# Patient Record
Sex: MALE | Race: BLACK OR AFRICAN AMERICAN | Employment: UNEMPLOYED | URBAN - METROPOLITAN AREA
[De-identification: names, ages, dates, MRNs, and addresses within clinical notes are randomized per-mention and may not be internally consistent; named-entity substitution may affect disease eponyms.]

---

## 2017-06-07 ENCOUNTER — OFFICE VISIT (OUTPATIENT)
Dept: FAMILY MEDICINE CLINIC | Facility: CLINIC | Age: 57
End: 2017-06-07

## 2017-06-07 VITALS
TEMPERATURE: 98.3 F | DIASTOLIC BLOOD PRESSURE: 76 MMHG | RESPIRATION RATE: 20 BRPM | WEIGHT: 267 LBS | SYSTOLIC BLOOD PRESSURE: 126 MMHG | HEIGHT: 62 IN | OXYGEN SATURATION: 96 % | HEART RATE: 63 BPM | BODY MASS INDEX: 49.13 KG/M2

## 2017-06-07 DIAGNOSIS — E11.9 CONTROLLED TYPE 2 DIABETES MELLITUS WITHOUT COMPLICATION, WITHOUT LONG-TERM CURRENT USE OF INSULIN (HCC): ICD-10-CM

## 2017-06-07 DIAGNOSIS — E78.5 HYPERLIPIDEMIA, UNSPECIFIED HYPERLIPIDEMIA TYPE: ICD-10-CM

## 2017-06-07 DIAGNOSIS — J20.9 ACUTE BRONCHITIS, UNSPECIFIED ORGANISM: Primary | ICD-10-CM

## 2017-06-07 DIAGNOSIS — E66.01 MORBID OBESITY, UNSPECIFIED OBESITY TYPE (HCC): ICD-10-CM

## 2017-06-07 DIAGNOSIS — R05.9 COUGH: ICD-10-CM

## 2017-06-07 RX ORDER — AZITHROMYCIN 250 MG/1
TABLET, FILM COATED ORAL
Qty: 6 TAB | Refills: 0 | Status: SHIPPED | OUTPATIENT
Start: 2017-06-07 | End: 2017-06-12

## 2017-06-07 RX ORDER — HYDROCODONE POLISTIREX AND CHLORPHENIRAMINE POLISTIREX 10; 8 MG/5ML; MG/5ML
5 SUSPENSION, EXTENDED RELEASE ORAL
Qty: 60 ML | Refills: 0 | Status: SHIPPED | OUTPATIENT
Start: 2017-06-07 | End: 2017-06-21

## 2017-06-07 RX ORDER — FLUTICASONE PROPIONATE 50 MCG
2 SPRAY, SUSPENSION (ML) NASAL DAILY
Qty: 1 BOTTLE | Refills: 6 | Status: SHIPPED | OUTPATIENT
Start: 2017-06-07 | End: 2017-06-21

## 2017-06-07 NOTE — PROGRESS NOTES
Chief Complaint   Patient presents with    Cough     congestion since Thursday. Patient complains of chest discomfort when coughing. Subjective:   Keivn Fortune is a 55 y.o. male who complains of coryza, congestion, sneezing and post nasal drip for 7 days, unchanged since that time. He denies a history of shortness of breath and wheezing. Evaluation to date: none. Treatment to date: OTC products. Patient does smoke cigarettes. Relevant PMH: DM.    Patient Active Problem List   Diagnosis Code    Pneumonia J18.9    Controlled type 2 diabetes mellitus without complication, without long-term current use of insulin ([facility] E11.9    Hyperlipidemia E78.5    Morbid obesity ([facility] E66.01     Patient Active Problem List    Diagnosis Date Noted    Controlled type 2 diabetes mellitus without complication, without long-term current use of insulin ([facility] 06/07/2017    Hyperlipidemia 06/07/2017    Morbid obesity ([facility] 06/07/2017    Pneumonia 03/30/2014     Current Outpatient Prescriptions   Medication Sig Dispense Refill    fluticasone (FLONASE) 50 mcg/actuation nasal spray 2 Sprays by Both Nostrils route daily. Administer to right and left nostrils. 1 Bottle 6    azithromycin (ZITHROMAX Z-BETO) 250 mg tablet Take as directed 6 Tab 0    chlorpheniramine-HYDROcodone (TUSSIONEX) 10-8 mg/5 mL suspension Take 5 mL by mouth every twelve (12) hours as needed for Cough. Max Daily Amount: 10 mL. Indications: COUGH 60 mL 0    albuterol sulfate (PROAIR RESPICLICK) 90 mcg/actuation aepb Take 2 Puffs by inhalation every four (4) hours as needed. 1 Inhaler 0    glipiZIDE (GLUCOTROL) 10 mg tablet Take 10 mg by mouth daily.  metFORMIN (GLUCOPHAGE) 1,000 mg tablet Take 1,000 mg by mouth two (2) times daily (with meals).  naproxen (NAPROSYN) 500 mg tablet Take 500 mg by mouth two (2) times daily (with meals).       pseudoephedrine CR (SUDAFED 12 HOUR) 120 mg CR tablet Take 1 Tab by mouth two (2) times daily as needed for Congestion. 14 Tab 0    allopurinol (ZYLOPRIM) 300 mg tablet        No Known Allergies  Past Medical History:   Diagnosis Date    Diabetes ([facility]     Morbid obesity ([facility]     Pneumonia     Sickle cell trait ([facility]      Past Surgical History:   Procedure Laterality Date    HX OTHER SURGICAL      pt reports gallstone removal     Family History   Problem Relation Age of Onset    Sickle Cell Trait Other     Diabetes Mother     Diabetes Sister     Diabetes Maternal Grandmother         Review of Systems  Pertinent items are noted in HPI. Objective:     Visit Vitals    /76 (BP 1 Location: Right arm, BP Patient Position: Sitting)    Pulse 63    Temp 98.3 °F (36.8 °C) (Oral)    Resp 20    Ht 5' 2\" (1.575 m)    Wt 267 lb (121.1 kg)    SpO2 96%    BMI 48.83 kg/m2     General:  alert, cooperative, no distress   Eyes: negative   Ears: normal TM's and external ear canals AU   Sinuses: Normal paranasal sinuses without tenderness   Mouth:  Lips, mucosa, and tongue normal. Teeth and gums normal   Neck: supple, symmetrical, trachea midline and no adenopathy. Heart: normal rate and regular rhythm, S1 and S2 normal, no murmurs noted. Lungs: clear to auscultation bilaterally, wheezes R base, L base   Abdomen: soft, non-tender. Bowel sounds normal. No masses,  no organomegaly        Assessment/Plan:   bronchitis  RTC prn. Daquan was seen today for cough. Diagnoses and all orders for this visit:    Acute bronchitis, unspecified organism  -     azithromycin (ZITHROMAX Z-BETO) 250 mg tablet; Take as directed  -     albuterol sulfate (PROAIR RESPICLICK) 90 mcg/actuation aepb; Take 2 Puffs by inhalation every four (4) hours as needed. Cough  -     fluticasone (FLONASE) 50 mcg/actuation nasal spray; 2 Sprays by Both Nostrils route daily. Administer to right and left nostrils. -     chlorpheniramine-HYDROcodone (TUSSIONEX) 10-8 mg/5 mL suspension;  Take 5 mL by mouth every twelve (12) hours as needed for Cough. Max Daily Amount: 10 mL. Indications: COUGH    Controlled type 2 diabetes mellitus without complication, without long-term current use of insulin (HCC)  -     HEMOGLOBIN A1C WITH EAG; Future  -     METABOLIC PANEL, COMPREHENSIVE; Future    Hyperlipidemia, unspecified hyperlipidemia type  -     LIPID PANEL; Future    Morbid obesity, unspecified obesity type  -     METABOLIC PANEL, COMPREHENSIVE; Future    Patient planning to return and establish at next visit. Will order labs for prior. I have discussed the diagnosis with the patient and the intended plan as seen in the above orders. The patient has received an after-visit summary and questions were answered concerning future plans. I have discussed medication side effects and warnings with the patient as well. I have reviewed the plan of care with the patient, accepted their input and they are in agreement with the treatment goals. Patient verbalizes understanding. Follow-up Disposition:  Return in 2 weeks (on 6/21/2017), or if symptoms worsen or fail to improve, for DM, Obesity. Rock Cardoza

## 2017-06-07 NOTE — MR AVS SNAPSHOT
Visit Information Date & Time Provider Department Dept. Phone Encounter #  
 6/7/2017 11:00 AM Tamiko Almonte MD [employer] [phone] [id number] Follow-up Instructions Return in 2 weeks (on 6/21/2017), or if symptoms worsen or fail to improve, for DM, Obesity. Upcoming Health Maintenance Date Due Hepatitis C Screening 1960 Pneumococcal 19-64 Medium Risk (1 of 1 - PPSV23) 9/1/1980 DTaP/Tdap/Td series (1 - Tdap) 9/1/1982 INFLUENZA AGE 9 TO ADULT 8/1/2017 COLONOSCOPY 9/26/2022 Allergies as of 6/7/2017  Review Complete On: 6/7/2017 By: Nely Lundberg LPN No Known Allergies Current Immunizations  Never Reviewed No immunizations on file. Not reviewed this visit You Were Diagnosed With   
  
 Codes Comments Acute bronchitis, unspecified organism    -  Primary ICD-10-CM: J20.9 ICD-9-CM: 466.0 Cough     ICD-10-CM: R05 ICD-9-CM: 786.2 Controlled type 2 diabetes mellitus without complication, without long-term current use of insulin [facility]     ICD-10-CM: E11.9 ICD-9-CM: 250.00 Hyperlipidemia, unspecified hyperlipidemia type     ICD-10-CM: E78.5 ICD-9-CM: 272.4 Morbid obesity, unspecified obesity type     ICD-10-CM: E66.01 
ICD-9-CM: 278.01 Vitals BP Pulse Temp Resp Height(growth percentile) Weight(growth percentile) 126/76 (BP 1 Location: Right arm, BP Patient Position: Sitting) 63 98.3 °F (36.8 °C) (Oral) 20 5' 2\" (1.575 m) 267 lb (121.1 kg) SpO2 BMI Smoking Status 96% 48.83 kg/m2 Current Every Day Smoker BMI and BSA Data Body Mass Index Body Surface Area  
 48.83 kg/m 2 2.3 m 2 Preferred Pharmacy Pharmacy Name Phone [id number] [address] [phone] Your Updated Medication List  
  
   
This list is accurate as of: 6/7/17 12:01 PM.  Always use your most recent med list.  
  
  
  
  
 albuterol sulfate 90 mcg/actuation Aepb Commonly known as:  Lorraine Hummer Take 2 Puffs by inhalation every four (4) hours as needed. allopurinol 300 mg tablet Commonly known as:  ZYLOPRIM  
  
 azithromycin 250 mg tablet Commonly known as:  Sherrel Gain Take as directed  
  
 chlorpheniramine-HYDROcodone 10-8 mg/5 mL suspension Commonly known as:  Alberta Arts Take 5 mL by mouth every twelve (12) hours as needed for Cough. Max Daily Amount: 10 mL. Indications: COUGH  
  
 fluticasone 50 mcg/actuation nasal spray Commonly known as:  Martha Aid 2 Sprays by Both Nostrils route daily. Administer to right and left nostrils. glipiZIDE 10 mg tablet Commonly known as:  Daphene Wills Take 10 mg by mouth daily. metFORMIN 1,000 mg tablet Commonly known as:  GLUCOPHAGE Take 1,000 mg by mouth two (2) times daily (with meals). naproxen 500 mg tablet Commonly known as:  NAPROSYN Take 500 mg by mouth two (2) times daily (with meals). pseudoephedrine  mg CR tablet Commonly known as:  SUDAFED 12 HOUR Take 1 Tab by mouth two (2) times daily as needed for Congestion. Prescriptions Printed Refills  
 chlorpheniramine-HYDROcodone (TUSSIONEX) 10-8 mg/5 mL suspension 0 Sig: Take 5 mL by mouth every twelve (12) hours as needed for Cough. Max Daily Amount: 10 mL. Indications: COUGH Class: Print Route: Oral  
  
Prescriptions Sent to Pharmacy Refills  
 fluticasone (FLONASE) 50 mcg/actuation nasal spray 6 Si Sprays by Both Nostrils route daily. Administer to right and left nostrils. Class: Normal  
 Pharmacy: [address] [place] [address] Ph #: [phone] Route: Both Nostrils  
 azithromycin (ZITHROMAX Z-EBTO) 250 mg tablet 0 Sig: Take as directed Class: Normal  
 Pharmacy: [address] [place] [address] Ph #: [phone] albuterol sulfate (PROAIR RESPICLICK) 90 mcg/actuation aepb 0 Sig: Take 2 Puffs by inhalation every four (4) hours as needed. Class: Normal  
 Pharmacy: [address] [place] [address] [facility] #: [phone] Route: Inhalation Follow-up Instructions Return in 2 weeks (on 6/21/2017), or if symptoms worsen or fail to improve, for DM, Obesity. To-Do List   
 06/07/2017 Lab:  HEMOGLOBIN A1C WITH EAG   
  
 06/07/2017 Lab:  LIPID PANEL   
  
 06/07/2017 Lab:  METABOLIC PANEL, COMPREHENSIVE Patient Instructions Upper Respiratory Infection (Cold): Care Instructions Your Care Instructions An upper respiratory infection, or URI, is an infection of the nose, sinuses, or throat. URIs are spread by coughs, sneezes, and direct contact. The common cold is the most frequent kind of URI. The flu and sinus infections are other kinds of URIs. Almost all URIs are caused by viruses. Antibiotics won't cure them. But you can treat most infections with home care. This may include drinking lots of fluids and taking over-the-counter pain medicine. You will probably feel better in 4 to 10 days. The doctor has checked you carefully, but problems can develop later. If you notice any problems or new symptoms, get medical treatment right away. Follow-up care is a key part of your treatment and safety. Be sure to make and go to all appointments, and call your doctor if you are having problems. It's also a good idea to know your test results and keep a list of the medicines you take. How can you care for yourself at home? · To prevent dehydration, drink plenty of fluids, enough so that your urine is light yellow or clear like water. Choose water and other caffeine-free clear liquids until you feel better. If you have kidney, heart, or liver disease and have to limit fluids, talk with your doctor before you increase the amount of fluids you drink. · Take an over-the-counter pain medicine, such as acetaminophen (Tylenol), ibuprofen (Advil, Motrin), or naproxen (Aleve). Read and follow all instructions on the label. · Before you use cough and cold medicines, check the label. These medicines may not be safe for young children or for people with certain health problems. · Be careful when taking over-the-counter cold or flu medicines and Tylenol at the same time. Many of these medicines have acetaminophen, which is Tylenol. Read the labels to make sure that you are not taking more than the recommended dose. Too much acetaminophen (Tylenol) can be harmful. · Get plenty of rest. 
· Do not smoke or allow others to smoke around you. If you need help quitting, talk to your doctor about stop-smoking programs and medicines. These can increase your chances of quitting for good. When should you call for help? Call 911 anytime you think you may need emergency care. For example, call if: 
· You have severe trouble breathing. Call your doctor now or seek immediate medical care if: 
· You seem to be getting much sicker. · You have new or worse trouble breathing. · You have a new or higher fever. · You have a new rash. Watch closely for changes in your health, and be sure to contact your doctor if: 
· You have a new symptom, such as a sore throat, an earache, or sinus pain. · You cough more deeply or more often, especially if you notice more mucus or a change in the color of your mucus. · You do not get better as expected. Where can you learn more? Go to http://shekhar-pk.info/. Enter Z960 in the search box to learn more about \"Upper Respiratory Infection (Cold): Care Instructions. \" Current as of: June 30, 2016 Content Version: 11.2 © 2517-6635 [employer].  Care instructions adapted under license by [employer] (which disclaims liability or warranty for this information). If you have questions about a medical condition or this instruction, always ask your healthcare professional. [address] any warranty or liability for your use of this information. Introducing [facility]! [facility] introduces Songdrop patient portal. Now you can access parts of your medical record, email your doctor's office, and request medication refills online. 1. In your internet browser, go to [email or web address] 2. Click on the First Time User? Click Here link in the Sign In box. You will see the New Member Sign Up page. 3. Enter your Songdrop Access Code exactly as it appears below. You will not need to use this code after youve completed the sign-up process. If you do not sign up before the expiration date, you must request a new code. · Songdrop Access Code: [email or web address] Expires: 9/5/2017 12:01 PM 
 
4. Enter the last four digits of your Social Security Number (xxxx) and Date of Birth (mm/dd/yyyy) as indicated and click Submit. You will be taken to the next sign-up page. 5. Create a Songdrop ID. This will be your Songdrop login ID and cannot be changed, so think of one that is secure and easy to remember. 6. Create a Songdrop password. You can change your password at any time. 7. Enter your Password Reset Question and Answer. This can be used at a later time if you forget your password. 8. Enter your e-mail address. You will receive e-mail notification when new information is available in [address] 9. Click Sign Up. You can now view and download portions of your medical record. 10. Click the Download Summary menu link to download a portable copy of your medical information. If you have questions, please visit the Frequently Asked Questions section of the Songdrop website. Remember, Songdrop is NOT to be used for urgent needs. For medical emergencies, dial 911. Now available from your iPhone and Android! Please provide this summary of care documentation to your next provider. Your primary care clinician is listed as Salina Hester. If you have any questions after today's visit, please call [phone].

## 2017-06-07 NOTE — PROGRESS NOTES
Chief Complaint   Patient presents with    Cough     congestion since Thursday. Patient complains of chest discomfort when coughing. 1. Have you been to the ER, urgent care clinic since your last visit? Hospitalized since your last visit? No    2. Have you seen or consulted any other health care providers outside of the [address] [place] [address] since your last visit? Include any pap smears or colon screening.  No

## 2017-06-07 NOTE — PATIENT INSTRUCTIONS

## 2017-06-16 LAB
ALBUMIN SERPL-MCNC: 4.5 G/DL (ref 3.5–5.5)
ALBUMIN/GLOB SERPL: 1.7 {RATIO} (ref 1.2–2.2)
ALP SERPL-CCNC: 89 IU/L (ref 39–117)
ALT SERPL-CCNC: 19 IU/L (ref 0–44)
AST SERPL-CCNC: 21 IU/L (ref 0–40)
BILIRUB SERPL-MCNC: 1 MG/DL (ref 0–1.2)
BUN SERPL-MCNC: 10 MG/DL (ref 6–24)
BUN/CREAT SERPL: 13 (ref 9–20)
CALCIUM SERPL-MCNC: 9 MG/DL (ref 8.7–10.2)
CHLORIDE SERPL-SCNC: 100 MMOL/L (ref 96–106)
CHOLEST SERPL-MCNC: 192 MG/DL (ref 100–199)
CO2 SERPL-SCNC: 23 MMOL/L (ref 18–29)
CREAT SERPL-MCNC: 0.79 MG/DL (ref 0.76–1.27)
EST. AVERAGE GLUCOSE BLD GHB EST-MCNC: 298 MG/DL
GLOBULIN SER CALC-MCNC: 2.6 G/DL (ref 1.5–4.5)
GLUCOSE SERPL-MCNC: 274 MG/DL (ref 65–99)
HBA1C MFR BLD: 12 % (ref 4.8–5.6)
HDLC SERPL-MCNC: 50 MG/DL
LDLC SERPL CALC-MCNC: 98 MG/DL (ref 0–99)
POTASSIUM SERPL-SCNC: 3.9 MMOL/L (ref 3.5–5.2)
PROT SERPL-MCNC: 7.1 G/DL (ref 6–8.5)
SODIUM SERPL-SCNC: 138 MMOL/L (ref 134–144)
TRIGL SERPL-MCNC: 221 MG/DL (ref 0–149)
VLDLC SERPL CALC-MCNC: 44 MG/DL (ref 5–40)

## 2017-06-20 DIAGNOSIS — J20.9 ACUTE BRONCHITIS, UNSPECIFIED ORGANISM: ICD-10-CM

## 2017-06-20 RX ORDER — ALBUTEROL SULFATE 90 UG/1
2 AEROSOL, METERED RESPIRATORY (INHALATION)
Qty: 1 INHALER | Refills: 0 | Status: SHIPPED | OUTPATIENT
Start: 2017-06-20 | End: 2017-10-18

## 2017-06-21 ENCOUNTER — OFFICE VISIT (OUTPATIENT)
Dept: FAMILY MEDICINE CLINIC | Facility: CLINIC | Age: 57
End: 2017-06-21

## 2017-06-21 VITALS
BODY MASS INDEX: 49.5 KG/M2 | DIASTOLIC BLOOD PRESSURE: 80 MMHG | TEMPERATURE: 98 F | OXYGEN SATURATION: 96 % | WEIGHT: 269 LBS | HEART RATE: 65 BPM | RESPIRATION RATE: 20 BRPM | SYSTOLIC BLOOD PRESSURE: 144 MMHG | HEIGHT: 62 IN

## 2017-06-21 DIAGNOSIS — M10.9 GOUT, UNSPECIFIED CAUSE, UNSPECIFIED CHRONICITY, UNSPECIFIED SITE: ICD-10-CM

## 2017-06-21 DIAGNOSIS — E66.01 MORBID OBESITY, UNSPECIFIED OBESITY TYPE (HCC): ICD-10-CM

## 2017-06-21 DIAGNOSIS — R03.0 ELEVATED BP WITHOUT DIAGNOSIS OF HYPERTENSION: ICD-10-CM

## 2017-06-21 DIAGNOSIS — E78.5 HYPERLIPIDEMIA, UNSPECIFIED HYPERLIPIDEMIA TYPE: ICD-10-CM

## 2017-06-21 DIAGNOSIS — Z71.6 TOBACCO ABUSE COUNSELING: ICD-10-CM

## 2017-06-21 DIAGNOSIS — Z11.3 SCREENING FOR STD (SEXUALLY TRANSMITTED DISEASE): ICD-10-CM

## 2017-06-21 DIAGNOSIS — Z72.0 TOBACCO ABUSE: ICD-10-CM

## 2017-06-21 DIAGNOSIS — E11.9 CONTROLLED TYPE 2 DIABETES MELLITUS WITHOUT COMPLICATION, WITHOUT LONG-TERM CURRENT USE OF INSULIN (HCC): Primary | ICD-10-CM

## 2017-06-21 LAB
MICROALBUMIN UR TEST STR-MCNC: 80 MG/L
MICROALBUMIN/CREAT RATIO POC: NORMAL MG/G

## 2017-06-21 RX ORDER — TIMOLOL MALEATE 5 MG/ML
SOLUTION OPHTHALMIC
Refills: 0 | COMMUNITY
Start: 2017-04-24

## 2017-06-21 RX ORDER — BRINZOLAMIDE/BRIMONIDINE TARTRATE 10; 2 MG/ML; MG/ML
1 SUSPENSION/ DROPS OPHTHALMIC
Refills: 0 | COMMUNITY
Start: 2017-04-25 | End: 2020-11-16 | Stop reason: ALTCHOICE

## 2017-06-21 RX ORDER — METFORMIN HYDROCHLORIDE 1000 MG/1
1000 TABLET ORAL 2 TIMES DAILY WITH MEALS
Qty: 60 TAB | Refills: 3 | Status: SHIPPED | OUTPATIENT
Start: 2017-06-21 | End: 2018-01-22 | Stop reason: SDUPTHER

## 2017-06-21 RX ORDER — GLIPIZIDE 5 MG/1
5 TABLET ORAL 2 TIMES DAILY
Qty: 60 TAB | Refills: 3 | Status: SHIPPED | OUTPATIENT
Start: 2017-06-21 | End: 2018-01-22 | Stop reason: ALTCHOICE

## 2017-06-21 RX ORDER — ALLOPURINOL 300 MG/1
300 TABLET ORAL DAILY
Qty: 30 TAB | Refills: 3 | Status: SHIPPED | OUTPATIENT
Start: 2017-06-21 | End: 2018-06-04 | Stop reason: SDUPTHER

## 2017-06-21 NOTE — MR AVS SNAPSHOT
Visit Information Date & Time Provider Department Dept. Phone Encounter #  
 6/21/2017  3:45 PM Roseline Pennington MD [facility] [id number] Follow-up Instructions Return in about 4 weeks (around 7/19/2017), or if symptoms worsen or fail to improve, for DM, Obesity, HLD. Upcoming Health Maintenance Date Due Hepatitis C Screening 1960 FOOT EXAM Q1 9/1/1971 MICROALBUMIN Q1 9/1/1971 EYE EXAM RETINAL OR DILATED Q1 9/1/1971 Pneumococcal 19-64 Medium Risk (1 of 1 - PPSV23) 9/1/1980 DTaP/Tdap/Td series (1 - Tdap) 9/1/1982 INFLUENZA AGE 9 TO ADULT 8/1/2017 HEMOGLOBIN A1C Q6M 12/15/2017 LIPID PANEL Q1 6/15/2018 COLONOSCOPY 9/26/2022 Allergies as of 6/21/2017  Review Complete On: 6/21/2017 By: Roseline Pennington MD  
 No Known Allergies Current Immunizations  Never Reviewed No immunizations on file. Not reviewed this visit You Were Diagnosed With   
  
 Codes Comments Controlled type 2 diabetes mellitus without complication, without long-term current use of insulin ([facility]    -  Primary ICD-10-CM: E11.9 ICD-9-CM: 250.00 Morbid obesity, unspecified obesity type     ICD-10-CM: E66.01 
ICD-9-CM: 278.01 Hyperlipidemia, unspecified hyperlipidemia type     ICD-10-CM: E78.5 ICD-9-CM: 272.4 Elevated BP without diagnosis of hypertension     ICD-10-CM: R03.0 ICD-9-CM: 796.2 Gout, unspecified cause, unspecified chronicity, unspecified site     ICD-10-CM: M10.9 ICD-9-CM: 274.9 Screening for STD (sexually transmitted disease)     ICD-10-CM: Z11.3 ICD-9-CM: V74.5 Vitals BP Pulse Temp Resp Height(growth percentile) Weight(growth percentile) 144/80 65 98 °F (36.7 °C) 20 5' 2\" (1.575 m) 269 lb (122 kg) SpO2 BMI Smoking Status 96% 49.2 kg/m2 Current Every Day Smoker Vitals History BMI and BSA Data Body Mass Index Body Surface Area 49.2 kg/m 2 2.31 m 2 Preferred Pharmacy Pharmacy Name Phone [id number] [address] [phone] Your Updated Medication List  
  
   
This list is accurate as of: 6/21/17  4:09 PM.  Always use your most recent med list.  
  
  
  
  
 albuterol 90 mcg/actuation inhaler Commonly known as:  PROVENTIL HFA, VENTOLIN HFA, PROAIR HFA Take 2 Puffs by inhalation every four (4) hours as needed for Wheezing. allopurinol 300 mg tablet Commonly known as:  [place] Calender Take 1 Tab by mouth daily. glipiZIDE 5 mg tablet Commonly known as:  [place] Fujita Take 1 Tab by mouth two (2) times a day. metFORMIN 1,000 mg tablet Commonly known as:  GLUCOPHAGE Take 1 Tab by mouth two (2) times daily (with meals). naproxen 500 mg tablet Commonly known as:  NAPROSYN Take 500 mg by mouth two (2) times daily (with meals). SIMBRINZA 1-0.2 % Drps Generic drug:  brinzolamide-brimonidine  
  
 timolol 0.5 % ophthalmic gel-forming Commonly known as:  TIMOPTIC-XE  
instill 1 drop into both eyes once daily Prescriptions Sent to Pharmacy Refills  
 allopurinol (ZYLOPRIM) 300 mg tablet 3 Sig: Take 1 Tab by mouth daily. Class: Normal  
 Pharmacy: [address] [place] [address] Ph #: [phone] Route: Oral  
 metFORMIN (GLUCOPHAGE) 1,000 mg tablet 3 Sig: Take 1 Tab by mouth two (2) times daily (with meals). Class: Normal  
 Pharmacy: [address] [place] [address] Ph #: [phone] Route: Oral  
 glipiZIDE (GLUCOTROL) 5 mg tablet 3 Sig: Take 1 Tab by mouth two (2) times a day. Class: Normal  
 Pharmacy: [address] [place] [address] Ph #: [phone] Route: Oral  
  
We Performed the Following AMB POC URINE, MICROALBUMIN, SEMIQUANTITATIVE [88363 CPT(R)]  DIABETES FOOT EXAM [7 Custom] Follow-up Instructions Return in about 4 weeks (around 7/19/2017), or if symptoms worsen or fail to improve, for DM, Obesity, HLD. To-Do List   
 06/21/2017 Lab:  HEP B SURFACE AG   
  
 06/21/2017 Lab:  HEPATITIS C AB   
  
 06/21/2017 Lab:  HIV 1/2 AG/AB, 4TH GENERATION,W RFLX CONFIRM   
  
 06/21/2017 Lab:  [facility] Patient Instructions Body Mass Index: Care Instructions Your Care Instructions Body mass index (BMI) can help you see if your weight is raising your risk for health problems. It uses a formula to compare how much you weigh with how tall you are. · A BMI lower than 18.5 is considered underweight. · A BMI between 18.5 and 24.9 is considered healthy. · A BMI between 25 and 29.9 is considered overweight. A BMI of 30 or higher is considered obese. If your BMI is in the normal range, it means that you have a lower risk for weight-related health problems. If your BMI is in the overweight or obese range, you may be at increased risk for weight-related health problems, such as high blood pressure, heart disease, stroke, arthritis or joint pain, and diabetes. If your BMI is in the underweight range, you may be at increased risk for health problems such as fatigue, lower protection (immunity) against illness, muscle loss, bone loss, hair loss, and hormone problems. BMI is just one measure of your risk for weight-related health problems. You may be at higher risk for health problems if you are not active, you eat an unhealthy diet, or you drink too much alcohol or use tobacco products. Follow-up care is a key part of your treatment and safety. Be sure to make and go to all appointments, and call your doctor if you are having problems. It's also a good idea to know your test results and keep a list of the medicines you take. How can you care for yourself at home? · Practice healthy eating habits.  This includes eating plenty of fruits, vegetables, whole grains, lean protein, and low-fat dairy. · If your doctor recommends it, get more exercise. Walking is a good choice. Bit by bit, increase the amount you walk every day. Try for at least 30 minutes on most days of the week. · Do not smoke. Smoking can increase your risk for health problems. If you need help quitting, talk to your doctor about stop-smoking programs and medicines. These can increase your chances of quitting for good. · Limit alcohol to 2 drinks a day for men and 1 drink a day for women. Too much alcohol can cause health problems. If you have a BMI higher than 25 · Your doctor may do other tests to check your risk for weight-related health problems. This may include measuring the distance around your waist. A waist measurement of more than 40 inches in men or 35 inches in women can increase the risk of weight-related health problems. · Talk with your doctor about steps you can take to stay healthy or improve your health. You may need to make lifestyle changes to lose weight and stay healthy, such as changing your diet and getting regular exercise. If you have a BMI lower than 18.5 · Your doctor may do other tests to check your risk for health problems. · Talk with your doctor about steps you can take to stay healthy or improve your health. You may need to make lifestyle changes to gain or maintain weight and stay healthy, such as getting more healthy foods in your diet and doing exercises to build muscle. Where can you learn more? Go to http://shekhar-pk.info/. Enter S176 in the search box to learn more about \"Body Mass Index: Care Instructions. \" Current as of: January 23, 2017 Content Version: 11.3 © 6136-2853 Healthwise, Incorporated. Care instructions adapted under license by [employer] (which disclaims liability or warranty for this information).  If you have questions about a medical condition or this instruction, always ask your healthcare professional. [place] [address] any warranty or liability for your use of this information. Learning About Diabetes Food Guidelines Your Care Instructions Meal planning is important to manage diabetes. It helps keep your blood sugar at a target level (which you set with your doctor). You don't have to eat special foods. You can eat what your family eats, including sweets once in a while. But you do have to pay attention to how often you eat and how much you eat of certain foods. You may want to work with a dietitian or a certified diabetes educator (CDE) to help you plan meals and snacks. A dietitian or CDE can also help you lose weight if that is one of your goals. What should you know about eating carbs? Managing the amount of carbohydrate (carbs) you eat is an important part of healthy meals when you have diabetes. Carbohydrate is found in many foods. · Learn which foods have carbs. And learn the amounts of carbs in different foods. ¨ Bread, cereal, pasta, and rice have about 15 grams of carbs in a serving. A serving is 1 slice of bread (1 ounce), ½ cup of cooked cereal, or 1/3 cup of cooked pasta or rice. ¨ Fruits have 15 grams of carbs in a serving. A serving is 1 small fresh fruit, such as an apple or orange; ½ of a banana; ½ cup of cooked or canned fruit; ½ cup of fruit juice; 1 cup of melon or raspberries; or 2 tablespoons of dried fruit. ¨ Milk and no-sugar-added yogurt have 15 grams of carbs in a serving. A serving is 1 cup of milk or 2/3 cup of no-sugar-added yogurt. ¨ Starchy vegetables have 15 grams of carbs in a serving. A serving is ½ cup of mashed potatoes or sweet potato; 1 cup winter squash; ½ of a small baked potato; ½ cup of cooked beans; or ½ cup cooked corn or green peas. · Learn how much carbs to eat each day and at each meal. A dietitian or CDE can teach you how to keep track of the amount of carbs you eat.  This is called carbohydrate counting. · If you are not sure how to count carbohydrate grams, use the Plate Method to plan meals. It is a good, quick way to make sure that you have a balanced meal. It also helps you spread carbs throughout the day. ¨ Divide your plate by types of foods. Put non-starchy vegetables on half the plate, meat or other protein food on one-quarter of the plate, and a grain or starchy vegetable in the final quarter of the plate. To this you can add a small piece of fruit and 1 cup of milk or yogurt, depending on how many carbs you are supposed to eat at a meal. 
· Try to eat about the same amount of carbs at each meal. Do not \"save up\" your daily allowance of carbs to eat at one meal. 
· Proteins have very little or no carbs per serving. Examples of proteins are beef, chicken, turkey, fish, eggs, tofu, cheese, cottage cheese, and peanut butter. A serving size of meat is 3 ounces, which is about the size of a deck of cards. Examples of meat substitute serving sizes (equal to 1 ounce of meat) are 1/4 cup of cottage cheese, 1 egg, 1 tablespoon of peanut butter, and ½ cup of tofu. How can you eat out and still eat healthy? · Learn to estimate the serving sizes of foods that have carbohydrate. If you measure food at home, it will be easier to estimate the amount in a serving of restaurant food. · If the meal you order has too much carbohydrate (such as potatoes, corn, or baked beans), ask to have a low-carbohydrate food instead. Ask for a salad or green vegetables. · If you use insulin, check your blood sugar before and after eating out to help you plan how much to eat in the future. · If you eat more carbohydrate at a meal than you had planned, take a walk or do other exercise. This will help lower your blood sugar. What else should you know? · Limit saturated fat, such as the fat from meat and dairy products.  This is a healthy choice because people who have diabetes are at higher risk of heart disease. So choose lean cuts of meat and nonfat or low-fat dairy products. Use olive or canola oil instead of butter or shortening when cooking. · Don't skip meals. Your blood sugar may drop too low if you skip meals and take insulin or certain medicines for diabetes. · Check with your doctor before you drink alcohol. Alcohol can cause your blood sugar to drop too low. Alcohol can also cause a bad reaction if you take certain diabetes medicines. Follow-up care is a key part of your treatment and safety. Be sure to make and go to all appointments, and call your doctor if you are having problems. It's also a good idea to know your test results and keep a list of the medicines you take. Where can you learn more? Go to http://shekhar-pk.info/. Enter Y060 in the search box to learn more about \"Learning About Diabetes Food Guidelines. \" Current as of: March 13, 2017 Content Version: 11.3 © 5100-5609 [employer]. Care instructions adapted under license by [employer] (which disclaims liability or warranty for this information). If you have questions about a medical condition or this instruction, always ask your healthcare professional. [address] any warranty or liability for your use of this information. Learning About Meal Planning for Diabetes Why plan your meals? Meal planning can be a key part of managing diabetes. Planning meals and snacks with the right balance of carbohydrate, protein, and fat can help you keep your blood sugar at the target level you set with your doctor. You don't have to eat special foods. You can eat what your family eats, including sweets once in a while. But you do have to pay attention to how often you eat and how much you eat of certain foods. You may want to work with a dietitian or a certified diabetes educator.  He or she can give you tips and meal ideas and can answer your questions about meal planning. This health professional can also help you reach a healthy weight if that is one of your goals. What plan is right for you? Your dietitian or diabetes educator may suggest that you start with the plate format or carbohydrate counting. The plate format The plate format is a simple way to help you manage how you eat. You plan meals by learning how much space each food should take on a plate. Using the plate format helps you spread carbohydrate throughout the day. It can make it easier to keep your blood sugar level within your target range. It also helps you see if you're eating healthy portion sizes. To use the plate format, you put non-starchy vegetables on half your plate. Add meat or meat substitutes on one-quarter of the plate. Put a grain or starchy vegetable (such as brown rice or a potato) on the final quarter of the plate. You can add a small piece of fruit and some low-fat or fat-free milk or yogurt, depending on your carbohydrate goal for each meal. 
Here are some tips for using the plate format: · Make sure that you are not using an oversized plate. A 9-inch plate is best. Many restaurants use larger plates. · Get used to using the plate format at home. Then you can use it when you eat out. · Write down your questions about using the plate format. Talk to your doctor, a dietitian, or a diabetes educator about your concerns. Carbohydrate counting With carbohydrate counting, you plan meals based on the amount of carbohydrate in each food. Carbohydrate raises blood sugar higher and more quickly than any other nutrient. It is found in desserts, breads and cereals, and fruit. It's also found in starchy vegetables such as potatoes and corn, grains such as rice and pasta, and milk and yogurt. Spreading carbohydrate throughout the day helps keep your blood sugar levels within your target range.  
Your daily amount depends on several things, including your weight, how active you are, which diabetes medicines you take, and what your goals are for your blood sugar levels. A registered dietitian or diabetes educator can help you plan how much carbohydrate to include in each meal and snack. A guideline for your daily amount of carbohydrate is: · 45 to 60 grams at each meal. That's about the same as 3 to 4 carbohydrate servings. · 15 to 20 grams at each snack. That's about the same as 1 carbohydrate serving. The Nutrition Facts label on packaged foods tells you how much carbohydrate is in a serving of the food. First, look at the serving size on the food label. Is that the amount you eat in a serving? All of the nutrition information on a food label is based on that serving size. So if you eat more or less than that, you'll need to adjust the other numbers. Total carbohydrate is the next thing you need to look for on the label. If you count carbohydrate servings, one serving of carbohydrate is 15 grams. For foods that don't come with labels, such as fresh fruits and vegetables, you'll need a guide that lists carbohydrate in these foods. Ask your doctor, dietitian, or diabetes educator about books or other nutrition guides you can use. If you take insulin, you need to know how many grams of carbohydrate are in a meal. This lets you know how much rapid-acting insulin to take before you eat. If you use an insulin pump, you get a constant rate of insulin during the day. So the pump must be programmed at meals to give you extra insulin to cover the rise in blood sugar after meals. When you know how much carbohydrate you will eat, you can take the right amount of insulin. Or, if you always use the same amount of insulin, you need to make sure that you eat the same amount of carbohydrate at meals. If you need more help to understand carbohydrate counting and food labels, ask your doctor, dietitian, or diabetes educator. How do you get started with meal planning? Here are some tips to get started: 
· Plan your meals a week at a time. Don't forget to include snacks too. · Use cookbooks or online recipes to plan several main meals. Plan some quick meals for busy nights. You also can double some recipes that freeze well. Then you can save half for other busy nights when you don't have time to cook. · Make sure you have the ingredients you need for your recipes. If you're running low on basic items, put these items on your shopping list too. · List foods that you use to make breakfasts, lunches, and snacks. List plenty of fruits and vegetables. · Post this list on the refrigerator. Add to it as you think of more things you need. · Take the list to the store to do your weekly shopping. Follow-up care is a key part of your treatment and safety. Be sure to make and go to all appointments, and call your doctor if you are having problems. It's also a good idea to know your test results and keep a list of the medicines you take. Where can you learn more? Go to http://shekharKylin Networkpk.info/. Abena Patel in the search box to learn more about \"Learning About Meal Planning for Diabetes. \" Current as of: March 13, 2017 Content Version: 11.3 © 7373-0755 [employer]. Care instructions adapted under license by [employer] (which disclaims liability or warranty for this information). If you have questions about a medical condition or this instruction, always ask your healthcare professional. [address] any warranty or liability for your use of this information. Nutrition Tips for Diabetes: After Your Visit Your Care Instructions A healthy diet is important to manage diabetes. It helps you lose weight (if you need to) and keep it off. It gives you the nutrition and energy your body needs and helps prevent heart disease. But a diet for diabetes does not mean that you have to eat special foods.  You can eat what your family eats, including occasional sweets and other favorites. But you do have to pay attention to how often you eat and how much you eat of certain foods. The right plan for you will give you meals that help you keep your blood sugar at healthy levels. Try to eat a variety of foods and to spread carbohydrate throughout the day. Carbohydrate raises blood sugar higher and more quickly than any other nutrient does. Carbohydrate is found in sugar, breads and cereals, fruit, starchy vegetables such as potatoes and corn, and milk and yogurt. You may want to work with a dietitian or diabetes educator to help you plan meals and snacks. A dietitian or diabetes educator also can help you lose weight if that is one of your goals. The following tips can help you enjoy your meals and stay healthy. Follow-up care is a key part of your treatment and safety. Be sure to make and go to all appointments, and call your doctor if you are having problems. Its also a good idea to know your test results and keep a list of the medicines you take. How can you care for yourself at home? · Learn which foods have carbohydrate and how much carbohydrate to eat. A dietitian or diabetes educator can help you learn to keep track of how much carbohydrate you eat. · Spread carbohydrate throughout the day. Eat some carbohydrate at all meals, but do not eat too much at any one time. · Plan meals to include food from all the food groups. These are the food groups and some example portion sizes: ¨ Grains: 1 slice of bread (1 ounce), ½ cup of cooked cereal, and 1/3 cup of cooked pasta or rice. These have about 15 grams of carbohydrate in a serving. Choose whole grains such as whole wheat bread or crackers, oatmeal, and brown rice more often than refined grains.  
¨ Fruit: 1 small fresh fruit, such as an apple or orange; ½ of a banana; ½ cup of chopped, cooked, or canned fruit; ½ cup of fruit juice; 1 cup of melon or raspberries; and 2 tablespoons of dried fruit. These have about 15 grams of carbohydrate in a serving. ¨ Dairy: 1 cup of nonfat or low-fat milk and 2/3 cup of plain yogurt. These have about 15 grams of carbohydrate in a serving. ¨ Protein foods: Beef, chicken, turkey, fish, eggs, tofu, cheese, cottage cheese, and peanut butter. A serving size of meat is 3 ounces, which is about the size of a deck of cards. Examples of meat substitute serving sizes (equal to 1 ounce of meat) are 1/4 cup of cottage cheese, 1 egg, 1 tablespoon of peanut butter, and ½ cup of tofu. These have very little or no carbohydrate per serving. ¨ Vegetables: Starchy vegetables such as ½ cup of cooked dried beans, peas, potatoes, or corn have about 15 grams of carbohydrate. Nonstarchy vegetables have very little carbohydrate, such as 1 cup of raw leafy vegetables (such as spinach), ½ cup of other vegetables (cooked or chopped), and 3/4 cup of vegetable juice. · Use the plate format to plan meals. It is a good, quick way to make sure that you have a balanced meal. It also helps you spread carbohydrate throughout the day. You divide your plate by types of foods. Put vegetables on half the plate, meat or meat substitutes on one-quarter of the plate, and a grain or starchy vegetable (such as brown rice or a potato) in the final quarter of the plate. To this you can add a small piece of fruit and 1 cup of milk or yogurt, depending on how much carbohydrate you are supposed to eat at a meal. 
· Talk to your dietitian or diabetes educator about ways to add limited amounts of sweets into your meal plan. You can eat these foods now and then, as long as you include the amount of carbohydrate they have in your daily carbohydrate allowance. · If you drink alcohol, limit it to no more than 1 drink a day for women and 2 drinks a day for men. If you are pregnant, no amount of alcohol is known to be safe. · Protein, fat, and fiber do not raise blood sugar as much as carbohydrate does. If you eat a lot of these nutrients in a meal, your blood sugar will rise more slowly than it would otherwise. · Limit saturated fats, such as those from meat and dairy products. Try to replace it with monounsaturated fat, such as olive oil. This is a healthier choice because people who have diabetes are at higher-than-average risk of heart disease. But use a modest amount of olive oil. A tablespoon of olive oil has 14 grams of fat and 120 calories. · Exercise lowers blood sugar. If you take insulin by shots or pump, you can use less than you would if you were not exercising. Keep in mind that timing matters. If you exercise within 1 hour after a meal, your body may need less insulin for that meal than it would if you exercised 3 hours after the meal. Test your blood sugar to find out how exercise affects your need for insulin. · Exercise on most days of the week. Aim for at least 30 minutes. Exercise helps you stay at a healthy weight and helps your body use insulin. Walking is an easy way to get exercise. Gradually increase the amount you walk every day. You also may want to swim, bike, or do other activities. When you eat out · Learn to estimate the serving sizes of foods that have carbohydrate. If you measure food at home, it will be easier to estimate the amount in a serving of restaurant food. · If the meal you order has too much carbohydrate (such as potatoes, corn, or baked beans), ask to have a low-carbohydrate food instead. Ask for a salad or green vegetables. · If you use insulin, check your blood sugar before and after eating out to help you plan how much to eat in the future. · If you eat more carbohydrate at a meal than you had planned, take a walk or do other exercise. This will help lower your blood sugar. Where can you learn more? Go to DealExplorer.be Enter O671 in the search box to learn more about \"Nutrition Tips for Diabetes: After Your Visit. \"  
© 2212-1643 Healthwise, [employer]orporated. Care instructions adapted under license by [employer] (which disclaims liability or warranty for this information). This care instruction is for use with your licensed healthcare professional. If you have questions about a medical condition or this instruction, always ask your healthcare professional. [place] [address] any warranty or liability for your use of this information. Content Version: 56.8.840359; Current as of: June 4, 2014 High Cholesterol: Care Instructions Your Care Instructions Cholesterol is a type of fat in your blood. It is needed for many body functions, such as making new cells. Cholesterol is made by your body. It also comes from food you eat. High cholesterol means that you have too much of the fat in your blood. This raises your risk of a heart attack and stroke. LDL and HDL are part of your total cholesterol. LDL is the \"bad\" cholesterol. High LDL can raise your risk for heart disease, heart attack, and stroke. HDL is the \"good\" cholesterol. It helps clear bad cholesterol from the body. High HDL is linked with a lower risk of heart disease, heart attack, and stroke. Your cholesterol levels help your doctor find out your risk for having a heart attack or stroke. You and your doctor can talk about whether you need to lower your risk and what treatment is best for you. A heart-healthy lifestyle along with medicines can help lower your cholesterol and your risk. The way you choose to lower your risk will depend on how high your risk is for heart attack and stroke. It will also depend on how you feel about taking medicines. Follow-up care is a key part of your treatment and safety.  Be sure to make and go to all appointments, and call your doctor if you are having problems. It's also a good idea to know your test results and keep a list of the medicines you take. How can you care for yourself at home? · Eat a variety of foods every day. Good choices include fruits, vegetables, whole grains (like oatmeal), dried beans and peas, nuts and seeds, soy products (like tofu), and fat-free or low-fat dairy products. · Replace butter, margarine, and hydrogenated or partially hydrogenated oils with olive and canola oils. (Canola oil margarine without trans fat is fine.) · Replace red meat with fish, poultry, and soy protein (like tofu). · Limit processed and packaged foods like chips, crackers, and cookies. · Bake, broil, or steam foods. Don't up them. · Be physically active. Get at least 30 minutes of exercise on most days of the week. Walking is a good choice. You also may want to do other activities, such as running, swimming, cycling, or playing tennis or team sports. · Stay at a healthy weight or lose weight by making the changes in eating and physical activity listed above. Losing just a small amount of weight, even 5 to 10 pounds, can reduce your risk for having a heart attack or stroke. · Do not smoke. When should you call for help? Watch closely for changes in your health, and be sure to contact your doctor if: 
· You need help making lifestyle changes. · You have questions about your medicine. Where can you learn more? Go to http://shekhar-pk.info/. Enter J103 in the search box to learn more about \"High Cholesterol: Care Instructions. \" Current as of: April 3, 2017 Content Version: 11.3 © 1369-5578 [email or web address]. Care instructions adapted under license by [employer] (which disclaims liability or warranty for this information).  If you have questions about a medical condition or this instruction, always ask your healthcare professional. [address] any warranty or liability for your use of this information. Introducing [facility]! Christopher Ruelas introduces [employer] patient portal. Now you can access parts of your medical record, email your doctor's office, and request medication refills online. 1. In your internet browser, go to [email or web address] 2. Click on the First Time User? Click Here link in the Sign In box. You will see the New Member Sign Up page. 3. Enter your [employer] Access Code exactly as it appears below. You will not need to use this code after youve completed the sign-up process. If you do not sign up before the expiration date, you must request a new code. · [employer] Access Code: [email or web address] Expires: 9/5/2017 12:01 PM 
 
4. Enter the last four digits of your Social Security Number (xxxx) and Date of Birth (mm/dd/yyyy) as indicated and click Submit. You will be taken to the next sign-up page. 5. Create a [employer] ID. This will be your [employer] login ID and cannot be changed, so think of one that is secure and easy to remember. 6. Create a [employer] password. You can change your password at any time. 7. Enter your Password Reset Question and Answer. This can be used at a later time if you forget your password. 8. Enter your e-mail address. You will receive e-mail notification when new information is available in [address] 9. Click Sign Up. You can now view and download portions of your medical record. 10. Click the Download Summary menu link to download a portable copy of your medical information. If you have questions, please visit the Frequently Asked Questions section of the [employer] website. Remember, [employer] is NOT to be used for urgent needs. For medical emergencies, dial 911. Now available from your iPhone and Android! Please provide this summary of care documentation to your next provider. Your primary care clinician is listed as Maryjane Yeung.  If you have any questions after today's visit, please call [phone].

## 2017-06-21 NOTE — PROGRESS NOTES
Chief Complaint   Patient presents with    Diabetes    Obesity     HPI:  Subjective:       Chief Complaint  The patient presents for follow up of diabetes, hypertension and high cholesterol. HPI  Daquan Lamas is a 55 y.o. male seen for follow up of diabetes. Healso has hypertension and hyperlipidemia. Diabetes poorly controlled, needs improvement, hypertension needs further observation, hyperlipidemia poorly controlled. Diet and Lifestyle: not attempting to follow a low fat, low cholesterol diet, not attempting to follow a low sodium diet, does not rigorously follow a diabetic diet, sedentary, smoker 0.25ppd    Home BP Monitoring: is not measured at home. Diabetic Review of Systems - medication compliance: noncompliant much of the time, diabetic diet compliance: noncompliant much of the time, home glucose monitoring: is not performed. Discussed the patient's BMI with him. The BMI follow up plan is as follows: I have counseled this patient on diet and exercise regimens. Tobacco abuse smoking 0.25ppd, planning to quit on own  Hx of gout needs refill on allopurinol    Review of Systems   Constitutional: Negative for chills, fever and weight loss. Eyes: Negative for blurred vision, double vision and photophobia. Respiratory: Negative for cough, shortness of breath and wheezing. Cardiovascular: Negative for chest pain, palpitations and orthopnea. Gastrointestinal: Negative for abdominal pain, constipation, diarrhea, nausea and vomiting. Genitourinary: Positive for frequency. Negative for dysuria, flank pain and hematuria. Skin: Negative for itching and rash. Neurological: Positive for headaches. Negative for tingling, sensory change, speech change and focal weakness. Psychiatric/Behavioral: Negative for suicidal ideas.          No Known Allergies  Past Medical History:   Diagnosis Date    Diabetes ([facility]     Morbid obesity ([facility]     Pneumonia     Sickle cell trait ([facility] Past Surgical History:   Procedure Laterality Date    HX OTHER SURGICAL      pt reports gallstone removal     Family History   Problem Relation Age of Onset    Sickle Cell Trait Other     Diabetes Mother     Diabetes Sister     Diabetes Maternal Grandmother      History   Smoking Status    Current Every Day Smoker    Packs/day: 0.25    Years: 30.00   Smokeless Tobacco    Never Used     Social History     Social History    Marital status: SINGLE     Spouse name: N/A    Number of children: N/A    Years of education: N/A     Occupational History    Not on file. Social History Main Topics    Smoking status: Current Every Day Smoker     Packs/day: 0.25     Years: 30.00    Smokeless tobacco: Never Used    Alcohol use No    Drug use: No    Sexual activity: Yes     Partners: Female     Birth control/ protection: Condom     Other Topics Concern    Not on file     Social History Narrative         OBJECTIVE:  Visit Vitals    /80    Pulse 65    Temp 98 °F (36.7 °C)    Resp 20    Ht 5' 2\" (1.575 m)    Wt 269 lb (122 kg)    SpO2 96%    BMI 49.2 kg/m2     PHYSICAL EXAM:  Physical Exam   Constitutional: He is oriented to person, place, and time. He appears well-developed and well-nourished. HENT:   Head: Normocephalic and atraumatic. Eyes: Conjunctivae are normal. Pupils are equal, round, and reactive to light. Cardiovascular: Normal rate, regular rhythm and intact distal pulses. Pulmonary/Chest: Effort normal and breath sounds normal. No respiratory distress. He has no wheezes. He has no rales. Abdominal: Soft. Bowel sounds are normal. He exhibits no distension. There is no tenderness. Musculoskeletal: He exhibits no edema. Lymphadenopathy:     He has no cervical adenopathy. Neurological: He is alert and oriented to person, place, and time. Skin: Skin is warm. Psychiatric: He has a normal mood and affect.  His behavior is normal.           Diabetic foot exam performed by Celestina Durand MD     Measurement  Response Nurse Comment Physician Comment   Monofilament  R - normal sensation with micro filament  L - normal sensation with micro filament     Pulse DP R - present  L - present     Pulse TP R - present  L - present     Structural deformity R - None  L - None     Skin Integrity / Deformity R - None  L - None        Reviewed by:             ASSESSMENT/PLAN:  Daquan was seen today for diabetes and obesity. Diagnoses and all orders for this visit:    Controlled type 2 diabetes mellitus without complication, without long-term current use of insulin (HCC)  -     AMB POC URINE, MICROALBUMIN, SEMIQUANTITATIVE  -     metFORMIN (GLUCOPHAGE) 1,000 mg tablet; Take 1 Tab by mouth two (2) times daily (with meals). -     glipiZIDE (GLUCOTROL) 5 mg tablet; Take 1 Tab by mouth two (2) times a day. -      DIABETES FOOT EXAM    Morbid obesity, unspecified obesity type    Hyperlipidemia, unspecified hyperlipidemia type    Elevated BP without diagnosis of hypertension    Gout, unspecified cause, unspecified chronicity, unspecified site  -     allopurinol (ZYLOPRIM) 300 mg tablet; Take 1 Tab by mouth daily. Screening for STD (sexually transmitted disease)  -     HEPATITIS C AB; Future  -     HEP B SURFACE AG; Future  -     RPR; Future  -     HIV 1/2 AG/AB, 4TH GENERATION,W RFLX CONFIRM; Future    Tobacco abuse    Tobacco abuse counseling     Discussed labs with patient. Patient will call with test strips information as he needs refill. I have discussed the diagnosis with the patient and the intended plan as seen in the above orders. The patient has received an after-visit summary and questions were answered concerning future plans. I have discussed medication side effects and warnings with the patient as well. I have reviewed the plan of care with the patient, accepted their input and they are in agreement with the treatment goals.  Patient verbalizes understanding. Follow-up Disposition:  Return in about 4 weeks (around 7/19/2017), or if symptoms worsen or fail to improve, for DM, Obesity, HLD.

## 2017-07-15 LAB
HBV SURFACE AG SERPL QL IA: NEGATIVE
HCV AB S/CO SERPL IA: <0.1 S/CO RATIO (ref 0–0.9)
HIV 1+2 AB+HIV1 P24 AG SERPL QL IA: NON REACTIVE
RPR SER QL: NON REACTIVE

## 2017-07-19 ENCOUNTER — OFFICE VISIT (OUTPATIENT)
Dept: FAMILY MEDICINE CLINIC | Facility: CLINIC | Age: 57
End: 2017-07-19

## 2017-07-19 VITALS
HEIGHT: 62 IN | HEART RATE: 62 BPM | BODY MASS INDEX: 49.98 KG/M2 | TEMPERATURE: 98.1 F | OXYGEN SATURATION: 97 % | DIASTOLIC BLOOD PRESSURE: 64 MMHG | SYSTOLIC BLOOD PRESSURE: 127 MMHG | WEIGHT: 271.6 LBS | RESPIRATION RATE: 20 BRPM

## 2017-07-19 DIAGNOSIS — E11.9 CONTROLLED TYPE 2 DIABETES MELLITUS WITHOUT COMPLICATION, WITHOUT LONG-TERM CURRENT USE OF INSULIN (HCC): Primary | ICD-10-CM

## 2017-07-19 LAB — GLUCOSE DOSE-GTT, POCT, GLDSPOCT: 126

## 2017-07-19 RX ORDER — LANCETS
EACH MISCELLANEOUS
Qty: 90 EACH | Refills: 11 | Status: SHIPPED | OUTPATIENT
Start: 2017-07-19

## 2017-07-19 RX ORDER — INSULIN PUMP SYRINGE, 3 ML
EACH MISCELLANEOUS 3 TIMES DAILY
COMMUNITY
End: 2017-07-19 | Stop reason: SDUPTHER

## 2017-07-19 RX ORDER — INSULIN PUMP SYRINGE, 3 ML
EACH MISCELLANEOUS DAILY
Qty: 1 KIT | Refills: 0 | Status: SHIPPED | OUTPATIENT
Start: 2017-07-19

## 2017-07-19 NOTE — MR AVS SNAPSHOT
Visit Information Date & Time Provider Department Dept. Phone Encounter #  
 7/19/2017 10:45 AM Ghazal Guillory MD [employer]  Follow-up Instructions Return in about 3 months (around 10/19/2017), or if symptoms worsen or fail to improve, for DM, Obesity, ED. Your Appointments 8/2/2017 10:15 AM  
Nurse Visit with Ghazal Guillory MD  
[employer] ([facility]) Appt Note: BP check 14 [facility] [address] [facility] [address]  
[phone]  
  
   
 14 [address] Upcoming Health Maintenance Date Due  
 EYE EXAM RETINAL OR DILATED Q1 9/1/1971 Pneumococcal 19-64 Medium Risk (1 of 1 - PPSV23) 9/1/1980 DTaP/Tdap/Td series (1 - Tdap) 9/1/1982 INFLUENZA AGE 9 TO ADULT 8/1/2017 HEMOGLOBIN A1C Q6M 12/15/2017 LIPID PANEL Q1 6/15/2018 FOOT EXAM Q1 6/21/2018 MICROALBUMIN Q1 6/21/2018 COLONOSCOPY 9/26/2022 Allergies as of 7/19/2017  Review Complete On: 7/19/2017 By: Ghazal Guillory MD  
 No Known Allergies Current Immunizations  Never Reviewed No immunizations on file. Not reviewed this visit You Were Diagnosed With   
  
 Codes Comments Controlled type 2 diabetes mellitus without complication, without long-term current use of insulin ([facility].)    -  Primary ICD-10-CM: E11.9 ICD-9-CM: 250.00 Vitals BP Pulse Temp Resp Height(growth percentile) Weight(growth percentile) 127/64 (BP 1 Location: Left arm, BP Patient Position: Sitting) 62 98.1 °F (36.7 °C) (Oral) 20 5' 2\" (1.575 m) 271 lb 9.6 oz (123.2 kg) SpO2 BMI Smoking Status 97% 49.68 kg/m2 Current Every Day Smoker Vitals History BMI and BSA Data Body Mass Index Body Surface Area  
 49.68 kg/m 2 2.32 m 2 Preferred Pharmacy Pharmacy Name Phone [facility]/PHARMACY #[id number]- [address] [address] [phone] Your Updated Medication List  
  
   
This list is accurate as of: 7/19/17 11:29 AM.  Always use your most recent med list.  
  
  
  
  
 albuterol 90 mcg/actuation inhaler Commonly known as:  PROVENTIL HFA, VENTOLIN HFA, PROAIR HFA Take 2 Puffs by inhalation every four (4) hours as needed for Wheezing. allopurinol 300 mg tablet Commonly known as:  Geradine Colder Take 1 Tab by mouth daily. Blood-Glucose Meter monitoring kit  
by Does Not Apply route daily. glipiZIDE 5 mg tablet Commonly known as:  Pietro Fragmin Take 1 Tab by mouth two (2) times a day. glucose blood VI test strips strip Commonly known as:  TRUETEST TEST STRIPS  
by Does Not Apply route three (3) times daily. Lancets Misc Use as directed 3 times a day  
  
 metFORMIN 1,000 mg tablet Commonly known as:  GLUCOPHAGE Take 1 Tab by mouth two (2) times daily (with meals). naproxen 500 mg tablet Commonly known as:  NAPROSYN Take 500 mg by mouth two (2) times daily (with meals). SIMBRINZA 1-0.2 % Drps Generic drug:  brinzolamide-brimonidine  
  
 timolol 0.5 % ophthalmic gel-forming Commonly known as:  TIMOPTIC-XE  
instill 1 drop into both eyes once daily Prescriptions Sent to Pharmacy Refills Blood-Glucose Meter monitoring kit 0 Sig: by Does Not Apply route daily. Class: Normal  
 Pharmacy: [address] [place] [address] Ph #: [phone] Route: Does Not Apply Lancets misc 11 Sig: Use as directed 3 times a day Class: Normal  
 Pharmacy: [facility]/pharmacy #[id number]- [address] Ph #: [phone]  
 glucose blood VI test strips (TRUETEST TEST STRIPS) strip 11 Sig: by Does Not Apply route three (3) times daily. Class: Normal  
 Pharmacy: [address] [place] [address] Ph #: [phone] Route: Does Not Apply Follow-up Instructions Return in about 3 months (around 10/19/2017), or if symptoms worsen or fail to improve, for DM, Obesity, ED. Patient Instructions Learning About Diabetes Food Guidelines Your Care Instructions Meal planning is important to manage diabetes. It helps keep your blood sugar at a target level (which you set with your doctor). You don't have to eat special foods. You can eat what your family eats, including sweets once in a while. But you do have to pay attention to how often you eat and how much you eat of certain foods. You may want to work with a dietitian or a certified diabetes educator (CDE) to help you plan meals and snacks. A dietitian or CDE can also help you lose weight if that is one of your goals. What should you know about eating carbs? Managing the amount of carbohydrate (carbs) you eat is an important part of healthy meals when you have diabetes. Carbohydrate is found in many foods. · Learn which foods have carbs. And learn the amounts of carbs in different foods. ¨ Bread, cereal, pasta, and rice have about 15 grams of carbs in a serving. A serving is 1 slice of bread (1 ounce), ½ cup of cooked cereal, or 1/3 cup of cooked pasta or rice. ¨ Fruits have 15 grams of carbs in a serving. A serving is 1 small fresh fruit, such as an apple or orange; ½ of a banana; ½ cup of cooked or canned fruit; ½ cup of fruit juice; 1 cup of melon or raspberries; or 2 tablespoons of dried fruit. ¨ Milk and no-sugar-added yogurt have 15 grams of carbs in a serving. A serving is 1 cup of milk or 2/3 cup of no-sugar-added yogurt. ¨ Starchy vegetables have 15 grams of carbs in a serving. A serving is ½ cup of mashed potatoes or sweet potato; 1 cup winter squash; ½ of a small baked potato; ½ cup of cooked beans; or ½ cup cooked corn or green peas.  
· Learn how much carbs to eat each day and at each meal. A dietitian or CDE can teach you how to keep track of the amount of carbs you eat. This is called carbohydrate counting. · If you are not sure how to count carbohydrate grams, use the Plate Method to plan meals. It is a good, quick way to make sure that you have a balanced meal. It also helps you spread carbs throughout the day. ¨ Divide your plate by types of foods. Put non-starchy vegetables on half the plate, meat or other protein food on one-quarter of the plate, and a grain or starchy vegetable in the final quarter of the plate. To this you can add a small piece of fruit and 1 cup of milk or yogurt, depending on how many carbs you are supposed to eat at a meal. 
· Try to eat about the same amount of carbs at each meal. Do not \"save up\" your daily allowance of carbs to eat at one meal. 
· Proteins have very little or no carbs per serving. Examples of proteins are beef, chicken, turkey, fish, eggs, tofu, cheese, cottage cheese, and peanut butter. A serving size of meat is 3 ounces, which is about the size of a deck of cards. Examples of meat substitute serving sizes (equal to 1 ounce of meat) are 1/4 cup of cottage cheese, 1 egg, 1 tablespoon of peanut butter, and ½ cup of tofu. How can you eat out and still eat healthy? · Learn to estimate the serving sizes of foods that have carbohydrate. If you measure food at home, it will be easier to estimate the amount in a serving of restaurant food. · If the meal you order has too much carbohydrate (such as potatoes, corn, or baked beans), ask to have a low-carbohydrate food instead. Ask for a salad or green vegetables. · If you use insulin, check your blood sugar before and after eating out to help you plan how much to eat in the future. · If you eat more carbohydrate at a meal than you had planned, take a walk or do other exercise. This will help lower your blood sugar. What else should you know? · Limit saturated fat, such as the fat from meat and dairy products.  This is a healthy choice because people who have diabetes are at higher risk of heart disease. So choose lean cuts of meat and nonfat or low-fat dairy products. Use olive or canola oil instead of butter or shortening when cooking. · Don't skip meals. Your blood sugar may drop too low if you skip meals and take insulin or certain medicines for diabetes. · Check with your doctor before you drink alcohol. Alcohol can cause your blood sugar to drop too low. Alcohol can also cause a bad reaction if you take certain diabetes medicines. Follow-up care is a key part of your treatment and safety. Be sure to make and go to all appointments, and call your doctor if you are having problems. It's also a good idea to know your test results and keep a list of the medicines you take. Where can you learn more? Go to http://shekhar-pk.info/. Enter N086 in the search box to learn more about \"Learning About Diabetes Food Guidelines. \" Current as of: March 13, 2017 Content Version: 11.3 © 6268-5774 [employer]. Care instructions adapted under license by [employer] (which disclaims liability or warranty for this information). If you have questions about a medical condition or this instruction, always ask your healthcare professional. [address] any warranty or liability for your use of this information. Learning About Meal Planning for Diabetes Why plan your meals? Meal planning can be a key part of managing diabetes. Planning meals and snacks with the right balance of carbohydrate, protein, and fat can help you keep your blood sugar at the target level you set with your doctor. You don't have to eat special foods. You can eat what your family eats, including sweets once in a while. But you do have to pay attention to how often you eat and how much you eat of certain foods. You may want to work with a dietitian or a certified diabetes educator.  He or she can give you tips and meal ideas and can answer your questions about meal planning. This health professional can also help you reach a healthy weight if that is one of your goals. What plan is right for you? Your dietitian or diabetes educator may suggest that you start with the plate format or carbohydrate counting. The plate format The plate format is a simple way to help you manage how you eat. You plan meals by learning how much space each food should take on a plate. Using the plate format helps you spread carbohydrate throughout the day. It can make it easier to keep your blood sugar level within your target range. It also helps you see if you're eating healthy portion sizes. To use the plate format, you put non-starchy vegetables on half your plate. Add meat or meat substitutes on one-quarter of the plate. Put a grain or starchy vegetable (such as brown rice or a potato) on the final quarter of the plate. You can add a small piece of fruit and some low-fat or fat-free milk or yogurt, depending on your carbohydrate goal for each meal. 
Here are some tips for using the plate format: · Make sure that you are not using an oversized plate. A 9-inch plate is best. Many restaurants use larger plates. · Get used to using the plate format at home. Then you can use it when you eat out. · Write down your questions about using the plate format. Talk to your doctor, a dietitian, or a diabetes educator about your concerns. Carbohydrate counting With carbohydrate counting, you plan meals based on the amount of carbohydrate in each food. Carbohydrate raises blood sugar higher and more quickly than any other nutrient. It is found in desserts, breads and cereals, and fruit. It's also found in starchy vegetables such as potatoes and corn, grains such as rice and pasta, and milk and yogurt. Spreading carbohydrate throughout the day helps keep your blood sugar levels within your target range. Your daily amount depends on several things, including your weight, how active you are, which diabetes medicines you take, and what your goals are for your blood sugar levels. A registered dietitian or diabetes educator can help you plan how much carbohydrate to include in each meal and snack. A guideline for your daily amount of carbohydrate is: · 45 to 60 grams at each meal. That's about the same as 3 to 4 carbohydrate servings. · 15 to 20 grams at each snack. That's about the same as 1 carbohydrate serving. The Nutrition Facts label on packaged foods tells you how much carbohydrate is in a serving of the food. First, look at the serving size on the food label. Is that the amount you eat in a serving? All of the nutrition information on a food label is based on that serving size. So if you eat more or less than that, you'll need to adjust the other numbers. Total carbohydrate is the next thing you need to look for on the label. If you count carbohydrate servings, one serving of carbohydrate is 15 grams. For foods that don't come with labels, such as fresh fruits and vegetables, you'll need a guide that lists carbohydrate in these foods. Ask your doctor, dietitian, or diabetes educator about books or other nutrition guides you can use. If you take insulin, you need to know how many grams of carbohydrate are in a meal. This lets you know how much rapid-acting insulin to take before you eat. If you use an insulin pump, you get a constant rate of insulin during the day. So the pump must be programmed at meals to give you extra insulin to cover the rise in blood sugar after meals. When you know how much carbohydrate you will eat, you can take the right amount of insulin. Or, if you always use the same amount of insulin, you need to make sure that you eat the same amount of carbohydrate at meals.  
If you need more help to understand carbohydrate counting and food labels, ask your doctor, dietitian, or diabetes educator. How do you get started with meal planning? Here are some tips to get started: 
· Plan your meals a week at a time. Don't forget to include snacks too. · Use cookbooks or online recipes to plan several main meals. Plan some quick meals for busy nights. You also can double some recipes that freeze well. Then you can save half for other busy nights when you don't have time to cook. · Make sure you have the ingredients you need for your recipes. If you're running low on basic items, put these items on your shopping list too. · List foods that you use to make breakfasts, lunches, and snacks. List plenty of fruits and vegetables. · Post this list on the refrigerator. Add to it as you think of more things you need. · Take the list to the store to do your weekly shopping. Follow-up care is a key part of your treatment and safety. Be sure to make and go to all appointments, and call your doctor if you are having problems. It's also a good idea to know your test results and keep a list of the medicines you take. Where can you learn more? Go to http://shekhar-pk.info/. Spivey Free in the search box to learn more about \"Learning About Meal Planning for Diabetes. \" Current as of: March 13, 2017 Content Version: 11.3 © 6035-1879 [employer]ulet. Care instructions adapted under license by [employer] (which disclaims liability or warranty for this information). If you have questions about a medical condition or this instruction, always ask your healthcare professional. [address] any warranty or liability for your use of this information. Nutrition Tips for Diabetes: After Your Visit Your Care Instructions A healthy diet is important to manage diabetes. It helps you lose weight (if you need to) and keep it off.  It gives you the nutrition and energy your body needs and helps prevent heart disease. But a diet for diabetes does not mean that you have to eat special foods. You can eat what your family eats, including occasional sweets and other favorites. But you do have to pay attention to how often you eat and how much you eat of certain foods. The right plan for you will give you meals that help you keep your blood sugar at healthy levels. Try to eat a variety of foods and to spread carbohydrate throughout the day. Carbohydrate raises blood sugar higher and more quickly than any other nutrient does. Carbohydrate is found in sugar, breads and cereals, fruit, starchy vegetables such as potatoes and corn, and milk and yogurt. You may want to work with a dietitian or diabetes educator to help you plan meals and snacks. A dietitian or diabetes educator also can help you lose weight if that is one of your goals. The following tips can help you enjoy your meals and stay healthy. Follow-up care is a key part of your treatment and safety. Be sure to make and go to all appointments, and call your doctor if you are having problems. Its also a good idea to know your test results and keep a list of the medicines you take. How can you care for yourself at home? · Learn which foods have carbohydrate and how much carbohydrate to eat. A dietitian or diabetes educator can help you learn to keep track of how much carbohydrate you eat. · Spread carbohydrate throughout the day. Eat some carbohydrate at all meals, but do not eat too much at any one time. · Plan meals to include food from all the food groups. These are the food groups and some example portion sizes: ¨ Grains: 1 slice of bread (1 ounce), ½ cup of cooked cereal, and 1/3 cup of cooked pasta or rice. These have about 15 grams of carbohydrate in a serving. Choose whole grains such as whole wheat bread or crackers, oatmeal, and brown rice more often than refined grains. ¨ Fruit: 1 small fresh fruit, such as an apple or orange; ½ of a banana; ½ cup of chopped, cooked, or canned fruit; ½ cup of fruit juice; 1 cup of melon or raspberries; and 2 tablespoons of dried fruit. These have about 15 grams of carbohydrate in a serving. ¨ Dairy: 1 cup of nonfat or low-fat milk and 2/3 cup of plain yogurt. These have about 15 grams of carbohydrate in a serving. ¨ Protein foods: Beef, chicken, turkey, fish, eggs, tofu, cheese, cottage cheese, and peanut butter. A serving size of meat is 3 ounces, which is about the size of a deck of cards. Examples of meat substitute serving sizes (equal to 1 ounce of meat) are 1/4 cup of cottage cheese, 1 egg, 1 tablespoon of peanut butter, and ½ cup of tofu. These have very little or no carbohydrate per serving. ¨ Vegetables: Starchy vegetables such as ½ cup of cooked dried beans, peas, potatoes, or corn have about 15 grams of carbohydrate. Nonstarchy vegetables have very little carbohydrate, such as 1 cup of raw leafy vegetables (such as spinach), ½ cup of other vegetables (cooked or chopped), and 3/4 cup of vegetable juice. · Use the plate format to plan meals. It is a good, quick way to make sure that you have a balanced meal. It also helps you spread carbohydrate throughout the day. You divide your plate by types of foods. Put vegetables on half the plate, meat or meat substitutes on one-quarter of the plate, and a grain or starchy vegetable (such as brown rice or a potato) in the final quarter of the plate. To this you can add a small piece of fruit and 1 cup of milk or yogurt, depending on how much carbohydrate you are supposed to eat at a meal. 
· Talk to your dietitian or diabetes educator about ways to add limited amounts of sweets into your meal plan. You can eat these foods now and then, as long as you include the amount of carbohydrate they have in your daily carbohydrate allowance. · If you drink alcohol, limit it to no more than 1 drink a day for women and 2 drinks a day for men. If you are pregnant, no amount of alcohol is known to be safe. · Protein, fat, and fiber do not raise blood sugar as much as carbohydrate does. If you eat a lot of these nutrients in a meal, your blood sugar will rise more slowly than it would otherwise. · Limit saturated fats, such as those from meat and dairy products. Try to replace it with monounsaturated fat, such as olive oil. This is a healthier choice because people who have diabetes are at higher-than-average risk of heart disease. But use a modest amount of olive oil. A tablespoon of olive oil has 14 grams of fat and 120 calories. · Exercise lowers blood sugar. If you take insulin by shots or pump, you can use less than you would if you were not exercising. Keep in mind that timing matters. If you exercise within 1 hour after a meal, your body may need less insulin for that meal than it would if you exercised 3 hours after the meal. Test your blood sugar to find out how exercise affects your need for insulin. · Exercise on most days of the week. Aim for at least 30 minutes. Exercise helps you stay at a healthy weight and helps your body use insulin. Walking is an easy way to get exercise. Gradually increase the amount you walk every day. You also may want to swim, bike, or do other activities. When you eat out · Learn to estimate the serving sizes of foods that have carbohydrate. If you measure food at home, it will be easier to estimate the amount in a serving of restaurant food. · If the meal you order has too much carbohydrate (such as potatoes, corn, or baked beans), ask to have a low-carbohydrate food instead. Ask for a salad or green vegetables. · If you use insulin, check your blood sugar before and after eating out to help you plan how much to eat in the future.  
· If you eat more carbohydrate at a meal than you had planned, take a walk or do other exercise. This will help lower your blood sugar. Where can you learn more? Go to Dragonfly.be Enter D678 in the search box to learn more about \"Nutrition Tips for Diabetes: After Your Visit. \"  
© 3499-0387 Healthwise, Incorporated. Care instructions adapted under license by Ivan Bush (which disclaims liability or warranty for this information). This care instruction is for use with your licensed healthcare professional. If you have questions about a medical condition or this instruction, always ask your healthcare professional. [address] any warranty or liability for your use of this information. Content Version: 82.9.528542; Current as of: June 4, 2014 Introducing [facility]! [employer] Bush introduces fl3ur patient portal. Now you can access parts of your medical record, email your doctor's office, and request medication refills online. 1. In your internet browser, go to [email or web address] 2. Click on the First Time User? Click Here link in the Sign In box. You will see the New Member Sign Up page. 3. Enter your fl3ur Access Code exactly as it appears below. You will not need to use this code after youve completed the sign-up process. If you do not sign up before the expiration date, you must request a new code. · fl3ur Access Code: [email or web address] Expires: 9/5/2017 12:01 PM 
 
4. Enter the last four digits of your Social Security Number (xxxx) and Date of Birth (mm/dd/yyyy) as indicated and click Submit. You will be taken to the next sign-up page. 5. Create a fl3ur ID. This will be your fl3ur login ID and cannot be changed, so think of one that is secure and easy to remember. 6. Create a fl3ur password. You can change your password at any time. 7. Enter your Password Reset Question and Answer. This can be used at a later time if you forget your password. 8. Enter your e-mail address. You will receive e-mail notification when new information is available in [address] 9. Click Sign Up. You can now view and download portions of your medical record. 10. Click the Download Summary menu link to download a portable copy of your medical information. If you have questions, please visit the Frequently Asked Questions section of the [employer] website. Remember, [employer] is NOT to be used for urgent needs. For medical emergencies, dial 911. Now available from your iPhone and Android! Please provide this summary of care documentation to your next provider. Your primary care clinician is listed as Dean Frazier. If you have any questions after today's visit, please call [phone].

## 2017-07-19 NOTE — PROGRESS NOTES
Chief Complaint   Patient presents with    Follow-up     4 week cholesterol. C/O ED. Need new glucometer    Diabetes    Obesity     Diabetes control uncertain  Diet and Lifestyle: does not rigorously follow a diabetic diet, sedentary    Diabetic Review of Systems - medication compliance: compliant most of the time, diabetic diet compliance: noncompliant much of the time, home glucose monitoring: is not performed. He tells me his meter is not working and needs a new one. Patient Active Problem List   Diagnosis Code    Pneumonia J18.9    Controlled type 2 diabetes mellitus without complication, without long-term current use of insulin (HCC) E11.9    Hyperlipidemia E78.5    Morbid obesity ([facility] [address] E66.01       Review of Systems   Complete ROS negative except where noted in HPI    Objective:     Visit Vitals    /64 (BP 1 Location: Left arm, BP Patient Position: Sitting)  Comment: XL cuff automated    Pulse 62    Temp 98.1 °F (36.7 °C) (Oral)    Resp 20    Ht 5' 2\" (1.575 m)    Wt 271 lb 9.6 oz (123.2 kg)    SpO2 97%    BMI 49.68 kg/m2     No exam data present    Constitutional: NAD, A & O x 3  Lungs: CTAB  Cardiovascular: RRFRANDY Butt was seen today for follow-up, diabetes and obesity. Diagnoses and all orders for this visit:    Controlled type 2 diabetes mellitus without complication, without long-term current use of insulin ([facility])  -     Blood-Glucose Meter monitoring kit; by Does Not Apply route daily.  -     Lancets misc; Use as directed 3 times a day  -     glucose blood VI test strips (TRUETEST TEST STRIPS) strip; by Does Not Apply route three (3) times daily.  -     AMB POC GLUCOSE TEST      I have discussed the diagnosis with the patient and the intended plan as seen in the above orders. The patient has received an after-visit summary and questions were answered concerning future plans. I have discussed medication side effects and warnings with the patient as well.  I have reviewed the plan of care with the patient, accepted their input and they are in agreement with the treatment goals. Patient verbalizes understanding.     Follow-up Disposition:  Return in about 3 months (around 10/19/2017), or if symptoms worsen or fail to improve, for DM, Obesity, ED.

## 2017-07-19 NOTE — PATIENT INSTRUCTIONS
Learning About Diabetes Food Guidelines  Your Care Instructions  Meal planning is important to manage diabetes. It helps keep your blood sugar at a target level (which you set with your doctor). You don't have to eat special foods. You can eat what your family eats, including sweets once in a while. But you do have to pay attention to how often you eat and how much you eat of certain foods. You may want to work with a dietitian or a certified diabetes educator (CDE) to help you plan meals and snacks. A dietitian or CDE can also help you lose weight if that is one of your goals. What should you know about eating carbs? Managing the amount of carbohydrate (carbs) you eat is an important part of healthy meals when you have diabetes. Carbohydrate is found in many foods. · Learn which foods have carbs. And learn the amounts of carbs in different foods. ¨ Bread, cereal, pasta, and rice have about 15 grams of carbs in a serving. A serving is 1 slice of bread (1 ounce), ½ cup of cooked cereal, or 1/3 cup of cooked pasta or rice. ¨ Fruits have 15 grams of carbs in a serving. A serving is 1 small fresh fruit, such as an apple or orange; ½ of a banana; ½ cup of cooked or canned fruit; ½ cup of fruit juice; 1 cup of melon or raspberries; or 2 tablespoons of dried fruit. ¨ Milk and no-sugar-added yogurt have 15 grams of carbs in a serving. A serving is 1 cup of milk or 2/3 cup of no-sugar-added yogurt. ¨ Starchy vegetables have 15 grams of carbs in a serving. A serving is ½ cup of mashed potatoes or sweet potato; 1 cup winter squash; ½ of a small baked potato; ½ cup of cooked beans; or ½ cup cooked corn or green peas. · Learn how much carbs to eat each day and at each meal. A dietitian or CDE can teach you how to keep track of the amount of carbs you eat. This is called carbohydrate counting. · If you are not sure how to count carbohydrate grams, use the Plate Method to plan meals.  It is a good, quick way to make sure that you have a balanced meal. It also helps you spread carbs throughout the day. ¨ Divide your plate by types of foods. Put non-starchy vegetables on half the plate, meat or other protein food on one-quarter of the plate, and a grain or starchy vegetable in the final quarter of the plate. To this you can add a small piece of fruit and 1 cup of milk or yogurt, depending on how many carbs you are supposed to eat at a meal.  · Try to eat about the same amount of carbs at each meal. Do not \"save up\" your daily allowance of carbs to eat at one meal.  · Proteins have very little or no carbs per serving. Examples of proteins are beef, chicken, turkey, fish, eggs, tofu, cheese, cottage cheese, and peanut butter. A serving size of meat is 3 ounces, which is about the size of a deck of cards. Examples of meat substitute serving sizes (equal to 1 ounce of meat) are 1/4 cup of cottage cheese, 1 egg, 1 tablespoon of peanut butter, and ½ cup of tofu. How can you eat out and still eat healthy? · Learn to estimate the serving sizes of foods that have carbohydrate. If you measure food at home, it will be easier to estimate the amount in a serving of restaurant food. · If the meal you order has too much carbohydrate (such as potatoes, corn, or baked beans), ask to have a low-carbohydrate food instead. Ask for a salad or green vegetables. · If you use insulin, check your blood sugar before and after eating out to help you plan how much to eat in the future. · If you eat more carbohydrate at a meal than you had planned, take a walk or do other exercise. This will help lower your blood sugar. What else should you know? · Limit saturated fat, such as the fat from meat and dairy products. This is a healthy choice because people who have diabetes are at higher risk of heart disease. So choose lean cuts of meat and nonfat or low-fat dairy products. Use olive or canola oil instead of butter or shortening when cooking.   · Don't skip meals. Your blood sugar may drop too low if you skip meals and take insulin or certain medicines for diabetes. · Check with your doctor before you drink alcohol. Alcohol can cause your blood sugar to drop too low. Alcohol can also cause a bad reaction if you take certain diabetes medicines. Follow-up care is a key part of your treatment and safety. Be sure to make and go to all appointments, and call your doctor if you are having problems. It's also a good idea to know your test results and keep a list of the medicines you take. Where can you learn more? Go to http://shekharAthleteTraxpk.info/. Enter H898 in the search box to learn more about \"Learning About Diabetes Food Guidelines. \"  Current as of: March 13, 2017  Content Version: 11.3  © 8930-3192 [employer]. Care instructions adapted under license by [employer] (which disclaims liability or warranty for this information). If you have questions about a medical condition or this instruction, always ask your healthcare professional. [address] any warranty or liability for your use of this information. Learning About Meal Planning for Diabetes  Why plan your meals? Meal planning can be a key part of managing diabetes. Planning meals and snacks with the right balance of carbohydrate, protein, and fat can help you keep your blood sugar at the target level you set with your doctor. You don't have to eat special foods. You can eat what your family eats, including sweets once in a while. But you do have to pay attention to how often you eat and how much you eat of certain foods. You may want to work with a dietitian or a certified diabetes educator. He or she can give you tips and meal ideas and can answer your questions about meal planning. This health professional can also help you reach a healthy weight if that is one of your goals. What plan is right for you?   Your dietitian or diabetes educator may suggest that you start with the plate format or carbohydrate counting. The plate format  The plate format is a simple way to help you manage how you eat. You plan meals by learning how much space each food should take on a plate. Using the plate format helps you spread carbohydrate throughout the day. It can make it easier to keep your blood sugar level within your target range. It also helps you see if you're eating healthy portion sizes. To use the plate format, you put non-starchy vegetables on half your plate. Add meat or meat substitutes on one-quarter of the plate. Put a grain or starchy vegetable (such as brown rice or a potato) on the final quarter of the plate. You can add a small piece of fruit and some low-fat or fat-free milk or yogurt, depending on your carbohydrate goal for each meal.  Here are some tips for using the plate format:  · Make sure that you are not using an oversized plate. A 9-inch plate is best. Many restaurants use larger plates. · Get used to using the plate format at home. Then you can use it when you eat out. · Write down your questions about using the plate format. Talk to your doctor, a dietitian, or a diabetes educator about your concerns. Carbohydrate counting  With carbohydrate counting, you plan meals based on the amount of carbohydrate in each food. Carbohydrate raises blood sugar higher and more quickly than any other nutrient. It is found in desserts, breads and cereals, and fruit. It's also found in starchy vegetables such as potatoes and corn, grains such as rice and pasta, and milk and yogurt. Spreading carbohydrate throughout the day helps keep your blood sugar levels within your target range. Your daily amount depends on several things, including your weight, how active you are, which diabetes medicines you take, and what your goals are for your blood sugar levels.  A registered dietitian or diabetes educator can help you plan how much carbohydrate to include in each meal and snack. A guideline for your daily amount of carbohydrate is:  · 45 to 60 grams at each meal. That's about the same as 3 to 4 carbohydrate servings. · 15 to 20 grams at each snack. That's about the same as 1 carbohydrate serving. The Nutrition Facts label on packaged foods tells you how much carbohydrate is in a serving of the food. First, look at the serving size on the food label. Is that the amount you eat in a serving? All of the nutrition information on a food label is based on that serving size. So if you eat more or less than that, you'll need to adjust the other numbers. Total carbohydrate is the next thing you need to look for on the label. If you count carbohydrate servings, one serving of carbohydrate is 15 grams. For foods that don't come with labels, such as fresh fruits and vegetables, you'll need a guide that lists carbohydrate in these foods. Ask your doctor, dietitian, or diabetes educator about books or other nutrition guides you can use. If you take insulin, you need to know how many grams of carbohydrate are in a meal. This lets you know how much rapid-acting insulin to take before you eat. If you use an insulin pump, you get a constant rate of insulin during the day. So the pump must be programmed at meals to give you extra insulin to cover the rise in blood sugar after meals. When you know how much carbohydrate you will eat, you can take the right amount of insulin. Or, if you always use the same amount of insulin, you need to make sure that you eat the same amount of carbohydrate at meals. If you need more help to understand carbohydrate counting and food labels, ask your doctor, dietitian, or diabetes educator. How do you get started with meal planning? Here are some tips to get started:  · Plan your meals a week at a time. Don't forget to include snacks too. · Use cookbooks or online recipes to plan several main meals. Plan some quick meals for busy nights. You also can double some recipes that freeze well. Then you can save half for other busy nights when you don't have time to cook. · Make sure you have the ingredients you need for your recipes. If you're running low on basic items, put these items on your shopping list too. · List foods that you use to make breakfasts, lunches, and snacks. List plenty of fruits and vegetables. · Post this list on the refrigerator. Add to it as you think of more things you need. · Take the list to the store to do your weekly shopping. Follow-up care is a key part of your treatment and safety. Be sure to make and go to all appointments, and call your doctor if you are having problems. It's also a good idea to know your test results and keep a list of the medicines you take. Where can you learn more? Go to http://shekharQivivopk.info/. Krystal Tolentino in the search box to learn more about \"Learning About Meal Planning for Diabetes. \"  Current as of: March 13, 2017  Content Version: 11.3  © 7700-8434 [employer]. Care instructions adapted under license by [employer] (which disclaims liability or warranty for this information). If you have questions about a medical condition or this instruction, always ask your healthcare professional. [address] any warranty or liability for your use of this information. Nutrition Tips for Diabetes: After Your Visit  Your Care Instructions  A healthy diet is important to manage diabetes. It helps you lose weight (if you need to) and keep it off. It gives you the nutrition and energy your body needs and helps prevent heart disease. But a diet for diabetes does not mean that you have to eat special foods. You can eat what your family eats, including occasional sweets and other favorites. But you do have to pay attention to how often you eat and how much you eat of certain foods.  The right plan for you will give you meals that help you keep your blood sugar at healthy levels. Try to eat a variety of foods and to spread carbohydrate throughout the day. Carbohydrate raises blood sugar higher and more quickly than any other nutrient does. Carbohydrate is found in sugar, breads and cereals, fruit, starchy vegetables such as potatoes and corn, and milk and yogurt. You may want to work with a dietitian or diabetes educator to help you plan meals and snacks. A dietitian or diabetes educator also can help you lose weight if that is one of your goals. The following tips can help you enjoy your meals and stay healthy. Follow-up care is a key part of your treatment and safety. Be sure to make and go to all appointments, and call your doctor if you are having problems. Its also a good idea to know your test results and keep a list of the medicines you take. How can you care for yourself at home? · Learn which foods have carbohydrate and how much carbohydrate to eat. A dietitian or diabetes educator can help you learn to keep track of how much carbohydrate you eat. · Spread carbohydrate throughout the day. Eat some carbohydrate at all meals, but do not eat too much at any one time. · Plan meals to include food from all the food groups. These are the food groups and some example portion sizes:  ¨ Grains: 1 slice of bread (1 ounce), ½ cup of cooked cereal, and 1/3 cup of cooked pasta or rice. These have about 15 grams of carbohydrate in a serving. Choose whole grains such as whole wheat bread or crackers, oatmeal, and brown rice more often than refined grains. ¨ Fruit: 1 small fresh fruit, such as an apple or orange; ½ of a banana; ½ cup of chopped, cooked, or canned fruit; ½ cup of fruit juice; 1 cup of melon or raspberries; and 2 tablespoons of dried fruit. These have about 15 grams of carbohydrate in a serving. ¨ Dairy: 1 cup of nonfat or low-fat milk and 2/3 cup of plain yogurt. These have about 15 grams of carbohydrate in a serving.   ¨ Protein foods: Beef, chicken, turkey, fish, eggs, tofu, cheese, cottage cheese, and peanut butter. A serving size of meat is 3 ounces, which is about the size of a deck of cards. Examples of meat substitute serving sizes (equal to 1 ounce of meat) are 1/4 cup of cottage cheese, 1 egg, 1 tablespoon of peanut butter, and ½ cup of tofu. These have very little or no carbohydrate per serving. ¨ Vegetables: Starchy vegetables such as ½ cup of cooked dried beans, peas, potatoes, or corn have about 15 grams of carbohydrate. Nonstarchy vegetables have very little carbohydrate, such as 1 cup of raw leafy vegetables (such as spinach), ½ cup of other vegetables (cooked or chopped), and 3/4 cup of vegetable juice. · Use the plate format to plan meals. It is a good, quick way to make sure that you have a balanced meal. It also helps you spread carbohydrate throughout the day. You divide your plate by types of foods. Put vegetables on half the plate, meat or meat substitutes on one-quarter of the plate, and a grain or starchy vegetable (such as brown rice or a potato) in the final quarter of the plate. To this you can add a small piece of fruit and 1 cup of milk or yogurt, depending on how much carbohydrate you are supposed to eat at a meal.  · Talk to your dietitian or diabetes educator about ways to add limited amounts of sweets into your meal plan. You can eat these foods now and then, as long as you include the amount of carbohydrate they have in your daily carbohydrate allowance. · If you drink alcohol, limit it to no more than 1 drink a day for women and 2 drinks a day for men. If you are pregnant, no amount of alcohol is known to be safe. · Protein, fat, and fiber do not raise blood sugar as much as carbohydrate does. If you eat a lot of these nutrients in a meal, your blood sugar will rise more slowly than it would otherwise. · Limit saturated fats, such as those from meat and dairy products.  Try to replace it with monounsaturated fat, such as olive oil. This is a healthier choice because people who have diabetes are at higher-than-average risk of heart disease. But use a modest amount of olive oil. A tablespoon of olive oil has 14 grams of fat and 120 calories. · Exercise lowers blood sugar. If you take insulin by shots or pump, you can use less than you would if you were not exercising. Keep in mind that timing matters. If you exercise within 1 hour after a meal, your body may need less insulin for that meal than it would if you exercised 3 hours after the meal. Test your blood sugar to find out how exercise affects your need for insulin. · Exercise on most days of the week. Aim for at least 30 minutes. Exercise helps you stay at a healthy weight and helps your body use insulin. Walking is an easy way to get exercise. Gradually increase the amount you walk every day. You also may want to swim, bike, or do other activities. When you eat out  · Learn to estimate the serving sizes of foods that have carbohydrate. If you measure food at home, it will be easier to estimate the amount in a serving of restaurant food. · If the meal you order has too much carbohydrate (such as potatoes, corn, or baked beans), ask to have a low-carbohydrate food instead. Ask for a salad or green vegetables. · If you use insulin, check your blood sugar before and after eating out to help you plan how much to eat in the future. · If you eat more carbohydrate at a meal than you had planned, take a walk or do other exercise. This will help lower your blood sugar. Where can you learn more? Go to Ens[employer].be  Enter P016 in the search box to learn more about \"Nutrition Tips for Diabetes: After Your Visit. \"   © 9057-9381 [employer]. Care instructions adapted under license by Gregor Alcazar (which disclaims liability or warranty for this information).  This care instruction is for use with your licensed healthcare professional. If you have questions about a medical condition or this instruction, always ask your healthcare professional. [place] [address] any warranty or liability for your use of this information.   Content Version: 59.8.261790; Current as of: June 4, 2014

## 2017-07-26 DIAGNOSIS — N52.9 ERECTILE DYSFUNCTION, UNSPECIFIED ERECTILE DYSFUNCTION TYPE: Primary | ICD-10-CM

## 2017-07-26 DIAGNOSIS — E11.9 CONTROLLED TYPE 2 DIABETES MELLITUS WITHOUT COMPLICATION, WITHOUT LONG-TERM CURRENT USE OF INSULIN (HCC): ICD-10-CM

## 2017-07-26 RX ORDER — SILDENAFIL 100 MG/1
100 TABLET, FILM COATED ORAL
Qty: 30 TAB | Refills: 0 | Status: SHIPPED | OUTPATIENT
Start: 2017-07-26 | End: 2021-09-06

## 2017-07-26 NOTE — PROGRESS NOTES
Chief Complaint   Patient presents with    Follow-up     4 week cholesterol. C/O ED. Need new glucometer    Diabetes    Obesity     1. Have you been to the ER, urgent care clinic since your last visit? Hospitalized since your last visit? No    2. Have you seen or consulted any other health care providers outside of the [address] [place] [address] since your last visit? Include any pap smears or colon screening.  No none

## 2017-07-28 NOTE — PROGRESS NOTES
Patient aware that his patient assistance form has been faxed to [employer] for the viagra  along with his income information that he submitted.  Patient aware that it can take up to two weeks to get an approval.

## 2017-08-02 ENCOUNTER — CLINICAL SUPPORT (OUTPATIENT)
Dept: FAMILY MEDICINE CLINIC | Facility: CLINIC | Age: 57
End: 2017-08-02

## 2017-08-02 VITALS — HEART RATE: 61 BPM | SYSTOLIC BLOOD PRESSURE: 126 MMHG | DIASTOLIC BLOOD PRESSURE: 71 MMHG

## 2017-08-02 DIAGNOSIS — Z01.30 BP CHECK: Primary | ICD-10-CM

## 2017-08-02 NOTE — PROGRESS NOTES
Nurse Visit - Hypertenstion  Daquan KWAN Eric Butler is a 55 y.o. male who is here for a follow up Blood Pressure    Vision changes: None. Headache: None. Has patients missed any doses of medicine?   no      No Known Allergies    Current Outpatient Prescriptions   Medication Sig Dispense Refill    glucose blood VI test strips (TRUE METRIX GLUCOSE TEST STRIP) strip Use as directed to test BS 3 times a day 100 Strip 11    sildenafil citrate (VIAGRA) 100 mg tablet Take 1 Tab by mouth daily as needed. 30 Tab 0    Blood-Glucose Meter monitoring kit by Does Not Apply route daily. 1 Kit 0    Lancets misc Use as directed 3 times a day 90 Each 11    glucose blood VI test strips (TRUETEST TEST STRIPS) strip by Does Not Apply route three (3) times daily. 90 Strip 11    timolol (TIMOPTIC-XE) 0.5 % ophthalmic gel-forming instill 1 drop into both eyes once daily  0    SIMBRINZA 1-0.2 % drps   0    allopurinol (ZYLOPRIM) 300 mg tablet Take 1 Tab by mouth daily. 30 Tab 3    metFORMIN (GLUCOPHAGE) 1,000 mg tablet Take 1 Tab by mouth two (2) times daily (with meals). 60 Tab 3    glipiZIDE (GLUCOTROL) 5 mg tablet Take 1 Tab by mouth two (2) times a day. 60 Tab 3    albuterol (PROVENTIL HFA, VENTOLIN HFA, PROAIR HFA) 90 mcg/actuation inhaler Take 2 Puffs by inhalation every four (4) hours as needed for Wheezing. 1 Inhaler 0    naproxen (NAPROSYN) 500 mg tablet Take 500 mg by mouth two (2) times daily (with meals). BP Readings from Last 4 Encounters:   08/02/17 126/71   07/19/17 127/64   06/21/17 144/80   06/07/17 126/76       Visit Vitals    /71 (BP 1 Location: Left arm, BP Patient Position: Sitting)    Pulse 61         Recommendations:   -DASH diet   -150 min exercise / week   -Keep BMI at 20 - 25       Patient informed that if any changes are made by PCP, then he/she will receive a call. Patient verbalized an understanding and did not voice any concerns at this time.

## 2017-09-09 LAB — PSA, EXTERNAL: 0.38

## 2017-10-18 ENCOUNTER — HOSPITAL ENCOUNTER (EMERGENCY)
Age: 57
Discharge: HOME | End: 2017-10-18
Attending: EMERGENCY MEDICINE
Payer: MEDICAID

## 2017-10-18 VITALS
BODY MASS INDEX: 42.33 KG/M2 | TEMPERATURE: 99.4 F | RESPIRATION RATE: 18 BRPM | WEIGHT: 230 LBS | OXYGEN SATURATION: 97 % | HEIGHT: 62 IN | DIASTOLIC BLOOD PRESSURE: 90 MMHG | SYSTOLIC BLOOD PRESSURE: 152 MMHG | HEART RATE: 75 BPM

## 2017-10-18 DIAGNOSIS — J06.9 ACUTE UPPER RESPIRATORY INFECTION: Primary | ICD-10-CM

## 2017-10-18 DIAGNOSIS — R03.0 ELEVATED BLOOD PRESSURE READING: ICD-10-CM

## 2017-10-18 PROCEDURE — 99281 EMR DPT VST MAYX REQ PHY/QHP: CPT

## 2017-10-18 RX ORDER — ALBUTEROL SULFATE 90 UG/1
2 AEROSOL, METERED RESPIRATORY (INHALATION)
Qty: 1 INHALER | Refills: 0 | Status: SHIPPED | OUTPATIENT
Start: 2017-10-18 | End: 2018-09-14

## 2017-10-18 RX ORDER — HYDROCODONE POLISTIREX AND CHLORPHENIRAMINE POLISTIREX 10; 8 MG/5ML; MG/5ML
5 SUSPENSION, EXTENDED RELEASE ORAL
Qty: 60 ML | Refills: 0 | Status: SHIPPED | OUTPATIENT
Start: 2017-10-18 | End: 2018-01-22

## 2017-10-18 NOTE — ED PROVIDER NOTES
HPI Comments: 9:35 AM Daquan Camacho is a 65 y.o. male with a history of DM, pneumonia, and morbid obesity who presents to ED for the evaluation of soar throat onset since yesterday. He explains feeling like his \"face feels swollen\". Rates pain 10/10. Associated Sx include generalized body aches, nausea, wheezing, cough, dizziness, congestion, and soarness in his neck. Pt is a current everyday cigarette user. No other complaints, associated symptoms or modifying factors at this time. PCP: Socorro Abdalla MD            The history is provided by the patient. Past Medical History:   Diagnosis Date    Diabetes ([address]     Morbid obesity ([address]     Pneumonia     Sickle cell trait ([address]        Past Surgical History:   Procedure Laterality Date    HX OTHER SURGICAL      pt reports gallstone removal         Family History:   Problem Relation Age of Onset    Diabetes Mother     Diabetes Maternal Grandmother     Sickle Cell Trait Other     Diabetes Sister        Social History     Social History    Marital status: SINGLE     Spouse name: N/A    Number of children: N/A    Years of education: N/A     Occupational History    Not on file. Social History Main Topics    Smoking status: Current Every Day Smoker     Packs/day: 0.25     Years: 30.00    Smokeless tobacco: Never Used    Alcohol use No    Drug use: No    Sexual activity: Yes     Partners: Female     Birth control/ protection: Condom     Other Topics Concern    Not on file     Social History Narrative         ALLERGIES: Review of patient's allergies indicates no known allergies. Review of Systems   Constitutional: Negative for chills and fever. HENT: Positive for congestion and sore throat. Respiratory: Positive for cough and wheezing. Negative for shortness of breath. Cardiovascular: Negative for chest pain. Gastrointestinal: Negative for diarrhea and vomiting.    Musculoskeletal:        Generalized body aches  Neck soarness   Skin: Negative for rash. Neurological: Positive for dizziness. Negative for headaches. All other systems reviewed and are negative. Vitals:    10/18/17 0909   BP: 152/90   Pulse: 75   Resp: 18   Temp: 99.4 °F (37.4 °C)   SpO2: 97%   Weight: 104.3 kg (230 lb)   Height: 5' 2\" (1.575 m)            Physical Exam   Constitutional: He is oriented to person, place, and time. He appears well-developed and well-nourished. No distress. HENT:   Head: Normocephalic and atraumatic. TMs normal, L canal w/ cerumen, OP normal, nose congested   Eyes: Right eye exhibits no discharge. Left eye exhibits no discharge. Neck: Neck supple. No JVD present. Cardiovascular: Normal rate, regular rhythm and normal heart sounds. Pulmonary/Chest: Effort normal and breath sounds normal. No respiratory distress. He has no wheezes. He has no rales. Abdominal: Soft. Musculoskeletal: He exhibits no edema or tenderness. Neurological: He is alert and oriented to person, place, and time. He exhibits normal muscle tone. Coordination normal.   Skin: Skin is warm and dry. No rash noted. He is not diaphoretic. Psychiatric: He has a normal mood and affect. Nursing note and vitals reviewed. MDM  Number of Diagnoses or Management Options  Acute upper respiratory infection:   Elevated blood pressure reading:   Diagnosis management comments: Imp: Constitutional sxs c/w viral URI. Will withhold antibiotics per CDC guidelines: \"Routine treatment of uncomplicated acute bronchitis with antibiotics is not recommended, regardless of cough duration. \"      ED Course       Procedures    Vitals:  Patient Vitals for the past 12 hrs:   Temp Pulse Resp BP SpO2   10/18/17 0909 99.4 °F (37.4 °C) 75 18 152/90 97 %         Medications ordered:   Medications - No data to display      Lab findings:  No results found for this or any previous visit (from the past 12 hour(s)).     EKG interpretation by ED Physician:      X-Ray, CT or other radiology findings or impressions:  No results found. Progress notes, Consult notes or additional Procedure notes:       Disposition:  Diagnosis:   1. Acute upper respiratory infection    2. Elevated blood pressure reading      1) Plenty of fluids  2) Tussionex for cough. Albuterol for wheezing  3) Cool mist vaporizer in bedroom at night  4) Honey 1 tsp as needed for cough  5) Vapo Rub to chest wall at night  6) Mentholated cough drops as needed  7) Recheck with PCP 1 week if not improved. 8) Blood pressure elevated to 152/90 (normal less than 140/90) and requires follow up with your PCP. 9) Coughs and colds are caused by viruses and antibiotics are not needed to treat them. Disposition: home    Follow-up Information     Follow up With Details Comments [address], [place]   [address] [place] [address]  [phone]             Patient's Medications   Start Taking    CHLORPHENIRAMINE-HYDROCODONE [facility]) 10-8 MG/5 ML SUSPENSION    Take 5 mL by mouth every twelve (12) hours as needed for Cough. Max Daily Amount: 10 mL. Continue Taking    ALLOPURINOL (ZYLOPRIM) 300 MG TABLET    Take 1 Tab by mouth daily. BLOOD-GLUCOSE METER MONITORING KIT    by Does Not Apply route daily. GLIPIZIDE (GLUCOTROL) 5 MG TABLET    Take 1 Tab by mouth two (2) times a day. GLUCOSE BLOOD VI TEST STRIPS (TRUE METRIX GLUCOSE TEST STRIP) STRIP    Use as directed to test BS 3 times a day    GLUCOSE BLOOD VI TEST STRIPS (TRUETEST TEST STRIPS) STRIP    by Does Not Apply route three (3) times daily. LANCETS MISC    Use as directed 3 times a day    METFORMIN (GLUCOPHAGE) 1,000 MG TABLET    Take 1 Tab by mouth two (2) times daily (with meals). NAPROXEN (NAPROSYN) 500 MG TABLET    Take 500 mg by mouth two (2) times daily (with meals). SILDENAFIL CITRATE (VIAGRA) 100 MG TABLET    Take 1 Tab by mouth daily as needed. SIMBRINZA 1-0.2 % DRPS        TIMOLOL (TIMOPTIC-XE) 0.5 % OPHTHALMIC GEL-FORMING    instill 1 drop into both eyes once daily   These Medications have changed    Modified Medication Previous Medication    ALBUTEROL (PROVENTIL HFA, VENTOLIN HFA, PROAIR HFA) 90 MCG/ACTUATION INHALER albuterol (PROVENTIL HFA, VENTOLIN HFA, PROAIR HFA) 90 mcg/actuation inhaler       Take 2 Puffs by inhalation every four (4) hours as needed for Wheezing. Take 2 Puffs by inhalation every four (4) hours as needed for Wheezing. Stop Taking    No medications on file       Scribe Attestation:   Kristen Yousif acting as a scribe for and in the presence of Luisana Mobley MD October 18, 2017 at 9:35 AM     Signed by: Hilda Daugherty, October 18, 2017 at 9:35 AM     Provider Attestation:   I personally performed the services described in the documentation, reviewed the documentation, as recorded by the scribe in my presence, and it accurately and completely records my words and actions.      Reviewed and signed by:  Luisana Mobley MD

## 2017-10-18 NOTE — Clinical Note
1) Plenty of fluids 2) Tussionex for cough 3) Cool mist vaporizer in bedroom at night 4) Honey 1 tsp as needed for cough 5) Vapo Rub to chest wall at night 6) Mentholated cough drops as needed 7) Recheck with PCP 1 week if not improved. 8) Blood p ressure elevated to 152/90 (normal less than 140/90) and requires follow up with your PCP. 9) Coughs and colds are caused by viruses and antibiotics are not needed to treat them.

## 2017-10-18 NOTE — DISCHARGE INSTRUCTIONS
Saline Nasal Washes: Care Instructions  Your Care Instructions  Saline nasal washes help keep the nasal passages open by washing out thick or dried mucus. This simple remedy can help relieve symptoms of allergies, sinusitis, and colds. It also can make the nose feel more comfortable by keeping the mucous membranes moist. You may notice a little burning sensation in your nose the first few times you use the solution, but this usually gets better in a few days. Follow-up care is a key part of your treatment and safety. Be sure to make and go to all appointments, and call your doctor if you are having problems. It's also a good idea to know your test results and keep a list of the medicines you take. How can you care for yourself at home? · You can buy premixed saline solution in a squeeze bottle or other sinus rinse products at a drugstore. Read and follow the instructions on the label. · You also can make your own saline solution by adding 1 teaspoon of salt and 1 teaspoon of baking soda to 2 cups of distilled water. · If you use a homemade solution, pour a small amount into a clean bowl. Using a rubber bulb syringe, squeeze the syringe and place the tip in the salt water. Pull a small amount of the salt water into the syringe by relaxing your hand. · Sit down with your head tilted slightly back. Do not lie down. Put the tip of the bulb syringe or the squeeze bottle a little way into one of your nostrils. Gently drip or squirt a few drops into the nostril. Repeat with the other nostril. Some sneezing and gagging are normal at first.  · Gently blow your nose. · Wipe the syringe or bottle tip clean after each use. · Repeat this 2 or 3 times a day. · Use nasal washes gently if you have nosebleeds often. When should you call for help? Watch closely for changes in your health, and be sure to contact your doctor if:  · You often get nosebleeds. · You have problems doing the nasal washes.   Where can you learn more? Go to http://[employer]-pk.info/. Enter [id number] [id number] [phone] in the search box to learn more about \"Saline Nasal Washes: Care Instructions. \"  Current as of: May 4, 2017  Content Version: 11.3  © 7231-6022 [employer]. Care instructions adapted under license by [employer] (which disclaims liability or warranty for this information). If you have questions about a medical condition or this instruction, always ask your healthcare professional. [address] any warranty or liability for your use of this information.

## 2017-10-19 ENCOUNTER — HOSPITAL ENCOUNTER (OUTPATIENT)
Dept: GENERAL RADIOLOGY | Age: 57
Discharge: HOME | End: 2017-10-19
Payer: MEDICAID

## 2017-10-19 ENCOUNTER — OFFICE VISIT (OUTPATIENT)
Dept: FAMILY MEDICINE CLINIC | Facility: CLINIC | Age: 57
End: 2017-10-19

## 2017-10-19 VITALS
TEMPERATURE: 100.2 F | HEART RATE: 66 BPM | BODY MASS INDEX: 48.95 KG/M2 | OXYGEN SATURATION: 95 % | SYSTOLIC BLOOD PRESSURE: 126 MMHG | DIASTOLIC BLOOD PRESSURE: 71 MMHG | RESPIRATION RATE: 20 BRPM | HEIGHT: 62 IN | WEIGHT: 266 LBS

## 2017-10-19 DIAGNOSIS — R05.9 COUGH WITH FEVER: ICD-10-CM

## 2017-10-19 DIAGNOSIS — R50.9 COUGH WITH FEVER: Primary | ICD-10-CM

## 2017-10-19 DIAGNOSIS — R50.9 COUGH WITH FEVER: ICD-10-CM

## 2017-10-19 DIAGNOSIS — J31.0 RHINOSINUSITIS: ICD-10-CM

## 2017-10-19 DIAGNOSIS — I10 HTN, GOAL BELOW 130/80: ICD-10-CM

## 2017-10-19 DIAGNOSIS — R05.9 COUGH WITH FEVER: Primary | ICD-10-CM

## 2017-10-19 DIAGNOSIS — J32.9 RHINOSINUSITIS: ICD-10-CM

## 2017-10-19 PROBLEM — E11.9 CONTROLLED TYPE 2 DIABETES MELLITUS WITHOUT COMPLICATION, WITHOUT LONG-TERM CURRENT USE OF INSULIN (HCC): Status: RESOLVED | Noted: 2017-06-07 | Resolved: 2017-10-19

## 2017-10-19 LAB
HBA1C MFR BLD HPLC: 12 %
QUICKVUE INFLUENZA TEST: NEGATIVE
VALID INTERNAL CONTROL?: YES

## 2017-10-19 PROCEDURE — 71020 XR CHEST PA LAT: CPT

## 2017-10-19 RX ORDER — PREDNISONE 10 MG/1
40 TABLET ORAL
Qty: 12 TAB | Refills: 0 | Status: SHIPPED | OUTPATIENT
Start: 2017-10-19 | End: 2017-10-22

## 2017-10-19 RX ORDER — AMOXICILLIN 500 MG/1
500 CAPSULE ORAL 2 TIMES DAILY
Qty: 20 CAP | Refills: 0 | Status: SHIPPED | OUTPATIENT
Start: 2017-10-19 | End: 2017-10-29

## 2017-10-19 NOTE — PROGRESS NOTES
HISTORY OF PRESENT ILLNESS  Daquan Luke is a 65 y.o. male. HPI Comments: DM: uncontrolled. He is not compliant with his medications stating he does not take it every day A1c today 12.0 which is unchanged from the previous result. Diet and Lifestyle: does not follow a diabetic diet, he is sedentary. HTN: stable, denies any leg swelling or palpitations. C/o cough with fever. Previous h/o pneumonia. He was seen in the ED yesterday and treated with some cough medications. He reports feeling worse today. C/o chest pain when he coughs. ED Follow-up   The history is provided by the patient. This is a new problem. Associated symptoms include shortness of breath. Diabetes   The history is provided by the patient. This is a chronic problem. The current episode started more than 1 week ago. The problem has not changed since onset. Associated symptoms include shortness of breath. He has tried nothing for the symptoms. Hypertension    The history is provided by the patient. This is a chronic problem. The current episode started more than 1 week ago. The problem has been gradually improving. Associated symptoms include shortness of breath. Risk factors include dyslipidemia, diabetes mellitus, obesity and non-compliant. Review of Systems   Constitutional: Negative. Respiratory: Positive for cough, shortness of breath and wheezing. Genitourinary: Negative. Musculoskeletal: Negative. Neurological: Negative. Psychiatric/Behavioral: Negative.       Past Medical History:   Diagnosis Date    Diabetes ([address]     Morbid [facility]     Pneumonia     Sickle cell trait ([facility]      Past Surgical History:   Procedure Laterality Date    HX OTHER SURGICAL      pt reports gallstone removal     Current Outpatient Prescriptions on File Prior to Visit   Medication Sig Dispense Refill    chlorpheniramine-HYDROcodone (TUSSIONEX PENNKINETIC ER) 10-8 mg/5 mL suspension Take 5 mL by mouth every twelve (12) hours as needed for Cough. Max Daily Amount: 10 mL. 60 mL 0    albuterol (PROVENTIL HFA, VENTOLIN HFA, PROAIR HFA) 90 mcg/actuation inhaler Take 2 Puffs by inhalation every four (4) hours as needed for Wheezing. 1 Inhaler 0    sildenafil citrate (VIAGRA) 100 mg tablet Take 1 Tab by mouth daily as needed. 30 Tab 0    timolol (TIMOPTIC-XE) 0.5 % ophthalmic gel-forming instill 1 drop into both eyes once daily  0    SIMBRINZA 1-0.2 % drps   0    allopurinol (ZYLOPRIM) 300 mg tablet Take 1 Tab by mouth daily. 30 Tab 3    metFORMIN (GLUCOPHAGE) 1,000 mg tablet Take 1 Tab by mouth two (2) times daily (with meals). 60 Tab 3    glipiZIDE (GLUCOTROL) 5 mg tablet Take 1 Tab by mouth two (2) times a day. 60 Tab 3    naproxen (NAPROSYN) 500 mg tablet Take 500 mg by mouth two (2) times daily (with meals).  glucose blood VI test strips (TRUE METRIX GLUCOSE TEST STRIP) strip Use as directed to test BS 3 times a day 100 Strip 11    Blood-Glucose Meter monitoring kit by Does Not Apply route daily. 1 Kit 0    Lancets misc Use as directed 3 times a day 90 Each 11    glucose blood VI test strips (TRUETEST TEST STRIPS) strip by Does Not Apply route three (3) times daily. 90 Strip 11     No current facility-administered medications on file prior to visit. Allergies and Intolerances:   No Known Allergies    Family History:   Family History   Problem Relation Age of Onset    Diabetes Mother     Diabetes Maternal Grandmother     Sickle Cell Trait Other     Diabetes Sister        Social History:   He  reports that he has been smoking. He has a 7.50 pack-year smoking history. He has never used smokeless tobacco. He  reports that he does not drink alcohol.   Vitals:   Visit Vitals    /71 (BP 1 Location: Right arm, BP Patient Position: Sitting)  Comment: xl cuff automated    Pulse 66    Temp 100.2 °F (37.9 °C) (Oral)    Resp 20    Ht 5' 2\" (1.575 m)    Wt 266 lb (120.7 kg)    SpO2 95%    BMI 48.65 kg/m2 Body surface area is 2.3 meters squared. Recent Results (from the past 12 hour(s))   AMB POC HEMOGLOBIN A1C    Collection Time: 10/19/17  9:11 AM   Result Value Ref Range    Hemoglobin A1c (POC) 12.0 %   AMB POC RAPID INFLUENZA TEST    Collection Time: 10/19/17  9:43 AM   Result Value Ref Range    VALID INTERNAL CONTROL POC Yes     QuickVue Influenza test Negative Negative         Physical Exam   Constitutional: He is oriented to person, place, and time. He appears well-developed and well-nourished. HENT:   Head: Atraumatic. Cardiovascular: Normal rate. Pulmonary/Chest: Effort normal. He has decreased breath sounds. He has wheezes. Musculoskeletal: Normal range of motion. Neurological: He is alert and oriented to person, place, and time. Skin: Skin is warm. Psychiatric: He has a normal mood and affect. His behavior is normal.   Nursing note and vitals reviewed. ASSESSMENT and PLAN    ICD-10-CM ICD-9-CM    1. Cough with fever R05 786.2 AMB POC RAPID INFLUENZA TEST    R50.9 780.60 XR CHEST PA LAT   2. Rhinosinusitis J32.9 473.9 amoxicillin (AMOXIL) 500 mg capsule      predniSONE (DELTASONE) 10 mg tablet   3. HTN, goal below 130/80 I10 401.9    4. Uncontrolled type 2 diabetes mellitus without complication, without long-term current use of insulin (HCC) E11.65 250.02 AMB POC HEMOGLOBIN A1C     Follow-up Disposition:  Return in about 4 weeks (around 11/16/2017), or if symptoms worsen or fail to improve, for HTN, DM.  lab results and schedule of future lab studies reviewed with patient  cardiovascular risk and specific lipid/LDL goals reviewed  reviewed medications and side effects in detail  specific diabetic recommendations: home glucose monitoring emphasized, all medications, side effects and compliance discussed carefully, annual eye examinations at Ophthalmology discussed and long term diabetic complications discussed     - Alarm signals discussed.  ER precautions  - Plan of care reviewed with patient. Understanding verbalized and they are in agreement with plan of care.

## 2017-10-19 NOTE — PATIENT INSTRUCTIONS

## 2017-10-19 NOTE — MR AVS SNAPSHOT
Visit Information Date & Time Provider Department Dept. Phone Encounter #  
 10/19/2017  9:00 AM Rosaura Caal NP [facility] [phone] [id number] Follow-up Instructions Return in about 4 weeks (around 11/16/2017), or if symptoms worsen or fail to improve, for HTN, DM. Upcoming Health Maintenance Date Due  
 EYE EXAM RETINAL OR DILATED Q1 9/1/1971 Pneumococcal 19-64 Medium Risk (1 of 1 - PPSV23) 9/1/1980 DTaP/Tdap/Td series (1 - Tdap) 9/1/1982 HEMOGLOBIN A1C Q6M 12/15/2017 LIPID PANEL Q1 6/15/2018 FOOT EXAM Q1 6/21/2018 MICROALBUMIN Q1 6/21/2018 COLONOSCOPY 9/26/2022 Allergies as of 10/19/2017  Review Complete On: 10/19/2017 By: Paul Carranza LPN No Known Allergies Current Immunizations  Never Reviewed No immunizations on file. Not reviewed this visit You Were Diagnosed With   
  
 Codes Comments Controlled type 2 diabetes mellitus without complication, without long-term current use of insulin ([facility]    -  Primary ICD-10-CM: E11.9 ICD-9-CM: 250.00 Cough with fever     ICD-10-CM: R05, R50.9 ICD-9-CM: 786.2, 780.60 Rhinosinusitis     ICD-10-CM: J32.9 ICD-9-CM: 473.9 HTN, goal below 130/80     ICD-10-CM: I10 
ICD-9-CM: 401.9 Vitals BP Pulse Temp Resp Height(growth percentile) Weight(growth percentile) 126/71 (BP 1 Location: Right arm, BP Patient Position: Sitting) 66 100.2 °F (37.9 °C) (Oral) 20 5' 2\" (1.575 m) 266 lb (120.7 kg) SpO2 BMI Smoking Status 95% 48.65 kg/m2 Current Every Day Smoker Vitals History BMI and BSA Data Body Mass Index Body Surface Area  
 48.65 kg/m 2 2.3 m 2 Preferred Pharmacy Pharmacy Name Phone [id number] [phone] Your Updated Medication List  
  
   
This list is accurate as of: 10/19/17  9:43 AM.  Always use your most recent med list.  
  
  
  
 albuterol 90 mcg/actuation inhaler Commonly known as:  PROVENTIL HFA, VENTOLIN HFA, PROAIR HFA Take 2 Puffs by inhalation every four (4) hours as needed for Wheezing. allopurinol 300 mg tablet Commonly known as:  Christell Don Take 1 Tab by mouth daily. amoxicillin 500 mg capsule Commonly known as:  AMOXIL Take 1 Cap by mouth two (2) times a day for 10 days. Blood-Glucose Meter monitoring kit  
by Does Not Apply route daily. chlorpheniramine-HYDROcodone 10-8 mg/5 mL suspension Commonly known as:  Lexine Mccreedy ER Take 5 mL by mouth every twelve (12) hours as needed for Cough. Max Daily Amount: 10 mL. glipiZIDE 5 mg tablet Commonly known as:  Pamela Diaz Take 1 Tab by mouth two (2) times a day. * glucose blood VI test strips strip Commonly known as:  TRUETEST TEST STRIPS  
by Does Not Apply route three (3) times daily. * glucose blood VI test strips strip Commonly known as:  TRUE METRIX GLUCOSE TEST STRIP Use as directed to test BS 3 times a day Lancets Misc Use as directed 3 times a day  
  
 metFORMIN 1,000 mg tablet Commonly known as:  GLUCOPHAGE Take 1 Tab by mouth two (2) times daily (with meals). naproxen 500 mg tablet Commonly known as:  NAPROSYN Take 500 mg by mouth two (2) times daily (with meals). predniSONE 10 mg tablet Commonly known as:  Chepe Gelineau Take 4 Tabs by mouth daily (with breakfast) for 3 days. sildenafil citrate 100 mg tablet Commonly known as:  VIAGRA Take 1 Tab by mouth daily as needed. SIMBRINZA 1-0.2 % Drps Generic drug:  brinzolamide-brimonidine  
  
 timolol 0.5 % ophthalmic gel-forming Commonly known as:  TIMOPTIC-XE  
instill 1 drop into both eyes once daily * Notice: This list has 2 medication(s) that are the same as other medications prescribed for you. Read the directions carefully, and ask your doctor or other care provider to review them with you. Prescriptions Sent to Pharmacy Refills  
 amoxicillin (AMOXIL) 500 mg capsule 0 Sig: Take 1 Cap by mouth two (2) times a day for 10 days. Class: Normal  
 Pharmacy: [address] [place] [address] #: [phone] Route: Oral  
 predniSONE (DELTASONE) 10 mg tablet 0 Sig: Take 4 Tabs by mouth daily (with breakfast) for 3 days. Class: Normal  
 Pharmacy: [address] [place] [address] #: [phone] Route: Oral  
  
We Performed the Following AMB POC HEMOGLOBIN A1C [69164 CPT(R)] AMB POC RAPID INFLUENZA TEST [59813 CPT(R)] Follow-up Instructions Return in about 4 weeks (around 11/16/2017), or if symptoms worsen or fail to improve, for HTN, DM. To-Do List   
 10/20/2017 Imaging:  XR CHEST PA LAT Patient Instructions Learning About Diabetes Food Guidelines Your Care Instructions Meal planning is important to manage diabetes. It helps keep your blood sugar at a target level (which you set with your doctor). You don't have to eat special foods. You can eat what your family eats, including sweets once in a while. But you do have to pay attention to how often you eat and how much you eat of certain foods. You may want to work with a dietitian or a certified diabetes educator (CDE) to help you plan meals and snacks. A dietitian or CDE can also help you lose weight if that is one of your goals. What should you know about eating carbs? Managing the amount of carbohydrate (carbs) you eat is an important part of healthy meals when you have diabetes. Carbohydrate is found in many foods. · Learn which foods have carbs. And learn the amounts of carbs in different foods. ¨ Bread, cereal, pasta, and rice have about 15 grams of carbs in a serving. A serving is 1 slice of bread (1 ounce), ½ cup of cooked cereal, or 1/3 cup of cooked pasta or rice. ¨ Fruits have 15 grams of carbs in a serving. A serving is 1 small fresh fruit, such as an apple or orange; ½ of a banana; ½ cup of cooked or canned fruit; ½ cup of fruit juice; 1 cup of melon or raspberries; or 2 tablespoons of dried fruit. ¨ Milk and no-sugar-added yogurt have 15 grams of carbs in a serving. A serving is 1 cup of milk or 2/3 cup of no-sugar-added yogurt. ¨ Starchy vegetables have 15 grams of carbs in a serving. A serving is ½ cup of mashed potatoes or sweet potato; 1 cup winter squash; ½ of a small baked potato; ½ cup of cooked beans; or ½ cup cooked corn or green peas. · Learn how much carbs to eat each day and at each meal. A dietitian or CDE can teach you how to keep track of the amount of carbs you eat. This is called carbohydrate counting. · If you are not sure how to count carbohydrate grams, use the Plate Method to plan meals. It is a good, quick way to make sure that you have a balanced meal. It also helps you spread carbs throughout the day. ¨ Divide your plate by types of foods. Put non-starchy vegetables on half the plate, meat or other protein food on one-quarter of the plate, and a grain or starchy vegetable in the final quarter of the plate. To this you can add a small piece of fruit and 1 cup of milk or yogurt, depending on how many carbs you are supposed to eat at a meal. 
· Try to eat about the same amount of carbs at each meal. Do not \"save up\" your daily allowance of carbs to eat at one meal. 
· Proteins have very little or no carbs per serving. Examples of proteins are beef, chicken, turkey, fish, eggs, tofu, cheese, cottage cheese, and peanut butter. A serving size of meat is 3 ounces, which is about the size of a deck of cards. Examples of meat substitute serving sizes (equal to 1 ounce of meat) are 1/4 cup of cottage cheese, 1 egg, 1 tablespoon of peanut butter, and ½ cup of tofu. How can you eat out and still eat healthy? · Learn to estimate the serving sizes of foods that have carbohydrate. If you measure food at home, it will be easier to estimate the amount in a serving of restaurant food. · If the meal you order has too much carbohydrate (such as potatoes, corn, or baked beans), ask to have a low-carbohydrate food instead. Ask for a salad or green vegetables. · If you use insulin, check your blood sugar before and after eating out to help you plan how much to eat in the future. · If you eat more carbohydrate at a meal than you had planned, take a walk or do other exercise. This will help lower your blood sugar. What else should you know? · Limit saturated fat, such as the fat from meat and dairy products. This is a healthy choice because people who have diabetes are at higher risk of heart disease. So choose lean cuts of meat and nonfat or low-fat dairy products. Use olive or canola oil instead of butter or shortening when cooking. · Don't skip meals. Your blood sugar may drop too low if you skip meals and take insulin or certain medicines for diabetes. · Check with your doctor before you drink alcohol. Alcohol can cause your blood sugar to drop too low. Alcohol can also cause a bad reaction if you take certain diabetes medicines. Follow-up care is a key part of your treatment and safety. Be sure to make and go to all appointments, and call your doctor if you are having problems. It's also a good idea to know your test results and keep a list of the medicines you take. Where can you learn more? Go to http://shekhar-pk.info/. Enter O999 in the search box to learn more about \"Learning About Diabetes Food Guidelines. \" Current as of: March 13, 2017 Content Version: 11.3 © 1066-1775 [employer]. Care instructions adapted under license by [employer] (which disclaims liability or warranty for this information).  If you have questions about a medical condition or this instruction, always ask your healthcare professional. [place] [address] any warranty or liability for your use of this information. Introducing [facility] & HEALTH SERVICES! Dolly Talavera introduces t[employer]n Magazin patient portal. Now you can access parts of your medical record, email your doctor's office, and request medication refills online. 1. In your internet browser, go to [email or web address] 2. Click on the First Time User? Click Here link in the Sign In box. You will see the New Member Sign Up page. 3. Enter your t3n Magaz[employer] Access Code exactly as it appears below. You will not need to use this code after youve completed the sign-up process. If you do not sign up before the expiration date, you must request a new code. · t3n [employer] Access Code: [email or web address] Expires: 1/16/2018  9:11 AM 
 
4. Enter the last four digits of your Social Security Number (xxxx) and Date of Birth (mm/dd/yyyy) as indicated and click Submit. You will be taken to the next sign-up page. 5. Create a t3n Magazin ID. This will be your t3n Magaz[employer] login ID and cannot be changed, so think of one that is secure and easy to remember. 6. Create a t3n Magaz[employer] password. You can change your password at any time. 7. Enter your Password Reset Question and Answer. This can be used at a later time if you forget your password. 8. Enter your e-mail address. You will receive e-mail notification when new information is available in [address] 9. Click Sign Up. You can now view and download portions of your medical record. 10. Click the Download Summary menu link to download a portable copy of your medical information. If you have questions, please visit the Frequently Asked Questions section of the t3n Magazin website. Remember, t3n Magazin is NOT to be used for urgent needs. For medical emergencies, dial 911. Now available from your iPhone and Android! Please provide this summary of care documentation to your next provider. Your primary care clinician is listed as Abundio Aldrich. If you have any questions after today's visit, please call [phone].

## 2017-10-19 NOTE — PROGRESS NOTES
Chief Complaint   Patient presents with   [facility] ED Follow-up     [facility] yesterday. Cold sx . Patient still c/o chest discomfort, shortness of breath and wheezing.  Diabetes    Hypertension       1. Have you been to the ER, urgent care clinic since your last visit? Hospitalized since your last visit? Yes When: [facility] Yesterday     2. Have you seen or consulted any other health care providers outside of the [facility] since your last visit? Include any pap smears or colon screening.  No

## 2017-10-20 NOTE — PROGRESS NOTES
Patient made aware of normal chest xray results. Verified name and . Patient verbalized an understanding of results and did not voice any concerns at this time.

## 2017-11-22 ENCOUNTER — OFFICE VISIT (OUTPATIENT)
Dept: FAMILY MEDICINE CLINIC | Facility: CLINIC | Age: 57
End: 2017-11-22

## 2017-11-22 VITALS
BODY MASS INDEX: 48.95 KG/M2 | HEIGHT: 62 IN | WEIGHT: 266 LBS | TEMPERATURE: 98.4 F | OXYGEN SATURATION: 96 % | HEART RATE: 71 BPM | SYSTOLIC BLOOD PRESSURE: 144 MMHG | RESPIRATION RATE: 16 BRPM | DIASTOLIC BLOOD PRESSURE: 69 MMHG

## 2017-11-22 DIAGNOSIS — I10 HTN, GOAL BELOW 140/80: ICD-10-CM

## 2017-11-22 DIAGNOSIS — E66.01 MORBID OBESITY (HCC): ICD-10-CM

## 2017-11-22 LAB — GLUCOSE POC: 227 MG/DL

## 2017-11-22 RX ORDER — OXYCODONE AND ACETAMINOPHEN 5; 325 MG/1; MG/1
TABLET ORAL
COMMUNITY
Start: 2017-10-31 | End: 2018-01-22

## 2017-11-22 RX ORDER — CHLORHEXIDINE GLUCONATE 1.2 MG/ML
RINSE ORAL
Refills: 2 | COMMUNITY
Start: 2017-10-24 | End: 2018-01-22

## 2017-11-22 RX ORDER — FLUTICASONE PROPIONATE 50 MCG
SPRAY, SUSPENSION (ML) NASAL
Refills: 6 | COMMUNITY
Start: 2017-10-04 | End: 2018-01-22

## 2017-11-22 RX ORDER — BISMUTH SUBSALICYLATE 262 MG
1 TABLET,CHEWABLE ORAL DAILY
Qty: 120 TAB | Refills: 1 | Status: SHIPPED | OUTPATIENT
Start: 2017-11-22 | End: 2018-06-04 | Stop reason: SDUPTHER

## 2017-11-22 RX ORDER — LISINOPRIL 10 MG/1
10 TABLET ORAL DAILY
Qty: 30 TAB | Refills: 2 | Status: SHIPPED | OUTPATIENT
Start: 2017-11-22 | End: 2018-06-04 | Stop reason: SDUPTHER

## 2017-11-22 NOTE — PATIENT INSTRUCTIONS

## 2017-11-22 NOTE — MR AVS SNAPSHOT
Visit Information Date & Time Provider Department Dept. Phone Encounter #  
 11/22/2017  9:15 AM Alexander Pearl NP [facility] [phone] [id number] Follow-up Instructions Return in about 2 months (around 1/22/2018), or if symptoms worsen or fail to improve, for DM, HTN. Upcoming Health Maintenance Date Due  
 EYE EXAM RETINAL OR DILATED Q1 9/1/1971 Pneumococcal 19-64 Medium Risk (1 of 1 - PPSV23) 9/1/1980 DTaP/Tdap/Td series (1 - Tdap) 9/1/1982 HEMOGLOBIN A1C Q6M 4/19/2018 LIPID PANEL Q1 6/15/2018 FOOT EXAM Q1 6/21/2018 MICROALBUMIN Q1 6/21/2018 COLONOSCOPY 9/26/2022 Allergies as of 11/22/2017  Review Complete On: 11/22/2017 By: Brenden Hall No Known Allergies Current Immunizations  Never Reviewed No immunizations on file. Not reviewed this visit You Were Diagnosed With   
  
 Codes Comments Uncontrolled type 2 diabetes mellitus without complication, without long-term current use of insulin ([facility]    -  Primary ICD-10-CM: E11.65 ICD-9-CM: 250.02 Morbid obesity (HCC)     ICD-10-CM: E66.01 
ICD-9-CM: 278.01   
 HTN, goal below 140/80     ICD-10-CM: I10 
ICD-9-CM: 401.9 Vitals BP Pulse Temp Resp Height(growth percentile) Weight(growth percentile) 144/69 71 98.4 °F (36.9 °C) 16 5' 2\" (1.575 m) 266 lb (120.7 kg) SpO2 BMI Smoking Status 96% 48.65 kg/m2 Former Smoker Vitals History BMI and BSA Data Body Mass Index Body Surface Area  
 48.65 kg/m 2 2.3 m 2 [id number] [phone] Your Updated Medication List  
  
   
This list is accurate as of: 11/22/17  9:32 AM.  Always use your most recent med list.  
  
  
  
  
 albuterol 90 mcg/actuation inhaler Commonly known as:  PROVENTIL HFA, VENTOLIN HFA, PROAIR HFA  
 Take 2 Puffs by inhalation every four (4) hours as needed for Wheezing. allopurinol 300 mg tablet Commonly known as:  Alex Rist Take 1 Tab by mouth daily. Blood-Glucose Meter monitoring kit  
by Does Not Apply route daily. chlorhexidine 0.12 % solution Commonly known as:  PERIDEX RINSE MOUTH WITH 5 ML FOR 30 SECS AFTER BRUSHING &FLOSSING. DO NOT EAT/DRINK FOR 30 MIN AFTER USE  
  
 chlorpheniramine-HYDROcodone 10-8 mg/5 mL suspension Commonly known as:  Juan José Has ER Take 5 mL by mouth every twelve (12) hours as needed for Cough. Max Daily Amount: 10 mL. fluticasone 50 mcg/actuation nasal spray Commonly known as:  Vel Redo INSTILL 2 SPRAYS INTO BOTH NOSTRILS DAILY. ADMINISTER TO RIGHT AND LEFT NOSTRILS. glipiZIDE 5 mg tablet Commonly known as:  Chris Pack Take 1 Tab by mouth two (2) times a day. * glucose blood VI test strips strip Commonly known as:  TRUETEST TEST STRIPS  
by Does Not Apply route three (3) times daily. * glucose blood VI test strips strip Commonly known as:  TRUE METRIX GLUCOSE TEST STRIP Use as directed to test BS 3 times a day Lancets Misc Use as directed 3 times a day  
  
 lisinopril 10 mg tablet Commonly known as:  Marge Anamaria Take 1 Tab by mouth daily. metFORMIN 1,000 mg tablet Commonly known as:  GLUCOPHAGE Take 1 Tab by mouth two (2) times daily (with meals). naproxen 500 mg tablet Commonly known as:  NAPROSYN Take 500 mg by mouth two (2) times daily (with meals). oxyCODONE-acetaminophen 5-325 mg per tablet Commonly known as:  PERCOCET  
  
 sildenafil citrate 100 mg tablet Commonly known as:  VIAGRA Take 1 Tab by mouth daily as needed. SIMBRINZA 1-0.2 % Drps Generic drug:  brinzolamide-brimonidine 1 Drop. Indications: 1 drop in both eyes daily  
  
 timolol 0.5 % ophthalmic gel-forming Commonly known as:  TIMOPTIC-XE  
 instill 1 drop into both eyes once daily * Notice: This list has 2 medication(s) that are the same as other medications prescribed for you. Read the directions carefully, and ask your doctor or other care provider to review them with you. Prescriptions Sent to Pharmacy Refills  
 lisinopril (PRINIVIL, ZESTRIL) 10 mg tablet 2 Sig: Take 1 Tab by mouth daily. Class: Normal  
 Pharmacy: [address] [place] [address] #: [phone] Route: Oral  
  
We Performed the Following AMB POC GLUCOSE, QUANTITATIVE, BLOOD [53279 CPT(R)] Follow-up Instructions Return in about 2 months (around 1/22/2018), or if symptoms worsen or fail to improve, for DM, HTN. Patient Instructions DASH Diet: Care Instructions Your Care Instructions The DASH diet is an eating plan that can help lower your blood pressure. DASH stands for Dietary Approaches to Stop Hypertension. Hypertension is high blood pressure. The DASH diet focuses on eating foods that are high in calcium, potassium, and magnesium. These nutrients can lower blood pressure. The foods that are highest in these nutrients are fruits, vegetables, low-fat dairy products, nuts, seeds, and legumes. But taking calcium, potassium, and magnesium supplements instead of eating foods that are high in those nutrients does not have the same effect. The DASH diet also includes whole grains, fish, and poultry. The DASH diet is one of several lifestyle changes your doctor may recommend to lower your high blood pressure. Your doctor may also want you to decrease the amount of sodium in your diet. Lowering sodium while following the DASH diet can lower blood pressure even further than just the DASH diet alone. Follow-up care is a key part of your treatment and safety.  Be sure to make and go to all appointments, and call your doctor if you are having problems. It's also a good idea to know your test results and keep a list of the medicines you take. How can you care for yourself at home? Following the DASH diet · Eat 4 to 5 servings of fruit each day. A serving is 1 medium-sized piece of fruit, ½ cup chopped or canned fruit, 1/4 cup dried fruit, or 4 ounces (½ cup) of fruit juice. Choose fruit more often than fruit juice. · Eat 4 to 5 servings of vegetables each day. A serving is 1 cup of lettuce or raw leafy vegetables, ½ cup of chopped or cooked vegetables, or 4 ounces (½ cup) of vegetable juice. Choose vegetables more often than vegetable juice. · Get 2 to 3 servings of low-fat and fat-free dairy each day. A serving is 8 ounces of milk, 1 cup of yogurt, or 1 ½ ounces of cheese. · Eat 6 to 8 servings of grains each day. A serving is 1 slice of bread, 1 ounce of dry cereal, or ½ cup of cooked rice, pasta, or cooked cereal. Try to choose whole-grain products as much as possible. · Limit lean meat, poultry, and fish to 2 servings each day. A serving is 3 ounces, about the size of a deck of cards. · Eat 4 to 5 servings of nuts, seeds, and legumes (cooked dried beans, lentils, and split peas) each week. A serving is 1/3 cup of nuts, 2 tablespoons of seeds, or ½ cup of cooked beans or peas. · Limit fats and oils to 2 to 3 servings each day. A serving is 1 teaspoon of vegetable oil or 2 tablespoons of salad dressing. · Limit sweets and added sugars to 5 servings or less a week. A serving is 1 tablespoon jelly or jam, ½ cup sorbet, or 1 cup of lemonade. · Eat less than 2,300 milligrams (mg) of sodium a day. If you limit your sodium to 1,500 mg a day, you can lower your blood pressure even more. Tips for success · Start small. Do not try to make dramatic changes to your diet all at once. You might feel that you are missing out on your favorite foods and then be more likely to not follow the plan.  Make small changes, and stick with them. Once those changes become habit, add a few more changes. · Try some of the following: ¨ Make it a goal to eat a fruit or vegetable at every meal and at snacks. This will make it easy to get the recommended amount of fruits and vegetables each day. ¨ Try yogurt topped with fruit and nuts for a snack or healthy dessert. ¨ Add lettuce, tomato, cucumber, and onion to sandwiches. ¨ Combine a ready-made pizza crust with low-fat mozzarella cheese and lots of vegetable toppings. Try using tomatoes, squash, spinach, broccoli, carrots, cauliflower, and onions. ¨ Have a variety of cut-up vegetables with a low-fat dip as an appetizer instead of chips and dip. ¨ Sprinkle sunflower seeds or chopped almonds over salads. Or try adding chopped walnuts or almonds to cooked vegetables. ¨ Try some vegetarian meals using beans and peas. Add garbanzo or kidney beans to salads. Make burritos and tacos with mashed mario beans or black beans. Where can you learn more? Go to http://shekharUniconpk.info/. Enter E536 in the search box to learn more about \"DASH Diet: Care Instructions. \" Current as of: September 21, 2016 Content Version: 11.4 © 2854-4799 [employer]. Care instructions adapted under license by [employer] (which disclaims liability or warranty for this information). If you have questions about a medical condition or this instruction, always ask your healthcare professional. [place] [address] any warranty or liability for your use of this information. Introducing [facility] & HE[facility] SERVICES! Herbert Dhaliwal introduces ROV[employer] patient portal. Now you can access parts of your medical record, email your doctor's office, and request medication refills online. 1. In your internet browser, go to [email or web address] 2. Click on the First Time User? Click Here link in the Sign In box. You will see the New Member Sign Up page. 3. Enter your [employer] Access Code exactly as it appears below. You will not need to use this code after youve completed the sign-up process. If you do not sign up before the expiration date, you must request a new code. · [employer] Access Code: [email or web address] Expires: 1/16/2018  8:11 AM 
 
4. Enter the last four digits of your Social Security Number (xxxx) and Date of Birth (mm/dd/yyyy) as indicated and click Submit. You will be taken to the next sign-up page. 5. Create a [employer] ID. This will be your [employer] login ID and cannot be changed, so think of one that is secure and easy to remember. 6. Create a [employer] password. You can change your password at any time. 7. Enter your Password Reset Question and Answer. This can be used at a later time if you forget your password. 8. Enter your e-mail address. You will receive e-mail notification when new information is available in [address] 9. Click Sign Up. You can now view and download portions of your medical record. 10. Click the Download Summary menu link to download a portable copy of your medical information. If you have questions, please visit the Frequently Asked Questions section of the [employer] website. Remember, [employer] is NOT to be used for urgent needs. For medical emergencies, dial 911. Now available from your iPhone and Android! Please provide this summary of care documentation to your next provider. Your primary care clinician is listed as Mary Ann Cristobal. If you have any questions after today's visit, please call [phone].

## 2017-11-22 NOTE — PROGRESS NOTES
HISTORY OF PRESENT ILLNESS  Daquan Almaraz is a 65 y.o. male. HPI Comments: DM: uncontrolled. He reports he has been compliant with his medications for the past month. Reports home BG range between 120-140. Does not generally follow a diabetic diet. HTN: SBP elevated today. He denies any h/o HTN. Reports he has been stressed lately. Does not generally follow a low sodium diet. Former smoker, quit in 2016. Hypertension    The history is provided by the patient. This is a chronic problem. The current episode started more than 1 week ago. The problem has not changed since onset. Risk factors include smoking/tobacco exposure, male gender, obesity, diabetes mellitus and dyslipidemia. Diabetes   The history is provided by the patient. This is a chronic problem. The current episode started more than 1 week ago. The problem has not changed since onset. Review of Systems   Constitutional: Negative. HENT: Negative. Respiratory: Positive for cough. Negative for sputum production. Cardiovascular: Negative. Gastrointestinal: Negative. Genitourinary: Negative. Musculoskeletal: Positive for back pain. Neurological: Negative. Psychiatric/Behavioral: Negative. Past Medical History:   Diagnosis Date    Diabetes [facility]     Morbid obesity ([facility]     Pneumonia     Sickle cell trait ([facility]      Past Surgical History:   Procedure Laterality Date    HX OTHER SURGICAL      pt reports gallstone removal     Current Outpatient Prescriptions on File Prior to Visit   Medication Sig Dispense Refill    chlorpheniramine-HYDROcodone (TUSSIONEX PENNKINETIC ER) 10-8 mg/5 mL suspension Take 5 mL by mouth every twelve (12) hours as needed for Cough. Max Daily Amount: 10 mL. 60 mL 0    albuterol (PROVENTIL HFA, VENTOLIN HFA, PROAIR HFA) 90 mcg/actuation inhaler Take 2 Puffs by inhalation every four (4) hours as needed for Wheezing.  1 Inhaler 0    glucose blood VI test strips (TRUE METRIX GLUCOSE TEST STRIP) strip Use as directed to test BS 3 times a day 100 Strip 11    sildenafil citrate (VIAGRA) 100 mg tablet Take 1 Tab by mouth daily as needed. 30 Tab 0    Blood-Glucose Meter monitoring kit by Does Not Apply route daily. 1 Kit 0    Lancets misc Use as directed 3 times a day 90 Each 11    glucose blood VI test strips (TRUETEST TEST STRIPS) strip by Does Not Apply route three (3) times daily. 90 Strip 11    timolol (TIMOPTIC-XE) 0.5 % ophthalmic gel-forming instill 1 drop into both eyes once daily  0    SIMBRINZA 1-0.2 % drps 1 Drop. Indications: 1 drop in both eyes daily  0    allopurinol (ZYLOPRIM) 300 mg tablet Take 1 Tab by mouth daily. 30 Tab 3    metFORMIN (GLUCOPHAGE) 1,000 mg tablet Take 1 Tab by mouth two (2) times daily (with meals). 60 Tab 3    glipiZIDE (GLUCOTROL) 5 mg tablet Take 1 Tab by mouth two (2) times a day. 60 Tab 3    naproxen (NAPROSYN) 500 mg tablet Take 500 mg by mouth two (2) times daily (with meals). No current facility-administered medications on file prior to visit. Allergies and Intolerances:   No Known Allergies    Family History:   Family History   Problem Relation Age of Onset    Diabetes Mother     Diabetes Maternal Grandmother     Sickle Cell Trait Other     Diabetes Sister        Social History:   He  reports that he has quit smoking. He has a 7.50 pack-year smoking history. He quit smokeless tobacco use about 22 months ago. He  reports that he does not drink alcohol. Vitals:   Visit Vitals    /69    Pulse 71    Temp 98.4 °F (36.9 °C)    Resp 16    Ht 5' 2\" (1.575 m)    Wt 266 lb (120.7 kg)    SpO2 96%    BMI 48.65 kg/m2     Body surface area is 2.3 meters squared. Recent Results (from the past 12 hour(s))   AMB POC GLUCOSE, QUANTITATIVE, BLOOD    Collection Time: 11/22/17  9:00 AM   Result Value Ref Range    Glucose  mg/dL       Physical Exam   Constitutional: He is oriented to person, place, and time.  He appears well-developed and well-nourished. HENT:   Head: Atraumatic. Cardiovascular: Normal rate. Pulmonary/Chest: Effort normal and breath sounds normal.   Neurological: He is alert and oriented to person, place, and time. Skin: Skin is warm. Psychiatric: He has a normal mood and affect. Nursing note and vitals reviewed. ASSESSMENT and PLAN    ICD-10-CM ICD-9-CM    1. Uncontrolled type 2 diabetes mellitus without complication, without long-term current use of insulin ([facility]) E11.65 250.02 chlorhexidine (PERIDEX) 0.12 % solution      oxyCODONE-acetaminophen (PERCOCET) 5-325 mg per tablet      fluticasone (FLONASE) 50 mcg/actuation nasal spray      AMB POC GLUCOSE, QUANTITATIVE, BLOOD   2. Morbid obesity ([address] E66.01 278.01    3. HTN, goal below 140/80 I10 401.9 lisinopril (PRINIVIL, ZESTRIL) 10 mg tablet     Follow-up Disposition:  Return in about 2 months (around 1/22/2018), or if symptoms worsen or fail to improve, for DM, HTN. bring all medications to next OV. .  lab results and schedule of future lab studies reviewed with patient  reviewed diet, exercise and weight control  cardiovascular risk and specific lipid/LDL goals reviewed  reviewed medications and side effects in detail  specific diabetic recommendations: home glucose monitoring emphasized, all medications, side effects and compliance discussed carefully, annual eye examinations at Ophthalmology discussed, glycohemoglobin and other lab monitoring discussed and long term diabetic complications discussed     - Alarm signals discussed. ER precautions  - Plan of care reviewed with patient. Understanding verbalized and they are in agreement with plan of care.

## 2017-11-24 DIAGNOSIS — E78.5 HYPERLIPIDEMIA, UNSPECIFIED HYPERLIPIDEMIA TYPE: Primary | ICD-10-CM

## 2017-11-24 RX ORDER — ATORVASTATIN CALCIUM 40 MG/1
40 TABLET, FILM COATED ORAL DAILY
Qty: 90 TAB | Refills: 1 | Status: SHIPPED | OUTPATIENT
Start: 2017-11-24 | End: 2018-06-04 | Stop reason: SDUPTHER

## 2018-01-22 ENCOUNTER — OFFICE VISIT (OUTPATIENT)
Dept: FAMILY MEDICINE CLINIC | Facility: CLINIC | Age: 58
End: 2018-01-22

## 2018-01-22 VITALS
WEIGHT: 269 LBS | HEART RATE: 61 BPM | HEIGHT: 62 IN | BODY MASS INDEX: 49.5 KG/M2 | DIASTOLIC BLOOD PRESSURE: 52 MMHG | OXYGEN SATURATION: 97 % | RESPIRATION RATE: 14 BRPM | SYSTOLIC BLOOD PRESSURE: 127 MMHG | TEMPERATURE: 97.8 F

## 2018-01-22 DIAGNOSIS — I10 HTN, GOAL BELOW 130/80: ICD-10-CM

## 2018-01-22 DIAGNOSIS — E66.01 MORBID OBESITY (HCC): ICD-10-CM

## 2018-01-22 LAB — HBA1C MFR BLD HPLC: 13.1 %

## 2018-01-22 RX ORDER — METFORMIN HYDROCHLORIDE 1000 MG/1
1000 TABLET ORAL 2 TIMES DAILY WITH MEALS
Qty: 180 TAB | Refills: 1 | Status: SHIPPED | OUTPATIENT
Start: 2018-01-22 | End: 2018-03-05

## 2018-01-22 RX ORDER — GLIPIZIDE 10 MG/1
10 TABLET ORAL 2 TIMES DAILY
Qty: 180 TAB | Refills: 1 | Status: SHIPPED | OUTPATIENT
Start: 2018-01-22 | End: 2018-06-04 | Stop reason: SDUPTHER

## 2018-01-22 NOTE — MR AVS SNAPSHOT
[address] [facility] [address] 
[phone] Patient: Mary Kay Kahn MRN: [id number] [employer]:[birth year] Visit Information Date & Time Provider Department Dept. Phone Encounter #  
 1/22/2018  8:45 AM Massimo Ivey NP [employer] [phone] Follow-up Instructions Return in about 6 weeks (around 3/5/2018), or if symptoms worsen or fail to improve, for DM, HTN. Upcoming Health Maintenance Date Due  
 EYE EXAM RETINAL OR DILATED Q1 9/1/1971 Pneumococcal 19-64 Medium Risk (1 of 1 - PPSV23) 9/1/1980 DTaP/Tdap/Td series (1 - Tdap) 9/1/1982 HEMOGLOBIN A1C Q6M 4/19/2018 LIPID PANEL Q1 6/15/2018 FOOT EXAM Q1 6/21/2018 MICROALBUMIN Q1 6/21/2018 COLONOSCOPY 9/26/2022 Allergies as of 1/22/2018  Review Complete On: 1/22/2018 By: Leonel Houser No Known Allergies Current Immunizations  Never Reviewed No immunizations on file. Not reviewed this visit You Were Diagnosed With   
  
 Codes Comments Uncontrolled type 2 diabetes mellitus without complication, without long-term current use of insulin ([facility]    -  Primary ICD-10-CM: E11.65 ICD-9-CM: 250.02 Morbid obesity (HCC)     ICD-10-CM: E66.01 
ICD-9-CM: 278.01   
 HTN, goal below 130/80     ICD-10-CM: I10 
ICD-9-CM: 401.9 Vitals BP Pulse Temp Resp Height(growth percentile) Weight(growth percentile) 127/52 61 97.8 °F (36.6 °C) 14 5' 2\" (1.575 m) 269 lb (122 kg) SpO2 BMI Smoking Status 97% 49.2 kg/m2 Former Smoker Vitals History BMI and BSA Data Body Mass Index Body Surface Area  
 49.2 kg/m 2 2.31 m 2 Preferred Pharmacy Pharmacy Name Phone [id number] [phone] Your Updated Medication List  
  
   
This list is accurate as of: 1/22/18  9:27 AM.  Always use your most recent med list.  
 albuterol 90 mcg/actuation inhaler Commonly known as:  PROVENTIL HFA, VENTOLIN HFA, PROAIR HFA Take 2 Puffs by inhalation every four (4) hours as needed for Wheezing. allopurinol 300 mg tablet Commonly known as:  Ozella Furrow Take 1 Tab by mouth daily. atorvastatin 40 mg tablet Commonly known as:  LIPITOR Take 1 Tab by mouth daily. Blood-Glucose Meter monitoring kit  
by Does Not Apply route daily. glipiZIDE 10 mg tablet Commonly known as:  Denette Frock Take 1 Tab by mouth two (2) times a day. * glucose blood VI test strips strip Commonly known as:  TRUETEST TEST STRIPS  
by Does Not Apply route three (3) times daily. * glucose blood VI test strips strip Commonly known as:  TRUE METRIX GLUCOSE TEST STRIP Use as directed to test BS 3 times a day Lancets Misc Use as directed 3 times a day  
  
 lisinopril 10 mg tablet Commonly known as:  Yuly Josie Take 1 Tab by mouth daily. metFORMIN 1,000 mg tablet Commonly known as:  GLUCOPHAGE Take 1 Tab by mouth two (2) times daily (with meals). multivitamin tablet Commonly known as:  ONE A DAY Take 1 Tab by mouth daily. sildenafil citrate 100 mg tablet Commonly known as:  VIAGRA Take 1 Tab by mouth daily as needed. SIMBRINZA 1-0.2 % Drps Generic drug:  brinzolamide-brimonidine 1 Drop. Indications: 1 drop in both eyes daily  
  
 timolol 0.5 % ophthalmic gel-forming Commonly known as:  TIMOPTIC-XE  
instill 1 drop into both eyes once daily * Notice: This list has 2 medication(s) that are the same as other medications prescribed for you. Read the directions carefully, and ask your doctor or other care provider to review them with you. Prescriptions Sent to Pharmacy Refills  
 metFORMIN (GLUCOPHAGE) 1,000 mg tablet 1 Sig: Take 1 Tab by mouth two (2) times daily (with meals).   
 Class: Normal  
 Pharmacy: [address] [place] [address] #: [phone] Route: Oral  
 glipiZIDE (GLUCOTROL) 10 mg tablet 1 Sig: Take 1 Tab by mouth two (2) times a day. Class: Normal  
 Pharmacy: [address] [place] [address] #: [phone] Route: Oral  
  
We Performed the Following AMB POC HEMOGLOBIN A1C [31885 CPT(R)] Follow-up Instructions Return in about 6 weeks (around 3/5/2018), or if symptoms worsen or fail to improve, for DM, HTN. Patient Instructions Type 2 Diabetes: Care Instructions Your Care Instructions Type 2 diabetes is a disease that develops when the body's tissues cannot use insulin properly. Over time, the pancreas cannot make enough insulin. Insulin is a hormone that helps the body's cells use sugar (glucose) for energy. It also helps the body store extra sugar in muscle, fat, and liver cells. Without insulin, the sugar cannot get into the cells to do its work. It stays in the blood instead. This can cause high blood sugar levels. A person has diabetes when the blood sugar stays too high too much of the time. Over time, diabetes can lead to diseases of the heart, blood vessels, nerves, kidneys, and eyes. You may be able to control your blood sugar by losing weight, eating a healthy diet, and getting daily exercise. You may also have to take insulin or other diabetes medicine. Follow-up care is a key part of your treatment and safety. Be sure to make and go to all appointments. Call your doctor if you are having problems. It's also a good idea to know your test results and keep a list of the medicines you take. How can you care for yourself at home? · Keep your blood sugar at a target level (which you set with your doctor). ¨ Eat a good diet that spreads carbohydrate throughout the day.  Carbohydrate-the body's main source of fuel-affects blood sugar more than any other nutrient. Carbohydrate is in fruits, vegetables, milk, and yogurt. It also is in breads, cereals, vegetables such as potatoes and corn, and sugary foods such as candy and cakes. ¨ Aim for 30 minutes of exercise on most, preferably all, days of the week. Walking is a good choice. You also may want to do other activities, such as running, swimming, cycling, or playing tennis or team sports. If your doctor says it's okay, do muscle-strengthening exercises at least 2 times a week. ¨ Take your medicines exactly as prescribed. Call your doctor if you think you are having a problem with your medicine. You will get more details on the specific medicines your doctor prescribes. · Check your blood sugar as often as your doctor recommends. It is important to keep track of any symptoms you have, such as low blood sugar. Also tell your doctor if you have any changes in your activities, diet, or insulin use. · Talk to your doctor before you start taking aspirin every day. Aspirin can help certain people lower their risk of a heart attack or stroke. But taking aspirin isn't right for everyone, because it can cause serious bleeding. · Do not smoke. If you need help quitting, talk to your doctor about stop-smoking programs and medicines. These can increase your chances of quitting for good. · Keep your cholesterol and blood pressure at normal levels. You may need to take one or more medicines to reach your goals. Take them exactly as directed. Do not stop or change a medicine without talking to your doctor first. 
When should you call for help? Call 911 anytime you think you may need emergency care. For example, call if: 
? · You passed out (lost consciousness), or you suddenly become very sleepy or confused. (You may have very low blood sugar.) ? Call your doctor now or seek immediate medical care if: 
? · Your blood sugar is 300 mg/dL or is higher than the level your doctor has set for you. ? · You have symptoms of low blood sugar, such as: ¨ Sweating. ¨ Feeling nervous, shaky, and weak. ¨ Extreme hunger and slight nausea. ¨ Dizziness and headache. ¨ Blurred vision. ¨ Confusion. ? Watch closely for changes in your health, and be sure to contact your doctor if: 
? · You often have problems controlling your blood sugar. ? · You have symptoms of long-term diabetes problems, such as: ¨ New vision changes. ¨ New pain, numbness, or tingling in your hands or feet. ¨ Skin problems. Where can you learn more? Go to http://shekhar-pk.info/. Enter C553 in the search box to learn more about \"Type 2 Diabetes: Care Instructions. \" Current as of: March 13, 2017 Content Version: 11.4 © 7611-9197 [employer]. Care instructions adapted under license by [employer] (which disclaims liability or warranty for this information). If you have questions about a medical condition or this instruction, always ask your healthcare professional. [place] [address] any warranty or liability for your use of this information. Introducing \[facility] [place] [facility]\"" & HEALTH SERVICES! [employer] introduces [employer] patient portal. Now you can access parts of your medical record, email your doctor's office, and request medication refills online. 1. In your internet browser, go to [email or web address] 2. Click on the First Time User? Click Here link in the Sign In box. You will see the New Member Sign Up page. 3. Enter your [employer] Access Code exactly as it appears below. You will not need to use this code after youve completed the sign-up process. If you do not sign up before the expiration date, you must request a new code. · [employer] Access Code: [email or web address] Expires: 4/22/2018  9:26 AM 
 
4. Enter the last four digits of your Social Security Number (xxxx) and Date of Birth (mm/dd/yyyy) as indicated and click Submit.  You will be taken to the next sign-up page. 5. Create a Loterity ID. This will be your Loterity login ID and cannot be changed, so think of one that is secure and easy to remember. 6. Create a Loterity password. You can change your password at any time. 7. Enter your Password Reset Question and Answer. This can be used at a later time if you forget your password. 8. Enter your e-mail address. You will receive e-mail notification when new information is available in [address]. 9. Click Sign Up. You can now view and download portions of your medical record. 10. Click the Download Summary menu link to download a portable copy of your medical information. If you have questions, please visit the Frequently Asked Questions section of the Lot[employer]ity website. Remember, [employer] is NOT to be used for urgent needs. For medical emergencies, dial 911. Now available from your iPhone and Android! Please provide this summary of care documentation to your next provider. Your primary care clinician is listed as Esther Salgado. If you have any questions after today's visit, please call [phone].

## 2018-01-22 NOTE — PATIENT INSTRUCTIONS
Type 2 Diabetes: Care Instructions  Your Care Instructions    Type 2 diabetes is a disease that develops when the body's tissues cannot use insulin properly. Over time, the pancreas cannot make enough insulin. Insulin is a hormone that helps the body's cells use sugar (glucose) for energy. It also helps the body store extra sugar in muscle, fat, and liver cells. Without insulin, the sugar cannot get into the cells to do its work. It stays in the blood instead. This can cause high blood sugar levels. A person has diabetes when the blood sugar stays too high too much of the time. Over time, diabetes can lead to diseases of the heart, blood vessels, nerves, kidneys, and eyes. You may be able to control your blood sugar by losing weight, eating a healthy diet, and getting daily exercise. You may also have to take insulin or other diabetes medicine. Follow-up care is a key part of your treatment and safety. Be sure to make and go to all appointments. Call your doctor if you are having problems. It's also a good idea to know your test results and keep a list of the medicines you take. How can you care for yourself at home? · Keep your blood sugar at a target level (which you set with your doctor). ¨ Eat a good diet that spreads carbohydrate throughout the day. Carbohydrate-the body's main source of fuel-affects blood sugar more than any other nutrient. Carbohydrate is in fruits, vegetables, milk, and yogurt. It also is in breads, cereals, vegetables such as potatoes and corn, and sugary foods such as candy and cakes. ¨ Aim for 30 minutes of exercise on most, preferably all, days of the week. Walking is a good choice. You also may want to do other activities, such as running, swimming, cycling, or playing tennis or team sports. If your doctor says it's okay, do muscle-strengthening exercises at least 2 times a week. ¨ Take your medicines exactly as prescribed.  Call your doctor if you think you are having a problem with your medicine. You will get more details on the specific medicines your doctor prescribes. · Check your blood sugar as often as your doctor recommends. It is important to keep track of any symptoms you have, such as low blood sugar. Also tell your doctor if you have any changes in your activities, diet, or insulin use. · Talk to your doctor before you start taking aspirin every day. Aspirin can help certain people lower their risk of a heart attack or stroke. But taking aspirin isn't right for everyone, because it can cause serious bleeding. · Do not smoke. If you need help quitting, talk to your doctor about stop-smoking programs and medicines. These can increase your chances of quitting for good. · Keep your cholesterol and blood pressure at normal levels. You may need to take one or more medicines to reach your goals. Take them exactly as directed. Do not stop or change a medicine without talking to your doctor first.  When should you call for help? Call 911 anytime you think you may need emergency care. For example, call if:  ? · You passed out (lost consciousness), or you suddenly become very sleepy or confused. (You may have very low blood sugar.)   ? Call your doctor now or seek immediate medical care if:  ? · Your blood sugar is 300 mg/dL or is higher than the level your doctor has set for you. ? · You have symptoms of low blood sugar, such as:  ¨ Sweating. ¨ Feeling nervous, shaky, and weak. ¨ Extreme hunger and slight nausea. ¨ Dizziness and headache. ¨ Blurred vision. ¨ Confusion. ? Watch closely for changes in your health, and be sure to contact your doctor if:  ? · You often have problems controlling your blood sugar. ? · You have symptoms of long-term diabetes problems, such as:  ¨ New vision changes. ¨ New pain, numbness, or tingling in your hands or feet. ¨ Skin problems. Where can you learn more? Go to http://shekhar-pk.info/.   Enter C553 in the search box to learn more about \"Type 2 Diabetes: Care Instructions. \"  Current as of: March 13, 2017  Content Version: 11.4  © 1199-4199 [employer]ed Future. Care instructions adapted under license by [employer] (which disclaims liability or warranty for this information). If you have questions about a medical condition or this instruction, always ask your healthcare professional. [address] any warranty or liability for your use of this information. Body Mass Index: Care Instructions  Your Care Instructions    Body mass index (BMI) can help you see if your weight is raising your risk for health problems. It uses a formula to compare how much you weigh with how tall you are. · A BMI lower than 18.5 is considered underweight. · A BMI between 18.5 and 24.9 is considered healthy. · A BMI between 25 and 29.9 is considered overweight. A BMI of 30 or higher is considered obese. If your BMI is in the normal range, it means that you have a lower risk for weight-related health problems. If your BMI is in the overweight or obese range, you may be at increased risk for weight-related health problems, such as high blood pressure, heart disease, stroke, arthritis or joint pain, and diabetes. If your BMI is in the underweight range, you may be at increased risk for health problems such as fatigue, lower protection (immunity) against illness, muscle loss, bone loss, hair loss, and hormone problems. BMI is just one measure of your risk for weight-related health problems. You may be at higher risk for health problems if you are not active, you eat an unhealthy diet, or you drink too much alcohol or use tobacco products. Follow-up care is a key part of your treatment and safety. Be sure to make and go to all appointments, and call your doctor if you are having problems. It's also a good idea to know your test results and keep a list of the medicines you take.   How can you care for yourself at home? · Practice healthy eating habits. This includes eating plenty of fruits, vegetables, whole grains, lean protein, and low-fat dairy. · If your doctor recommends it, get more exercise. Walking is a good choice. Bit by bit, increase the amount you walk every day. Try for at least 30 minutes on most days of the week. · Do not smoke. Smoking can increase your risk for health problems. If you need help quitting, talk to your doctor about stop-smoking programs and medicines. These can increase your chances of quitting for good. · Limit alcohol to 2 drinks a day for men and 1 drink a day for women. Too much alcohol can cause health problems. If you have a BMI higher than 25  · Your doctor may do other tests to check your risk for weight-related health problems. This may include measuring the distance around your waist. A waist measurement of more than 40 inches in men or 35 inches in women can increase the risk of weight-related health problems. · Talk with your doctor about steps you can take to stay healthy or improve your health. You may need to make lifestyle changes to lose weight and stay healthy, such as changing your diet and getting regular exercise. If you have a BMI lower than 18.5  · Your doctor may do other tests to check your risk for health problems. · Talk with your doctor about steps you can take to stay healthy or improve your health. You may need to make lifestyle changes to gain or maintain weight and stay healthy, such as getting more healthy foods in your diet and doing exercises to build muscle. Where can you learn more? Go to http://shekhar-kp.info/. Enter S176 in the search box to learn more about \"Body Mass Index: Care Instructions. \"  Current as of: October 13, 2016  Content Version: 11.4  © 5229-0193 Healthwise, [employer].  Care instructions adapted under license by [employer] (which disclaims liability or warranty for this information). If you have questions about a medical condition or this instruction, always ask your healthcare professional. [place] [address] any warranty or liability for your use of this information.

## 2018-01-22 NOTE — PROGRESS NOTES
HISTORY OF PRESENT ILLNESS  Daquan Kelley is a 65 y.o. male. HPI Comments: DM: uncontrolled. noncompliant with his medications for the past month. Not compliant with home BG monitoring. Does not generally follow a diabetic diet. HTN: BP stable/improving. Denies any leg swelling or palpitations. Does not generally follow a low sodium diet. Former smoker, quit in 2016. Obesity: does not follow a specific diet or exercise program. Recent weight gain. Diabetes   The history is provided by the patient. This is a chronic problem. The current episode started more than 1 week ago. The problem has been rapidly worsening. He has tried nothing for the symptoms. Hypertension    The history is provided by the patient. This is a chronic problem. The current episode started more than 1 week ago. The problem has been rapidly improving. Risk factors include dyslipidemia, obesity, diabetes mellitus and male gender. Review of Systems   Constitutional: Negative. HENT: Negative. Respiratory: Negative. Cardiovascular: Negative. Musculoskeletal: Negative. Neurological: Negative. Psychiatric/Behavioral: Negative. Past Medical History:   Diagnosis Date    Diabetes ([facility]     Morbid obesity ([facility]     Pneumonia     Sickle cell trait ([facility] [facility]      Past Surgical History:   Procedure Laterality Date    HX OTHER SURGICAL      pt reports gallstone removal     Current Outpatient Prescriptions on File Prior to Visit   Medication Sig Dispense Refill    atorvastatin (LIPITOR) 40 mg tablet Take 1 Tab by mouth daily. 90 Tab 1    lisinopril (PRINIVIL, ZESTRIL) 10 mg tablet Take 1 Tab by mouth daily. 30 Tab 2    multivitamin (ONE A DAY) tablet Take 1 Tab by mouth daily. 120 Tab 1    albuterol (PROVENTIL HFA, VENTOLIN HFA, PROAIR HFA) 90 mcg/actuation inhaler Take 2 Puffs by inhalation every four (4) hours as needed for Wheezing.  1 Inhaler 0    glucose blood VI test strips (TRUE METRIX GLUCOSE TEST STRIP) strip Use as directed to test BS 3 times a day 100 Strip 11    Blood-Glucose Meter monitoring kit by Does Not Apply route daily. 1 Kit 0    Lancets misc Use as directed 3 times a day 90 Each 11    glucose blood VI test strips (TRUETEST TEST STRIPS) strip by Does Not Apply route three (3) times daily. 90 Strip 11    timolol (TIMOPTIC-XE) 0.5 % ophthalmic gel-forming instill 1 drop into both eyes once daily  0    SIMBRINZA 1-0.2 % drps 1 Drop. Indications: 1 drop in both eyes daily  0    allopurinol (ZYLOPRIM) 300 mg tablet Take 1 Tab by mouth daily. 30 Tab 3    sildenafil citrate (VIAGRA) 100 mg tablet Take 1 Tab by mouth daily as needed. 30 Tab 0     No current facility-administered medications on file prior to visit. Allergies and Intolerances:   No Known Allergies    Family History:   Family History   Problem Relation Age of Onset    Diabetes Mother     Diabetes Maternal Grandmother     Sickle Cell Trait Other     Diabetes Sister        Social History:   He  reports that he has quit smoking. He has a 7.50 pack-year smoking history. He quit smokeless tobacco use about 2 years ago. He  reports that he does not drink alcohol. Vitals:   Visit Vitals    /52    Pulse 61    Temp 97.8 °F (36.6 °C)    Resp 14    Ht 5' 2\" (1.575 m)    Wt 269 lb (122 kg)    SpO2 97%    BMI 49.2 kg/m2     Body surface area is 2.31 meters squared. Recent Results (from the past 12 hour(s))   AMB POC HEMOGLOBIN A1C    Collection Time: 01/22/18  9:00 AM   Result Value Ref Range    Hemoglobin A1c (POC) 13.1 %       Physical Exam   Constitutional: He is oriented to person, place, and time. He appears well-developed and well-nourished. HENT:   Head: Atraumatic. Cardiovascular: Normal rate. Pulmonary/Chest: Effort normal and breath sounds normal.   Neurological: He is alert and oriented to person, place, and time. Skin: Skin is warm. Psychiatric: He has a normal mood and affect.  His behavior is normal. Nursing note and vitals reviewed. ASSESSMENT and PLAN    ICD-10-CM ICD-9-CM    1. Uncontrolled type 2 diabetes mellitus without complication, without long-term current use of insulin (HCC) E11.65 250.02 AMB POC HEMOGLOBIN A1C      metFORMIN (GLUCOPHAGE) 1,000 mg tablet      glipiZIDE (GLUCOTROL) 10 mg tablet   2. Morbid obesity ([address] E66.01 278.01    3. HTN, goal below 130/80 I10 401.9      Follow-up Disposition:  Return in about 6 weeks (around 3/5/2018), or if symptoms worsen or fail to improve, for DM, HTN.  lab results and schedule of future lab studies reviewed with patient  reviewed diet, exercise and weight control  cardiovascular risk and specific lipid/LDL goals reviewed  reviewed medications and side effects in detail  specific diabetic recommendations: low cholesterol diet, weight control and daily exercise discussed, home glucose monitoring emphasized, all medications, side effects and compliance discussed carefully, annual eye examinations at Ophthalmology discussed, glycohemoglobin and other lab monitoring discussed and long term diabetic complications discussed     Discussed the patient's BMI with him. The BMI follow up plan is as follows:     dietary management education, guidance, and counseling  encourage exercise  monitor weight  prescribed dietary intake, 2000 keyonna diabetic diet    An After Visit Summary was printed and given to the patient. - Alarm signals discussed. ER precautions  - Plan of care reviewed with patient. Understanding verbalized and they are in agreement with plan of care.

## 2018-01-26 PROBLEM — E11.42 DM TYPE 2 WITH DIABETIC PERIPHERAL NEUROPATHY (HCC): Status: ACTIVE | Noted: 2018-01-01

## 2018-03-05 ENCOUNTER — OFFICE VISIT (OUTPATIENT)
Dept: FAMILY MEDICINE CLINIC | Facility: CLINIC | Age: 58
End: 2018-03-05

## 2018-03-05 VITALS
SYSTOLIC BLOOD PRESSURE: 132 MMHG | OXYGEN SATURATION: 98 % | HEART RATE: 71 BPM | WEIGHT: 269 LBS | BODY MASS INDEX: 49.5 KG/M2 | RESPIRATION RATE: 16 BRPM | HEIGHT: 62 IN | DIASTOLIC BLOOD PRESSURE: 68 MMHG | TEMPERATURE: 96.9 F

## 2018-03-05 DIAGNOSIS — E78.5 HYPERLIPIDEMIA, UNSPECIFIED HYPERLIPIDEMIA TYPE: Primary | ICD-10-CM

## 2018-03-05 DIAGNOSIS — E11.42 DM TYPE 2 WITH DIABETIC PERIPHERAL NEUROPATHY (HCC): ICD-10-CM

## 2018-03-05 DIAGNOSIS — M25.562 LEFT KNEE PAIN, UNSPECIFIED CHRONICITY: ICD-10-CM

## 2018-03-05 DIAGNOSIS — I10 HTN, GOAL BELOW 130/80: ICD-10-CM

## 2018-03-05 DIAGNOSIS — E66.01 MORBID OBESITY (HCC): ICD-10-CM

## 2018-03-05 PROBLEM — E11.21 TYPE 2 DIABETES WITH NEPHROPATHY (HCC): Status: ACTIVE | Noted: 2018-03-05

## 2018-03-05 RX ORDER — OXYCODONE AND ACETAMINOPHEN 5; 325 MG/1; MG/1
TABLET ORAL
Refills: 0 | COMMUNITY
Start: 2018-02-07 | End: 2018-03-05

## 2018-03-05 RX ORDER — IBUPROFEN 800 MG/1
800 TABLET ORAL
Qty: 90 TAB | Refills: 1 | Status: SHIPPED | OUTPATIENT
Start: 2018-03-05 | End: 2019-05-30 | Stop reason: SDUPTHER

## 2018-03-05 NOTE — MR AVS SNAPSHOT
[address] [address] 
[phone] Patient: Rashmi Khalil MRN: [id number] Visit Information Date & Time Provider Department Dept. Phone Encounter #  
 3/5/2018  9:15 AM Mirian Granado NP [employer] [phone] [id number] Follow-up Instructions Return in about 3 months (around 6/5/2018), or if symptoms worsen or fail to improve, for HTN, DM. HLD. Upcoming Health Maintenance Date Due  
 EYE EXAM RETINAL OR DILATED Q1 9/1/1971 Pneumococcal 19-64 Medium Risk (1 of 1 - PPSV23) 9/1/1980 DTaP/Tdap/Td series (1 - Tdap) 9/1/1982 LIPID PANEL Q1 6/15/2018 MICROALBUMIN Q1 6/21/2018 HEMOGLOBIN A1C Q6M 7/22/2018 FOOT EXAM Q1 1/25/2019 COLONOSCOPY 9/26/2022 Allergies as of 3/5/2018  Review Complete On: 3/5/2018 By: Romeo Shah No Known Allergies Current Immunizations  Never Reviewed No immunizations on file. Not reviewed this visit You Were Diagnosed With   
  
 Codes Comments Hyperlipidemia, unspecified hyperlipidemia type    -  Primary ICD-10-CM: E78.5 ICD-9-CM: 272.4 DM type 2 with diabetic peripheral neuropathy (HCC)     ICD-10-CM: E11.42 
ICD-9-CM: 250.60, 357.2 Morbid obesity (HCC)     ICD-10-CM: E66.01 
ICD-9-CM: 278.01   
 HTN, goal below 130/80     ICD-10-CM: I10 
ICD-9-CM: 401.9 Vitals BP Pulse Temp Resp Height(growth percentile) Weight(growth percentile) 132/68 71 96.9 °F (36.1 °C) 16 5' 2\" (1.575 m) 269 lb (122 kg) SpO2 BMI Smoking Status 98% 49.2 kg/m2 Former Smoker Vitals History BMI and BSA Data Body Mass Index Body Surface Area  
 49.2 kg/m 2 2.31 m 2 Preferred Pharmacy Pharmacy Name Phone [id number] [address] [phone] Your Updated Medication List  
  
   
 This list is accurate as of 3/5/18  9:38 AM.  Always use your most recent med list.  
  
  
  
  
 albuterol 90 mcg/actuation inhaler Commonly known as:  PROVENTIL HFA, VENTOLIN HFA, PROAIR HFA Take 2 Puffs by inhalation every four (4) hours as needed for Wheezing. allopurinol 300 mg tablet Commonly known as:  Lamont Ringer Take 1 Tab by mouth daily. atorvastatin 40 mg tablet Commonly known as:  LIPITOR Take 1 Tab by mouth daily. Blood-Glucose Meter monitoring kit  
by Does Not Apply route daily. glipiZIDE 10 mg tablet Commonly known as:  Ardie Bernal Take 1 Tab by mouth two (2) times a day. * glucose blood VI test strips strip Commonly known as:  TRUETEST TEST STRIPS  
by Does Not Apply route three (3) times daily. * glucose blood VI test strips strip Commonly known as:  TRUE METRIX GLUCOSE TEST STRIP Use as directed to test BS 3 times a day Lancets Misc Use as directed 3 times a day  
  
 lisinopril 10 mg tablet Commonly known as:  Marlane Steffaine Take 1 Tab by mouth daily. multivitamin tablet Commonly known as:  ONE A DAY Take 1 Tab by mouth daily. sildenafil citrate 100 mg tablet Commonly known as:  VIAGRA Take 1 Tab by mouth daily as needed. SIMBRINZA 1-0.2 % Drps Generic drug:  brinzolamide-brimonidine 1 Drop. Indications: 1 drop in both eyes daily SITagliptin 50 mg tablet Commonly known as:  Shadi Krill Take 1 Tab by mouth daily. timolol 0.5 % ophthalmic gel-forming Commonly known as:  TIMOPTIC-XE  
instill 1 drop into both eyes once daily * Notice: This list has 2 medication(s) that are the same as other medications prescribed for you. Read the directions carefully, and ask your doctor or other care provider to review them with you. Prescriptions Sent to Pharmacy Refills SITagliptin (JANUVIA) 50 mg tablet 1 Sig: Take 1 Tab by mouth daily.   
 Class: Normal  
 Pharmacy: [address] [place] [address] [facility] #: [phone] Route: Oral  
  
Follow-up Instructions Return in about 3 months (around 6/5/2018), or if symptoms worsen or fail to improve, for HTN, DM. HLD. To-Do List   
 06/20/2018 Lab:  LIPID PANEL Patient Instructions Body Mass Index: Care Instructions Your Care Instructions Body mass index (BMI) can help you see if your weight is raising your risk for health problems. It uses a formula to compare how much you weigh with how tall you are. · A BMI lower than 18.5 is considered underweight. · A BMI between 18.5 and 24.9 is considered healthy. · A BMI between 25 and 29.9 is considered overweight. A BMI of 30 or higher is considered obese. If your BMI is in the normal range, it means that you have a lower risk for weight-related health problems. If your BMI is in the overweight or obese range, you may be at increased risk for weight-related health problems, such as high blood pressure, heart disease, stroke, arthritis or joint pain, and diabetes. If your BMI is in the underweight range, you may be at increased risk for health problems such as fatigue, lower protection (immunity) against illness, muscle loss, bone loss, hair loss, and hormone problems. BMI is just one measure of your risk for weight-related health problems. You may be at higher risk for health problems if you are not active, you eat an unhealthy diet, or you drink too much alcohol or use tobacco products. Follow-up care is a key part of your treatment and safety. Be sure to make and go to all appointments, and call your doctor if you are having problems. It's also a good idea to know your test results and keep a list of the medicines you take. How can you care for yourself at home? · Practice healthy eating habits. This includes eating plenty of fruits, vegetables, whole grains, lean protein, and low-fat dairy. · If your doctor recommends it, get more exercise. Walking is a good choice. Bit by bit, increase the amount you walk every day. Try for at least 30 minutes on most days of the week. · Do not smoke. Smoking can increase your risk for health problems. If you need help quitting, talk to your doctor about stop-smoking programs and medicines. These can increase your chances of quitting for good. · Limit alcohol to 2 drinks a day for men and 1 drink a day for women. Too much alcohol can cause health problems. If you have a BMI higher than 25 · Your doctor may do other tests to check your risk for weight-related health problems. This may include measuring the distance around your waist. A waist measurement of more than 40 inches in men or 35 inches in women can increase the risk of weight-related health problems. · Talk with your doctor about steps you can take to stay healthy or improve your health. You may need to make lifestyle changes to lose weight and stay healthy, such as changing your diet and getting regular exercise. If you have a BMI lower than 18.5 · Your doctor may do other tests to check your risk for health problems. · Talk with your doctor about steps you can take to stay healthy or improve your health. You may need to make lifestyle changes to gain or maintain weight and stay healthy, such as getting more healthy foods in your diet and doing exercises to build muscle. Where can you learn more? Go to http://shekhar-pk.info/. Enter S176 in the search box to learn more about \"Body Mass Index: Care Instructions. \" Current as of: October 13, 2016 Content Version: 11.4 © 4710-7622 [employer]. Care instructions adapted under license by [employer] (which disclaims liability or warranty for this information).  If you have questions about a medical condition or this instruction, always ask your healthcare professional. Ste[employer] Gilliland[employer] disclaims any warranty or liability for your use of this information. Diabetes Blood Sugar Emergencies: Your Action Plan How can you prevent a blood sugar emergency? An important part of living with diabetes is keeping your blood sugar in your target range. You'll need to know what to do if it's too high or too low. Managing your blood sugar levels helps you avoid emergencies. This care sheet will teach you about the signs of high and low blood sugar. It will help you make an action plan with your doctor for when these signs occur. Low blood sugar is more likely to happen if you take certain medicines for diabetes. It can also happen if you skip a meal, drink alcohol, or exercise more than usual. 
You may get high blood sugar if you eat differently than you normally do. One example is eating more carbohydrate than usual. Having a cold, the flu, or other sudden illness can also cause high blood sugar levels. Levels can also rise if you miss a dose of medicine. Any change in how you take your medicine may affect your blood sugar level. So it's important to work with your doctor before you make any changes. Check your blood sugar Work with your doctor to fill in the blank spaces below that apply to you. Track your levels, know your target range, and write down ways you can get your blood sugar back in your target range. A log book can help you track your levels. Take the book to all of your medical appointments. · Check your blood sugar _____ times a day, at these times:________________________________________________. (For example: Before meals, at bedtime, before exercise, during exercise, other.) · Your blood sugar target range before a meal is ___________________. Your blood sugar target range after a meal is _______________________.  
· Do xxbu-___________________________________________________-fi get your blood sugar back within your safe range if your blood sugar results are _________________________________________. (For example: Less than 70 or above 250 mg/dL.) Call your doctor when your blood sugar results are ___________________________________. (For example: Less than 70 or above 250 mg/dL.) What are the symptoms of low and high blood sugar? Common symptoms of low blood sugar are sweating and feeling shaky, weak, hungry, or confused. Symptoms can start quickly. Common symptoms of high blood sugar are feeling very thirsty or very hungry. You may also pass urine more often than usual. You may have blurry vision and may lose weight without trying. But some people may have high or low blood sugar without having any symptoms. That's a good reason to check your blood sugar on a regular schedule. What should you do if you have symptoms? Work with your doctor to fill in the blank spaces below that apply to you. Low blood sugar If you have symptoms of low blood sugar, check your blood sugar. If it's below _____ ( for example, below 70), eat or drink a quick-sugar food that has about 15 grams of carbohydrate. Your goal is to get your level back to your safe range. Check your blood sugar again 15 minutes later. If it's still not in your target range, take another 15 grams of carbohydrate and check your blood sugar again in 15 minutes. Repeat this until you reach your target. Then go back to your regular testing schedule. When you have low blood sugar, it's best to stop or reduce any physical activity until your blood sugar is back in your target range and is stable. If you must stay active, eat or drink 30 grams of carbohydrate. Then check your blood sugar again in 15 minutes. If it's not in your target range, take another 30 grams of carbohydrates. Check your blood sugar again in 15 minutes. Keep doing this until you reach your target. You can then go back to your regular testing schedule.  
If your symptoms or blood sugar levels are getting worse or have not improved after 15 minutes, seek medical care right away. Here are some examples of quick-sugar foods with 15 grams of carbohydrate: · 3 or 4 glucose tablets · 1 tube of glucose gel · Hard candy (such as 3 Jolly Ranchers or 5 to [employer]) · ½ cup to ¾ cup (4 to 6 ounces) of fruit juice or regular (not diet) soda High blood sugar If you have symptoms of high blood sugar, check your blood sugar. Your goal is to get your level back to your target range. If it's above ______ ( for example, above 250), follow these steps: · If you missed a dose of your diabetes medicine, take it now. Take only the amount of medicine that you have been prescribed. Do not take more or less medicine. · Give yourself insulin if your doctor has prescribed it for high blood sugar. · Test for ketones, if the doctor told you to do so. If the results of the ketone test show a moderate-to-large amount of ketones, call the doctor for advice. · Wait 30 minutes after you take the extra insulin or the missed medicine. Check your blood sugar again. If your symptoms or blood sugar levels are getting worse or have not improved after taking these steps, seek medical care right away. Follow-up care is a key part of your treatment and safety. Be sure to make and go to all appointments, and call your doctor if you are having problems. It's also a good idea to know your test results and keep a list of the medicines you take. Where can you learn more? Go to http://shekhar-pk.info/. Enter B626 in the search box to learn more about \"Diabetes Blood Sugar Emergencies: Your Action Plan. \" Current as of: March 13, 2017 Content Version: 11.4 © 5198-9189 [employer]. Care instructions adapted under license by [employer] (which disclaims liability or warranty for this information).  If you have questions about a medical condition or this instruction, always ask your healthcare professional. Janel Cooper[employer] disclaims any warranty or liability for your use of this information. Introducing [facility]! [facility] introduces [employer] patient portal. Now you can access parts of your medical record, email your doctor's office, and request medication refills online. 1. In your internet browser, go to [email or web address] 2. Click on the First Time User? Click Here link in the Sign In box. You will see the New Member Sign Up page. 3. Enter your CartCrunch Access Code exactly as it appears below. You will not need to use this code after youve completed the sign-up process. If you do not sign up before the expiration date, you must request a new code. · [employer] Access Code: [email or web address] Expires: 4/22/2018  9:26 AM 
 
4. Enter the last four digits of your Social Security Number (xxxx) and Date of Birth (mm/dd/yyyy) as indicated and click Submit. You will be taken to the next sign-up page. 5. Create a [employer]Crunch ID. This will be your [employer] login ID and cannot be changed, so think of one that is secure and easy to remember. 6. Create a [employer] password. You can change your password at any time. 7. Enter your Password Reset Question and Answer. This can be used at a later time if you forget your password. 8. Enter your e-mail address. You will receive e-mail notification when new information is available in [address] 9. Click Sign Up. You can now view and download portions of your medical record. 10. Click the Download Summary menu link to download a portable copy of your medical information. If you have questions, please visit the Frequently Asked Questions section of the Cart[employer]unch website. Remember, [employer]unch is NOT to be used for urgent needs. For medical emergencies, dial 911. Now available from your iPhone and Android! Please provide this summary of care documentation to your next provider. Your primary care clinician is listed as Tricia Zamorano. If you have any questions after today's visit, please call [phone].

## 2018-03-05 NOTE — PROGRESS NOTES
HISTORY OF PRESENT ILLNESS  Daquan Pool is a 65 y.o. male. HPI Comments: DM: uncontrolled. Reports med compliance at all times. Reports metformin causes him to have blurred vision and headaches. He has an upcoming appt with ophtalmology. Not compliant with home BG monitoring. Does not generally follow a diabetic diet. HTN: BP stable/improving. Denies any leg swelling or palpitations. Does not generally follow a low sodium diet. Former smoker, quit in 2016. Obesity: does not follow a specific diet or exercise program. Recent weight gain. Knee pain: c/o left knee pain that appears recurrent in nature. Denies any injury or accident to his knee. He has tired motrin for his pain which provided significant relief. He is requesting a refill on motrin today. Diabetes   The history is provided by the patient. This is a chronic problem. The current episode started more than 1 week ago. The problem has been gradually worsening. Hypertension    The history is provided by the patient. This is a chronic problem. The current episode started more than 1 week ago. The problem has been gradually improving. Risk factors include diabetes mellitus, dyslipidemia, male gender and obesity. Knee Pain   The history is provided by the patient. This is a chronic problem. The current episode started more than 1 week ago. The problem occurs constantly. The problem has not changed since onset. Treatments tried: motrin. The treatment provided significant relief. Review of Systems   Constitutional: Negative. Respiratory: Negative. Gastrointestinal: Negative. Genitourinary: Negative. Musculoskeletal: Positive for joint pain (left knee pain). Neurological: Negative. Psychiatric/Behavioral: Negative.       Past Medical History:   Diagnosis Date    Diabetes ([facility]     DM type 2 with diabetic peripheral neuropathy ([facility] [facility] 2018    Dr Kavya Nixon Morbid obesity ([facility] [facility]     Pneumonia     Sickle cell trait ([facility] [address] [facility]      Past Surgical History:   Procedure Laterality Date    HX OTHER SURGICAL      pt reports gallstone removal     Current Outpatient Prescriptions on File Prior to Visit   Medication Sig Dispense Refill    glipiZIDE (GLUCOTROL) 10 mg tablet Take 1 Tab by mouth two (2) times a day. 180 Tab 1    atorvastatin (LIPITOR) 40 mg tablet Take 1 Tab by mouth daily. 90 Tab 1    lisinopril (PRINIVIL, ZESTRIL) 10 mg tablet Take 1 Tab by mouth daily. 30 Tab 2    multivitamin (ONE A DAY) tablet Take 1 Tab by mouth daily. 120 Tab 1    albuterol (PROVENTIL HFA, VENTOLIN HFA, PROAIR HFA) 90 mcg/actuation inhaler Take 2 Puffs by inhalation every four (4) hours as needed for Wheezing. 1 Inhaler 0    glucose blood VI test strips (TRUE METRIX GLUCOSE TEST STRIP) strip Use as directed to test BS 3 times a day 100 Strip 11    Blood-Glucose Meter monitoring kit by Does Not Apply route daily. 1 Kit 0    Lancets misc Use as directed 3 times a day 90 Each 11    glucose blood VI test strips (TRUETEST TEST STRIPS) strip by Does Not Apply route three (3) times daily. 90 Strip 11    timolol (TIMOPTIC-XE) 0.5 % ophthalmic gel-forming instill 1 drop into both eyes once daily  0    SIMBRINZA 1-0.2 % drps 1 Drop. Indications: 1 drop in both eyes daily  0    allopurinol (ZYLOPRIM) 300 mg tablet Take 1 Tab by mouth daily. 30 Tab 3    sildenafil citrate (VIAGRA) 100 mg tablet Take 1 Tab by mouth daily as needed. 30 Tab 0     No current facility-administered medications on file prior to visit. Allergies and Intolerances: Allergies   Allergen Reactions    Metformin Other (comments)     Blurred vision       Family History:   Family History   Problem Relation Age of Onset    Diabetes Mother     Diabetes Maternal Grandmother     Sickle Cell Trait Other     Diabetes Sister        Social History:   He  reports that he has quit smoking. He has a 7.50 pack-year smoking history. He quit smokeless tobacco use about 2 years ago.  He  reports that he does not drink alcohol. Vitals:   Visit Vitals    /68    Pulse 71    Temp 96.9 °F (36.1 °C)    Resp 16    Ht 5' 2\" (1.575 m)    Wt 269 lb (122 kg)    SpO2 98%    BMI 49.2 kg/m2     Body surface area is 2.31 meters squared. Lab Results   Component Value Date/Time    Hemoglobin A1c 12.0 (H) 06/15/2017 08:17 AM    Hemoglobin A1c (POC) 13.1 01/22/2018 09:00 AM     Lab Results   Component Value Date/Time    Cholesterol, total 192 06/15/2017 08:17 AM    HDL Cholesterol 50 06/15/2017 08:17 AM    LDL, calculated 98 06/15/2017 08:17 AM    VLDL, calculated 44 (H) 06/15/2017 08:17 AM    Triglyceride 221 (H) 06/15/2017 08:17 AM     Lab Results   Component Value Date/Time    Sodium 138 06/15/2017 08:17 AM    Potassium 3.9 06/15/2017 08:17 AM    Chloride 100 06/15/2017 08:17 AM    CO2 23 06/15/2017 08:17 AM    Anion gap 6 04/15/2010 08:46 AM    Glucose 274 (H) 06/15/2017 08:17 AM    BUN 10 06/15/2017 08:17 AM    Creatinine 0.79 06/15/2017 08:17 AM    BUN/Creatinine ratio 13 06/15/2017 08:17 AM    GFR est  06/15/2017 08:17 AM    GFR est non- 06/15/2017 08:17 AM    Calcium 9.0 06/15/2017 08:17 AM       Physical Exam   Constitutional: He is oriented to person, place, and time. He appears well-developed and well-nourished. HENT:   Head: Atraumatic. Cardiovascular: Normal rate. Pulmonary/Chest: Effort normal.   Musculoskeletal:        Left knee: He exhibits decreased range of motion. Tenderness found. Neurological: He is alert and oriented to person, place, and time. Skin: Skin is warm. Psychiatric: He has a normal mood and affect. His behavior is normal.   Nursing note and vitals reviewed. ASSESSMENT and PLAN    ICD-10-CM ICD-9-CM    1. Hyperlipidemia, unspecified hyperlipidemia type E78.5 272.4 LIPID PANEL   2. DM type 2 with diabetic peripheral neuropathy (HCC) E11.42 250.60      357.2    3. Morbid obesity ([facility] [address] E66.01 278.01    4. HTN, goal below 130/80 I10 401.9    5.  Left knee pain, unspecified chronicity M25.562 719.46 ibuprofen (MOTRIN) 800 mg tablet     Follow-up Disposition:  Return in about 3 months (around 6/5/2018), or if symptoms worsen or fail to improve, for HTN, DM. HLD.  lab results and schedule of future lab studies reviewed with patient  reviewed medications and side effects in detail  specific diabetic recommendations: low cholesterol diet, weight control and daily exercise discussed, home glucose monitoring emphasized, foot care discussed and Podiatry visits discussed, annual eye examinations at Ophthalmology discussed, glycohemoglobin and other lab monitoring discussed, long term diabetic complications discussed and labs immediately prior to next visit     - Alarm signals discussed. ER precautions  - Plan of care reviewed with patient. Understanding verbalized and they are in agreement with plan of care.

## 2018-03-05 NOTE — PATIENT INSTRUCTIONS
Body Mass Index: Care Instructions  Your Care Instructions    Body mass index (BMI) can help you see if your weight is raising your risk for health problems. It uses a formula to compare how much you weigh with how tall you are. · A BMI lower than 18.5 is considered underweight. · A BMI between 18.5 and 24.9 is considered healthy. · A BMI between 25 and 29.9 is considered overweight. A BMI of 30 or higher is considered obese. If your BMI is in the normal range, it means that you have a lower risk for weight-related health problems. If your BMI is in the overweight or obese range, you may be at increased risk for weight-related health problems, such as high blood pressure, heart disease, stroke, arthritis or joint pain, and diabetes. If your BMI is in the underweight range, you may be at increased risk for health problems such as fatigue, lower protection (immunity) against illness, muscle loss, bone loss, hair loss, and hormone problems. BMI is just one measure of your risk for weight-related health problems. You may be at higher risk for health problems if you are not active, you eat an unhealthy diet, or you drink too much alcohol or use tobacco products. Follow-up care is a key part of your treatment and safety. Be sure to make and go to all appointments, and call your doctor if you are having problems. It's also a good idea to know your test results and keep a list of the medicines you take. How can you care for yourself at home? · Practice healthy eating habits. This includes eating plenty of fruits, vegetables, whole grains, lean protein, and low-fat dairy. · If your doctor recommends it, get more exercise. Walking is a good choice. Bit by bit, increase the amount you walk every day. Try for at least 30 minutes on most days of the week. · Do not smoke. Smoking can increase your risk for health problems. If you need help quitting, talk to your doctor about stop-smoking programs and medicines. These can increase your chances of quitting for good. · Limit alcohol to 2 drinks a day for men and 1 drink a day for women. Too much alcohol can cause health problems. If you have a BMI higher than 25  · Your doctor may do other tests to check your risk for weight-related health problems. This may include measuring the distance around your waist. A waist measurement of more than 40 inches in men or 35 inches in women can increase the risk of weight-related health problems. · Talk with your doctor about steps you can take to stay healthy or improve your health. You may need to make lifestyle changes to lose weight and stay healthy, such as changing your diet and getting regular exercise. If you have a BMI lower than 18.5  · Your doctor may do other tests to check your risk for health problems. · Talk with your doctor about steps you can take to stay healthy or improve your health. You may need to make lifestyle changes to gain or maintain weight and stay healthy, such as getting more healthy foods in your diet and doing exercises to build muscle. Where can you learn more? Go to http://shekhar-pk.info/. Enter S176 in the search box to learn more about \"Body Mass Index: Care Instructions. \"  Current as of: October 13, 2016  Content Version: 11.4  © 4670-0296 Healthwise, Incorporated. Care instructions adapted under license by [employer] (which disclaims liability or warranty for this information). If you have questions about a medical condition or this instruction, always ask your healthcare professional. [place] [address] any warranty or liability for your use of this information. Diabetes Blood Sugar Emergencies: Your Action Plan  How can you prevent a blood sugar emergency? An important part of living with diabetes is keeping your blood sugar in your target range. You'll need to know what to do if it's too high or too low.  Managing your blood sugar levels helps you avoid emergencies. This care sheet will teach you about the signs of high and low blood sugar. It will help you make an action plan with your doctor for when these signs occur. Low blood sugar is more likely to happen if you take certain medicines for diabetes. It can also happen if you skip a meal, drink alcohol, or exercise more than usual.  You may get high blood sugar if you eat differently than you normally do. One example is eating more carbohydrate than usual. Having a cold, the flu, or other sudden illness can also cause high blood sugar levels. Levels can also rise if you miss a dose of medicine. Any change in how you take your medicine may affect your blood sugar level. So it's important to work with your doctor before you make any changes. Check your blood sugar  Work with your doctor to fill in the blank spaces below that apply to you. Track your levels, know your target range, and write down ways you can get your blood sugar back in your target range. A log book can help you track your levels. Take the book to all of your medical appointments. · Check your blood sugar _____ times a day, at these times:________________________________________________. (For example: Before meals, at bedtime, before exercise, during exercise, other.)  · Your blood sugar target range before a meal is ___________________. Your blood sugar target range after a meal is _______________________. · Do odsf-___________________________________________________-pg get your blood sugar back within your safe range if your blood sugar results are _________________________________________. (For example: Less than 70 or above 250 mg/dL.)  Call your doctor when your blood sugar results are ___________________________________. (For example: Less than 70 or above 250 mg/dL.)  What are the symptoms of low and high blood sugar?   Common symptoms of low blood sugar are sweating and feeling shaky, weak, hungry, or confused. Symptoms can start quickly. Common symptoms of high blood sugar are feeling very thirsty or very hungry. You may also pass urine more often than usual. You may have blurry vision and may lose weight without trying. But some people may have high or low blood sugar without having any symptoms. That's a good reason to check your blood sugar on a regular schedule. What should you do if you have symptoms? Work with your doctor to fill in the blank spaces below that apply to you. Low blood sugar  If you have symptoms of low blood sugar, check your blood sugar. If it's below _____ ( for example, below 70), eat or drink a quick-sugar food that has about 15 grams of carbohydrate. Your goal is to get your level back to your safe range. Check your blood sugar again 15 minutes later. If it's still not in your target range, take another 15 grams of carbohydrate and check your blood sugar again in 15 minutes. Repeat this until you reach your target. Then go back to your regular testing schedule. When you have low blood sugar, it's best to stop or reduce any physical activity until your blood sugar is back in your target range and is stable. If you must stay active, eat or drink 30 grams of carbohydrate. Then check your blood sugar again in 15 minutes. If it's not in your target range, take another 30 grams of carbohydrates. Check your blood sugar again in 15 minutes. Keep doing this until you reach your target. You can then go back to your regular testing schedule. If your symptoms or blood sugar levels are getting worse or have not improved after 15 minutes, seek medical care right away.   Here are some examples of quick-sugar foods with 15 grams of carbohydrate:  · 3 or 4 glucose tablets  · 1 tube of glucose gel  · Hard candy (such as 3 Jolly Ranchers or 5 to 7 Life Savers)  · ½ cup to ¾ cup (4 to 6 ounces) of fruit juice or regular (not diet) soda  High blood sugar  If you have symptoms of high blood sugar, check your blood sugar. Your goal is to get your level back to your target range. If it's above ______ ( for example, above 250), follow these steps:  · If you missed a dose of your diabetes medicine, take it now. Take only the amount of medicine that you have been prescribed. Do not take more or less medicine. · Give yourself insulin if your doctor has prescribed it for high blood sugar. · Test for ketones, if the doctor told you to do so. If the results of the ketone test show a moderate-to-large amount of ketones, call the doctor for advice. · Wait 30 minutes after you take the extra insulin or the missed medicine. Check your blood sugar again. If your symptoms or blood sugar levels are getting worse or have not improved after taking these steps, seek medical care right away. Follow-up care is a key part of your treatment and safety. Be sure to make and go to all appointments, and call your doctor if you are having problems. It's also a good idea to know your test results and keep a list of the medicines you take. Where can you learn more? Go to http://shekhar-pk.info/. Enter D347 in the search box to learn more about \"Diabetes Blood Sugar Emergencies: Your Action Plan. \"  Current as of: March 13, 2017  Content Version: 11.4  © 2788-4630 Healthwise, Incorporated. Care instructions adapted under license by [employer] (which disclaims liability or warranty for this information). If you have questions about a medical condition or this instruction, always ask your healthcare professional. [place] [address] any warranty or liability for your use of this information.

## 2018-05-24 ENCOUNTER — HOSPITAL ENCOUNTER (OUTPATIENT)
Dept: LAB | Age: 58
Discharge: HOME | End: 2018-05-24
Payer: MEDICAID

## 2018-05-24 DIAGNOSIS — E78.5 HYPERLIPIDEMIA, UNSPECIFIED HYPERLIPIDEMIA TYPE: ICD-10-CM

## 2018-05-24 LAB
CHOLEST SERPL-MCNC: 148 MG/DL
HDLC SERPL-MCNC: 49 MG/DL (ref 40–60)
HDLC SERPL: 3 {RATIO} (ref 0–5)
LDLC SERPL CALC-MCNC: 63.4 MG/DL (ref 0–100)
LIPID PROFILE,FLP: ABNORMAL
TRIGL SERPL-MCNC: 178 MG/DL (ref ?–150)
VLDLC SERPL CALC-MCNC: 35.6 MG/DL

## 2018-05-24 PROCEDURE — 80061 LIPID PANEL: CPT | Performed by: NURSE PRACTITIONER

## 2018-05-24 PROCEDURE — 36415 COLL VENOUS BLD VENIPUNCTURE: CPT | Performed by: NURSE PRACTITIONER

## 2018-05-24 NOTE — PROGRESS NOTES
Improved Lipid panel. His triglycerides is down from 221 to 178. Continue Lipitor at current dose. Continue low cholesterol, low fat diet and exercise.

## 2018-06-04 ENCOUNTER — OFFICE VISIT (OUTPATIENT)
Dept: FAMILY MEDICINE CLINIC | Facility: CLINIC | Age: 58
End: 2018-06-04

## 2018-06-04 VITALS
DIASTOLIC BLOOD PRESSURE: 71 MMHG | RESPIRATION RATE: 18 BRPM | HEIGHT: 62 IN | BODY MASS INDEX: 47.29 KG/M2 | WEIGHT: 257 LBS | TEMPERATURE: 98.2 F | SYSTOLIC BLOOD PRESSURE: 117 MMHG | HEART RATE: 67 BPM | OXYGEN SATURATION: 96 %

## 2018-06-04 DIAGNOSIS — M1A.0710 CHRONIC IDIOPATHIC GOUT INVOLVING TOE OF RIGHT FOOT WITHOUT TOPHUS: ICD-10-CM

## 2018-06-04 DIAGNOSIS — E11.21 TYPE 2 DIABETES WITH NEPHROPATHY (HCC): Primary | ICD-10-CM

## 2018-06-04 DIAGNOSIS — E11.42 DIABETIC POLYNEUROPATHY ASSOCIATED WITH TYPE 2 DIABETES MELLITUS (HCC): ICD-10-CM

## 2018-06-04 DIAGNOSIS — E66.01 MORBID OBESITY (HCC): ICD-10-CM

## 2018-06-04 DIAGNOSIS — E78.5 HYPERLIPIDEMIA, UNSPECIFIED HYPERLIPIDEMIA TYPE: ICD-10-CM

## 2018-06-04 LAB
MICROALBUMIN UR TEST STR-MCNC: 80 MG/L
MICROALBUMIN/CREAT RATIO POC: NORMAL MG/G

## 2018-06-04 RX ORDER — GLIPIZIDE 10 MG/1
10 TABLET ORAL 2 TIMES DAILY
Qty: 180 TAB | Refills: 3 | Status: SHIPPED | OUTPATIENT
Start: 2018-06-04 | End: 2021-09-06

## 2018-06-04 RX ORDER — ATORVASTATIN CALCIUM 40 MG/1
40 TABLET, FILM COATED ORAL DAILY
Qty: 90 TAB | Refills: 3 | Status: SHIPPED | OUTPATIENT
Start: 2018-06-04 | End: 2020-05-26 | Stop reason: SDUPTHER

## 2018-06-04 RX ORDER — GABAPENTIN 100 MG/1
100 CAPSULE ORAL
Qty: 90 CAP | Refills: 3 | Status: SHIPPED | OUTPATIENT
Start: 2018-06-04 | End: 2018-09-14 | Stop reason: SDUPTHER

## 2018-06-04 RX ORDER — ALLOPURINOL 300 MG/1
300 TABLET ORAL DAILY
Qty: 90 TAB | Refills: 3 | Status: SHIPPED | OUTPATIENT
Start: 2018-06-04 | End: 2021-06-09 | Stop reason: SDUPTHER

## 2018-06-04 RX ORDER — BISMUTH SUBSALICYLATE 262 MG
1 TABLET,CHEWABLE ORAL DAILY
Qty: 90 TAB | Refills: 3 | Status: SHIPPED | OUTPATIENT
Start: 2018-06-04 | End: 2021-09-06

## 2018-06-04 RX ORDER — LISINOPRIL 10 MG/1
10 TABLET ORAL DAILY
Qty: 90 TAB | Refills: 3 | Status: SHIPPED | OUTPATIENT
Start: 2018-06-04 | End: 2020-05-26 | Stop reason: SDUPTHER

## 2018-06-04 NOTE — PROGRESS NOTES
HISTORY OF PRESENT ILLNESS  Daquan West is a 65 y.o. male. HPI Comments: Seen in follow-up for diabetes with neuropathy and nephropathy, hyperlipidemia, morbid obesity, g;aucoma, gout. No problems with medications, though he has had some confusion about what he should be getting from the pharmacy. He measures his fasting sugar about once a week, with values between 100-150. He complains of burning pain in his feet at times - no medications for this. This may keep him awake at night. Diabetes   Pertinent negatives include no chest pain, no headaches and no shortness of breath. Hypertension    Pertinent negatives include no chest pain, no palpitations, no headaches, no shortness of breath, no nausea and no vomiting. Past Medical History:   Diagnosis Date    Diabetes ([facility] [address]     DM type 2 with diabetic peripheral neuropathy ([facility] [address] 2018    Dr Keith Forbes Hypertensive retinopathy     Morbid obesity ([facility] [address]     Pneumonia     Primary open angle glaucoma     [facility]        Past Surgical History:   Procedure Laterality Date    HX OTHER SURGICAL      pt reports gallstone removal       History   Smoking Status    Former Smoker    Packs/day: 0.25    Years: 30.00   Smokeless Tobacco    Former User    Quit date: 2016     Comment: stop 03/2016     Current Outpatient Prescriptions   Medication Sig    SITagliptin (JANUVIA) 50 mg tablet Take 1 Tab by mouth daily.  ibuprofen (MOTRIN) 800 mg tablet Take 1 Tab by mouth every eight (8) hours as needed for Pain.  lisinopril (PRINIVIL, ZESTRIL) 10 mg tablet Take 1 Tab by mouth daily.  albuterol (PROVENTIL HFA, VENTOLIN HFA, PROAIR HFA) 90 mcg/actuation inhaler Take 2 Puffs by inhalation every four (4) hours as needed for Wheezing.  glucose blood VI test strips (TRUE METRIX GLUCOSE TEST STRIP) strip Use as directed to test BS 3 times a day    sildenafil citrate (VIAGRA) 100 mg tablet Take 1 Tab by mouth daily as needed.     Blood-Glucose Meter monitoring kit by Does Not Apply route daily.  Lancets misc Use as directed 3 times a day    glucose blood VI test strips (TRUETEST TEST STRIPS) strip by Does Not Apply route three (3) times daily.  timolol (TIMOPTIC-XE) 0.5 % ophthalmic gel-forming instill 1 drop into both eyes once daily    SIMBRINZA 1-0.2 % drps 1 Drop. Indications: 1 drop in both eyes daily    allopurinol (ZYLOPRIM) 300 mg tablet Take 1 Tab by mouth daily.  glipiZIDE (GLUCOTROL) 10 mg tablet Take 1 Tab by mouth two (2) times a day.  atorvastatin (LIPITOR) 40 mg tablet Take 1 Tab by mouth daily.  multivitamin (ONE A DAY) tablet Take 1 Tab by mouth daily. No current facility-administered medications for this visit. Review of Systems   Constitutional: Negative for chills and fever. Respiratory: Negative for shortness of breath. Cardiovascular: Negative for chest pain, palpitations and leg swelling. Gastrointestinal: Negative for nausea and vomiting. Neurological: Positive for tingling. Negative for focal weakness and headaches. Psychiatric/Behavioral: The patient does not have insomnia. Visit Vitals    /71 (BP 1 Location: Right arm, BP Patient Position: Sitting)    Pulse 67    Temp 98.2 °F (36.8 °C)    Resp 18    Ht 5' 2\" (1.575 m)    Wt 257 lb (116.6 kg)    SpO2 96%    BMI 47.01 kg/m2       Physical Exam   Constitutional: He is oriented to person, place, and time. He appears well-developed and well-nourished. No distress. Neck: Neck supple. No thyromegaly present. Carotid bruit absent   Cardiovascular: Normal rate, regular rhythm and intact distal pulses. Exam reveals no gallop and no friction rub. No murmur heard. Pulmonary/Chest: Effort normal and breath sounds normal. No respiratory distress. Musculoskeletal: He exhibits no edema. Lymphadenopathy:     He has no cervical adenopathy. Neurological: He is alert and oriented to person, place, and time.    Skin: Skin is warm and dry. Psychiatric: He has a normal mood and affect. His behavior is normal. Judgment and thought content normal.   Nursing note and vitals reviewed. Recent Results (from the past 12 hour(s))   AMB POC URINE, MICROALBUMIN, SEMIQUANTITATIVE    Collection Time: 06/04/18 10:20 AM   Result Value Ref Range    Microalbumin urine (POC) 80 MG/L    Microalbumin/creat ratio (POC)  <30 MG/G       ASSESSMENT and PLAN    ICD-10-CM ICD-9-CM    1. Type 2 diabetes with nephropathy (HCC) E11.21 250.40 AMB POC URINE, MICROALBUMIN, SEMIQUANTITATIVE     583.81 lisinopril (PRINIVIL, ZESTRIL) 10 mg tablet      glipiZIDE (GLUCOTROL) 10 mg tablet      multivitamin (ONE A DAY) tablet   2. Hyperlipidemia, unspecified hyperlipidemia type E78.5 272.4 atorvastatin (LIPITOR) 40 mg tablet   3. Diabetic polyneuropathy associated with type 2 diabetes mellitus (HCC) E11.42 250.60 gabapentin (NEURONTIN) 100 mg capsule     357.2    4. Morbid obesity ([address] E66.01 278.01    5. Chronic idiopathic gout involving toe of right foot without tophus [id number] 274.02 allopurinol (ZYLOPRIM) 300 mg tablet     Follow-up Disposition:  Return in about 3 months (around 9/4/2018). the following changes in treatment are made: Add Gabapentin 100mg at hs. Refills as noted. Encouraged to compare his medication list with the pharmacy list, to be sure he gets all of his medications. lab results and schedule of future lab studies reviewed with patient  reviewed medications and side effects in detail  Plan of care reviewed - patient verbalize(s) understanding and agreement.

## 2018-06-04 NOTE — MR AVS SNAPSHOT
[address] [place] [address] [address] [place] [address] 
[phone] Patient: Padma Silva MRN: [id number] QYC:4[birth year] Visit Information Date & Time Provider Department Dept. Phone Encounter #  
 6/4/2018  9:45 AM Анна Montes MD [facility] [phone] [id number] Follow-up Instructions Return in about 3 months (around 9/4/2018). Upcoming Health Maintenance Date Due Pneumococcal 19-64 Medium Risk (1 of 1 - PPSV23) 9/1/1980 DTaP/Tdap/Td series (1 - Tdap) 9/1/1982 MICROALBUMIN Q1 6/21/2018 HEMOGLOBIN A1C Q6M 7/22/2018 Influenza Age 5 to Adult 8/1/2018 FOOT EXAM Q1 1/25/2019 EYE EXAM RETINAL OR DILATED Q1 4/23/2019 LIPID PANEL Q1 5/24/2019 COLONOSCOPY 9/26/2022 Allergies as of 6/4/2018  Review Complete On: 6/4/2018 By: Анна Montes MD  
  
 Severity Noted Reaction Type Reactions Metformin Medium 03/05/2018    Other (comments) Blurred vision Current Immunizations  Never Reviewed No immunizations on file. Not reviewed this visit You Were Diagnosed With   
  
 Codes Comments Type 2 diabetes with nephropathy (HCC)    -  Primary ICD-10-CM: E11.21 
ICD-9-CM: 250.40, 583.81 Hyperlipidemia, unspecified hyperlipidemia type     ICD-10-CM: E78.5 ICD-9-CM: 272.4 Diabetic polyneuropathy associated with type 2 diabetes mellitus ([facility]     ICD-10-CM: E11.42 
ICD-9-CM: 250.60, 357.2 Morbid obesity ([facility]     ICD-10-CM: E66.01 
ICD-9-CM: 278.01 Chronic idiopathic gout involving toe of right foot without tophus     ICD-10-CM: M1A.0710 ICD-9-CM: 274.02   
 HTN, goal below 140/80     ICD-10-CM: I10 
ICD-9-CM: 401.9 Uncontrolled type 2 diabetes mellitus without complication, without long-term current use of insulin ([facility]     ICD-10-CM: E11.65 ICD-9-CM: 250.02 Gout, unspecified cause, unspecified chronicity, unspecified site     ICD-10-CM: M10.9 ICD-9-CM: 274.9 Vitals BP Pulse Temp Resp Height(growth percentile) Weight(growth percentile) 117/71 (BP 1 Location: Right arm, BP Patient Position: Sitting) 67 98.2 °F (36.8 °C) 18 5' 2\" (1.575 m) 257 lb (116.6 kg) SpO2 BMI Smoking Status 96% 47.01 kg/m2 Former Smoker BMI and BSA Data Body Mass Index Body Surface Area 47.01 kg/m 2 2.26 m 2 Preferred Pharmacy Pharmacy Name Phone [id number] Lemus [phone] Your Updated Medication List  
  
   
This list is accurate as of 6/4/18 10:48 AM.  Always use your most recent med list.  
  
  
  
  
 albuterol 90 mcg/actuation inhaler Commonly known as:  PROVENTIL HFA, VENTOLIN HFA, PROAIR HFA Take 2 Puffs by inhalation every four (4) hours as needed for Wheezing. allopurinol 300 mg tablet Commonly known as:  Lea Clau Take 1 Tab by mouth daily. Indications: prevention of acute gout attack  
  
 atorvastatin 40 mg tablet Commonly known as:  LIPITOR Take 1 Tab by mouth daily. Indications: hyperlipidemia Blood-Glucose Meter monitoring kit  
by Does Not Apply route daily. gabapentin 100 mg capsule Commonly known as:  NEURONTIN Take 1 Cap by mouth nightly. Indications: NEUROPATHIC PAIN  
  
 glipiZIDE 10 mg tablet Commonly known as:  Ayaan Ayala Take 1 Tab by mouth two (2) times a day. * glucose blood VI test strips strip Commonly known as:  TRUETEST TEST STRIPS  
by Does Not Apply route three (3) times daily. * glucose blood VI test strips strip Commonly known as:  TRUE METRIX GLUCOSE TEST STRIP Use as directed to test BS 3 times a day  
  
 ibuprofen 800 mg tablet Commonly known as:  MOTRIN Take 1 Tab by mouth every eight (8) hours as needed for Pain. Lancets Misc Use as directed 3 times a day  
  
 lisinopril 10 mg tablet Commonly known as:  Robin Marino  
 Take 1 Tab by mouth daily. Indications: Diabetic Nephropathy  
  
 multivitamin tablet Commonly known as:  ONE A DAY Take 1 Tab by mouth daily. sildenafil citrate 100 mg tablet Commonly known as:  VIAGRA Take 1 Tab by mouth daily as needed. SIMBRINZA 1-0.2 % Drps Generic drug:  brinzolamide-brimonidine 1 Drop. Indications: 1 drop in both eyes daily SITagliptin 50 mg tablet Commonly known as:  Tiffany Darner Take 1 Tab by mouth daily. timolol 0.5 % ophthalmic gel-forming Commonly known as:  TIMOPTIC-XE  
instill 1 drop into both eyes once daily * Notice: This list has 2 medication(s) that are the same as other medications prescribed for you. Read the directions carefully, and ask your doctor or other care provider to review them with you. Prescriptions Sent to Pharmacy Refills  
 lisinopril (PRINIVIL, ZESTRIL) 10 mg tablet 3 Sig: Take 1 Tab by mouth daily. Indications: Diabetic Nephropathy Class: Normal  
 Pharmacy: [address] [place] [address] Ph #: [phone] Route: Oral  
 glipiZIDE (GLUCOTROL) 10 mg tablet 3 Sig: Take 1 Tab by mouth two (2) times a day. Class: Normal  
 Pharmacy: [address] [place] [address] Ph #: [phone] Route: Oral  
 atorvastatin (LIPITOR) 40 mg tablet 3 Sig: Take 1 Tab by mouth daily. Indications: hyperlipidemia Class: Normal  
 Pharmacy: [address] [place] [address] Ph #: [phone] Route: Oral  
 allopurinol (ZYLOPRIM) 300 mg tablet 3 Sig: Take 1 Tab by mouth daily. Indications: prevention of acute gout attack Class: Normal  
 Pharmacy: [address] [place] [address] Ph #: [phone] Route: Oral  
 multivitamin (ONE A DAY) tablet 3 Sig: Take 1 Tab by mouth daily.   
 Class: Normal  
 Pharmacy: [address] [place] [address], [facility] Ph #: [phone] Route: Oral  
 gabapentin (NEURONTIN) 100 mg capsule 3 Sig: Take 1 Cap by mouth nightly. Indications: NEUROPATHIC PAIN Class: Normal  
 Pharmacy: [address] [place] [address] #: [phone] Route: Oral  
  
We Performed the Following AMB POC URINE, MICROALBUMIN, SEMIQUANTITATIVE [31800 CPT(R)] Follow-up Instructions Return in about 3 months (around 9/4/2018). Patient Instructions Diabetic Neuropathy: Care Instructions Your Care Instructions When you have diabetes, your blood sugar level may get too high. Over time, high blood sugar levels can damage nerves. This is called diabetic neuropathy. Nerve damage can cause pain, burning, tingling, and numbness and may leave you feeling weak. The feet are often affected. When you have nerve damage in your feet, you cannot feel your feet and toes as well as normal and may not notice cuts or sores. Even a small injury can lead to a serious infection. It is very important that you follow your doctor's advice on foot care. Sometimes diabetes damages nerves that help the body function. If this happens, your blood pressure, sweating, digestion, and urination might be affected. Your doctor may give you a target blood sugar level that is higher or lower than you are used to. Try to keep your blood sugar very close to this target level to prevent more damage. Follow-up care is a key part of your treatment and safety. Be sure to make and go to all appointments, and call your doctor if you are having problems. It's also a good idea to know your test results and keep a list of the medicines you take. How can you care for yourself at home? · Take your medicines exactly as prescribed. Call your doctor if you think you are having a problem with your medicine. It is very important that you take your insulin or diabetes pills as your doctor tells you. · Try to keep blood sugar at your target level. ¨ Eat a variety of healthy foods, with carbohydrate spread out in your meals. A dietitian can help you plan meals. ¨ Try to get at least 30 minutes of exercise on most days. ¨ Check your blood sugar as many times each day as your doctor recommends. · Take and record your blood pressure at home if your doctor tells you to. Learn the importance of the two measures of blood pressure (such as 130 over 80, or 130/80). To take your blood pressure at home: ¨ Ask your doctor to check your blood pressure monitor to be sure it is accurate and the cuff fits you. Also ask your doctor to watch you to make sure that you are using it right. ¨ Do not use medicine known to raise blood pressure (such as some nasal decongestant sprays) before taking your blood pressure. ¨ Avoid taking your blood pressure if you have just exercised or are nervous or upset. Rest at least 15 minutes before you take a reading. · Take pain medicines exactly as directed. ¨ If the doctor gave you a prescription medicine for pain, take it as prescribed. ¨ If you are not taking a prescription pain medicine, ask your doctor if you can take an over-the-counter medicine. · Do not smoke. Smoking can increase your chance for a heart attack or stroke. If you need help quitting, talk to your doctor about stop-smoking programs and medicines. These can increase your chances of quitting for good. · Limit alcohol to 2 drinks a day for men and 1 drink a day for women. Too much alcohol can cause health problems. · Eat small meals often, rather than 2 or 3 large meals a day. To care for your feet · Prevent injury by wearing shoes at all times, even when you are indoors. · Do foot care as part of your daily routine. Wash your feet and then rub lotion on your feet, but not between your toes. Use a handheld mirror or magnifying mirror to inspect your feet for blisters, cuts, cracks, or sores. · Have your toenails trimmed and filed straight across. · Wear shoes and socks that fit well. Soft shoes that have good support and that fit well (such as tennis shoes) are best for your feet. · Check your shoes for any loose objects or rough edges before you put them on. · Ask your doctor to check your feet during each visit. Your doctor may notice a foot problem you have missed. · Get early treatment for any foot problem, even a minor one. When should you call for help? Call your doctor now or seek immediate medical care if: 
? · You have symptoms of infection, such as: 
¨ Increased pain, swelling, warmth, or redness. ¨ Red streaks leading from the area. ¨ Pus draining from the area. ¨ A fever. ? · You have new or worse numbness, pain, or tingling in any part of your body. ? Watch closely for changes in your health, and be sure to contact your doctor if: 
? · You have a new problem with your feet, such as: ¨ A new sore or ulcer. ¨ A break in the skin that is not healing after several days. ¨ Bleeding corns or calluses. ¨ An ingrown toenail. ? · You do not get better as expected. Where can you learn more? Go to http://shekhar-pk.info/. Enter V220 in the search box to learn more about \"Diabetic Neuropathy: Care Instructions. \" Current as of: March 13, 2017 Content Version: 11.4 © 7994-5614 [employer]. Care instructions adapted under license by [employer] (which disclaims liability or warranty for this information). If you have questions about a medical condition or this instruction, always ask your healthcare professional. [place] [address] any warranty or liability for your use of this information. Introducing \[facility] [place] [facility]\"" & HEALTH SERVICES! [facility] introduces [employer]OOK patient portal. Now you can access parts of your medical record, email your doctor's office, and request medication refills online. 1. In your internet browser, go to [email or web address] 2. Click on the First Time User? Click Here link in the Sign In box. You will see the New Member Sign Up page. 3. Enter your "[employer]" Access Code exactly as it appears below. You will not need to use this code after youve completed the sign-up process. If you do not sign up before the expiration date, you must request a new code. · "[employer]" Access Code: [email or web address] Expires: 8/22/2018 10:40 AM 
 
4. Enter the last four digits of your Social Security Number (xxxx) and Date of Birth (mm/dd/yyyy) as indicated and click Submit. You will be taken to the next sign-up page. 5. Create a [employer] ID. This will be your "[employer]" login ID and cannot be changed, so think of one that is secure and easy to remember. 6. Create a "[employer]" password. You can change your password at any time. 7. Enter your Password Reset Question and Answer. This can be used at a later time if you forget your password. 8. Enter your e-mail address. You will receive e-mail notification when new information is available in [address] 9. Click Sign Up. You can now view and download portions of your medical record. 10. Click the Download Summary menu link to download a portable copy of your medical information. If you have questions, please visit the Frequently Asked Questions section of the "[employer]" website. Remember, "[employer]" is NOT to be used for urgent needs. For medical emergencies, dial 911. Now available from your iPhone and Android! Please provide this summary of care documentation to your next provider. Your primary care clinician is listed as Tara Lopez. If you have any questions after today's visit, please call [phone].

## 2018-06-04 NOTE — PROGRESS NOTES
Chief Complaint   Patient presents with    Diabetes     follow up    Hypertension     follow up       Pt preferred language for health care discussion is english. Is someone accompanying this pt? no    Is the patient using any DME equipment during OV? no    Depression Screening:  PHQ over the last two weeks 6/4/2018 3/5/2018 1/22/2018 11/22/2017 6/21/2017 6/7/2017 3/2/2016   Little interest or pleasure in doing things Not at all Not at all Not at all Not at all Not at all Not at all Not at all   Feeling down, depressed or hopeless Not at all Not at all Not at all Not at all Not at all Not at all Not at all   Total Score PHQ 2 0 0 0 0 0 0 0       Learning Assessment:  Learning Assessment 10/6/2014   PRIMARY LEARNER Patient   HIGHEST LEVEL OF EDUCATION - PRIMARY LEARNER  DID NOT GRADUATE HIGH SCHOOL   BARRIERS PRIMARY LEARNER NONE   CO-LEARNER CAREGIVER No   PRIMARY LANGUAGE ENGLISH   LEARNER PREFERENCE PRIMARY LISTENING   ANSWERED BY patient   RELATIONSHIP SELF       Health Maintenance reviewed and discussed per provider. Yes    Pt is due for   Health Maintenance Due   Topic Date Due    Pneumococcal 19-64 Medium Risk (1 of 1 - PPSV23) 09/01/1980    DTaP/Tdap/Td series (1 - Tdap) 09/01/1982    MICROALBUMIN Q1  06/21/2018   . Please order/place referral if appropriate. Coordination of Care:  1. Have you been to the ER, urgent care clinic since your last visit? Hospitalized since your last visit? no    2. Have you seen or consulted any other health care providers outside of the [address] [place] [address] since your last visit? Include any pap smears or colon screening.  no

## 2018-06-04 NOTE — PATIENT INSTRUCTIONS
Diabetic Neuropathy: Care Instructions  Your Care Instructions    When you have diabetes, your blood sugar level may get too high. Over time, high blood sugar levels can damage nerves. This is called diabetic neuropathy. Nerve damage can cause pain, burning, tingling, and numbness and may leave you feeling weak. The feet are often affected. When you have nerve damage in your feet, you cannot feel your feet and toes as well as normal and may not notice cuts or sores. Even a small injury can lead to a serious infection. It is very important that you follow your doctor's advice on foot care. Sometimes diabetes damages nerves that help the body function. If this happens, your blood pressure, sweating, digestion, and urination might be affected. Your doctor may give you a target blood sugar level that is higher or lower than you are used to. Try to keep your blood sugar very close to this target level to prevent more damage. Follow-up care is a key part of your treatment and safety. Be sure to make and go to all appointments, and call your doctor if you are having problems. It's also a good idea to know your test results and keep a list of the medicines you take. How can you care for yourself at home? · Take your medicines exactly as prescribed. Call your doctor if you think you are having a problem with your medicine. It is very important that you take your insulin or diabetes pills as your doctor tells you. · Try to keep blood sugar at your target level. ¨ Eat a variety of healthy foods, with carbohydrate spread out in your meals. A dietitian can help you plan meals. ¨ Try to get at least 30 minutes of exercise on most days. ¨ Check your blood sugar as many times each day as your doctor recommends. · Take and record your blood pressure at home if your doctor tells you to. Learn the importance of the two measures of blood pressure (such as 130 over 80, or 130/80).  To take your blood pressure at home:  ¨ Ask your doctor to check your blood pressure monitor to be sure it is accurate and the cuff fits you. Also ask your doctor to watch you to make sure that you are using it right. ¨ Do not use medicine known to raise blood pressure (such as some nasal decongestant sprays) before taking your blood pressure. ¨ Avoid taking your blood pressure if you have just exercised or are nervous or upset. Rest at least 15 minutes before you take a reading. · Take pain medicines exactly as directed. ¨ If the doctor gave you a prescription medicine for pain, take it as prescribed. ¨ If you are not taking a prescription pain medicine, ask your doctor if you can take an over-the-counter medicine. · Do not smoke. Smoking can increase your chance for a heart attack or stroke. If you need help quitting, talk to your doctor about stop-smoking programs and medicines. These can increase your chances of quitting for good. · Limit alcohol to 2 drinks a day for men and 1 drink a day for women. Too much alcohol can cause health problems. · Eat small meals often, rather than 2 or 3 large meals a day. To care for your feet  · Prevent injury by wearing shoes at all times, even when you are indoors. · Do foot care as part of your daily routine. Wash your feet and then rub lotion on your feet, but not between your toes. Use a handheld mirror or magnifying mirror to inspect your feet for blisters, cuts, cracks, or sores. · Have your toenails trimmed and filed straight across. · Wear shoes and socks that fit well. Soft shoes that have good support and that fit well (such as tennis shoes) are best for your feet. · Check your shoes for any loose objects or rough edges before you put them on. · Ask your doctor to check your feet during each visit. Your doctor may notice a foot problem you have missed. · Get early treatment for any foot problem, even a minor one. When should you call for help?   Call your doctor now or seek immediate medical care if:  ? · You have symptoms of infection, such as:  ¨ Increased pain, swelling, warmth, or redness. ¨ Red streaks leading from the area. ¨ Pus draining from the area. ¨ A fever. ? · You have new or worse numbness, pain, or tingling in any part of your body. ? Watch closely for changes in your health, and be sure to contact your doctor if:  ? · You have a new problem with your feet, such as:  ¨ A new sore or ulcer. ¨ A break in the skin that is not healing after several days. ¨ Bleeding corns or calluses. ¨ An ingrown toenail. ? · You do not get better as expected. Where can you learn more? Go to http://shekhar-pk.info/. Enter R640 in the search box to learn more about \"Diabetic Neuropathy: Care Instructions. \"  Current as of: March 13, 2017  Content Version: 11.4  © 2243-0759 [employer]. Care instructions adapted under license by [employer] (which disclaims liability or warranty for this information). If you have questions about a medical condition or this instruction, always ask your healthcare professional. [address] any warranty or liability for your use of this information.

## 2018-09-14 ENCOUNTER — OFFICE VISIT (OUTPATIENT)
Dept: FAMILY MEDICINE CLINIC | Facility: CLINIC | Age: 58
End: 2018-09-14

## 2018-09-14 VITALS
OXYGEN SATURATION: 96 % | TEMPERATURE: 97.4 F | HEART RATE: 63 BPM | SYSTOLIC BLOOD PRESSURE: 129 MMHG | HEIGHT: 62 IN | BODY MASS INDEX: 47.11 KG/M2 | WEIGHT: 256 LBS | DIASTOLIC BLOOD PRESSURE: 70 MMHG | RESPIRATION RATE: 20 BRPM

## 2018-09-14 DIAGNOSIS — E11.42 DIABETIC POLYNEUROPATHY ASSOCIATED WITH TYPE 2 DIABETES MELLITUS (HCC): ICD-10-CM

## 2018-09-14 DIAGNOSIS — E78.5 HYPERLIPIDEMIA, UNSPECIFIED HYPERLIPIDEMIA TYPE: ICD-10-CM

## 2018-09-14 DIAGNOSIS — M1A.0710 CHRONIC IDIOPATHIC GOUT INVOLVING TOE OF RIGHT FOOT WITHOUT TOPHUS: ICD-10-CM

## 2018-09-14 DIAGNOSIS — E11.21 TYPE 2 DIABETES WITH NEPHROPATHY (HCC): Primary | ICD-10-CM

## 2018-09-14 DIAGNOSIS — E66.01 MORBID OBESITY (HCC): ICD-10-CM

## 2018-09-14 LAB — HBA1C MFR BLD HPLC: 12.5 %

## 2018-09-14 RX ORDER — METFORMIN HYDROCHLORIDE 500 MG/1
500 TABLET ORAL 2 TIMES DAILY WITH MEALS
Qty: 60 TAB | Refills: 11 | Status: SHIPPED | OUTPATIENT
Start: 2018-09-14 | End: 2019-05-31 | Stop reason: SDUPTHER

## 2018-09-14 RX ORDER — GABAPENTIN 100 MG/1
100 CAPSULE ORAL 3 TIMES DAILY
Qty: 90 CAP | Refills: 11 | Status: SHIPPED | OUTPATIENT
Start: 2018-09-14 | End: 2019-02-01

## 2018-09-14 NOTE — MR AVS SNAPSHOT
[address] [place] [address] 
[phone] Patient: Lavell Cockayne MRN: [id number] Visit Information Date & Time Provider Department Dept. Phone Encounter #  
 9/14/2018  8:00 AM Luis Armando Hartley MD [facility] [phone] [id number] Follow-up Instructions Return in about 3 months (around 12/14/2018). Upcoming Health Maintenance Date Due DTaP/Tdap/Td series (1 - Tdap) 9/1/1982 HEMOGLOBIN A1C Q6M 7/22/2018 Pneumococcal 19-64 Medium Risk (1 of 1 - PPSV23) 3/31/2019* Influenza Age 5 to Adult 3/31/2019* FOOT EXAM Q1 1/25/2019 EYE EXAM RETINAL OR DILATED Q1 4/23/2019 LIPID PANEL Q1 5/24/2019 MICROALBUMIN Q1 6/4/2019 COLONOSCOPY 9/26/2022 *Topic was postponed. The date shown is not the original due date. Allergies as of 9/14/2018  Review Complete On: 9/14/2018 By: Luis Armando Hartley MD  
  
 Severity Noted Reaction Type Reactions Metformin Medium 03/05/2018    Other (comments) Blurred vision Current Immunizations  Never Reviewed No immunizations on file. Not reviewed this visit You Were Diagnosed With   
  
 Codes Comments Type 2 diabetes with nephropathy (HCC)    -  Primary ICD-10-CM: E11.21 
ICD-9-CM: 250.40, 583.81 Diabetic polyneuropathy associated with type 2 diabetes mellitus [facility]     ICD-10-CM: E11.42 
ICD-9-CM: 250.60, 357.2 Hyperlipidemia, unspecified hyperlipidemia type     ICD-10-CM: E78.5 ICD-9-CM: 272.4 Chronic idiopathic gout involving toe of right foot without tophus     ICD-10-CM: M1A.0710 ICD-9-CM: 274.02 Morbid obesity ([facility]     ICD-10-CM: E66.01 
ICD-9-CM: 278.01 Vitals BP Pulse Temp Resp Height(growth percentile) Weight(growth percentile) 129/70 63 97.4 °F (36.3 °C) (Oral) 20 5' 2\" (1.575 m) 256 lb (116.1 kg) SpO2 BMI Smoking Status 96% 46.82 kg/m2 Former Smoker Vitals History BMI and BSA Data Body Mass Index Body Surface Area  
 46.82 kg/m 2 2.25 m 2 Preferred Pharmacy Pharmacy Name Phone [id number] [place] [phone] Your Updated Medication List  
  
   
This list is accurate as of 9/14/18  8:45 AM.  Always use your most recent med list.  
  
  
  
  
 allopurinol 300 mg tablet Commonly known as:  Helane Sizer Take 1 Tab by mouth daily. Indications: prevention of acute gout attack  
  
 atorvastatin 40 mg tablet Commonly known as:  LIPITOR Take 1 Tab by mouth daily. Indications: hyperlipidemia Blood-Glucose Meter monitoring kit  
by Does Not Apply route daily. gabapentin 100 mg capsule Commonly known as:  NEURONTIN Take 1 Cap by mouth three (3) times daily. Indications: NEUROPATHIC PAIN  
  
 glipiZIDE 10 mg tablet Commonly known as:  Gracie Barrier Take 1 Tab by mouth two (2) times a day. * glucose blood VI test strips strip Commonly known as:  TRUETEST TEST STRIPS  
by Does Not Apply route three (3) times daily. * glucose blood VI test strips strip Commonly known as:  TRUE METRIX GLUCOSE TEST STRIP Use as directed to test BS 3 times a day  
  
 ibuprofen 800 mg tablet Commonly known as:  MOTRIN Take 1 Tab by mouth every eight (8) hours as needed for Pain. Lancets Misc Use as directed 3 times a day  
  
 lisinopril 10 mg tablet Commonly known as:  Ora Bee Take 1 Tab by mouth daily. Indications: Diabetic Nephropathy  
  
 metFORMIN 500 mg tablet Commonly known as:  GLUCOPHAGE Take 1 Tab by mouth two (2) times daily (with meals). Indications: type 2 diabetes mellitus  
  
 multivitamin tablet Commonly known as:  ONE A DAY Take 1 Tab by mouth daily. sildenafil citrate 100 mg tablet Commonly known as:  VIAGRA Take 1 Tab by mouth daily as needed. SIMBRINZA 1-0.2 % Drps Generic drug:  brinzolamide-brimonidine 1 Drop. Indications: 1 drop in both eyes daily SITagliptin 50 mg tablet Commonly known as:  Vallerkenneth Dotson Take 1 Tab by mouth daily. timolol 0.5 % ophthalmic gel-forming Commonly known as:  TIMOPTIC-XE  
instill 1 drop into both eyes once daily * Notice: This list has 2 medication(s) that are the same as other medications prescribed for you. Read the directions carefully, and ask your doctor or other care provider to review them with you. Prescriptions Sent to Pharmacy Refills  
 metFORMIN (GLUCOPHAGE) 500 mg tablet 11 Sig: Take 1 Tab by mouth two (2) times daily (with meals). Indications: type 2 diabetes mellitus Class: Normal  
 Pharmacy: [address] [place] [address] #: [phone] Route: Oral  
 gabapentin (NEURONTIN) 100 mg capsule 11 Sig: Take 1 Cap by mouth three (3) times daily. Indications: NEUROPATHIC PAIN Class: Normal  
 Pharmacy: [address] [place] [address] #: [phone] Route: Oral  
  
We Performed the Following AMB POC HEMOGLOBIN A1C [71665 CPT(R)] Follow-up Instructions Return in about 3 months (around 12/14/2018). Introducing [facility]! [employer] introduces [employer]entabs patient portal. Now you can access parts of your medical record, email your doctor's office, and request medication refills online. 1. In your internet browser, go to [email or web address] 2. Click on the First Time User? Click Here link in the Sign In box. You will see the New Member Sign Up page. 3. Enter your opentabs Access Code exactly as it appears below. You will not need to use this code after youve completed the sign-up process. If you do not sign up before the expiration date, you must request a new code. · opent[employer] Access Code: [email or web address] Expires: 12/13/2018  8:45 AM 
 
 4. Enter the last four digits of your Social Security Number (xxxx) and Date of Birth (mm/dd/yyyy) as indicated and click Submit. You will be taken to the next sign-up page. 5. Create a myTomorrows ID. This will be your myTomorrows login ID and cannot be changed, so think of one that is secure and easy to remember. 6. Create a myTomorrows password. You can change your password at any time. 7. Enter your Password Reset Question and Answer. This can be used at a later time if you forget your password. 8. Enter your e-mail address. You will receive e-mail notification when new information is available in [address] 9. Click Sign Up. You can now view and download portions of your medical record. 10. Click the Download Summary menu link to download a portable copy of your medical information. If you have questions, please visit the Frequently Asked Questions section of the myTomorrows website. Remember, myTomorrows is NOT to be used for urgent needs. For medical emergencies, dial 911. Now available from your iPhone and Android! Please provide this summary of care documentation to your next provider. Your primary care clinician is listed as Lubna Perales. If you have any questions after today's visit, please call [phone].

## 2018-09-14 NOTE — PROGRESS NOTES
HISTORY OF PRESENT ILLNESS Daquan Camacho is a 63 y.o. male. HPI Comments: Seen in follow-up for diabetes with neuropathy and nephropathy, hyperlipidemia, morbid obesity, g;aucoma, gout. No problems with medications. Gabapentin isn't helping with neuropathy at current dose. Fasting glucose is around 275. He has a listed allergy to Metformin (blurred vision), but now notes that he has this frequently without Metformin. He has been seeing Podiatry about arthritis in his right foot. Declines a flu shot. Follow Up Chronic Condition Pertinent negatives include no chest pain, no headaches and no shortness of breath. Past Medical History:  
Diagnosis Date  Diabetes ([facility]  DM type 2 with diabetic peripheral neuropathy ([facility] 2018 Dr Luke Rodgers  Hypertensive retinopathy  Morbid obesity ([facility]  Pneumonia  Primary open angle glaucoma  Sickle cell trait ([facility] Past Surgical History:  
Procedure Laterality Date  HX OTHER SURGICAL    
 pt reports gallstone removal  
 
 
History Smoking Status  Former Smoker  Packs/day: 0.25  
 Years: 30.00 Smokeless Tobacco  
 Former User  Quit date: 2016 Comment: stop 03/2016 Current Outpatient Prescriptions Medication Sig  
 metFORMIN (GLUCOPHAGE) 500 mg tablet Take 1 Tab by mouth two (2) times daily (with meals). Indications: type 2 diabetes mellitus  gabapentin (NEURONTIN) 100 mg capsule Take 1 Cap by mouth three (3) times daily. Indications: NEUROPATHIC PAIN  
 lisinopril (PRINIVIL, ZESTRIL) 10 mg tablet Take 1 Tab by mouth daily. Indications: Diabetic Nephropathy  glipiZIDE (GLUCOTROL) 10 mg tablet Take 1 Tab by mouth two (2) times a day.  atorvastatin (LIPITOR) 40 mg tablet Take 1 Tab by mouth daily. Indications: hyperlipidemia  allopurinol (ZYLOPRIM) 300 mg tablet Take 1 Tab by mouth daily. Indications: prevention of acute gout attack  multivitamin (ONE A DAY) tablet Take 1 Tab by mouth daily.  SITagliptin (JANUVIA) 50 mg tablet Take 1 Tab by mouth daily.  ibuprofen (MOTRIN) 800 mg tablet Take 1 Tab by mouth every eight (8) hours as needed for Pain.  glucose blood VI test strips (TRUE METRIX GLUCOSE TEST STRIP) strip Use as directed to test BS 3 times a day  sildenafil citrate (VIAGRA) 100 mg tablet Take 1 Tab by mouth daily as needed.  Blood-Glucose Meter monitoring kit by Does Not Apply route daily.  Lancets misc Use as directed 3 times a day  glucose blood VI test strips (TRUETEST TEST STRIPS) strip by Does Not Apply route three (3) times daily.  timolol (TIMOPTIC-XE) 0.5 % ophthalmic gel-forming instill 1 drop into both eyes once daily  SIMBRINZA 1-0.2 % drps 1 Drop. Indications: 1 drop in both eyes daily No current facility-administered medications for this visit. Review of Systems Constitutional: Negative for chills and fever. Respiratory: Negative for shortness of breath. Cardiovascular: Negative for chest pain, palpitations and leg swelling. Gastrointestinal: Negative for nausea and vomiting. Neurological: Positive for tingling. Negative for focal weakness and headaches. Psychiatric/Behavioral: The patient does not have insomnia. Visit Vitals  /70  Pulse 63  Temp 97.4 °F (36.3 °C) (Oral)  Resp 20  
 Ht 5' 2\" (1.575 m)  Wt 256 lb (116.1 kg)  SpO2 96%  BMI 46.82 kg/m2 Physical Exam  
Constitutional: He is oriented to person, place, and time. He appears well-developed and well-nourished. No distress. Neck: Neck supple. No thyromegaly present. Carotid bruit absent Cardiovascular: Normal rate, regular rhythm and intact distal pulses. Exam reveals no gallop and no friction rub. No murmur heard. Pulmonary/Chest: Effort normal and breath sounds normal. No respiratory distress. Musculoskeletal: He exhibits no edema. Lymphadenopathy:  
  He has no cervical adenopathy. Neurological: He is alert and oriented to person, place, and time. Skin: Skin is warm and dry. Psychiatric: He has a normal mood and affect. His behavior is normal. Judgment and thought content normal.  
Nursing note and vitals reviewed. Recent Results (from the past 12 hour(s)) AMB POC HEMOGLOBIN A1C Collection Time: 09/14/18  8:23 AM  
Result Value Ref Range Hemoglobin A1c (POC) 12.5 % ASSESSMENT and PLAN 
  ICD-10-CM ICD-9-CM 1. Type 2 diabetes with nephropathy (HCC) E11.21 250.40 AMB POC HEMOGLOBIN A1C  
  583.81 metFORMIN (GLUCOPHAGE) 500 mg tablet 2. Diabetic polyneuropathy associated with type 2 diabetes mellitus (HCC) E11.42 250.60 gabapentin (NEURONTIN) 100 mg capsule  
  357.2 3. Hyperlipidemia, unspecified hyperlipidemia type E78.5 272.4 4. Chronic idiopathic gout involving toe of right foot without tophus M1A.0710 274.02   
5. Morbid obesity ([facility] E66.01 278.01 Follow-up Disposition: 
Return in about 3 months (around 12/14/2018). the following changes in treatment are made: Add Metformin 500mg bid. Increase Gabapentin to bid, and then tid, for neuropathy. Advised to check with his pharmacy about which test strips to use with his glucometer. reviewed medications and side effects in detail Plan of care reviewed - patient verbalize(s) understanding and agreement.

## 2018-10-12 NOTE — PATIENT INSTRUCTIONS
Body Mass Index: Care Instructions  Your Care Instructions    Body mass index (BMI) can help you see if your weight is raising your risk for health problems. It uses a formula to compare how much you weigh with how tall you are. · A BMI lower than 18.5 is considered underweight. · A BMI between 18.5 and 24.9 is considered healthy. · A BMI between 25 and 29.9 is considered overweight. A BMI of 30 or higher is considered obese. If your BMI is in the normal range, it means that you have a lower risk for weight-related health problems. If your BMI is in the overweight or obese range, you may be at increased risk for weight-related health problems, such as high blood pressure, heart disease, stroke, arthritis or joint pain, and diabetes. If your BMI is in the underweight range, you may be at increased risk for health problems such as fatigue, lower protection (immunity) against illness, muscle loss, bone loss, hair loss, and hormone problems. BMI is just one measure of your risk for weight-related health problems. You may be at higher risk for health problems if you are not active, you eat an unhealthy diet, or you drink too much alcohol or use tobacco products. Follow-up care is a key part of your treatment and safety. Be sure to make and go to all appointments, and call your doctor if you are having problems. It's also a good idea to know your test results and keep a list of the medicines you take. How can you care for yourself at home? · Practice healthy eating habits. This includes eating plenty of fruits, vegetables, whole grains, lean protein, and low-fat dairy. · If your doctor recommends it, get more exercise. Walking is a good choice. Bit by bit, increase the amount you walk every day. Try for at least 30 minutes on most days of the week. · Do not smoke. Smoking can increase your risk for health problems. If you need help quitting, talk to your doctor about stop-smoking programs and medicines. These can increase your chances of quitting for good. · Limit alcohol to 2 drinks a day for men and 1 drink a day for women. Too much alcohol can cause health problems. If you have a BMI higher than 25  · Your doctor may do other tests to check your risk for weight-related health problems. This may include measuring the distance around your waist. A waist measurement of more than 40 inches in men or 35 inches in women can increase the risk of weight-related health problems. · Talk with your doctor about steps you can take to stay healthy or improve your health. You may need to make lifestyle changes to lose weight and stay healthy, such as changing your diet and getting regular exercise. If you have a BMI lower than 18.5  · Your doctor may do other tests to check your risk for health problems. · Talk with your doctor about steps you can take to stay healthy or improve your health. You may need to make lifestyle changes to gain or maintain weight and stay healthy, such as getting more healthy foods in your diet and doing exercises to build muscle. Where can you learn more? Go to http://shekhar-pk.info/. Enter S176 in the search box to learn more about \"Body Mass Index: Care Instructions. \"  Current as of: January 23, 2017  Content Version: 11.3  © 7052-3954 [employer]. Care instructions adapted under license by [employer] (which disclaims liability or warranty for this information). If you have questions about a medical condition or this instruction, always ask your healthcare professional. [address] any warranty or liability for your use of this information. Learning About Diabetes Food Guidelines  Your Care Instructions  Meal planning is important to manage diabetes. It helps keep your blood sugar at a target level (which you set with your doctor). You don't have to eat special foods.  You can eat what your family eats, including weakness sweets once in a while. But you do have to pay attention to how often you eat and how much you eat of certain foods. You may want to work with a dietitian or a certified diabetes educator (CDE) to help you plan meals and snacks. A dietitian or CDE can also help you lose weight if that is one of your goals. What should you know about eating carbs? Managing the amount of carbohydrate (carbs) you eat is an important part of healthy meals when you have diabetes. Carbohydrate is found in many foods. · Learn which foods have carbs. And learn the amounts of carbs in different foods. ¨ Bread, cereal, pasta, and rice have about 15 grams of carbs in a serving. A serving is 1 slice of bread (1 ounce), ½ cup of cooked cereal, or 1/3 cup of cooked pasta or rice. ¨ Fruits have 15 grams of carbs in a serving. A serving is 1 small fresh fruit, such as an apple or orange; ½ of a banana; ½ cup of cooked or canned fruit; ½ cup of fruit juice; 1 cup of melon or raspberries; or 2 tablespoons of dried fruit. ¨ Milk and no-sugar-added yogurt have 15 grams of carbs in a serving. A serving is 1 cup of milk or 2/3 cup of no-sugar-added yogurt. ¨ Starchy vegetables have 15 grams of carbs in a serving. A serving is ½ cup of mashed potatoes or sweet potato; 1 cup winter squash; ½ of a small baked potato; ½ cup of cooked beans; or ½ cup cooked corn or green peas. · Learn how much carbs to eat each day and at each meal. A dietitian or CDE can teach you how to keep track of the amount of carbs you eat. This is called carbohydrate counting. · If you are not sure how to count carbohydrate grams, use the Plate Method to plan meals. It is a good, quick way to make sure that you have a balanced meal. It also helps you spread carbs throughout the day. ¨ Divide your plate by types of foods.  Put non-starchy vegetables on half the plate, meat or other protein food on one-quarter of the plate, and a grain or starchy vegetable in the final quarter of the plate. To this you can add a small piece of fruit and 1 cup of milk or yogurt, depending on how many carbs you are supposed to eat at a meal.  · Try to eat about the same amount of carbs at each meal. Do not \"save up\" your daily allowance of carbs to eat at one meal.  · Proteins have very little or no carbs per serving. Examples of proteins are beef, chicken, turkey, fish, eggs, tofu, cheese, cottage cheese, and peanut butter. A serving size of meat is 3 ounces, which is about the size of a deck of cards. Examples of meat substitute serving sizes (equal to 1 ounce of meat) are 1/4 cup of cottage cheese, 1 egg, 1 tablespoon of peanut butter, and ½ cup of tofu. How can you eat out and still eat healthy? · Learn to estimate the serving sizes of foods that have carbohydrate. If you measure food at home, it will be easier to estimate the amount in a serving of restaurant food. · If the meal you order has too much carbohydrate (such as potatoes, corn, or baked beans), ask to have a low-carbohydrate food instead. Ask for a salad or green vegetables. · If you use insulin, check your blood sugar before and after eating out to help you plan how much to eat in the future. · If you eat more carbohydrate at a meal than you had planned, take a walk or do other exercise. This will help lower your blood sugar. What else should you know? · Limit saturated fat, such as the fat from meat and dairy products. This is a healthy choice because people who have diabetes are at higher risk of heart disease. So choose lean cuts of meat and nonfat or low-fat dairy products. Use olive or canola oil instead of butter or shortening when cooking. · Don't skip meals. Your blood sugar may drop too low if you skip meals and take insulin or certain medicines for diabetes. · Check with your doctor before you drink alcohol. Alcohol can cause your blood sugar to drop too low.  Alcohol can also cause a bad reaction if you take certain diabetes medicines. Follow-up care is a key part of your treatment and safety. Be sure to make and go to all appointments, and call your doctor if you are having problems. It's also a good idea to know your test results and keep a list of the medicines you take. Where can you learn more? Go to http://shekhar-pk.info/. Enter J779 in the search box to learn more about \"Learning About Diabetes Food Guidelines. \"  Current as of: March 13, 2017  Content Version: 11.3  © 8222-0105 [employer]. Care instructions adapted under license by [employer] (which disclaims liability or warranty for this information). If you have questions about a medical condition or this instruction, always ask your healthcare professional. [address] any warranty or liability for your use of this information. Learning About Meal Planning for Diabetes  Why plan your meals? Meal planning can be a key part of managing diabetes. Planning meals and snacks with the right balance of carbohydrate, protein, and fat can help you keep your blood sugar at the target level you set with your doctor. You don't have to eat special foods. You can eat what your family eats, including sweets once in a while. But you do have to pay attention to how often you eat and how much you eat of certain foods. You may want to work with a dietitian or a certified diabetes educator. He or she can give you tips and meal ideas and can answer your questions about meal planning. This health professional can also help you reach a healthy weight if that is one of your goals. What plan is right for you? Your dietitian or diabetes educator may suggest that you start with the plate format or carbohydrate counting. The plate format  The plate format is a simple way to help you manage how you eat. You plan meals by learning how much space each food should take on a plate.  Using the plate format helps you spread carbohydrate throughout the day. It can make it easier to keep your blood sugar level within your target range. It also helps you see if you're eating healthy portion sizes. To use the plate format, you put non-starchy vegetables on half your plate. Add meat or meat substitutes on one-quarter of the plate. Put a grain or starchy vegetable (such as brown rice or a potato) on the final quarter of the plate. You can add a small piece of fruit and some low-fat or fat-free milk or yogurt, depending on your carbohydrate goal for each meal.  Here are some tips for using the plate format:  · Make sure that you are not using an oversized plate. A 9-inch plate is best. Many restaurants use larger plates. · Get used to using the plate format at home. Then you can use it when you eat out. · Write down your questions about using the plate format. Talk to your doctor, a dietitian, or a diabetes educator about your concerns. Carbohydrate counting  With carbohydrate counting, you plan meals based on the amount of carbohydrate in each food. Carbohydrate raises blood sugar higher and more quickly than any other nutrient. It is found in desserts, breads and cereals, and fruit. It's also found in starchy vegetables such as potatoes and corn, grains such as rice and pasta, and milk and yogurt. Spreading carbohydrate throughout the day helps keep your blood sugar levels within your target range. Your daily amount depends on several things, including your weight, how active you are, which diabetes medicines you take, and what your goals are for your blood sugar levels. A registered dietitian or diabetes educator can help you plan how much carbohydrate to include in each meal and snack. A guideline for your daily amount of carbohydrate is:  · 45 to 60 grams at each meal. That's about the same as 3 to 4 carbohydrate servings. · 15 to 20 grams at each snack. That's about the same as 1 carbohydrate serving.   The Nutrition Facts label on packaged foods tells you how much carbohydrate is in a serving of the food. First, look at the serving size on the food label. Is that the amount you eat in a serving? All of the nutrition information on a food label is based on that serving size. So if you eat more or less than that, you'll need to adjust the other numbers. Total carbohydrate is the next thing you need to look for on the label. If you count carbohydrate servings, one serving of carbohydrate is 15 grams. For foods that don't come with labels, such as fresh fruits and vegetables, you'll need a guide that lists carbohydrate in these foods. Ask your doctor, dietitian, or diabetes educator about books or other nutrition guides you can use. If you take insulin, you need to know how many grams of carbohydrate are in a meal. This lets you know how much rapid-acting insulin to take before you eat. If you use an insulin pump, you get a constant rate of insulin during the day. So the pump must be programmed at meals to give you extra insulin to cover the rise in blood sugar after meals. When you know how much carbohydrate you will eat, you can take the right amount of insulin. Or, if you always use the same amount of insulin, you need to make sure that you eat the same amount of carbohydrate at meals. If you need more help to understand carbohydrate counting and food labels, ask your doctor, dietitian, or diabetes educator. How do you get started with meal planning? Here are some tips to get started:  · Plan your meals a week at a time. Don't forget to include snacks too. · Use cookbooks or online recipes to plan several main meals. Plan some quick meals for busy nights. You also can double some recipes that freeze well. Then you can save half for other busy nights when you don't have time to cook. · Make sure you have the ingredients you need for your recipes.  If you're running low on basic items, put these items on your shopping list too.  · List foods that you use to make breakfasts, lunches, and snacks. List plenty of fruits and vegetables. · Post this list on the refrigerator. Add to it as you think of more things you need. · Take the list to the store to do your weekly shopping. Follow-up care is a key part of your treatment and safety. Be sure to make and go to all appointments, and call your doctor if you are having problems. It's also a good idea to know your test results and keep a list of the medicines you take. Where can you learn more? Go to http://shekharRegenesis Biomedicalpk.info/. Coco  in the search box to learn more about \"Learning About Meal Planning for Diabetes. \"  Current as of: March 13, 2017  Content Version: 11.3  © 2596-8272 [employer]. Care instructions adapted under license by [employer] (which disclaims liability or warranty for this information). If you have questions about a medical condition or this instruction, always ask your healthcare professional. [place] [address] any warranty or liability for your use of this information. Nutrition Tips for Diabetes: After Your Visit  Your Care Instructions  A healthy diet is important to manage diabetes. It helps you lose weight (if you need to) and keep it off. It gives you the nutrition and energy your body needs and helps prevent heart disease. But a diet for diabetes does not mean that you have to eat special foods. You can eat what your family eats, including occasional sweets and other favorites. But you do have to pay attention to how often you eat and how much you eat of certain foods. The right plan for you will give you meals that help you keep your blood sugar at healthy levels. Try to eat a variety of foods and to spread carbohydrate throughout the day. Carbohydrate raises blood sugar higher and more quickly than any other nutrient does.  Carbohydrate is found in sugar, breads and cereals, fruit, starchy vegetables such as potatoes and corn, and milk and yogurt. You may want to work with a dietitian or diabetes educator to help you plan meals and snacks. A dietitian or diabetes educator also can help you lose weight if that is one of your goals. The following tips can help you enjoy your meals and stay healthy. Follow-up care is a key part of your treatment and safety. Be sure to make and go to all appointments, and call your doctor if you are having problems. Its also a good idea to know your test results and keep a list of the medicines you take. How can you care for yourself at home? · Learn which foods have carbohydrate and how much carbohydrate to eat. A dietitian or diabetes educator can help you learn to keep track of how much carbohydrate you eat. · Spread carbohydrate throughout the day. Eat some carbohydrate at all meals, but do not eat too much at any one time. · Plan meals to include food from all the food groups. These are the food groups and some example portion sizes:  ¨ Grains: 1 slice of bread (1 ounce), ½ cup of cooked cereal, and 1/3 cup of cooked pasta or rice. These have about 15 grams of carbohydrate in a serving. Choose whole grains such as whole wheat bread or crackers, oatmeal, and brown rice more often than refined grains. ¨ Fruit: 1 small fresh fruit, such as an apple or orange; ½ of a banana; ½ cup of chopped, cooked, or canned fruit; ½ cup of fruit juice; 1 cup of melon or raspberries; and 2 tablespoons of dried fruit. These have about 15 grams of carbohydrate in a serving. ¨ Dairy: 1 cup of nonfat or low-fat milk and 2/3 cup of plain yogurt. These have about 15 grams of carbohydrate in a serving. ¨ Protein foods: Beef, chicken, turkey, fish, eggs, tofu, cheese, cottage cheese, and peanut butter. A serving size of meat is 3 ounces, which is about the size of a deck of cards.  Examples of meat substitute serving sizes (equal to 1 ounce of meat) are 1/4 cup of cottage cheese, 1 egg, 1 tablespoon of peanut butter, and ½ cup of tofu. These have very little or no carbohydrate per serving. ¨ Vegetables: Starchy vegetables such as ½ cup of cooked dried beans, peas, potatoes, or corn have about 15 grams of carbohydrate. Nonstarchy vegetables have very little carbohydrate, such as 1 cup of raw leafy vegetables (such as spinach), ½ cup of other vegetables (cooked or chopped), and 3/4 cup of vegetable juice. · Use the plate format to plan meals. It is a good, quick way to make sure that you have a balanced meal. It also helps you spread carbohydrate throughout the day. You divide your plate by types of foods. Put vegetables on half the plate, meat or meat substitutes on one-quarter of the plate, and a grain or starchy vegetable (such as brown rice or a potato) in the final quarter of the plate. To this you can add a small piece of fruit and 1 cup of milk or yogurt, depending on how much carbohydrate you are supposed to eat at a meal.  · Talk to your dietitian or diabetes educator about ways to add limited amounts of sweets into your meal plan. You can eat these foods now and then, as long as you include the amount of carbohydrate they have in your daily carbohydrate allowance. · If you drink alcohol, limit it to no more than 1 drink a day for women and 2 drinks a day for men. If you are pregnant, no amount of alcohol is known to be safe. · Protein, fat, and fiber do not raise blood sugar as much as carbohydrate does. If you eat a lot of these nutrients in a meal, your blood sugar will rise more slowly than it would otherwise. · Limit saturated fats, such as those from meat and dairy products. Try to replace it with monounsaturated fat, such as olive oil. This is a healthier choice because people who have diabetes are at higher-than-average risk of heart disease. But use a modest amount of olive oil. A tablespoon of olive oil has 14 grams of fat and 120 calories. · Exercise lowers blood sugar. If you take insulin by shots or pump, you can use less than you would if you were not exercising. Keep in mind that timing matters. If you exercise within 1 hour after a meal, your body may need less insulin for that meal than it would if you exercised 3 hours after the meal. Test your blood sugar to find out how exercise affects your need for insulin. · Exercise on most days of the week. Aim for at least 30 minutes. Exercise helps you stay at a healthy weight and helps your body use insulin. Walking is an easy way to get exercise. Gradually increase the amount you walk every day. You also may want to swim, bike, or do other activities. When you eat out  · Learn to estimate the serving sizes of foods that have carbohydrate. If you measure food at home, it will be easier to estimate the amount in a serving of restaurant food. · If the meal you order has too much carbohydrate (such as potatoes, corn, or baked beans), ask to have a low-carbohydrate food instead. Ask for a salad or green vegetables. · If you use insulin, check your blood sugar before and after eating out to help you plan how much to eat in the future. · If you eat more carbohydrate at a meal than you had planned, take a walk or do other exercise. This will help lower your blood sugar. Where can you learn more? Go to [employer]  Enter F700 in the search box to learn more about \"Nutrition Tips for Diabetes: After Your Visit. \"   © 1824-9733 [employer]. Care instructions adapted under license by [employer] (which disclaims liability or warranty for this information). This care instruction is for use with your licensed healthcare professional. If you have questions about a medical condition or this instruction, always ask your healthcare professional. [place] [address] any warranty or liability for your use of this information.   Content Version: 81.8.650448; Current as of: June 4, 2014                 High Cholesterol: Care Instructions  Your Care Instructions  Cholesterol is a type of fat in your blood. It is needed for many body functions, such as making new cells. Cholesterol is made by your body. It also comes from food you eat. High cholesterol means that you have too much of the fat in your blood. This raises your risk of a heart attack and stroke. LDL and HDL are part of your total cholesterol. LDL is the \"bad\" cholesterol. High LDL can raise your risk for heart disease, heart attack, and stroke. HDL is the \"good\" cholesterol. It helps clear bad cholesterol from the body. High HDL is linked with a lower risk of heart disease, heart attack, and stroke. Your cholesterol levels help your doctor find out your risk for having a heart attack or stroke. You and your doctor can talk about whether you need to lower your risk and what treatment is best for you. A heart-healthy lifestyle along with medicines can help lower your cholesterol and your risk. The way you choose to lower your risk will depend on how high your risk is for heart attack and stroke. It will also depend on how you feel about taking medicines. Follow-up care is a key part of your treatment and safety. Be sure to make and go to all appointments, and call your doctor if you are having problems. It's also a good idea to know your test results and keep a list of the medicines you take. How can you care for yourself at home? · Eat a variety of foods every day. Good choices include fruits, vegetables, whole grains (like oatmeal), dried beans and peas, nuts and seeds, soy products (like tofu), and fat-free or low-fat dairy products. · Replace butter, margarine, and hydrogenated or partially hydrogenated oils with olive and canola oils. (Canola oil margarine without trans fat is fine.)  · Replace red meat with fish, poultry, and soy protein (like tofu).   · Limit processed and packaged foods like chips, crackers, and cookies. · Bake, broil, or steam foods. Don't up them. · Be physically active. Get at least 30 minutes of exercise on most days of the week. Walking is a good choice. You also may want to do other activities, such as running, swimming, cycling, or playing tennis or team sports. · Stay at a healthy weight or lose weight by making the changes in eating and physical activity listed above. Losing just a small amount of weight, even 5 to 10 pounds, can reduce your risk for having a heart attack or stroke. · Do not smoke. When should you call for help? Watch closely for changes in your health, and be sure to contact your doctor if:  · You need help making lifestyle changes. · You have questions about your medicine. Where can you learn more? Go to http://shekhar-pk.info/. Enter R852 in the search box to learn more about \"High Cholesterol: Care Instructions. \"  Current as of: April 3, 2017  Content Version: 11.3  © 9401-1847 [employer]. Care instructions adapted under license by [employer] (which disclaims liability or warranty for this information). If you have questions about a medical condition or this instruction, always ask your healthcare professional. [address] any warranty or liability for your use of this information.

## 2019-02-01 ENCOUNTER — OFFICE VISIT (OUTPATIENT)
Dept: FAMILY MEDICINE CLINIC | Facility: CLINIC | Age: 59
End: 2019-02-01

## 2019-02-01 VITALS
TEMPERATURE: 97.1 F | DIASTOLIC BLOOD PRESSURE: 65 MMHG | OXYGEN SATURATION: 96 % | RESPIRATION RATE: 18 BRPM | HEIGHT: 62 IN | SYSTOLIC BLOOD PRESSURE: 132 MMHG | BODY MASS INDEX: 47.48 KG/M2 | WEIGHT: 258 LBS | HEART RATE: 54 BPM

## 2019-02-01 DIAGNOSIS — M1A.0710 CHRONIC IDIOPATHIC GOUT INVOLVING TOE OF RIGHT FOOT WITHOUT TOPHUS: ICD-10-CM

## 2019-02-01 DIAGNOSIS — E78.5 HYPERLIPIDEMIA, UNSPECIFIED HYPERLIPIDEMIA TYPE: Primary | ICD-10-CM

## 2019-02-01 DIAGNOSIS — E11.21 TYPE 2 DIABETES WITH NEPHROPATHY (HCC): ICD-10-CM

## 2019-02-01 DIAGNOSIS — E11.42 DIABETIC POLYNEUROPATHY ASSOCIATED WITH TYPE 2 DIABETES MELLITUS (HCC): ICD-10-CM

## 2019-02-01 DIAGNOSIS — E66.01 MORBID OBESITY (HCC): ICD-10-CM

## 2019-02-01 DIAGNOSIS — R06.02 SHORTNESS OF BREATH: ICD-10-CM

## 2019-02-01 RX ORDER — GABAPENTIN 300 MG/1
300 CAPSULE ORAL 3 TIMES DAILY
Qty: 90 CAP | Refills: 11 | Status: SHIPPED | OUTPATIENT
Start: 2019-02-01 | End: 2020-11-16

## 2019-02-01 NOTE — PROGRESS NOTES
HISTORY OF PRESENT ILLNESS Daquan West is a 63 y.o. male. Seen in follow-up for diabetes with neuropathy and nephropathy, hyperlipidemia, morbid obesity, g;aucoma, gout. He has stopped taking almost all of his medications (about a week ago) due to episodes of SOB over the past 2 months. This comes mostly at rest (but often exertional), and lasts for about 20 minutes. No chest pain, nausea or diaphoresis with this. He has never seen a cardiologist. He quit smoking about 2 years ago. No coughing or heartburn. Review of Systems Constitutional: Negative for chills and fever. Respiratory: Positive for shortness of breath. Negative for cough and wheezing. Cardiovascular: Negative for chest pain, palpitations and leg swelling. Gastrointestinal: Negative for nausea and vomiting. Neurological: Positive for tingling and headaches. Negative for focal weakness. Psychiatric/Behavioral: The patient does not have insomnia. Visit Vitals /65 (BP 1 Location: Right arm, BP Patient Position: Sitting) Pulse (!) 54 Temp 97.1 °F (36.2 °C) (Oral) Resp 18 Ht 5' 2\" (1.575 m) Wt 258 lb (117 kg) SpO2 96% BMI 47.19 kg/m² Physical Exam  
Constitutional: He is oriented to person, place, and time. He appears well-developed and well-nourished. No distress. Neck: Neck supple. No thyromegaly present. Carotid bruit absent Cardiovascular: Normal rate, regular rhythm and intact distal pulses. Exam reveals no gallop and no friction rub. No murmur heard. Pulmonary/Chest: Effort normal and breath sounds normal. No respiratory distress. Musculoskeletal: He exhibits no edema. Lymphadenopathy:  
  He has no cervical adenopathy. Neurological: He is alert and oriented to person, place, and time. Skin: Skin is warm and dry. Psychiatric: He has a normal mood and affect. His behavior is normal. Judgment and thought content normal.  
Nursing note and vitals reviewed. ASSESSMENT and PLAN 
  ICD-10-CM ICD-9-CM 1. Hyperlipidemia, unspecified hyperlipidemia type E78.5 272.4 2. Type 2 diabetes with nephropathy (HCC) E11.21 250.40   
  583.81   
3. Diabetic polyneuropathy associated with type 2 diabetes mellitus (HCC) E11.42 250.60 gabapentin (NEURONTIN) 300 mg capsule  
  357.2 4. Morbid obesity ([address] E66.01 278.01   
5. Chronic idiopathic gout involving toe of right foot without tophus [id number].[id number] 274.02   
6. Shortness of breath R06.02 786.05 REFERRAL TO CARDIOLOGY Follow-up Disposition: 
Return in about 3 months (around 5/1/2019). the following changes in treatment are made: Increase Gabapentin. Strongly urged to resume his medications (as stopping them didn't fix his dyspnea. Referral to Cardiology. reviewed diet, exercise and weight control 
reviewed medications and side effects in detail Plan of care reviewed - patient verbalize(s) understanding and agreement. Breath sounds clear and equal bilaterally.

## 2019-02-01 NOTE — PROGRESS NOTES
Iban Aceves is a 63 y.o.  male presents today for follow up visit for Chief Complaint Patient presents with  Diabetes  Cholesterol Problem Radha Kirby Depression Screening: PHQ over the last two weeks 2/1/2019 Little interest or pleasure in doing things Not at all Feeling down, depressed, irritable, or hopeless Not at all Total Score PHQ 2 0 Learning Assessment: 
Learning Assessment 6/4/2018 PRIMARY LEARNER Patient HIGHEST LEVEL OF EDUCATION - PRIMARY LEARNER  DID NOT GRADUATE HIGH SCHOOL  
BARRIERS PRIMARY LEARNER NONE  
CO-LEARNER CAREGIVER No  
PRIMARY LANGUAGE ENGLISH  
LEARNER PREFERENCE PRIMARY DEMONSTRATION  
ANSWERED BY patient RELATIONSHIP SELF Abuse Screening: No flowsheet data found. Fall Risk No flowsheet data found. 1. Have you been to the ER, urgent care clinic since your last visit? Hospitalized since your last visit? No 
 
2. Have you seen or consulted any other health care providers outside of the [address] [place] [address] since your last visit? Include any pap smears or colon screening. No 
 
Health Maintenance reviewed - Health Maintenance Due Topic Date Due  
 DTaP/Tdap/Td series (1 - Tdap) 09/01/1982  Shingrix Vaccine Age 50> (1 of 2) 09/01/2011 Radha Kirby Upcoming Appts No 
 
VORB: No orders of the defined types were placed in this encounter. 
Arielle Parker MD/Alistair Cherry LPN

## 2019-03-13 ENCOUNTER — OFFICE VISIT (OUTPATIENT)
Dept: CARDIOLOGY CLINIC | Age: 59
End: 2019-03-13

## 2019-03-13 VITALS
OXYGEN SATURATION: 97 % | HEART RATE: 64 BPM | WEIGHT: 257 LBS | DIASTOLIC BLOOD PRESSURE: 84 MMHG | HEIGHT: 62 IN | SYSTOLIC BLOOD PRESSURE: 130 MMHG | BODY MASS INDEX: 47.29 KG/M2

## 2019-03-13 DIAGNOSIS — R06.02 SOB (SHORTNESS OF BREATH): Primary | ICD-10-CM

## 2019-03-13 DIAGNOSIS — E66.01 OBESITY, CLASS III, BMI 40-49.9 (MORBID OBESITY) (HCC): ICD-10-CM

## 2019-03-13 DIAGNOSIS — I10 ESSENTIAL HYPERTENSION: ICD-10-CM

## 2019-03-13 NOTE — PATIENT INSTRUCTIONS
Someone will call you to schedule your cardiac testing.  If you do not receive a phone call or miss it you may call; Central Scheduling [phone]

## 2019-03-13 NOTE — PROGRESS NOTES
Aaron López presents today for   Chief Complaint   Patient presents with    New Patient     referred by [facility] for SOB     Shortness of Breath     with and without exertion everyday worsening for 2 months        Daquan Kwon preferred language for health care discussion is english/other. Is someone accompanying this pt? No     Is the patient using any DME equipment during OV? No     Depression Screening:  3 most recent PHQ Screens 2/1/2019   Little interest or pleasure in doing things Not at all   Feeling down, depressed, irritable, or hopeless Not at all   Total Score PHQ 2 0       Learning Assessment:  Learning Assessment 6/4/2018   PRIMARY LEARNER Patient   HIGHEST LEVEL OF EDUCATION - PRIMARY LEARNER  DID NOT GRADUATE HIGH SCHOOL   BARRIERS PRIMARY LEARNER NONE   CO-LEARNER CAREGIVER No   PRIMARY LANGUAGE ENGLISH   LEARNER PREFERENCE PRIMARY DEMONSTRATION   ANSWERED BY patient   RELATIONSHIP SELF       Abuse Screening:  No flowsheet data found. Fall Risk  No flowsheet data found. Pt currently taking Antiplatelet therapy? No    Coordination of Care:  1. Have you been to the ER, urgent care clinic since your last visit? Hospitalized since your last visit? No     2. Have you seen or consulted any other health care providers outside of the [address] [place] [address] since your last visit? Include any pap smears or colon screening.  No

## 2019-03-13 NOTE — PROGRESS NOTES
HISTORY OF PRESENT ILLNESS  Daquan Owen is a 63 y.o. male. HPI  He is referred to my office for evaluation of shortness of breath. For the past two months or so, he has been experiencing shortness of breath at rest as well as with exertion. He claims that ever since he has been cleaning the toilet with bleach, he has been short of breath. He does not have orthopnea or PND. He denies chest pain. He has had no palpitations, dizziness or syncope. He denies any symptoms of claudication, TIA or amaurosis fugax. He claims he never had shortness of breath before two months ago. He has had no wheezing. He is a former smoker until a few years ago. He has a history of hypertension and diabetes. He has dyslipidemia. He denies family history of premature atherosclerotic coronary artery disease. Review of Systems   Constitutional: Negative for malaise/fatigue and weight loss. HENT: Negative for hearing loss. Eyes: Negative for blurred vision and double vision. Respiratory: Positive for shortness of breath. Cardiovascular: Negative for chest pain, palpitations, orthopnea, claudication, leg swelling and PND. Gastrointestinal: Negative for blood in stool, heartburn and melena. Genitourinary: Negative for dysuria, frequency, hematuria and urgency. Musculoskeletal: Negative for back pain and joint pain. Skin: Negative for itching and rash. Neurological: Negative for dizziness, seizures and loss of consciousness. Psychiatric/Behavioral: Negative for depression and memory loss. Physical Exam   Constitutional: He is oriented to person, place, and time. He appears well-developed and well-nourished. HENT:   Head: Normocephalic and atraumatic. Eyes: Conjunctivae are normal. Pupils are equal, round, and reactive to light. Neck: Normal range of motion. Neck supple. No JVD present. Cardiovascular: Normal rate, regular rhythm, S1 normal and S2 normal.  No extrasystoles are present.  PMI is not displaced. Exam reveals no gallop and no friction rub. No murmur heard. Pulses:       Carotid pulses are 3+ on the right side, and 3+ on the left side. Pulmonary/Chest: Effort normal. He has no rales. Abdominal: Soft. There is no tenderness. Musculoskeletal: He exhibits no edema. Neurological: He is alert and oriented to person, place, and time. No cranial nerve deficit. Skin: Skin is warm and dry. Psychiatric: He has a normal mood and affect.  His behavior is normal.     Visit Vitals  /84   Pulse 64   Ht 5' 2\" (1.575 m)   Wt 116.6 kg (257 lb)   SpO2 97%   BMI 47.01 kg/m²       Past Medical History:   Diagnosis Date    Diabetes ([facility]     DM type 2 with diabetic peripheral neuropathy ([facility] 2018    Dr Jane Lindsey Hypertensive retinopathy     Morbid obesity ([facility]     Pneumonia     Primary open angle glaucoma     Sickle cell trait ([facility]        Social History     Socioeconomic History    Marital status: SINGLE     Spouse name: Not on file    Number of children: Not on file    Years of education: Not on file    Highest education level: Not on file   Social Needs    Financial resource strain: Not on file    Food insecurity - worry: Not on file    Food insecurity - inability: Not on file   [employer] needs - medical: Not on file   [employer] needs - non-medical: Not on file   Occupational History    Not on file   Tobacco Use    Smoking status: Current Every Day Smoker     Packs/day: 0.25     Years: 30.00     Pack years: 7.50     Types: Cigarettes    Smokeless tobacco: Former User     Quit date: 2016    Tobacco comment: stop 03/2016   Substance and Sexual Activity    Alcohol use: No    Drug use: No    Sexual activity: Yes     Partners: Female     Birth control/protection: Condom   Other Topics Concern    Not on file   Social History Narrative    Not on file       Family History   Problem Relation Age of Onset    Diabetes Mother     Diabetes Maternal Grandmother     Sickle Cell Trait Other     Diabetes Sister        Past Surgical History:   Procedure Laterality Date    HX OTHER SURGICAL      pt reports gallstone removal       Current Outpatient Medications   Medication Sig Dispense Refill    gabapentin (NEURONTIN) 300 mg capsule Take 1 Cap by mouth three (3) times daily. 90 Cap 11    metFORMIN (GLUCOPHAGE) 500 mg tablet Take 1 Tab by mouth two (2) times daily (with meals). Indications: type 2 diabetes mellitus 60 Tab 11    multivitamin (ONE A DAY) tablet Take 1 Tab by mouth daily. 90 Tab 3    ibuprofen (MOTRIN) 800 mg tablet Take 1 Tab by mouth every eight (8) hours as needed for Pain. 90 Tab 1    glucose blood VI test strips (TRUE METRIX GLUCOSE TEST STRIP) strip Use as directed to test BS 3 times a day 100 Strip 11    Blood-Glucose Meter monitoring kit by Does Not Apply route daily. 1 Kit 0    Lancets misc Use as directed 3 times a day 90 Each 11    glucose blood VI test strips (TRUETEST TEST STRIPS) strip by Does Not Apply route three (3) times daily. 90 Strip 11    timolol (TIMOPTIC-XE) 0.5 % ophthalmic gel-forming instill 1 drop into both eyes once daily  0    SIMBRINZA 1-0.2 % drps 1 Drop. Indications: 1 drop in both eyes daily  0    lisinopril (PRINIVIL, ZESTRIL) 10 mg tablet Take 1 Tab by mouth daily. Indications: Diabetic Nephropathy 90 Tab 3    glipiZIDE (GLUCOTROL) 10 mg tablet Take 1 Tab by mouth two (2) times a day. 180 Tab 3    atorvastatin (LIPITOR) 40 mg tablet Take 1 Tab by mouth daily. Indications: hyperlipidemia 90 Tab 3    allopurinol (ZYLOPRIM) 300 mg tablet Take 1 Tab by mouth daily. Indications: prevention of acute gout attack 90 Tab 3    SITagliptin (JANUVIA) 50 mg tablet Take 1 Tab by mouth daily. 90 Tab 1    sildenafil citrate (VIAGRA) 100 mg tablet Take 1 Tab by mouth daily as needed. 30 Tab 0       EKG: normal EKG, normal sinus rhythm, there are no previous tracings available for comparison.     ASSESSMENT and PLAN  Encounter Diagnoses Name Primary?  SOB (shortness of breath) Yes    Essential hypertension     Obesity, Class III, BMI 40-49.9 (morbid obesity) ([facility]    The cause of this patient's shortness of breath is not quite clear. It could be related to his obesity and deconditioning, however, he did not have any symptoms of dyspnea before two months ago when he had the same weight and same activity level. He claims that it is related to inhaling the bleach while cleaning the bathroom, but he does not have any other pulmonary symptoms. He does not have orthopnea or PND. He shows no physical findings of decompensated congestive heart failure. At this point, I will obtain an echocardiogram to further evaluate his LV function, particularly in light of his history of hypertension and diabetes.

## 2019-03-29 ENCOUNTER — HOSPITAL ENCOUNTER (OUTPATIENT)
Dept: NON INVASIVE DIAGNOSTICS | Age: 59
Discharge: HOME | End: 2019-03-29
Attending: INTERNAL MEDICINE
Payer: MEDICAID

## 2019-03-29 VITALS
DIASTOLIC BLOOD PRESSURE: 84 MMHG | HEIGHT: 62 IN | BODY MASS INDEX: 47.29 KG/M2 | WEIGHT: 257 LBS | SYSTOLIC BLOOD PRESSURE: 130 MMHG

## 2019-03-29 DIAGNOSIS — R06.02 SOB (SHORTNESS OF BREATH): ICD-10-CM

## 2019-03-29 LAB
ECHO AO ROOT DIAM: 3.22 CM
ECHO LA VOL BP: 60.64 ML (ref 18–58)
ECHO LA VOL/BSA BIPLANE: 28.52 ML/M2 (ref 16–28)
ECHO LV E' LATERAL VELOCITY: 7.84 CM/S
ECHO LV E' SEPTAL VELOCITY: 5.84 CM/S

## 2019-03-29 PROCEDURE — 93306 TTE W/DOPPLER COMPLETE: CPT

## 2019-04-01 NOTE — PROGRESS NOTES
Per your last note' At this point, I will obtain an echocardiogram to further evaluate his LV function, particularly in light of his history of hypertension and diabetes.

## 2019-05-30 ENCOUNTER — HOSPITAL ENCOUNTER (OUTPATIENT)
Dept: LAB | Age: 59
Discharge: HOME | End: 2019-05-30
Payer: MEDICAID

## 2019-05-30 ENCOUNTER — OFFICE VISIT (OUTPATIENT)
Dept: FAMILY MEDICINE CLINIC | Facility: CLINIC | Age: 59
End: 2019-05-30

## 2019-05-30 VITALS
WEIGHT: 255.8 LBS | HEIGHT: 62 IN | RESPIRATION RATE: 18 BRPM | BODY MASS INDEX: 47.07 KG/M2 | OXYGEN SATURATION: 91 % | TEMPERATURE: 97.4 F | HEART RATE: 71 BPM | DIASTOLIC BLOOD PRESSURE: 73 MMHG | SYSTOLIC BLOOD PRESSURE: 133 MMHG

## 2019-05-30 DIAGNOSIS — E78.5 HYPERLIPIDEMIA, UNSPECIFIED HYPERLIPIDEMIA TYPE: ICD-10-CM

## 2019-05-30 DIAGNOSIS — I10 HTN, GOAL BELOW 130/80: ICD-10-CM

## 2019-05-30 DIAGNOSIS — Z23 ENCOUNTER FOR IMMUNIZATION: ICD-10-CM

## 2019-05-30 DIAGNOSIS — E11.21 TYPE 2 DIABETES WITH NEPHROPATHY (HCC): ICD-10-CM

## 2019-05-30 DIAGNOSIS — M25.562 LEFT KNEE PAIN, UNSPECIFIED CHRONICITY: ICD-10-CM

## 2019-05-30 LAB
ANION GAP SERPL CALC-SCNC: 9 MMOL/L (ref 3–18)
BASOPHILS # BLD: 0 K/UL (ref 0–0.1)
BASOPHILS NFR BLD: 0 % (ref 0–2)
BUN SERPL-MCNC: 7 MG/DL (ref 7–18)
BUN/CREAT SERPL: 8 (ref 12–20)
CALCIUM SERPL-MCNC: 8.8 MG/DL (ref 8.5–10.1)
CHLORIDE SERPL-SCNC: 104 MMOL/L (ref 100–108)
CHOLEST SERPL-MCNC: 167 MG/DL
CO2 SERPL-SCNC: 23 MMOL/L (ref 21–32)
CREAT SERPL-MCNC: 0.91 MG/DL (ref 0.6–1.3)
DIFFERENTIAL METHOD BLD: ABNORMAL
EOSINOPHIL # BLD: 0.1 K/UL (ref 0–0.4)
EOSINOPHIL NFR BLD: 1 % (ref 0–5)
ERYTHROCYTE [DISTWIDTH] IN BLOOD BY AUTOMATED COUNT: 14.9 % (ref 11.6–14.5)
GLUCOSE SERPL-MCNC: 328 MG/DL (ref 74–99)
HBA1C MFR BLD HPLC: 12.2 %
HCT VFR BLD AUTO: 39.9 % (ref 36–48)
HDLC SERPL-MCNC: 54 MG/DL (ref 40–60)
HDLC SERPL: 3.1 {RATIO} (ref 0–5)
HGB BLD-MCNC: 13.5 G/DL (ref 13–16)
LDLC SERPL CALC-MCNC: 70.2 MG/DL (ref 0–100)
LIPID PROFILE,FLP: ABNORMAL
LYMPHOCYTES # BLD: 2.9 K/UL (ref 0.9–3.6)
LYMPHOCYTES NFR BLD: 36 % (ref 21–52)
MCH RBC QN AUTO: 27.9 PG (ref 24–34)
MCHC RBC AUTO-ENTMCNC: 33.8 G/DL (ref 31–37)
MCV RBC AUTO: 82.4 FL (ref 74–97)
MICROALBUMIN UR TEST STR-MCNC: 80 MG/L
MICROALBUMIN/CREAT RATIO POC: NORMAL MG/G
MONOCYTES # BLD: 0.4 K/UL (ref 0.05–1.2)
MONOCYTES NFR BLD: 5 % (ref 3–10)
NEUTS SEG # BLD: 4.6 K/UL (ref 1.8–8)
NEUTS SEG NFR BLD: 58 % (ref 40–73)
PLATELET # BLD AUTO: 191 K/UL (ref 135–420)
PMV BLD AUTO: 12.7 FL (ref 9.2–11.8)
POTASSIUM SERPL-SCNC: 4 MMOL/L (ref 3.5–5.5)
RBC # BLD AUTO: 4.84 M/UL (ref 4.7–5.5)
SODIUM SERPL-SCNC: 136 MMOL/L (ref 136–145)
TRIGL SERPL-MCNC: 214 MG/DL (ref ?–150)
VLDLC SERPL CALC-MCNC: 42.8 MG/DL
WBC # BLD AUTO: 8 K/UL (ref 4.6–13.2)

## 2019-05-30 PROCEDURE — 85025 COMPLETE CBC W/AUTO DIFF WBC: CPT

## 2019-05-30 PROCEDURE — 36415 COLL VENOUS BLD VENIPUNCTURE: CPT

## 2019-05-30 PROCEDURE — 80048 BASIC METABOLIC PNL TOTAL CA: CPT

## 2019-05-30 PROCEDURE — 80061 LIPID PANEL: CPT

## 2019-05-30 RX ORDER — IBUPROFEN 800 MG/1
800 TABLET ORAL
Qty: 90 TAB | Refills: 6 | Status: SHIPPED | OUTPATIENT
Start: 2019-05-30 | End: 2019-11-26 | Stop reason: SDUPTHER

## 2019-05-30 NOTE — PROGRESS NOTES
05/30/19  8:11 AM  had concerns including Diabetes (pt here for f/u ). HISTORY OF PRESENT ILLNESS   This is a 63 y.o. male who presents in follow up. His problems include hypertension non-insulin-dependent diabetes mellitus hyperlipidemia and he also complains today of shortness of breath. Hypertension  No problems with medication. The patient has no headache lightheadedness chest pain or pressure shortness of breath orthopnea edema nausea weakness or paresthesias. Non-insulin-dependent diabetes mellitus  The patient has had no problem with his medications although he is not quite sure what he is taking. The patient denies any polyuria polydipsia or polyphagia. No change in sensation is noted. He does not follow a specific diet. The patient does not check his blood sugars at home. Hyperlipidemia  She has had no problem with medication. The patient denies muscle aches, headache, GI symptoms or difficulty with mentation. Shortness of breath  This happens after cleaning with bleach. He does not have any shortness of breath on exertion and there is no history of orthopnea or PND. He has no edema. There is no history of chest pain pressure palpitations nausea or diaphoresis. The ECHO from March was negative. He is a non-smoker. There is no history of lung disease. Wellness  Colonoscopy age 49, and he states that he should follow-up every 10 years. Glaucoma, check every 3 months with the eye doctor. The patient is not sure about prostate screening. He requests this. There is no family history of prostate cancer. At this time he is not interested in immunizations. Chronic idiopathic gout  The patient has had no recent attacks. The attacks are usually in the knees. The patient states that nonsteroidal anti-inflammatories usually relieved his symptoms. Prior office visits  His last office visit was in February 2019 with another provider.   He was seen for diabetes with neuropathy and nephropathy hyperlipidemia morbid obesity glaucoma and gout. His most recent low an A1c that I can see in the chart was 12.5 in September 2018. No Known Allergies    Active Ambulatory Problems     Diagnosis Date Noted    Hyperlipidemia 06/07/2017    Morbid obesity ([address] 06/07/2017    DM type 2 with diabetic peripheral neuropathy ([address] 01/01/2018    Type 2 diabetes with nephropathy ([address] 03/05/2018    Primary open angle glaucoma     Chronic idiopathic gout involving toe of right foot without tophus 06/04/2018    Diabetic polyneuropathy associated with type 2 diabetes mellitus ([address] 06/04/2018     Resolved Ambulatory Problems     Diagnosis Date Noted    Pneumonia 03/30/2014    Controlled type 2 diabetes mellitus without complication, without long-term current use of insulin ([address] 06/07/2017     Past Medical History:   Diagnosis Date    Diabetes ([address]     DM type 2 with diabetic peripheral neuropathy ([address] 2018    Hypertensive retinopathy     Morbid obesity ([address]     Pneumonia     Primary open angle glaucoma     Sickle cell trait ([address]        Review of Systems   Constitutional: Negative for chills and fever. HENT: Negative for ear pain, hearing loss and tinnitus. Eyes: Negative for blurred vision and double vision. Respiratory: Negative for shortness of breath. Cardiovascular: Negative for chest pain, palpitations and leg swelling. Gastrointestinal: Negative for nausea and vomiting. Neurological: Negative for tingling, focal weakness and headaches. Psychiatric/Behavioral: The patient is not nervous/anxious and does not have insomnia.         Results for orders placed or performed during the hospital encounter of 03/29/19   ECHO ADULT COMPLETE   Result Value Ref Range    LA Volume 60.64 (A) 18 - 58 mL    LV E' Lateral Velocity 7.84 cm/s    LV E' Septal Velocity 5.84 cm/s    Ao Root D 3.22 cm    LA Vol Index 28.52 16 - 28 ml/m2       Visit Vitals  /73 (BP 1 Location: Right arm, BP Patient Position: Sitting)   Pulse 71   Temp 97.4 °F (36.3 °C) (Oral)   Resp 18   Ht 5' 2\" (1.575 m)   Wt 255 lb 12.8 oz (116 kg)   SpO2 91%   BMI 46.79 kg/m²       Physical Exam   Constitutional: He is oriented to person, place, and time. He appears well-developed and well-nourished. No distress. Body mass index is 46.79 kg/m². HENT:   Head: Normocephalic. Right Ear: External ear normal.   Left Ear: External ear normal.   Mouth/Throat: Oropharynx is clear and moist.   Tympani wnl   Eyes: Pupils are equal, round, and reactive to light. EOM are normal.   Neck: Neck supple. No thyromegaly present. Carotid bruit absent   Cardiovascular: Normal rate, regular rhythm and intact distal pulses. Exam reveals no gallop and no friction rub. No murmur heard. Pulmonary/Chest: Effort normal and breath sounds normal. No respiratory distress. Abdominal: Soft. Bowel sounds are normal. He exhibits no distension. Genitourinary: Rectum normal, prostate normal and penis normal.   Musculoskeletal: He exhibits no edema. Lymphadenopathy:     He has no cervical adenopathy. Neurological: He is alert and oriented to person, place, and time. Skin: Skin is warm and dry. Psychiatric: He has a normal mood and affect. His behavior is normal. Judgment and thought content normal.   Nursing note and vitals reviewed. MDM  Number of Diagnoses or Management Options  Encounter for immunization:   HTN, goal below 130/80: established, improving  Hyperlipidemia, unspecified hyperlipidemia type:   Type 2 diabetes with nephropathy ([facility]): established, improving  Uncontrolled type 2 diabetes mellitus without complication, without long-term current use of insulin [facility]):   Diagnosis management comments: The patient has metabolic syndrome he is overweight. We will recheck his CBC and BMP as well as hemoglobin A1c to assess where he is now as far as his treatment is concerned. No change in medication will be started at this time. Massimo Day the patient has a normal feeling prostate and the rectal vault feels normal.  I do not see a need for a PSA at this time. Amount and/or Complexity of Data Reviewed  Clinical lab tests: ordered        ASSESSMENT and PLAN    ICD-10-CM ICD-9-CM    1. Uncontrolled type 2 diabetes mellitus without complication, without long-term current use of insulin ([facility]) E11.65 250.02 AMB POC HEMOGLOBIN A1C    doing well, no change in meds. Follow up labs   2. Type 2 diabetes with nephropathy ([facility]) E11.21 250.40 AMB POC URINE, MICROALBUMIN, SEMIQUANTITATIVE     583.81     As above (duplication)   3. HTN, goal below 130/80 I10 401.9 CBC WITH AUTOMATED DIFF      METABOLIC PANEL, BASIC    High blood pressure in good control, folllow   4. Hyperlipidemia, unspecified hyperlipidemia type E78.5 272.4 LIPID PANEL    Follow up labs ordered  Discussed diet and exercise   5. Encounter for immunization Z23 V03.89 DTAP, HIB, IPV COMBINED VACCINE      pneumococcal 23-valent (PNEUMOVAX 23) 25 mcg/0.5 mL injection      DISCONTINUED: pneumococcal 23-valent (PNEUMOVAX 23) 25 mcg/0.5 mL injection    Dtap and pneumonia 23 give     Follow-up and Dispositions    · Return in about 3 months (around 8/30/2019).        lab results and schedule of future lab studies reviewed with patient  reviewed diet, exercise and weight control

## 2019-05-30 NOTE — PATIENT INSTRUCTIONS
High Blood Pressure: Care Instructions Overview It's normal for blood pressure to go up and down throughout the day. But if it stays up, you have high blood pressure. Another name for high blood pressure is hypertension. Despite what a lot of people think, high blood pressure usually doesn't cause headaches or make you feel dizzy or lightheaded. It usually has no symptoms. But it does increase your risk of stroke, heart attack, and other problems. You and your doctor will talk about your risks of these problems based on your blood pressure. Your doctor will give you a goal for your blood pressure. Your goal will be based on your health and your age. Lifestyle changes, such as eating healthy and being active, are always important to help lower blood pressure. You might also take medicine to reach your blood pressure goal. 
Follow-up care is a key part of your treatment and safety. Be sure to make and go to all appointments, and call your doctor if you are having problems. It's also a good idea to know your test results and keep a list of the medicines you take. How can you care for yourself at home? Medical treatment · If you stop taking your medicine, your blood pressure will go back up. You may take one or more types of medicine to lower your blood pressure. Be safe with medicines. Take your medicine exactly as prescribed. Call your doctor if you think you are having a problem with your medicine. · Talk to your doctor before you start taking aspirin every day. Aspirin can help certain people lower their risk of a heart attack or stroke. But taking aspirin isn't right for everyone, because it can cause serious bleeding. · See your doctor regularly. You may need to see the doctor more often at first or until your blood pressure comes down. · If you are taking blood pressure medicine, talk to your doctor before you take decongestants or anti-inflammatory medicine, such as ibuprofen. Some of these medicines can raise blood pressure. · Learn how to check your blood pressure at home. Lifestyle changes · Stay at a healthy weight. This is especially important if you put on weight around the waist. Losing even 10 pounds can help you lower your blood pressure. · If your doctor recommends it, get more exercise. Walking is a good choice. Bit by bit, increase the amount you walk every day. Try for at least 30 minutes on most days of the week. You also may want to swim, bike, or do other activities. · Avoid or limit alcohol. Talk to your doctor about whether you can drink any alcohol. · Try to limit how much sodium you eat to less than 2,300 milligrams (mg) a day. Your doctor may ask you to try to eat less than 1,500 mg a day. · Eat plenty of fruits (such as bananas and oranges), vegetables, legumes, whole grains, and low-fat dairy products. · Lower the amount of saturated fat in your diet. Saturated fat is found in animal products such as milk, cheese, and meat. Limiting these foods may help you lose weight and also lower your risk for heart disease. · Do not smoke. Smoking increases your risk for heart attack and stroke. If you need help quitting, talk to your doctor about stop-smoking programs and medicines. These can increase your chances of quitting for good. When should you call for help? Call 911 anytime you think you may need emergency care. This may mean having symptoms that suggest that your blood pressure is causing a serious heart or blood vessel problem. Your blood pressure may be over 180/120. 
 For example, call 911 if: 
  · You have symptoms of a heart attack. These may include: 
? Chest pain or pressure, or a strange feeling in the chest. 
? Sweating. ? Shortness of breath. ? Nausea or vomiting. ? Pain, pressure, or a strange feeling in the back, neck, jaw, or upper belly or in one or both shoulders or arms. ? Lightheadedness or sudden weakness. ? A fast or irregular heartbeat.  
  · You have symptoms of a stroke. These may include: 
? Sudden numbness, tingling, weakness, or loss of movement in your face, arm, or leg, especially on only one side of your body. ? Sudden vision changes. ? Sudden trouble speaking. ? Sudden confusion or trouble understanding simple statements. ? Sudden problems with walking or balance. ? A sudden, severe headache that is different from past headaches.  
  · You have severe back or belly pain.  
 Do not wait until your blood pressure comes down on its own. Get help right away. 
 Call your doctor now or seek immediate care if: 
  · Your blood pressure is much higher than normal (such as 180/120 or higher), but you don't have symptoms.  
  · You think high blood pressure is causing symptoms, such as: 
? Severe headache. 
? Blurry vision.  
 Watch closely for changes in your health, and be sure to contact your doctor if: 
  · Your blood pressure measures higher than your doctor recommends at least 2 times. That means the top number is higher or the bottom number is higher, or both.  
  · You think you may be having side effects from your blood pressure medicine. Where can you learn more? Go to http://shekhar-pk.info/. Enter W574 in the search box to learn more about \"High Blood Pressure: Care Instructions. \" Current as of: July 22, 2018 Content Version: 11.9 © 7095-7572 [employer]. Care instructions adapted under license by [employer] (which disclaims liability or warranty for this information). If you have questions about a medical condition or this instruction, always ask your healthcare professional. [place] [address] any warranty or liability for your use of this information.

## 2019-05-30 NOTE — PROGRESS NOTES
Gumaro Bearden is a 63 y.o. male here for f/u        Daquan D Patti Bolanos is a 63 y.o. male (: 1960) presenting to address:    Chief Complaint   Patient presents with    Diabetes     pt here for f/u        Vitals:    [id number]   Resp: 18   SpO2: 98%   Weight: 255 lb 12.8 oz (116 kg)   Height: 5' 2\" (1.575 m)   PainSc:   8   PainLoc: Foot       Hearing/Vision:   No exam data present    Learning Assessment:     Learning Assessment 2018   PRIMARY LEARNER Patient   HIGHEST LEVEL OF EDUCATION - PRIMARY LEARNER  DID NOT GRADUATE HIGH SCHOOL   BARRIERS PRIMARY LEARNER NONE   CO-LEARNER CAREGIVER No   PRIMARY LANGUAGE ENGLISH   LEARNER PREFERENCE PRIMARY DEMONSTRATION   ANSWERED BY patient   RELATIONSHIP SELF     Depression Screening:     3 most recent PHQ Screens 2019   Little interest or pleasure in doing things Not at all   Feeling down, depressed, irritable, or hopeless Not at all   Total Score PHQ 2 0     Fall Risk Assessment:   No flowsheet data found. Abuse Screening:   No flowsheet data found. Coordination of Care Questionaire:   1. Have you been to the ER, urgent care clinic since your last visit? Hospitalized since your last visit? NO    2. Have you seen or consulted any other health care providers outside of the [address] [place] [address] since your last visit? Include any pap smears or colon screening. ? YES ophthalmology, podiatry    Advanced Directive:   1. Do you have an Advanced Directive? NO    2. Would you like information on Advanced Directives?  NO

## 2019-05-31 DIAGNOSIS — E11.21 TYPE 2 DIABETES WITH NEPHROPATHY (HCC): ICD-10-CM

## 2019-05-31 RX ORDER — METFORMIN HYDROCHLORIDE 1000 MG/1
1000 TABLET ORAL 2 TIMES DAILY WITH MEALS
Qty: 60 TAB | Refills: 11 | Status: SHIPPED | OUTPATIENT
Start: 2019-05-31 | End: 2019-11-26 | Stop reason: ALTCHOICE

## 2019-05-31 NOTE — PROGRESS NOTES
Please call to let him know that his blood sugar is too high. This close along with his hemoglobin A1c which is the average of how high his sugar has been over the last 3 months. We want to increase his metformin from 500 mg twice a day to 1000 mg twice a day. He should double his present medication which is 500 twice a day and take 2 twice a day when that prescription runs out I have called and Metformin 1000 mg to be taken twice a day which would be 1 pill on the next prescription.   His cholesterol is good and his urine is good

## 2019-05-31 NOTE — PROGRESS NOTES
Pt aware.  He stated he has run out of the metformin 500 mg tabs but is aware metformin 1,00mg tabs were sent in

## 2019-08-29 ENCOUNTER — OFFICE VISIT (OUTPATIENT)
Dept: FAMILY MEDICINE CLINIC | Facility: CLINIC | Age: 59
End: 2019-08-29

## 2019-08-29 ENCOUNTER — HOSPITAL ENCOUNTER (OUTPATIENT)
Dept: LAB | Age: 59
Discharge: HOME | End: 2019-08-29
Payer: MEDICAID

## 2019-08-29 VITALS
OXYGEN SATURATION: 95 % | HEIGHT: 62 IN | DIASTOLIC BLOOD PRESSURE: 76 MMHG | WEIGHT: 263 LBS | HEART RATE: 67 BPM | RESPIRATION RATE: 16 BRPM | SYSTOLIC BLOOD PRESSURE: 139 MMHG | BODY MASS INDEX: 48.4 KG/M2 | TEMPERATURE: 97.8 F

## 2019-08-29 DIAGNOSIS — R06.00 DYSPNEA, UNSPECIFIED TYPE: ICD-10-CM

## 2019-08-29 DIAGNOSIS — I10 HTN, GOAL BELOW 130/80: Primary | ICD-10-CM

## 2019-08-29 DIAGNOSIS — E66.01 MORBID OBESITY (HCC): ICD-10-CM

## 2019-08-29 DIAGNOSIS — F17.200 CURRENT SMOKER: ICD-10-CM

## 2019-08-29 DIAGNOSIS — E78.5 HYPERLIPIDEMIA, UNSPECIFIED HYPERLIPIDEMIA TYPE: ICD-10-CM

## 2019-08-29 DIAGNOSIS — I10 HTN, GOAL BELOW 130/80: ICD-10-CM

## 2019-08-29 LAB
ANION GAP SERPL CALC-SCNC: 7 MMOL/L (ref 3–18)
BASOPHILS # BLD: 0 K/UL (ref 0–0.1)
BASOPHILS NFR BLD: 0 % (ref 0–2)
BUN SERPL-MCNC: 12 MG/DL (ref 7–18)
BUN/CREAT SERPL: 13 (ref 12–20)
CALCIUM SERPL-MCNC: 9.1 MG/DL (ref 8.5–10.1)
CHLORIDE SERPL-SCNC: 103 MMOL/L (ref 100–111)
CO2 SERPL-SCNC: 25 MMOL/L (ref 21–32)
CREAT SERPL-MCNC: 0.9 MG/DL (ref 0.6–1.3)
DIFFERENTIAL METHOD BLD: ABNORMAL
EOSINOPHIL # BLD: 0.2 K/UL (ref 0–0.4)
EOSINOPHIL NFR BLD: 2 % (ref 0–5)
ERYTHROCYTE [DISTWIDTH] IN BLOOD BY AUTOMATED COUNT: 15.1 % (ref 11.6–14.5)
EST. AVERAGE GLUCOSE BLD GHB EST-MCNC: 232 MG/DL
GLUCOSE SERPL-MCNC: 201 MG/DL (ref 74–99)
HBA1C MFR BLD: 9.7 % (ref 4.2–5.6)
HCT VFR BLD AUTO: 38.8 % (ref 36–48)
HGB BLD-MCNC: 12.8 G/DL (ref 13–16)
LYMPHOCYTES # BLD: 3.7 K/UL (ref 0.9–3.6)
LYMPHOCYTES NFR BLD: 45 % (ref 21–52)
MCH RBC QN AUTO: 28.3 PG (ref 24–34)
MCHC RBC AUTO-ENTMCNC: 33 G/DL (ref 31–37)
MCV RBC AUTO: 85.8 FL (ref 74–97)
MONOCYTES # BLD: 0.4 K/UL (ref 0.05–1.2)
MONOCYTES NFR BLD: 4 % (ref 3–10)
NEUTS SEG # BLD: 4 K/UL (ref 1.8–8)
NEUTS SEG NFR BLD: 49 % (ref 40–73)
PLATELET # BLD AUTO: 208 K/UL (ref 135–420)
PMV BLD AUTO: 12.1 FL (ref 9.2–11.8)
POTASSIUM SERPL-SCNC: 4.2 MMOL/L (ref 3.5–5.5)
RBC # BLD AUTO: 4.52 M/UL (ref 4.7–5.5)
SODIUM SERPL-SCNC: 135 MMOL/L (ref 136–145)
WBC # BLD AUTO: 8.3 K/UL (ref 4.6–13.2)

## 2019-08-29 PROCEDURE — 36415 COLL VENOUS BLD VENIPUNCTURE: CPT

## 2019-08-29 PROCEDURE — 83036 HEMOGLOBIN GLYCOSYLATED A1C: CPT

## 2019-08-29 PROCEDURE — 80048 BASIC METABOLIC PNL TOTAL CA: CPT

## 2019-08-29 PROCEDURE — 85025 COMPLETE CBC W/AUTO DIFF WBC: CPT

## 2019-08-29 PROCEDURE — 80061 LIPID PANEL: CPT

## 2019-08-29 NOTE — PATIENT INSTRUCTIONS
High Blood Pressure: Care Instructions  Overview    It's normal for blood pressure to go up and down throughout the day. But if it stays up, you have high blood pressure. Another name for high blood pressure is hypertension. Despite what a lot of people think, high blood pressure usually doesn't cause headaches or make you feel dizzy or lightheaded. It usually has no symptoms. But it does increase your risk of stroke, heart attack, and other problems. You and your doctor will talk about your risks of these problems based on your blood pressure. Your doctor will give you a goal for your blood pressure. Your goal will be based on your health and your age. Lifestyle changes, such as eating healthy and being active, are always important to help lower blood pressure. You might also take medicine to reach your blood pressure goal.  Follow-up care is a key part of your treatment and safety. Be sure to make and go to all appointments, and call your doctor if you are having problems. It's also a good idea to know your test results and keep a list of the medicines you take. How can you care for yourself at home? Medical treatment  · If you stop taking your medicine, your blood pressure will go back up. You may take one or more types of medicine to lower your blood pressure. Be safe with medicines. Take your medicine exactly as prescribed. Call your doctor if you think you are having a problem with your medicine. · Talk to your doctor before you start taking aspirin every day. Aspirin can help certain people lower their risk of a heart attack or stroke. But taking aspirin isn't right for everyone, because it can cause serious bleeding. · See your doctor regularly. You may need to see the doctor more often at first or until your blood pressure comes down. · If you are taking blood pressure medicine, talk to your doctor before you take decongestants or anti-inflammatory medicine, such as ibuprofen.  Some of these medicines can raise blood pressure. · Learn how to check your blood pressure at home. Lifestyle changes  · Stay at a healthy weight. This is especially important if you put on weight around the waist. Losing even 10 pounds can help you lower your blood pressure. · If your doctor recommends it, get more exercise. Walking is a good choice. Bit by bit, increase the amount you walk every day. Try for at least 30 minutes on most days of the week. You also may want to swim, bike, or do other activities. · Avoid or limit alcohol. Talk to your doctor about whether you can drink any alcohol. · Try to limit how much sodium you eat to less than 2,300 milligrams (mg) a day. Your doctor may ask you to try to eat less than 1,500 mg a day. · Eat plenty of fruits (such as bananas and oranges), vegetables, legumes, whole grains, and low-fat dairy products. · Lower the amount of saturated fat in your diet. Saturated fat is found in animal products such as milk, cheese, and meat. Limiting these foods may help you lose weight and also lower your risk for heart disease. · Do not smoke. Smoking increases your risk for heart attack and stroke. If you need help quitting, talk to your doctor about stop-smoking programs and medicines. These can increase your chances of quitting for good. When should you call for help? Call 911 anytime you think you may need emergency care. This may mean having symptoms that suggest that your blood pressure is causing a serious heart or blood vessel problem. Your blood pressure may be over 180/120. For example, call 911 if:    · You have symptoms of a heart attack. These may include:  ? Chest pain or pressure, or a strange feeling in the chest.  ? Sweating. ? Shortness of breath. ? Nausea or vomiting. ? Pain, pressure, or a strange feeling in the back, neck, jaw, or upper belly or in one or both shoulders or arms. ? Lightheadedness or sudden weakness. ? A fast or irregular heartbeat. · You have symptoms of a stroke. These may include:  ? Sudden numbness, tingling, weakness, or loss of movement in your face, arm, or leg, especially on only one side of your body. ? Sudden vision changes. ? Sudden trouble speaking. ? Sudden confusion or trouble understanding simple statements. ? Sudden problems with walking or balance. ? A sudden, severe headache that is different from past headaches. · You have severe back or belly pain. Do not wait until your blood pressure comes down on its own. Get help right away. Call your doctor now or seek immediate care if:    · Your blood pressure is much higher than normal (such as 180/120 or higher), but you don't have symptoms. · You think high blood pressure is causing symptoms, such as:  ? Severe headache.  ? Blurry vision. Watch closely for changes in your health, and be sure to contact your doctor if:    · Your blood pressure measures higher than your doctor recommends at least 2 times. That means the top number is higher or the bottom number is higher, or both. · You think you may be having side effects from your blood pressure medicine. Where can you learn more? Go to http://shekhar-pk.info/. Enter Y874 in the search box to learn more about \"High Blood Pressure: Care Instructions. \"  Current as of: July 22, 2018  Content Version: 12.1  © 6550-7879 [employer]. Care instructions adapted under license by [employer] (which disclaims liability or warranty for this information). If you have questions about a medical condition or this instruction, always ask your healthcare professional. [place] [address] any warranty or liability for your use of this information. High Blood Pressure: Care Instructions  Overview    It's normal for blood pressure to go up and down throughout the day. But if it stays up, you have high blood pressure.  Another name for high blood pressure is hypertension. Despite what a lot of people think, high blood pressure usually doesn't cause headaches or make you feel dizzy or lightheaded. It usually has no symptoms. But it does increase your risk of stroke, heart attack, and other problems. You and your doctor will talk about your risks of these problems based on your blood pressure. Your doctor will give you a goal for your blood pressure. Your goal will be based on your health and your age. Lifestyle changes, such as eating healthy and being active, are always important to help lower blood pressure. You might also take medicine to reach your blood pressure goal.  Follow-up care is a key part of your treatment and safety. Be sure to make and go to all appointments, and call your doctor if you are having problems. It's also a good idea to know your test results and keep a list of the medicines you take. How can you care for yourself at home? Medical treatment  · If you stop taking your medicine, your blood pressure will go back up. You may take one or more types of medicine to lower your blood pressure. Be safe with medicines. Take your medicine exactly as prescribed. Call your doctor if you think you are having a problem with your medicine. · Talk to your doctor before you start taking aspirin every day. Aspirin can help certain people lower their risk of a heart attack or stroke. But taking aspirin isn't right for everyone, because it can cause serious bleeding. · See your doctor regularly. You may need to see the doctor more often at first or until your blood pressure comes down. · If you are taking blood pressure medicine, talk to your doctor before you take decongestants or anti-inflammatory medicine, such as ibuprofen. Some of these medicines can raise blood pressure. · Learn how to check your blood pressure at home. Lifestyle changes  · Stay at a healthy weight.  This is especially important if you put on weight around the waist. Losing even 10 pounds can help you lower your blood pressure. · If your doctor recommends it, get more exercise. Walking is a good choice. Bit by bit, increase the amount you walk every day. Try for at least 30 minutes on most days of the week. You also may want to swim, bike, or do other activities. · Avoid or limit alcohol. Talk to your doctor about whether you can drink any alcohol. · Try to limit how much sodium you eat to less than 2,300 milligrams (mg) a day. Your doctor may ask you to try to eat less than 1,500 mg a day. · Eat plenty of fruits (such as bananas and oranges), vegetables, legumes, whole grains, and low-fat dairy products. · Lower the amount of saturated fat in your diet. Saturated fat is found in animal products such as milk, cheese, and meat. Limiting these foods may help you lose weight and also lower your risk for heart disease. · Do not smoke. Smoking increases your risk for heart attack and stroke. If you need help quitting, talk to your doctor about stop-smoking programs and medicines. These can increase your chances of quitting for good. When should you call for help? Call 911 anytime you think you may need emergency care. This may mean having symptoms that suggest that your blood pressure is causing a serious heart or blood vessel problem. Your blood pressure may be over 180/120. For example, call 911 if:    · You have symptoms of a heart attack. These may include:  ? Chest pain or pressure, or a strange feeling in the chest.  ? Sweating. ? Shortness of breath. ? Nausea or vomiting. ? Pain, pressure, or a strange feeling in the back, neck, jaw, or upper belly or in one or both shoulders or arms. ? Lightheadedness or sudden weakness. ? A fast or irregular heartbeat. · You have symptoms of a stroke. These may include:  ? Sudden numbness, tingling, weakness, or loss of movement in your face, arm, or leg, especially on only one side of your body. ? Sudden vision changes.   ? Sudden trouble speaking. ? Sudden confusion or trouble understanding simple statements. ? Sudden problems with walking or balance. ? A sudden, severe headache that is different from past headaches. · You have severe back or belly pain. Do not wait until your blood pressure comes down on its own. Get help right away. Call your doctor now or seek immediate care if:    · Your blood pressure is much higher than normal (such as 180/120 or higher), but you don't have symptoms. · You think high blood pressure is causing symptoms, such as:  ? Severe headache.  ? Blurry vision. Watch closely for changes in your health, and be sure to contact your doctor if:    · Your blood pressure measures higher than your doctor recommends at least 2 times. That means the top number is higher or the bottom number is higher, or both. · You think you may be having side effects from your blood pressure medicine. Where can you learn more? Go to http://shekhar-pk.info/. Enter Q230 in the search box to learn more about \"High Blood Pressure: Care Instructions. \"  Current as of: July 22, 2018  Content Version: 12.1  © 6567-2686 [employer]. Care instructions adapted under license by [employer] (which disclaims liability or warranty for this information). If you have questions about a medical condition or this instruction, always ask your healthcare professional. [address] any warranty or liability for your use of this information. High Blood Pressure: Care Instructions  Overview    It's normal for blood pressure to go up and down throughout the day. But if it stays up, you have high blood pressure. Another name for high blood pressure is hypertension. Despite what a lot of people think, high blood pressure usually doesn't cause headaches or make you feel dizzy or lightheaded. It usually has no symptoms.  But it does increase your risk of stroke, heart attack, and other problems. You and your doctor will talk about your risks of these problems based on your blood pressure. Your doctor will give you a goal for your blood pressure. Your goal will be based on your health and your age. Lifestyle changes, such as eating healthy and being active, are always important to help lower blood pressure. You might also take medicine to reach your blood pressure goal.  Follow-up care is a key part of your treatment and safety. Be sure to make and go to all appointments, and call your doctor if you are having problems. It's also a good idea to know your test results and keep a list of the medicines you take. How can you care for yourself at home? Medical treatment  · If you stop taking your medicine, your blood pressure will go back up. You may take one or more types of medicine to lower your blood pressure. Be safe with medicines. Take your medicine exactly as prescribed. Call your doctor if you think you are having a problem with your medicine. · Talk to your doctor before you start taking aspirin every day. Aspirin can help certain people lower their risk of a heart attack or stroke. But taking aspirin isn't right for everyone, because it can cause serious bleeding. · See your doctor regularly. You may need to see the doctor more often at first or until your blood pressure comes down. · If you are taking blood pressure medicine, talk to your doctor before you take decongestants or anti-inflammatory medicine, such as ibuprofen. Some of these medicines can raise blood pressure. · Learn how to check your blood pressure at home. Lifestyle changes  · Stay at a healthy weight. This is especially important if you put on weight around the waist. Losing even 10 pounds can help you lower your blood pressure. · If your doctor recommends it, get more exercise. Walking is a good choice. Bit by bit, increase the amount you walk every day.  Try for at least 30 minutes on most days of the week. You also may want to swim, bike, or do other activities. · Avoid or limit alcohol. Talk to your doctor about whether you can drink any alcohol. · Try to limit how much sodium you eat to less than 2,300 milligrams (mg) a day. Your doctor may ask you to try to eat less than 1,500 mg a day. · Eat plenty of fruits (such as bananas and oranges), vegetables, legumes, whole grains, and low-fat dairy products. · Lower the amount of saturated fat in your diet. Saturated fat is found in animal products such as milk, cheese, and meat. Limiting these foods may help you lose weight and also lower your risk for heart disease. · Do not smoke. Smoking increases your risk for heart attack and stroke. If you need help quitting, talk to your doctor about stop-smoking programs and medicines. These can increase your chances of quitting for good. When should you call for help? Call 911 anytime you think you may need emergency care. This may mean having symptoms that suggest that your blood pressure is causing a serious heart or blood vessel problem. Your blood pressure may be over 180/120. For example, call 911 if:    · You have symptoms of a heart attack. These may include:  ? Chest pain or pressure, or a strange feeling in the chest.  ? Sweating. ? Shortness of breath. ? Nausea or vomiting. ? Pain, pressure, or a strange feeling in the back, neck, jaw, or upper belly or in one or both shoulders or arms. ? Lightheadedness or sudden weakness. ? A fast or irregular heartbeat. · You have symptoms of a stroke. These may include:  ? Sudden numbness, tingling, weakness, or loss of movement in your face, arm, or leg, especially on only one side of your body. ? Sudden vision changes. ? Sudden trouble speaking. ? Sudden confusion or trouble understanding simple statements. ? Sudden problems with walking or balance. ? A sudden, severe headache that is different from past headaches.      · You have severe back or belly pain. Do not wait until your blood pressure comes down on its own. Get help right away. Call your doctor now or seek immediate care if:    · Your blood pressure is much higher than normal (such as 180/120 or higher), but you don't have symptoms. · You think high blood pressure is causing symptoms, such as:  ? Severe headache.  ? Blurry vision. Watch closely for changes in your health, and be sure to contact your doctor if:    · Your blood pressure measures higher than your doctor recommends at least 2 times. That means the top number is higher or the bottom number is higher, or both. · You think you may be having side effects from your blood pressure medicine. Where can you learn more? Go to http://shekhar-pk.info/. Enter V957 in the search box to learn more about \"High Blood Pressure: Care Instructions. \"  Current as of: July 22, 2018  Content Version: 12.1  © 5913-9140 [employer]. Care instructions adapted under license by [employer] (which disclaims liability or warranty for this information). If you have questions about a medical condition or this instruction, always ask your healthcare professional. [place] [address] any warranty or liability for your use of this information. Type 2 Diabetes: Care Instructions  Your Care Instructions    Type 2 diabetes is a disease that develops when the body's tissues cannot use insulin properly. Over time, the pancreas cannot make enough insulin. Insulin is a hormone that helps the body's cells use sugar (glucose) for energy. It also helps the body store extra sugar in muscle, fat, and liver cells. Without insulin, the sugar cannot get into the cells to do its work. It stays in the blood instead. This can cause high blood sugar levels. A person has diabetes when the blood sugar stays too high too much of the time.  Over time, diabetes can lead to diseases of the heart, blood vessels, nerves, kidneys, and eyes. You may be able to control your blood sugar by losing weight, eating a healthy diet, and getting daily exercise. You may also have to take insulin or other diabetes medicine. Follow-up care is a key part of your treatment and safety. Be sure to make and go to all appointments. Call your doctor if you are having problems. It's also a good idea to know your test results and keep a list of the medicines you take. How can you care for yourself at home? · Keep your blood sugar at a target level (which you set with your doctor). ? Eat a good diet that spreads carbohydrate throughout the day. Carbohydratethe body's main source of fuelaffects blood sugar more than any other nutrient. Carbohydrate is in fruits, vegetables, milk, and yogurt. It also is in breads, cereals, vegetables such as potatoes and corn, and sugary foods such as candy and cakes. ? Aim for 30 minutes of exercise on most, preferably all, days of the week. Walking is a good choice. You also may want to do other activities, such as running, swimming, cycling, or playing tennis or team sports. If your doctor says it's okay, do muscle-strengthening exercises at least 2 times a week. ? Take your medicines exactly as prescribed. Call your doctor if you think you are having a problem with your medicine. You will get more details on the specific medicines your doctor prescribes. · Check your blood sugar as often as your doctor recommends. It is important to keep track of any symptoms you have, such as low blood sugar. Also tell your doctor if you have any changes in your activities, diet, or insulin use. · Talk to your doctor before you start taking aspirin every day. Aspirin can help certain people lower their risk of a heart attack or stroke. But taking aspirin isn't right for everyone, because it can cause serious bleeding. · Do not smoke.  If you need help quitting, talk to your doctor about stop-smoking programs and medicines. These can increase your chances of quitting for good. · Keep your cholesterol and blood pressure at normal levels. You may need to take one or more medicines to reach your goals. Take them exactly as directed. Do not stop or change a medicine without talking to your doctor first.  When should you call for help? Call 911 anytime you think you may need emergency care. For example, call if:    · You passed out (lost consciousness), or you suddenly become very sleepy or confused. (You may have very low blood sugar.)    Call your doctor now or seek immediate medical care if:    · Your blood sugar is 300 mg/dL or is higher than the level your doctor has set for you. · You have symptoms of low blood sugar, such as:  ? Sweating. ? Feeling nervous, shaky, and weak. ? Extreme hunger and slight nausea. ? Dizziness and headache.  ? Blurred vision. ? Confusion. Watch closely for changes in your health, and be sure to contact your doctor if:    · You often have problems controlling your blood sugar. · You have symptoms of long-term diabetes problems, such as:  ? New vision changes. ? New pain, numbness, or tingling in your hands or feet. ? Skin problems. Where can you learn more? Go to http://shekhar-pk.info/. Enter C553 in the search box to learn more about \"Type 2 Diabetes: Care Instructions. \"  Current as of: July 25, 2018  Content Version: 12.1  © 7558-0928 [employer]. Care instructions adapted under license by [employer] (which disclaims liability or warranty for this information). If you have questions about a medical condition or this instruction, always ask your healthcare professional. [place] [address] any warranty or liability for your use of this information. High Cholesterol: Care Instructions  Your Care Instructions    Cholesterol is a type of fat in your blood.  It is needed for many body functions, such as making new cells. Cholesterol is made by your body. It also comes from food you eat. High cholesterol means that you have too much of the fat in your blood. This raises your risk of a heart attack and stroke. LDL and HDL are part of your total cholesterol. LDL is the \"bad\" cholesterol. High LDL can raise your risk for heart disease, heart attack, and stroke. HDL is the \"good\" cholesterol. It helps clear bad cholesterol from the body. High HDL is linked with a lower risk of heart disease, heart attack, and stroke. Your cholesterol levels help your doctor find out your risk for having a heart attack or stroke. You and your doctor can talk about whether you need to lower your risk and what treatment is best for you. A heart-healthy lifestyle along with medicines can help lower your cholesterol and your risk. The way you choose to lower your risk will depend on how high your risk is for heart attack and stroke. It will also depend on how you feel about taking medicines. Follow-up care is a key part of your treatment and safety. Be sure to make and go to all appointments, and call your doctor if you are having problems. It's also a good idea to know your test results and keep a list of the medicines you take. How can you care for yourself at home? · Eat a variety of foods every day. Good choices include fruits, vegetables, whole grains (like oatmeal), dried beans and peas, nuts and seeds, soy products (like tofu), and fat-free or low-fat dairy products. · Replace butter, margarine, and hydrogenated or partially hydrogenated oils with olive and canola oils. (Canola oil margarine without trans fat is fine.)  · Replace red meat with fish, poultry, and soy protein (like tofu). · Limit processed and packaged foods like chips, crackers, and cookies. · Bake, broil, or steam foods. Don't up them. · Be physically active. Get at least 30 minutes of exercise on most days of the week. Walking is a good choice.  You also may want to do other activities, such as running, swimming, cycling, or playing tennis or team sports. · Stay at a healthy weight or lose weight by making the changes in eating and physical activity listed above. Losing just a small amount of weight, even 5 to 10 pounds, can reduce your risk for having a heart attack or stroke. · Do not smoke. When should you call for help? Watch closely for changes in your health, and be sure to contact your doctor if:    · You need help making lifestyle changes. · You have questions about your medicine. Where can you learn more? Go to http://shekharConcentrapk.info/. Enter B931 in the search box to learn more about \"High Cholesterol: Care Instructions. \"  Current as of: July 22, 2018  Content Version: 12.1  © 3213-0204 [employer]. Care instructions adapted under license by [employer] (which disclaims liability or warranty for this information). If you have questions about a medical condition or this instruction, always ask your healthcare professional. [place] [address] any warranty or liability for your use of this information. Stopping Smoking: Care Instructions  Your Care Instructions  Cigarette smokers crave the nicotine in cigarettes. Giving it up is much harder than simply changing a habit. Your body has to stop craving the nicotine. It is hard to quit, but you can do it. There are many tools that people use to quit smoking. You may find that combining tools works best for you. There are several steps to quitting. First you get ready to quit. Then you get support to help you. After that, you learn new skills and behaviors to become a nonsmoker. For many people, a necessary step is getting and using medicine. Your doctor will help you set up the plan that best meets your needs. You may want to attend a smoking cessation program to help you quit smoking.  When you choose a program, look for one that has proven success. Ask your doctor for ideas. You will greatly increase your chances of success if you take medicine as well as get counseling or join a cessation program.  Some of the changes you feel when you first quit tobacco are uncomfortable. Your body will miss the nicotine at first, and you may feel short-tempered and grumpy. You may have trouble sleeping or concentrating. Medicine can help you deal with these symptoms. You may struggle with changing your smoking habits and rituals. The last step is the tricky one: Be prepared for the smoking urge to continue for a time. This is a lot to deal with, but keep at it. You will feel better. Follow-up care is a key part of your treatment and safety. Be sure to make and go to all appointments, and call your doctor if you are having problems. It's also a good idea to know your test results and keep a list of the medicines you take. How can you care for yourself at home? · Ask your family, friends, and coworkers for support. You have a better chance of quitting if you have help and support. · Join a support group, such as Nicotine Anonymous, for people who are trying to quit smoking. · Consider signing up for a smoking cessation program, such as the American Lung Association's Freedom from Smoking program.  · Get text messaging support. Go to the website at www.smokefree. gov to sign up for the [facility] program.  · Set a quit date. Pick your date carefully so that it is not right in the middle of a big deadline or stressful time. Once you quit, do not even take a puff. Get rid of all ashtrays and lighters after your last cigarette. Clean your house and your clothes so that they do not smell of smoke. · Learn how to be a nonsmoker. Think about ways you can avoid those things that make you reach for a cigarette. ? Avoid situations that put you at greatest risk for smoking. For some people, it is hard to have a drink with friends without smoking.  For others, they might skip a coffee break with coworkers who smoke. ? Change your daily routine. Take a different route to work or eat a meal in a different place. · Cut down on stress. Calm yourself or release tension by doing an activity you enjoy, such as reading a book, taking a hot bath, or gardening. · Talk to your doctor or pharmacist about nicotine replacement therapy, which replaces the nicotine in your body. You still get nicotine but you do not use tobacco. Nicotine replacement products help you slowly reduce the amount of nicotine you need. These products come in several forms, many of them available over-the-counter:  ? Nicotine patches  ? Nicotine gum and lozenges  ? Nicotine inhaler  · Ask your doctor about bupropion (Wellbutrin) or varenicline (Chantix), which are prescription medicines. They do not contain nicotine. They help you by reducing withdrawal symptoms, such as stress and anxiety. · Some people find hypnosis, acupuncture, and massage helpful for ending the smoking habit. · Eat a healthy diet and get regular exercise. Having healthy habits will help your body move past its craving for nicotine. · Be prepared to keep trying. Most people are not successful the first few times they try to quit. Do not get mad at yourself if you smoke again. Make a list of things you learned and think about when you want to try again, such as next week, next month, or next year. Where can you learn more? Go to http://shekhar-pk.info/. Enter N848 in the search box to learn more about \"Stopping Smoking: Care Instructions. \"  Current as of: September 26, 2018  Content Version: 12.1  © 8315-8774 [employer]. Care instructions adapted under license by [employer] (which disclaims liability or warranty for this information).  If you have questions about a medical condition or this instruction, always ask your healthcare professional. [address] any warranty or liability for your use of this information. Stopping Smoking: Care Instructions  Your Care Instructions  Cigarette smokers crave the nicotine in cigarettes. Giving it up is much harder than simply changing a habit. Your body has to stop craving the nicotine. It is hard to quit, but you can do it. There are many tools that people use to quit smoking. You may find that combining tools works best for you. There are several steps to quitting. First you get ready to quit. Then you get support to help you. After that, you learn new skills and behaviors to become a nonsmoker. For many people, a necessary step is getting and using medicine. Your doctor will help you set up the plan that best meets your needs. You may want to attend a smoking cessation program to help you quit smoking. When you choose a program, look for one that has proven success. Ask your doctor for ideas. You will greatly increase your chances of success if you take medicine as well as get counseling or join a cessation program.  Some of the changes you feel when you first quit tobacco are uncomfortable. Your body will miss the nicotine at first, and you may feel short-tempered and grumpy. You may have trouble sleeping or concentrating. Medicine can help you deal with these symptoms. You may struggle with changing your smoking habits and rituals. The last step is the tricky one: Be prepared for the smoking urge to continue for a time. This is a lot to deal with, but keep at it. You will feel better. Follow-up care is a key part of your treatment and safety. Be sure to make and go to all appointments, and call your doctor if you are having problems. It's also a good idea to know your test results and keep a list of the medicines you take. How can you care for yourself at home? · Ask your family, friends, and coworkers for support. You have a better chance of quitting if you have help and support.   · Join a support group, such as Nicotine Anonymous, for people who are trying to quit smoking. · Consider signing up for a smoking cessation program, such as the American Lung Association's Freedom from Smoking program.  · Get text messaging support. Go to the website at www.smokefree. gov to sign up for the [facility] program.  · Set a quit date. Pick your date carefully so that it is not right in the middle of a big deadline or stressful time. Once you quit, do not even take a puff. Get rid of all ashtrays and lighters after your last cigarette. Clean your house and your clothes so that they do not smell of smoke. · Learn how to be a nonsmoker. Think about ways you can avoid those things that make you reach for a cigarette. ? Avoid situations that put you at greatest risk for smoking. For some people, it is hard to have a drink with friends without smoking. For others, they might skip a coffee break with coworkers who smoke. ? Change your daily routine. Take a different route to work or eat a meal in a different place. · Cut down on stress. Calm yourself or release tension by doing an activity you enjoy, such as reading a book, taking a hot bath, or gardening. · Talk to your doctor or pharmacist about nicotine replacement therapy, which replaces the nicotine in your body. You still get nicotine but you do not use tobacco. Nicotine replacement products help you slowly reduce the amount of nicotine you need. These products come in several forms, many of them available over-the-counter:  ? Nicotine patches  ? Nicotine gum and lozenges  ? Nicotine inhaler  · Ask your doctor about bupropion (Wellbutrin) or varenicline (Chantix), which are prescription medicines. They do not contain nicotine. They help you by reducing withdrawal symptoms, such as stress and anxiety. · Some people find hypnosis, acupuncture, and massage helpful for ending the smoking habit. · Eat a healthy diet and get regular exercise.  Having healthy habits will help your body move past its craving for nicotine. · Be prepared to keep trying. Most people are not successful the first few times they try to quit. Do not get mad at yourself if you smoke again. Make a list of things you learned and think about when you want to try again, such as next week, next month, or next year. Where can you learn more? Go to http://shekhar-pk.info/. Enter W430 in the search box to learn more about \"Stopping Smoking: Care Instructions. \"  Current as of: September 26, 2018  Content Version: 12.1  © 2483-7573 [employer]. Care instructions adapted under license by [employer] (which disclaims liability or warranty for this information). If you have questions about a medical condition or this instruction, always ask your healthcare professional. [address] any warranty or liability for your use of this information.

## 2019-08-29 NOTE — PROGRESS NOTES
Chief Complaint   Patient presents with    Follow Up Chronic Condition     HTN DM Cholesterol Room 7    Knee Pain     Left knee pain

## 2019-08-29 NOTE — PROGRESS NOTES
08/31/19  8:11 AM  had concerns including Follow Up Chronic Condition (HTN DM Cholesterol Room 7) and Knee Pain (Left knee pain ). HISTORY OF PRESENT ILLNESS   This is a 63 y.o. male who presents in follow up. His problems include hypertension non-insulin-dependent diabetes mellitus hyperlipidemia and he also complains today of shortness of breath. The patient was last seen in May for uncontrolled diabetes type 2 diabetes mellitus hypertension and hyperlipidemia. Dyspnea  When he sits on the side of the bed he feels dyspneic. The sensation lasts for several minute. There are no associated chest pain pressure or palpitations. The dyspnea also happens after cleaning with bleach. He does not have any shortness of breath on exertion and there is no history of orthopnea or PND. He has no edema. He is due for a cardiology follow up in one month. The last ECHO showed Estimated left ventricular ejection fraction is 56 - 60%. Visually measured ejection fraction. Left ventricular mild concentric hypertrophy. No regional wall motion abnormality noted. He started smoking 1/3 ppd since age 16. He has no known history of lung disease. Hypertension  No problems with medication. The patient has no headache lightheadedness chest pain or pressure shortness of breath orthopnea edema nausea weakness or paresthesias. Non-insulin-dependent diabetes mellitus  The patient has had no problem with his medications although he is not quite sure what he is taking. Patient does not check his blood sugar at home. He denies any polyuria or polydipsia. No change in sensation is noted. He does not follow a specific diet. The patient does not check his blood sugars at home. Hyperlipidemia  She has had no problem with medication. The patient denies muscle aches, headache, GI symptoms or difficulty with mentation. Chronic idiopathic gout  The patient has had no recent attacks. The attacks are usually in the knees.   The patient states that nonsteroidal anti-inflammatories usually relieved his symptoms. Wellness  Colonoscopy age 49, and he states that he should follow-up every 10 years. Glaucoma, check every 3 months with the eye doctor. The patient is not sure about prostate screening. He requests this. There is no family history of prostate cancer. At this time he is not interested in immunizations. His last office visit was in February 2019 with another provider. He was seen for diabetes with neuropathy and nephropathy hyperlipidemia morbid obesity glaucoma and gout. His most recent low an A1c that I can see in the chart was 12.5 in September 2018. No Known Allergies    Active Ambulatory Problems     Diagnosis Date Noted    Hyperlipidemia 06/07/2017    Morbid obesity ([address] 06/07/2017    DM type 2 with diabetic peripheral neuropathy ([address] 01/01/2018    Type 2 diabetes with nephropathy ([address] 03/05/2018    Primary open angle glaucoma     Chronic idiopathic gout involving toe of right foot without tophus 06/04/2018    Diabetic polyneuropathy associated with type 2 diabetes mellitus ([address] 06/04/2018     Resolved Ambulatory Problems     Diagnosis Date Noted    Pneumonia 03/30/2014    Controlled type 2 diabetes mellitus without complication, without long-term current use of insulin ([address] 06/07/2017     Past Medical History:   Diagnosis Date    Diabetes ([address]     Hypertensive retinopathy     Sickle cell trait ([address]        Review of Systems   Constitutional: Negative for chills and fever. HENT: Negative for ear pain, hearing loss and tinnitus. Eyes: Negative for blurred vision and double vision. Respiratory: Negative for shortness of breath (This occurs at rest with no chest pain pressure or palpitations. ). Cardiovascular: Negative for chest pain, palpitations, orthopnea and leg swelling. Gastrointestinal: Negative for nausea and vomiting.    Musculoskeletal: Positive for back pain (Disabled because of back pain) and joint pain (Knees Motrin taken periodically). Neurological: Negative for tingling, focal weakness and headaches. Psychiatric/Behavioral: The patient is not nervous/anxious and does not have insomnia. Results for orders placed or performed during the hospital encounter of 05/30/19   CBC WITH AUTOMATED DIFF   Result Value Ref Range    WBC 8.0 4.6 - 13.2 K/uL    RBC 4.84 4.70 - 5.50 M/uL    HGB 13.5 13.0 - 16.0 g/dL    HCT 39.9 36.0 - 48.0 %    MCV 82.4 74.0 - 97.0 FL    MCH 27.9 24.0 - 34.0 PG    MCHC 33.8 31.0 - 37.0 g/dL    RDW 14.9 (H) 11.6 - 14.5 %    PLATELET 725 003 - 847 K/uL    MPV 12.7 (H) 9.2 - 11.8 FL    NEUTROPHILS 58 40 - 73 %    LYMPHOCYTES 36 21 - 52 %    MONOCYTES 5 3 - 10 %    EOSINOPHILS 1 0 - 5 %    BASOPHILS 0 0 - 2 %    ABS. NEUTROPHILS 4.6 1.8 - 8.0 K/UL    ABS. LYMPHOCYTES 2.9 0.9 - 3.6 K/UL    ABS. MONOCYTES 0.4 0.05 - 1.2 K/UL    ABS. EOSINOPHILS 0.1 0.0 - 0.4 K/UL    ABS. BASOPHILS 0.0 0.0 - 0.1 K/UL    DF AUTOMATED     METABOLIC PANEL, BASIC   Result Value Ref Range    Sodium 136 136 - 145 mmol/L    Potassium 4.0 3.5 - 5.5 mmol/L    Chloride 104 100 - 108 mmol/L    CO2 23 21 - 32 mmol/L    Anion gap 9 3.0 - 18 mmol/L    Glucose 328 (H) 74 - 99 mg/dL    BUN 7 7.0 - 18 MG/DL    Creatinine 0.91 0.6 - 1.3 MG/DL    BUN/Creatinine ratio 8 (L) 12 - 20      GFR est AA >60 >60 ml/min/1.73m2    GFR est non-AA >60 >60 ml/min/1.73m2    Calcium 8.8 8.5 - 10.1 MG/DL   LIPID PANEL   Result Value Ref Range    LIPID PROFILE          Cholesterol, total 167 <200 MG/DL    Triglyceride 214 (H) <150 MG/DL    HDL Cholesterol 54 40 - 60 MG/DL    LDL, calculated 70.2 0 - 100 MG/DL    VLDL, calculated 42.8 MG/DL    CHOL/HDL Ratio 3.1 0 - 5.0         Visit Vitals  /76   Pulse 67   Temp 97.8 °F (36.6 °C) (Oral)   Resp 16   Ht 5' 2\" (1.575 m)   Wt 263 lb (119.3 kg)   SpO2 95%   BMI 48.10 kg/m²       Physical Exam   Constitutional: He is oriented to person, place, and time.  He appears well-developed and well-nourished. No distress. Body mass index is 46.79 kg/m². HENT:   Head: Normocephalic. Right Ear: External ear normal.   Left Ear: External ear normal.   Mouth/Throat: Oropharynx is clear and moist.   Tympani wnl   Eyes: Pupils are equal, round, and reactive to light. EOM are normal.   Neck: Neck supple. No thyromegaly present. Carotid bruit absent   Cardiovascular: Normal rate, regular rhythm and intact distal pulses. Exam reveals no gallop and no friction rub. No murmur heard. Pulmonary/Chest: Effort normal and breath sounds normal. No respiratory distress. Abdominal: Soft. Bowel sounds are normal. He exhibits no distension. Genitourinary: Rectum normal, prostate normal and penis normal.   Musculoskeletal: He exhibits no edema. Lymphadenopathy:     He has no cervical adenopathy. Neurological: He is alert and oriented to person, place, and time. Skin: Skin is warm and dry. Psychiatric: He has a normal mood and affect. His behavior is normal. Judgment and thought content normal.   Nursing note and vitals reviewed. MDM  Number of Diagnoses or Management Options  Current smoker: established, improving  Dyspnea, unspecified type: new, needed workup  HTN, goal below 130/80: established, improving  Hyperlipidemia, unspecified hyperlipidemia type: Morbid obesity ([address]): established, worsening  Uncontrolled type 2 diabetes mellitus without complication, without long-term current use of insulin ([address]): established, worsening  Diagnosis management comments: We want to increase his metformin from 500 mg twice a day to 1000 mg twice a day. He should double his present medication which is 500 twice a day and take 2 twice a day when that prescription runs out I have called and Metformin 1000 mg to be taken twice a day which would be 1 pill on the next prescription. The patient has been advised to stop smoking and literature was given to him.   He is not interested in using medication or patches at this time       Amount and/or Complexity of Data Reviewed  Clinical lab tests: ordered        ASSESSMENT and PLAN    ICD-10-CM ICD-9-CM    1. HTN, goal below 130/80 I10 401.9 CBC WITH AUTOMATED DIFF    Fairly good control for him continue present medications. 2. Uncontrolled type 2 diabetes mellitus without complication, without long-term current use of insulin (HCC) E11.65 250.02 CBC WITH AUTOMATED DIFF      METABOLIC PANEL, BASIC      HEMOGLOBIN A1C WITH EAG    Not in good control at this time recheck hemoglobin A1c post last visits adjustments. 3. Hyperlipidemia, unspecified hyperlipidemia type E78.5 272.4 LIPID PANEL    Follow-up labs ordered. 4. Morbid obesity ([facility] [address] E66.01 278.01     Diabetic diet given. We will work on his blood sugar. 5. Current smoker F17.200 305.1     Advised to stop, literature given. 6. Dyspnea, unspecified type R06.00 786.09 PFT COMPLETE    Risk factors include obesity, history of cigarette smoking since teenage years. Plan pulmonary function tests     Follow-up and Dispositions    · Return in about 3 months (around 11/29/2019).        lab results and schedule of future lab studies reviewed with patient  reviewed diet, exercise and weight control

## 2019-08-30 LAB
CHOLEST SERPL-MCNC: 181 MG/DL
HDLC SERPL-MCNC: 50 MG/DL (ref 40–60)
HDLC SERPL: 3.6 {RATIO} (ref 0–5)
LDLC SERPL CALC-MCNC: 72.4 MG/DL (ref 0–100)
LIPID PROFILE,FLP: ABNORMAL
TRIGL SERPL-MCNC: 293 MG/DL (ref ?–150)
VLDLC SERPL CALC-MCNC: 58.6 MG/DL

## 2019-09-16 ENCOUNTER — HOSPITAL ENCOUNTER (OUTPATIENT)
Dept: RESPIRATORY THERAPY | Age: 59
Discharge: HOME | End: 2019-09-16
Attending: EMERGENCY MEDICINE
Payer: MEDICAID

## 2019-09-16 DIAGNOSIS — R06.00 DYSPNEA, UNSPECIFIED TYPE: ICD-10-CM

## 2019-09-16 PROCEDURE — 94060 EVALUATION OF WHEEZING: CPT

## 2019-09-16 PROCEDURE — 94726 PLETHYSMOGRAPHY LUNG VOLUMES: CPT

## 2019-09-16 PROCEDURE — 94729 DIFFUSING CAPACITY: CPT

## 2019-11-26 ENCOUNTER — OFFICE VISIT (OUTPATIENT)
Dept: FAMILY MEDICINE CLINIC | Facility: CLINIC | Age: 59
End: 2019-11-26

## 2019-11-26 VITALS
OXYGEN SATURATION: 95 % | HEART RATE: 63 BPM | TEMPERATURE: 98 F | BODY MASS INDEX: 47.11 KG/M2 | DIASTOLIC BLOOD PRESSURE: 66 MMHG | HEIGHT: 62 IN | RESPIRATION RATE: 12 BRPM | SYSTOLIC BLOOD PRESSURE: 124 MMHG | WEIGHT: 256 LBS

## 2019-11-26 DIAGNOSIS — I10 HTN, GOAL BELOW 130/80: Primary | ICD-10-CM

## 2019-11-26 DIAGNOSIS — M25.562 LEFT KNEE PAIN, UNSPECIFIED CHRONICITY: ICD-10-CM

## 2019-11-26 DIAGNOSIS — R06.00 DYSPNEA, UNSPECIFIED TYPE: ICD-10-CM

## 2019-11-26 DIAGNOSIS — E78.5 HYPERLIPIDEMIA, UNSPECIFIED HYPERLIPIDEMIA TYPE: ICD-10-CM

## 2019-11-26 DIAGNOSIS — Z23 ENCOUNTER FOR IMMUNIZATION: ICD-10-CM

## 2019-11-26 DIAGNOSIS — E66.01 MORBID OBESITY (HCC): ICD-10-CM

## 2019-11-26 RX ORDER — METFORMIN HYDROCHLORIDE 750 MG/1
750 TABLET, EXTENDED RELEASE ORAL
Qty: 30 TAB | Refills: 6 | Status: SHIPPED | OUTPATIENT
Start: 2019-11-26 | End: 2021-04-11 | Stop reason: ALTCHOICE

## 2019-11-26 RX ORDER — IBUPROFEN 800 MG/1
800 TABLET ORAL
Qty: 90 TAB | Refills: 6 | Status: SHIPPED | OUTPATIENT
Start: 2019-11-26 | End: 2020-05-26 | Stop reason: ALTCHOICE

## 2019-11-26 NOTE — PATIENT INSTRUCTIONS
High Blood Pressure: Care Instructions Overview It's normal for blood pressure to go up and down throughout the day. But if it stays up, you have high blood pressure. Another name for high blood pressure is hypertension. Despite what a lot of people think, high blood pressure usually doesn't cause headaches or make you feel dizzy or lightheaded. It usually has no symptoms. But it does increase your risk of stroke, heart attack, and other problems. You and your doctor will talk about your risks of these problems based on your blood pressure. Your doctor will give you a goal for your blood pressure. Your goal will be based on your health and your age. Lifestyle changes, such as eating healthy and being active, are always important to help lower blood pressure. You might also take medicine to reach your blood pressure goal. 
Follow-up care is a key part of your treatment and safety. Be sure to make and go to all appointments, and call your doctor if you are having problems. It's also a good idea to know your test results and keep a list of the medicines you take. How can you care for yourself at home? Medical treatment · If you stop taking your medicine, your blood pressure will go back up. You may take one or more types of medicine to lower your blood pressure. Be safe with medicines. Take your medicine exactly as prescribed. Call your doctor if you think you are having a problem with your medicine. · Talk to your doctor before you start taking aspirin every day. Aspirin can help certain people lower their risk of a heart attack or stroke. But taking aspirin isn't right for everyone, because it can cause serious bleeding. · See your doctor regularly. You may need to see the doctor more often at first or until your blood pressure comes down. · If you are taking blood pressure medicine, talk to your doctor before you take decongestants or anti-inflammatory medicine, such as ibuprofen. Some of these medicines can raise blood pressure. · Learn how to check your blood pressure at home. Lifestyle changes · Stay at a healthy weight. This is especially important if you put on weight around the waist. Losing even 10 pounds can help you lower your blood pressure. · If your doctor recommends it, get more exercise. Walking is a good choice. Bit by bit, increase the amount you walk every day. Try for at least 30 minutes on most days of the week. You also may want to swim, bike, or do other activities. · Avoid or limit alcohol. Talk to your doctor about whether you can drink any alcohol. · Try to limit how much sodium you eat to less than 2,300 milligrams (mg) a day. Your doctor may ask you to try to eat less than 1,500 mg a day. · Eat plenty of fruits (such as bananas and oranges), vegetables, legumes, whole grains, and low-fat dairy products. · Lower the amount of saturated fat in your diet. Saturated fat is found in animal products such as milk, cheese, and meat. Limiting these foods may help you lose weight and also lower your risk for heart disease. · Do not smoke. Smoking increases your risk for heart attack and stroke. If you need help quitting, talk to your doctor about stop-smoking programs and medicines. These can increase your chances of quitting for good. When should you call for help? Call 911 anytime you think you may need emergency care. This may mean having symptoms that suggest that your blood pressure is causing a serious heart or blood vessel problem. Your blood pressure may be over 180/120. For example, call 911 if: 
  · You have symptoms of a heart attack. These may include: 
? Chest pain or pressure, or a strange feeling in the chest. 
? Sweating. ? Shortness of breath. ? Nausea or vomiting. ? Pain, pressure, or a strange feeling in the back, neck, jaw, or upper belly or in one or both shoulders or arms. ? Lightheadedness or sudden weakness. ? A fast or irregular heartbeat. · You have symptoms of a stroke. These may include: 
? Sudden numbness, tingling, weakness, or loss of movement in your face, arm, or leg, especially on only one side of your body. ? Sudden vision changes. ? Sudden trouble speaking. ? Sudden confusion or trouble understanding simple statements. ? Sudden problems with walking or balance. ? A sudden, severe headache that is different from past headaches. · You have severe back or belly pain. Do not wait until your blood pressure comes down on its own. Get help right away. Call your doctor now or seek immediate care if: 
  · Your blood pressure is much higher than normal (such as 180/120 or higher), but you don't have symptoms. · You think high blood pressure is causing symptoms, such as: 
? Severe headache. 
? Blurry vision. Watch closely for changes in your health, and be sure to contact your doctor if: 
  · Your blood pressure measures higher than your doctor recommends at least 2 times. That means the top number is higher or the bottom number is higher, or both. · You think you may be having side effects from your blood pressure medicine. Where can you learn more? Go to http://shekhar-pk.info/. Enter N653 in the search box to learn more about \"High Blood Pressure: Care Instructions. \" Current as of: July 22, 2018 Content Version: 12.1 © 3086-8964 Healthwise, Incorporated. Care instructions adapted under license by [employer] (which disclaims liability or warranty for this information). If you have questions about a medical condition or this instruction, always ask your healthcare professional. [place] [address] any warranty or liability for your use of this information. High Blood Pressure: Care Instructions Overview It's normal for blood pressure to go up and down throughout the day.  But if it stays up, you have high blood pressure. Another name for high blood pressure is hypertension. Despite what a lot of people think, high blood pressure usually doesn't cause headaches or make you feel dizzy or lightheaded. It usually has no symptoms. But it does increase your risk of stroke, heart attack, and other problems. You and your doctor will talk about your risks of these problems based on your blood pressure. Your doctor will give you a goal for your blood pressure. Your goal will be based on your health and your age. Lifestyle changes, such as eating healthy and being active, are always important to help lower blood pressure. You might also take medicine to reach your blood pressure goal. 
Follow-up care is a key part of your treatment and safety. Be sure to make and go to all appointments, and call your doctor if you are having problems. It's also a good idea to know your test results and keep a list of the medicines you take. How can you care for yourself at home? Medical treatment · If you stop taking your medicine, your blood pressure will go back up. You may take one or more types of medicine to lower your blood pressure. Be safe with medicines. Take your medicine exactly as prescribed. Call your doctor if you think you are having a problem with your medicine. · Talk to your doctor before you start taking aspirin every day. Aspirin can help certain people lower their risk of a heart attack or stroke. But taking aspirin isn't right for everyone, because it can cause serious bleeding. · See your doctor regularly. You may need to see the doctor more often at first or until your blood pressure comes down. · If you are taking blood pressure medicine, talk to your doctor before you take decongestants or anti-inflammatory medicine, such as ibuprofen. Some of these medicines can raise blood pressure. · Learn how to check your blood pressure at home. Lifestyle changes · Stay at a healthy weight. This is especially important if you put on weight around the waist. Losing even 10 pounds can help you lower your blood pressure. · If your doctor recommends it, get more exercise. Walking is a good choice. Bit by bit, increase the amount you walk every day. Try for at least 30 minutes on most days of the week. You also may want to swim, bike, or do other activities. · Avoid or limit alcohol. Talk to your doctor about whether you can drink any alcohol. · Try to limit how much sodium you eat to less than 2,300 milligrams (mg) a day. Your doctor may ask you to try to eat less than 1,500 mg a day. · Eat plenty of fruits (such as bananas and oranges), vegetables, legumes, whole grains, and low-fat dairy products. · Lower the amount of saturated fat in your diet. Saturated fat is found in animal products such as milk, cheese, and meat. Limiting these foods may help you lose weight and also lower your risk for heart disease. · Do not smoke. Smoking increases your risk for heart attack and stroke. If you need help quitting, talk to your doctor about stop-smoking programs and medicines. These can increase your chances of quitting for good. When should you call for help? Call 911 anytime you think you may need emergency care. This may mean having symptoms that suggest that your blood pressure is causing a serious heart or blood vessel problem. Your blood pressure may be over 180/120. For example, call 911 if: 
  · You have symptoms of a heart attack. These may include: 
? Chest pain or pressure, or a strange feeling in the chest. 
? Sweating. ? Shortness of breath. ? Nausea or vomiting. ? Pain, pressure, or a strange feeling in the back, neck, jaw, or upper belly or in one or both shoulders or arms. ? Lightheadedness or sudden weakness. ? A fast or irregular heartbeat. · You have symptoms of a stroke. These may include: ? Sudden numbness, tingling, weakness, or loss of movement in your face, arm, or leg, especially on only one side of your body. ? Sudden vision changes. ? Sudden trouble speaking. ? Sudden confusion or trouble understanding simple statements. ? Sudden problems with walking or balance. ? A sudden, severe headache that is different from past headaches. · You have severe back or belly pain. Do not wait until your blood pressure comes down on its own. Get help right away. Call your doctor now or seek immediate care if: 
  · Your blood pressure is much higher than normal (such as 180/120 or higher), but you don't have symptoms. · You think high blood pressure is causing symptoms, such as: 
? Severe headache. 
? Blurry vision. Watch closely for changes in your health, and be sure to contact your doctor if: 
  · Your blood pressure measures higher than your doctor recommends at least 2 times. That means the top number is higher or the bottom number is higher, or both. · You think you may be having side effects from your blood pressure medicine. Where can you learn more? Go to http://shekhar-pk.info/. Enter G401 in the search box to learn more about \"High Blood Pressure: Care Instructions. \" Current as of: July 22, 2018 Content Version: 12.1 © 7690-4977 Healthwise, Incorporated. Care instructions adapted under license by [employer] (which disclaims liability or warranty for this information). If you have questions about a medical condition or this instruction, always ask your healthcare professional. [place] [address] any warranty or liability for your use of this information. High Blood Pressure: Care Instructions Overview It's normal for blood pressure to go up and down throughout the day. But if it stays up, you have high blood pressure. Another name for high blood pressure is hypertension. Despite what a lot of people think, high blood pressure usually doesn't cause headaches or make you feel dizzy or lightheaded. It usually has no symptoms. But it does increase your risk of stroke, heart attack, and other problems. You and your doctor will talk about your risks of these problems based on your blood pressure. Your doctor will give you a goal for your blood pressure. Your goal will be based on your health and your age. Lifestyle changes, such as eating healthy and being active, are always important to help lower blood pressure. You might also take medicine to reach your blood pressure goal. 
Follow-up care is a key part of your treatment and safety. Be sure to make and go to all appointments, and call your doctor if you are having problems. It's also a good idea to know your test results and keep a list of the medicines you take. How can you care for yourself at home? Medical treatment · If you stop taking your medicine, your blood pressure will go back up. You may take one or more types of medicine to lower your blood pressure. Be safe with medicines. Take your medicine exactly as prescribed. Call your doctor if you think you are having a problem with your medicine. · Talk to your doctor before you start taking aspirin every day. Aspirin can help certain people lower their risk of a heart attack or stroke. But taking aspirin isn't right for everyone, because it can cause serious bleeding. · See your doctor regularly. You may need to see the doctor more often at first or until your blood pressure comes down. · If you are taking blood pressure medicine, talk to your doctor before you take decongestants or anti-inflammatory medicine, such as ibuprofen. Some of these medicines can raise blood pressure. · Learn how to check your blood pressure at home. Lifestyle changes · Stay at a healthy weight.  This is especially important if you put on weight around the waist. Losing even 10 pounds can help you lower your blood pressure. · If your doctor recommends it, get more exercise. Walking is a good choice. Bit by bit, increase the amount you walk every day. Try for at least 30 minutes on most days of the week. You also may want to swim, bike, or do other activities. · Avoid or limit alcohol. Talk to your doctor about whether you can drink any alcohol. · Try to limit how much sodium you eat to less than 2,300 milligrams (mg) a day. Your doctor may ask you to try to eat less than 1,500 mg a day. · Eat plenty of fruits (such as bananas and oranges), vegetables, legumes, whole grains, and low-fat dairy products. · Lower the amount of saturated fat in your diet. Saturated fat is found in animal products such as milk, cheese, and meat. Limiting these foods may help you lose weight and also lower your risk for heart disease. · Do not smoke. Smoking increases your risk for heart attack and stroke. If you need help quitting, talk to your doctor about stop-smoking programs and medicines. These can increase your chances of quitting for good. When should you call for help? Call 911 anytime you think you may need emergency care. This may mean having symptoms that suggest that your blood pressure is causing a serious heart or blood vessel problem. Your blood pressure may be over 180/120. For example, call 911 if: 
  · You have symptoms of a heart attack. These may include: 
? Chest pain or pressure, or a strange feeling in the chest. 
? Sweating. ? Shortness of breath. ? Nausea or vomiting. ? Pain, pressure, or a strange feeling in the back, neck, jaw, or upper belly or in one or both shoulders or arms. ? Lightheadedness or sudden weakness. ? A fast or irregular heartbeat. · You have symptoms of a stroke. These may include: 
? Sudden numbness, tingling, weakness, or loss of movement in your face, arm, or leg, especially on only one side of your body. ? Sudden vision changes. ? Sudden trouble speaking. ? Sudden confusion or trouble understanding simple statements. ? Sudden problems with walking or balance. ? A sudden, severe headache that is different from past headaches. · You have severe back or belly pain. Do not wait until your blood pressure comes down on its own. Get help right away. Call your doctor now or seek immediate care if: 
  · Your blood pressure is much higher than normal (such as 180/120 or higher), but you don't have symptoms. · You think high blood pressure is causing symptoms, such as: 
? Severe headache. 
? Blurry vision. Watch closely for changes in your health, and be sure to contact your doctor if: 
  · Your blood pressure measures higher than your doctor recommends at least 2 times. That means the top number is higher or the bottom number is higher, or both. · You think you may be having side effects from your blood pressure medicine. Where can you learn more? Go to http://shekhar-pk.info/. Enter V579 in the search box to learn more about \"High Blood Pressure: Care Instructions. \" Current as of: July 22, 2018 Content Version: 12.1 © 5479-5878 [employer] Care instructions adapted under license by [employer] (which disclaims liability or warranty for this information). If you have questions about a medical condition or this instruction, always ask your healthcare professional. [place] [address] any warranty or liability for your use of this information. Type 2 Diabetes: Care Instructions Your Care Instructions Type 2 diabetes is a disease that develops when the body's tissues cannot use insulin properly. Over time, the pancreas cannot make enough insulin. Insulin is a hormone that helps the body's cells use sugar (glucose) for energy. It also helps the body store extra sugar in muscle, fat, and liver cells. Without insulin, the sugar cannot get into the cells to do its work. It stays in the blood instead. This can cause high blood sugar levels. A person has diabetes when the blood sugar stays too high too much of the time. Over time, diabetes can lead to diseases of the heart, blood vessels, nerves, kidneys, and eyes. You may be able to control your blood sugar by losing weight, eating a healthy diet, and getting daily exercise. You may also have to take insulin or other diabetes medicine. Follow-up care is a key part of your treatment and safety. Be sure to make and go to all appointments. Call your doctor if you are having problems. It's also a good idea to know your test results and keep a list of the medicines you take. How can you care for yourself at home? · Keep your blood sugar at a target level (which you set with your doctor). ? Eat a good diet that spreads carbohydrate throughout the day. Carbohydratethe body's main source of fuelaffects blood sugar more than any other nutrient. Carbohydrate is in fruits, vegetables, milk, and yogurt. It also is in breads, cereals, vegetables such as potatoes and corn, and sugary foods such as candy and cakes. ? Aim for 30 minutes of exercise on most, preferably all, days of the week. Walking is a good choice. You also may want to do other activities, such as running, swimming, cycling, or playing tennis or team sports. If your doctor says it's okay, do muscle-strengthening exercises at least 2 times a week. ? Take your medicines exactly as prescribed. Call your doctor if you think you are having a problem with your medicine. You will get more details on the specific medicines your doctor prescribes. · Check your blood sugar as often as your doctor recommends. It is important to keep track of any symptoms you have, such as low blood sugar. Also tell your doctor if you have any changes in your activities, diet, or insulin use. · Talk to your doctor before you start taking aspirin every day. Aspirin can help certain people lower their risk of a heart attack or stroke. But taking aspirin isn't right for everyone, because it can cause serious bleeding. · Do not smoke. If you need help quitting, talk to your doctor about stop-smoking programs and medicines. These can increase your chances of quitting for good. · Keep your cholesterol and blood pressure at normal levels. You may need to take one or more medicines to reach your goals. Take them exactly as directed. Do not stop or change a medicine without talking to your doctor first. 
When should you call for help? Call 911 anytime you think you may need emergency care. For example, call if: 
  · You passed out (lost consciousness), or you suddenly become very sleepy or confused. (You may have very low blood sugar.) Call your doctor now or seek immediate medical care if: 
  · Your blood sugar is 300 mg/dL or is higher than the level your doctor has set for you. · You have symptoms of low blood sugar, such as: ? Sweating. ? Feeling nervous, shaky, and weak. ? Extreme hunger and slight nausea. ? Dizziness and headache. 
? Blurred vision. ? Confusion. Watch closely for changes in your health, and be sure to contact your doctor if: 
  · You often have problems controlling your blood sugar. · You have symptoms of long-term diabetes problems, such as: ? New vision changes. ? New pain, numbness, or tingling in your hands or feet. ? Skin problems. Where can you learn more? Go to http://shekhar-pk.info/. Enter C553 in the search box to learn more about \"Type 2 Diabetes: Care Instructions. \" Current as of: July 25, 2018 Content Version: 12.1 © 7239-3362 Healthwise, Incorporated.  Care instructions adapted under license by [employer] (which disclaims liability or warranty for this information). If you have questions about a medical condition or this instruction, always ask your healthcare professional. [address] any warranty or liability for your use of this information. High Cholesterol: Care Instructions Your Care Instructions Cholesterol is a type of fat in your blood. It is needed for many body functions, such as making new cells. Cholesterol is made by your body. It also comes from food you eat. High cholesterol means that you have too much of the fat in your blood. This raises your risk of a heart attack and stroke. LDL and HDL are part of your total cholesterol. LDL is the \"bad\" cholesterol. High LDL can raise your risk for heart disease, heart attack, and stroke. HDL is the \"good\" cholesterol. It helps clear bad cholesterol from the body. High HDL is linked with a lower risk of heart disease, heart attack, and stroke. Your cholesterol levels help your doctor find out your risk for having a heart attack or stroke. You and your doctor can talk about whether you need to lower your risk and what treatment is best for you. A heart-healthy lifestyle along with medicines can help lower your cholesterol and your risk. The way you choose to lower your risk will depend on how high your risk is for heart attack and stroke. It will also depend on how you feel about taking medicines. Follow-up care is a key part of your treatment and safety. Be sure to make and go to all appointments, and call your doctor if you are having problems. It's also a good idea to know your test results and keep a list of the medicines you take. How can you care for yourself at home? · Eat a variety of foods every day. Good choices include fruits, vegetables, whole grains (like oatmeal), dried beans and peas, nuts and seeds, soy products (like tofu), and fat-free or low-fat dairy products.  
· Replace butter, margarine, and hydrogenated or partially hydrogenated oils with olive and canola oils. (Canola oil margarine without trans fat is fine.) · Replace red meat with fish, poultry, and soy protein (like tofu). · Limit processed and packaged foods like chips, crackers, and cookies. · Bake, broil, or steam foods. Don't up them. · Be physically active. Get at least 30 minutes of exercise on most days of the week. Walking is a good choice. You also may want to do other activities, such as running, swimming, cycling, or playing tennis or team sports. · Stay at a healthy weight or lose weight by making the changes in eating and physical activity listed above. Losing just a small amount of weight, even 5 to 10 pounds, can reduce your risk for having a heart attack or stroke. · Do not smoke. When should you call for help? Watch closely for changes in your health, and be sure to contact your doctor if: 
  · You need help making lifestyle changes. · You have questions about your medicine. Where can you learn more? Go to http://shekhar-pk.info/. Enter C984 in the search box to learn more about \"High Cholesterol: Care Instructions. \" Current as of: July 22, 2018 Content Version: 12.1 © 1888-4179 Healthwise, Incorporated. Care instructions adapted under license by [employer] (which disclaims liability or warranty for this information). If you have questions about a medical condition or this instruction, always ask your healthcare professional. [address] any warranty or liability for your use of this information. Learning About Diabetes and Exercise Can you exercise if you have diabetes? When you have diabetes, it's important to get regular exercise. This helps control your blood sugar level. You can still play sports, run, ride a bike, go swimming, and do other activities when you have diabetes. How can exercise help you manage diabetes? Your body turns the food you eat into glucose, a type of sugar. You need this sugar for energy. When you have diabetes, the sugar builds up in your blood. But when you exercise, your body uses sugar. This helps keep it from building up in your blood and results in lower blood sugar and better control of diabetes. Exercise may help you in other ways too. It can help you reach and stay at a healthy weight. It also helps improve blood pressure and cholesterol, which can reduce the risk of heart disease. Exercise can make you feel stronger and happier. It can help you relax and sleep better, and give you confidence in other things you do. How can you exercise safely? Before you start a new exercise program, talk to your doctor about how and when to exercise. You may need to have a medical exam and tests before you begin. Some types of exercise can be harmful if your diabetes is causing other problems, such as problems with your feet. Your doctor can tell you what types of exercise are good choices for you. These tips can help you exercise safely when you have diabetes. If your diabetes is controlled by diet or medicine that doesn't lower your blood sugar, you don't need to eat a snack before you exercise. · Check your blood sugar before you exercise. And be careful about what you eat. ? If your blood sugar is less than 100, eat a carbohydrate snack before you exercise. ? Be careful when you exercise if your blood sugar is over 300. High blood sugar can make you dehydrated. And that makes your blood sugar levels go even higher. If you have ketones in your blood or urine and your blood sugar is over 300, do not exercise. · Don't try to do too much at first. Build up your exercise program bit by bit. Try to get at least 30 minutes of exercise on most days of the week. Walking is a good choice. You also may want to do other activities, such as riding a bike or swimming. You might try running or gardening.  Try to include muscle-strengthening exercises at least 2 times a week. These exercises include push-ups and weight training. You can also use rubber tubing or stretch bands. You stretch or pull the tubing or band to build muscle strength. If you want to exercise more, slowly increase how hard or long you exercise. · You may get symptoms of low blood sugar during exercise or up to 24 hours later. Some symptoms of low blood sugar, such as sweating, a fast heartbeat, or feeling tired, can be confused with what can happen anytime you exercise. Other symptoms may include feeling anxious, dizzy, weak, or shaky. So it's a good idea to check your blood sugar again. · You can treat low blood sugar by eating or drinking something that has 15 grams of carbohydrate. These should be quick-sugar foods. Codie Carry foods such as fruit juice, regular (not diet) soda, glucose tablets, hard candy, or raisins can help raise blood sugar. Check your blood sugar level again 15 minutes after having a quick-sugar food to make sure your level is getting back to your target range. · Drink plenty of water before, during, and after you exercise. · Wear medical alert jewelry that says you have diabetes. You can buy this at most drugstores. · Pay attention to your body. If you are used to exercise and notice that you can't do as much as usual, talk to your doctor. Follow-up care is a key part of your treatment and safety. Be sure to make and go to all appointments, and call your doctor if you are having problems. It's also a good idea to know your test results and keep a list of the medicines you take. Where can you learn more? Go to http://shekhar-pk.info/. Enter H953 in the search box to learn more about \"Learning About Diabetes and Exercise. \" Current as of: April 16, 2019 Content Version: 12.2 © 4051-5082 [employer].  Care instructions adapted under license by [address] (which disclaims liability or warranty for this information). If you have questions about a medical condition or this instruction, always ask your healthcare professional. [address] any warranty or liability for your use of this information. Counting Carbohydrates: Care Instructions Your Care Instructions You don't have to eat special foods when you have diabetes. You just have to be careful to eat healthy foods. Carbohydrates (carbs) raise blood sugar higher and quicker than any other nutrient. Carbs are found in desserts, breads and cereals, and fruit. They're also in starchy vegetables. These include potatoes, corn, and grains such as rice and pasta. Carbs are also in milk and yogurt. The more carbs you eat at one time, the higher your blood sugar will rise. Spreading carbs all through the day helps keep your blood sugar levels within your target range. Counting carbs is one of the best ways to keep your blood sugar under control. If you use insulin, counting carbs helps you match the right amount of insulin to the number of grams of carbs in a meal. Then you can change your diet and insulin dose as needed. Testing your blood sugar several times a day can help you learn how carbs affect your blood sugar. A registered dietitian or certified diabetes educator can help you plan meals and snacks. Follow-up care is a key part of your treatment and safety. Be sure to make and go to all appointments, and call your doctor if you are having problems. It's also a good idea to know your test results and keep a list of the medicines you take. How can you care for yourself at home? Know your daily amount of carbohydrates Your daily amount depends on several things, such as your weight, how active you are, which diabetes medicines you take, and what your goals are for your blood sugar levels.  A registered dietitian or certified diabetes educator can help you plan how many carbs to include in each meal and snack. For most adults, a guideline for the daily amount of carbs is: · 45 to 60 grams at each meal. That's about the same as 3 to 4 carbohydrate servings. · 15 to 20 grams at each snack. That's about the same as 1 carbohydrate serving. Count carbs Counting carbs lets you know how much rapid-acting insulin to take before you eat. If you use an insulin pump, you get a constant rate of insulin during the day. So the pump must be programmed at meals. This gives you extra insulin to cover the rise in blood sugar after meals. If you take insulin: 
· Learn your own insulin-to-carb ratio. You and your diabetes health professional will figure out the ratio. You can do this by testing your blood sugar after meals. For example, you may need a certain amount of insulin for every 15 grams of carbs. · Add up the carb grams in a meal. Then you can figure out how many units of insulin to take based on your insulin-to-carb ratio. · Exercise lowers blood sugar. You can use less insulin than you would if you were not doing exercise. Keep in mind that timing matters. If you exercise within 1 hour after a meal, your body may need less insulin for that meal than it would if you exercised 3 hours after the meal. Test your blood sugar to find out how exercise affects your need for insulin. If you do or don't take insulin: 
· Look at labels on packaged foods. This can tell you how many carbs are in a serving. You can also use guides from the American Diabetes Association. · Be aware of portions, or serving sizes. If a package has two servings and you eat the whole package, you need to double the number of grams of carbohydrate listed for one serving. · Protein, fat, and fiber do not raise blood sugar as much as carbs do. If you eat a lot of these nutrients in a meal, your blood sugar will rise more slowly than it would otherwise. Eat from all food groups · Eat at least three meals a day. · Plan meals to include food from all the food groups. The food groups include grains, fruits, dairy, proteins, and vegetables. · Talk to your dietitian or diabetes educator about ways to add limited amounts of sweets into your meal plan. · If you drink alcohol, talk to your doctor. It may not be recommended when you are taking certain diabetes medicines. Where can you learn more? Go to http://shekhar-pk.info/. Enter K937 in the search box to learn more about \"Counting Carbohydrates: Care Instructions. \" Current as of: April 16, 2019 Content Version: 12.2 © 3251-6496 [employer]. Care instructions adapted under license by [employer] (which disclaims liability or warranty for this information). If you have questions about a medical condition or this instruction, always ask your healthcare professional. [address] any warranty or liability for your use of this information. Learning About Diabetes Food Guidelines Your Care Instructions Meal planning is important to manage diabetes. It helps keep your blood sugar at a target level (which you set with your doctor). You don't have to eat special foods. You can eat what your family eats, including sweets once in a while. But you do have to pay attention to how often you eat and how much you eat of certain foods. You may want to work with a dietitian or a certified diabetes educator (CDE) to help you plan meals and snacks. A dietitian or CDE can also help you lose weight if that is one of your goals. What should you know about eating carbs? Managing the amount of carbohydrate (carbs) you eat is an important part of healthy meals when you have diabetes. Carbohydrate is found in many foods. · Learn which foods have carbs. And learn the amounts of carbs in different foods.  
? Bread, cereal, pasta, and rice have about 15 grams of carbs in a serving. A serving is 1 slice of bread (1 ounce), ½ cup of cooked cereal, or 1/3 cup of cooked pasta or rice. ? Fruits have 15 grams of carbs in a serving. A serving is 1 small fresh fruit, such as an apple or orange; ½ of a banana; ½ cup of cooked or canned fruit; ½ cup of fruit juice; 1 cup of melon or raspberries; or 2 tablespoons of dried fruit. ? Milk and no-sugar-added yogurt have 15 grams of carbs in a serving. A serving is 1 cup of milk or 2/3 cup of no-sugar-added yogurt. ? Starchy vegetables have 15 grams of carbs in a serving. A serving is ½ cup of mashed potatoes or sweet potato; 1 cup winter squash; ½ of a small baked potato; ½ cup of cooked beans; or ½ cup cooked corn or green peas. · Learn how much carbs to eat each day and at each meal. A dietitian or CDE can teach you how to keep track of the amount of carbs you eat. This is called carbohydrate counting. · If you are not sure how to count carbohydrate grams, use the Plate Method to plan meals. It is a good, quick way to make sure that you have a balanced meal. It also helps you spread carbs throughout the day. ? Divide your plate by types of foods. Put non-starchy vegetables on half the plate, meat or other protein food on one-quarter of the plate, and a grain or starchy vegetable in the final quarter of the plate. To this you can add a small piece of fruit and 1 cup of milk or yogurt, depending on how many carbs you are supposed to eat at a meal. 
· Try to eat about the same amount of carbs at each meal. Do not \"save up\" your daily allowance of carbs to eat at one meal. 
· Proteins have very little or no carbs per serving. Examples of proteins are beef, chicken, turkey, fish, eggs, tofu, cheese, cottage cheese, and peanut butter. A serving size of meat is 3 ounces, which is about the size of a deck of cards.  Examples of meat substitute serving sizes (equal to 1 ounce of meat) are 1/4 cup of cottage cheese, 1 egg, 1 tablespoon of peanut butter, and ½ cup of tofu. How can you eat out and still eat healthy? · Learn to estimate the serving sizes of foods that have carbohydrate. If you measure food at home, it will be easier to estimate the amount in a serving of restaurant food. · If the meal you order has too much carbohydrate (such as potatoes, corn, or baked beans), ask to have a low-carbohydrate food instead. Ask for a salad or green vegetables. · If you use insulin, check your blood sugar before and after eating out to help you plan how much to eat in the future. · If you eat more carbohydrate at a meal than you had planned, take a walk or do other exercise. This will help lower your blood sugar. What else should you know? · Limit saturated fat, such as the fat from meat and dairy products. This is a healthy choice because people who have diabetes are at higher risk of heart disease. So choose lean cuts of meat and nonfat or low-fat dairy products. Use olive or canola oil instead of butter or shortening when cooking. · Don't skip meals. Your blood sugar may drop too low if you skip meals and take insulin or certain medicines for diabetes. · Check with your doctor before you drink alcohol. Alcohol can cause your blood sugar to drop too low. Alcohol can also cause a bad reaction if you take certain diabetes medicines. Follow-up care is a key part of your treatment and safety. Be sure to make and go to all appointments, and call your doctor if you are having problems. It's also a good idea to know your test results and keep a list of the medicines you take. Where can you learn more? Go to http://shekhar-pk.info/. Enter M724 in the search box to learn more about \"Learning About Diabetes Food Guidelines. \" Current as of: April 16, 2019 Content Version: 12.2 © 9288-2150 [employer].  Care instructions adapted under license by [employer] (which disclaims liability or warranty for this information). If you have questions about a medical condition or this instruction, always ask your healthcare professional. [address] any warranty or liability for your use of this information. Vaccine Information Statement Influenza (Flu) Vaccine (Inactivated or Recombinant): What You Need to Know Many Vaccine Information Statements are available in Thai and other languages. See www.immunize.org/vis Hojas de información sobre vacunas están disponibles en español y en muchos otros idiomas. Visite www.immunize.org/vis 1. Why get vaccinated? Influenza vaccine can prevent influenza (flu). Flu is a contagious disease that spreads around the [place] every year, usually between October and May. Anyone can get the flu, but it is more dangerous for some people. Infants and young children, people 72years of age and older, pregnant women, and people with certain health conditions or a weakened immune system are at greatest risk of flu complications. Pneumonia, bronchitis, sinus infections and ear infections are examples of flu-related complications. If you have a medical condition, such as heart disease, cancer or diabetes, flu can make it worse. Flu can cause fever and chills, sore throat, muscle aches, fatigue, cough, headache, and runny or stuffy nose. Some people may have vomiting and diarrhea, though this is more common in children than adults. Each year thousands of people in the [facility] die from flu, and many more are hospitalized. Flu vaccine prevents millions of illnesses and flu-related visits to the doctor each year. 2. Influenza vaccines CDC recommends everyone 10months of age and older get vaccinated every flu season. Children 6 months through 6years of age may need 2 doses during a single flu season. Everyone else needs only 1 dose each flu season. It takes about 2 weeks for protection to develop after vaccination. There are many flu viruses, and they are always changing. Each year a new flu vaccine is made to protect against three or four viruses that are likely to cause disease in the upcoming flu season. Even when the vaccine doesnt exactly match these viruses, it may still provide some protection. Influenza vaccine does not cause flu. Influenza vaccine may be given at the same time as other vaccines. 3. Talk with your health care provider Tell your vaccine provider if the person getting the vaccine: 
 Has had an allergic reaction after a previous dose of influenza vaccine, or has any severe, life-threatening allergies.  Has ever had Guillain-Barré Syndrome (also called GBS). In some cases, your health care provider may decide to postpone influenza vaccination to a future visit. People with minor illnesses, such as a cold, may be vaccinated. People who are moderately or severely ill should usually wait until they recover before getting influenza vaccine. Your health care provider can give you more information. 4. Risks of a reaction  Soreness, redness, and swelling where shot is given, fever, muscle aches, and headache can happen after influenza vaccine.  There may be a very small increased risk of Guillain-Barré Syndrome (GBS) after inactivated influenza vaccine (the flu shot). [facility] children who get the flu shot along with pneumococcal vaccine (PCV13), and/or DTaP vaccine at the same time might be slightly more likely to have a seizure caused by fever. Tell your health care provider if a child who is getting flu vaccine has ever had a seizure. People sometimes faint after medical procedures, including vaccination. Tell your provider if you feel dizzy or have vision changes or ringing in the ears. As with any medicine, there is a very remote chance of a vaccine causing a severe allergic reaction, other serious injury, or death. 5. What if there is a serious problem? An allergic reaction could occur after the vaccinated person leaves the clinic. If you see signs of a severe allergic reaction (hives, swelling of the face and throat, difficulty breathing, a fast heartbeat, dizziness, or weakness), call 9-1-1 and get the person to the nearest hospital. 
 
For other signs that concern you, call your health care provider. Adverse reactions should be reported to the Vaccine Adverse Event Reporting System (VAERS). Your health care provider will usually file this report, or you can do it yourself. Visit the VAERS website at www.vaers. hhs.gov or call 8-542.361.8809. VAERS is only for reporting reactions, and VAERS staff do not give medical advice. 6. The National Vaccine Injury Compensation Program 
 
The [facility] Vaccine Injury Compensation Program (VICP) is a federal program that was created to compensate people who may have been injured by certain vaccines. Visit the VICP website at www.[facility].gov/vaccinecompensation or call 8-649.857.2063 to learn about the program and about filing a claim. There is a time limit to file a claim for compensation. 7. How can I learn more?  Ask your health care provider.  Call your local or state health department.  Contact the Centers for Disease Control and Prevention (CDC): 
- Call 2-341.690.6804 (8-953-IWH-INFO) or 
- Visit CDCs influenza website at www.cdc.gov/flu Vaccine Information Statement (Interim) Inactivated Influenza Vaccine 8/15/2019 
42 ANAL[address] [id number] Department of Health and [employer] Centers for Disease Control and Prevention Office Use Only

## 2019-11-26 NOTE — PROGRESS NOTES
11/26/19  8:11 AM  had concerns including Diabetes (follow-up). HISTORY OF PRESENT ILLNESS   This is a 63 y.o. male who presents in follow up. His problems include hypertension non-insulin-dependent diabetes mellitus hyperlipidemia and dyspnea the patient is not an ideal body weight. Dyspnea  Dyspnea when taking metformin. He denies any dyspnea at rest at this time no chest pain pressure palpitations orthopnea or PND is admitted to. No exertional dyspnea is admitted to.  03/29/19   ECHO ADULT COMPLETE 03/29/2019 3/29/2019    Narrative · Estimated left ventricular ejection fraction is 56 - 60%. Visually   measured ejection fraction. Left ventricular mild concentric hypertrophy. No regional wall motion abnormality noted. Signed by: Oscar Salmon MD       03/29/19   ECHO ADULT COMPLETE 03/29/2019 3/29/2019    Narrative · Estimated left ventricular ejection fraction is 56 - 60%. Visually   measured ejection fraction. Left ventricular mild concentric hypertrophy. No regional wall motion abnormality noted. Signed by: Oscar Salmon MD       PFT Results  (Last 3 results in the past 10 years)               09/18/19 0903  PFT COMPLETE Final result    Impression:  [address], [address] [facility]   Pulmonary, Critical Care, and Sleep Medicine       09/18/19           Patient effort: Good   Meets ATS criteria for interpretation       Please refer to scanned document for additional data including technician comments and raw data including flows and flow-volume loops. Impression:   Spirometry, lung volumes, diffusion capacity (corrected) are all normal.  No significant bronchodilator response, however failure to respond to inhaled bronchodilator should not preclude a clinical trial of therapy.            Anahy Sanabria MD/MPH   Pulmonary, Critical Care Medicine   [facility]   09/18/19  9:04 AM Essential hypertension  The patient has had no problem with the medication. The patient has no headaches, visual changes, chest pain or pressure,dyspnea, orthopnea, abdominal pain, dysuria, weakness, or paresthesias. BP Readings from Last 3 Encounters:   11/26/19 124/66   08/29/19 139/76   05/30/19 133/73     Lab Results   Component Value Date/Time    Sodium 135 (L) 08/29/2019 09:25 AM    Potassium 4.2 08/29/2019 09:25 AM    Chloride 103 08/29/2019 09:25 AM    CO2 25 08/29/2019 09:25 AM    Anion gap 7 08/29/2019 09:25 AM    Glucose 201 (H) 08/29/2019 09:25 AM    BUN 12 08/29/2019 09:25 AM    Creatinine 0.90 08/29/2019 09:25 AM    BUN/Creatinine ratio 13 08/29/2019 09:25 AM    GFR est AA >60 08/29/2019 09:25 AM    GFR est non-AA >60 08/29/2019 09:25 AM    Calcium 9.1 08/29/2019 09:25 AM                         Key CAD CHF Meds             lisinopril (PRINIVIL, ZESTRIL) 10 mg tablet Take 1 Tab by mouth daily. Indications: Diabetic Nephropathy    atorvastatin (LIPITOR) 40 mg tablet Take 1 Tab by mouth daily. Indications: hyperlipidemia        Diabetes mellitus  The patient stopped the Metformin and the dyspnea improved. He is not taking the Januvia or glucotrol  The patient denies polyuria, polydipsia, or polyphagia. The patient does not  Admit to significant weight gain. Lab Results   Component Value Date/Time    Hemoglobin A1c 9.7 (H) 08/29/2019 09:25 AM    Hemoglobin A1c (POC) 12.2 05/30/2019 08:13 AM     Lab Results   Component Value Date/Time    Glucose 201 (H) 08/29/2019 09:25 AM    Glucose (POC) 206 (H) 03/30/2014 09:48 AM    Glucose  11/22/2017 09:00 AM     Key Antihyperglycemic Medications             metFORMIN (GLUCOPHAGE) 1,000 mg tablet Take 1 Tab by mouth two (2) times daily (with meals). Indications: type 2 diabetes mellitus    glipiZIDE (GLUCOTROL) 10 mg tablet Take 1 Tab by mouth two (2) times a day. SITagliptin (JANUVIA) 50 mg tablet Take 1 Tab by mouth daily. Hyperlipidemia  The patient has had no problem with the medications  The patient denies muscle aches, headache, GI symptoms or difficulty with mentation  Lab Results   Component Value Date/Time    Cholesterol, total 181 08/29/2019 09:25 AM    HDL Cholesterol 50 08/29/2019 09:25 AM    LDL, calculated 72.4 08/29/2019 09:25 AM    VLDL, calculated 58.6 08/29/2019 09:25 AM    Triglyceride 293 (H) 08/29/2019 09:25 AM    CHOL/HDL Ratio 3.6 08/29/2019 09:25 AM       Key Antihyperlipidemia Meds             atorvastatin (LIPITOR) 40 mg tablet Take 1 Tab by mouth daily. Indications: hyperlipidemia          Active Ambulatory Problems     Diagnosis Date Noted    Hyperlipidemia 06/07/2017    Morbid obesity ([facility] 06/07/2017    DM type 2 with diabetic peripheral neuropathy ([facility] 01/01/2018    [facility] with [facility] 03/05/2018    Primary open angle glaucoma     Chronic idiopathic gout involving toe of right foot without tophus 06/04/2018    Diabetic polyneuropathy associated with type 2 diabetes mellitus ([facility] 06/04/2018     Resolved Ambulatory Problems     Diagnosis Date Noted    Pneumonia 03/30/2014    Controlled type 2 diabetes mellitus without complication, without long-term current use of insulin ([facility] 06/07/2017     Past Medical History:   Diagnosis Date    Diabetes ([facility]     Hypertensive retinopathy     Sickle cell trait (HCC)      Current Outpatient Medications   Medication Sig    metFORMIN (GLUCOPHAGE) 1,000 mg tablet Take 1 Tab by mouth two (2) times daily (with meals). Indications: type 2 diabetes mellitus    ibuprofen (MOTRIN) 800 mg tablet Take 1 Tab by mouth every eight (8) hours as needed for Pain.  gabapentin (NEURONTIN) 300 mg capsule Take 1 Cap by mouth three (3) times daily.  lisinopril (PRINIVIL, ZESTRIL) 10 mg tablet Take 1 Tab by mouth daily. Indications: Diabetic Nephropathy    glipiZIDE (GLUCOTROL) 10 mg tablet Take 1 Tab by mouth two (2) times a day.     atorvastatin (LIPITOR) 40 mg tablet Take 1 Tab by mouth daily. Indications: hyperlipidemia    allopurinol (ZYLOPRIM) 300 mg tablet Take 1 Tab by mouth daily. Indications: prevention of acute gout attack    multivitamin (ONE A DAY) tablet Take 1 Tab by mouth daily.  SITagliptin (JANUVIA) 50 mg tablet Take 1 Tab by mouth daily.  glucose blood VI test strips (TRUE METRIX GLUCOSE TEST STRIP) strip Use as directed to test BS 3 times a day    sildenafil citrate (VIAGRA) 100 mg tablet Take 1 Tab by mouth daily as needed.  Blood-Glucose Meter monitoring kit by Does Not Apply route daily.  Lancets misc Use as directed 3 times a day    glucose blood VI test strips (TRUETEST TEST STRIPS) strip by Does Not Apply route three (3) times daily.  timolol (TIMOPTIC-XE) 0.5 % ophthalmic gel-forming instill 1 drop into both eyes once daily    SIMBRINZA 1-0.2 % drps 1 Drop. Indications: 1 drop in both eyes daily     No current facility-administered medications for this visit. Review of Systems   Constitutional: Negative for chills and fever. HENT: Negative for ear pain, hearing loss and tinnitus. Eyes: Negative for blurred vision and double vision. Respiratory: Negative for shortness of breath (This occurs at rest with no chest pain pressure or palpitations. ). Cardiovascular: Negative for chest pain, palpitations, orthopnea and leg swelling. Gastrointestinal: Negative for nausea and vomiting. Musculoskeletal: Positive for back pain (Disabled because of back pain) and joint pain (Knees Motrin taken periodically). Neurological: Negative for tingling, focal weakness and headaches. Psychiatric/Behavioral: The patient is not nervous/anxious and does not have insomnia.         Results for orders placed or performed during the hospital encounter of 08/29/19   CBC WITH AUTOMATED DIFF   Result Value Ref Range    WBC 8.3 4.6 - 13.2 K/uL    RBC 4.52 (L) 4.70 - 5.50 M/uL    HGB 12.8 (L) 13.0 - 16.0 g/dL    HCT 38.8 36.0 - 48.0 %    MCV 85.8 74.0 - 97.0 FL    MCH 28.3 24.0 - 34.0 PG    MCHC 33.0 31.0 - 37.0 g/dL    RDW 15.1 (H) 11.6 - 14.5 %    PLATELET 568 363 - 321 K/uL    MPV 12.1 (H) 9.2 - 11.8 FL    NEUTROPHILS 49 40 - 73 %    LYMPHOCYTES 45 21 - 52 %    MONOCYTES 4 3 - 10 %    EOSINOPHILS 2 0 - 5 %    BASOPHILS 0 0 - 2 %    ABS. NEUTROPHILS 4.0 1.8 - 8.0 K/UL    ABS. LYMPHOCYTES 3.7 (H) 0.9 - 3.6 K/UL    ABS. MONOCYTES 0.4 0.05 - 1.2 K/UL    ABS. EOSINOPHILS 0.2 0.0 - 0.4 K/UL    ABS. BASOPHILS 0.0 0.0 - 0.1 K/UL    DF AUTOMATED     METABOLIC PANEL, BASIC   Result Value Ref Range    Sodium 135 (L) 136 - 145 mmol/L    Potassium 4.2 3.5 - 5.5 mmol/L    Chloride 103 100 - 111 mmol/L    CO2 25 21 - 32 mmol/L    Anion gap 7 3.0 - 18 mmol/L    Glucose 201 (H) 74 - 99 mg/dL    BUN 12 7.0 - 18 MG/DL    Creatinine 0.90 0.6 - 1.3 MG/DL    BUN/Creatinine ratio 13 12 - 20      GFR est AA >60 >60 ml/min/1.73m2    GFR est non-AA >60 >60 ml/min/1.73m2    Calcium 9.1 8.5 - 10.1 MG/DL   LIPID PANEL   Result Value Ref Range    LIPID PROFILE          Cholesterol, total 181 <200 MG/DL    Triglyceride 293 (H) <150 MG/DL    HDL Cholesterol 50 40 - 60 MG/DL    LDL, calculated 72.4 0 - 100 MG/DL    VLDL, calculated 58.6 MG/DL    CHOL/HDL Ratio 3.6 0 - 5.0     HEMOGLOBIN A1C WITH EAG   Result Value Ref Range    Hemoglobin A1c 9.7 (H) 4.2 - 5.6 %    Est. average glucose 232 mg/dL       Visit Vitals  /66   Pulse 63   Temp 98 °F (36.7 °C) (Oral)   Resp 12   Ht 5' 2\" (1.575 m)   Wt 256 lb (116.1 kg)   SpO2 95%   BMI 46.82 kg/m²       Physical Exam  Vitals signs and nursing note reviewed. Constitutional:       General: He is not in acute distress. Appearance: He is well-developed. Comments: Body mass index is 46.82 kg/m². HENT:      Head: Normocephalic. Right Ear: External ear normal.      Left Ear: External ear normal.      Mouth/Throat:      Comments: Old missing teeth is appreciated.   New are looking pretty good  Eyes: Pupils: Pupils are equal, round, and reactive to light. Neck:      Musculoskeletal: Neck supple. Thyroid: No thyromegaly. Comments: Carotid bruit absent  Cardiovascular:      Rate and Rhythm: Normal rate and regular rhythm. Heart sounds: No murmur. No friction rub. No gallop. Pulmonary:      Effort: Pulmonary effort is normal. No respiratory distress. Breath sounds: Normal breath sounds. Abdominal:      General: Bowel sounds are normal. There is no distension. Palpations: Abdomen is soft. Genitourinary:     Penis: Normal.       Prostate: Normal.      Rectum: Normal.   Musculoskeletal:         General: No swelling. Lymphadenopathy:      Cervical: No cervical adenopathy. Skin:     General: Skin is warm and dry. Neurological:      Mental Status: He is alert and oriented to person, place, and time. Psychiatric:         Behavior: Behavior normal.         Thought Content: Thought content normal.         Judgment: Judgment normal.         ASSESSMENT and PLAN    ICD-10-CM ICD-9-CM    1. HTN, goal below 130/80 I10 401.9 CBC WITH AUTOMATED DIFF    This is a chronic problem and is in good control. Continue present medications. 2. Uncontrolled type 2 diabetes mellitus without complication, without long-term current use of insulin ([facility]) E11.65 250.02 metFORMIN ER (GLUCOPHAGE XR) 750 mg tablet      METABOLIC PANEL, BASIC      HEMOGLOBIN A1C WITH EAG      MICROALBUMIN, UR, RAND W/ MICROALB/CREAT RATIO    Trial of switching to Metromin Xl 750 daily to see if tolerated. This is his only medication at this time   3. Hyperlipidemia, unspecified hyperlipidemia type E78.5 272.4 LIPID PANEL    Chronic no problem with medications will recheck before next visit   4. Morbid obesity ([facility] E66.01 278.01     Discussed diet and exercise weight is fluctuating around about 3 pounds or so. 5. Dyspnea, unspecified type R06.00 786.09     Trial of switching from metformin to metformin XR.    6. Left knee pain, unspecified chronicity M25.562 719.46 ibuprofen (MOTRIN) 800 mg tablet    Motrin refilled. The condition is unchanged. 7. Encounter for immunization Z23 V03.89 INFLUENZA VIRUS VAC QUAD,SPLIT,PRESV FREE SYRINGE IM    Flu shot to be administered. Follow-up and Dispositions    · Return in about 3 months (around 2/26/2020).        lab results and schedule of future lab studies reviewed with patient  reviewed diet, exercise and weight control

## 2019-11-26 NOTE — PROGRESS NOTES
Visit Vitals  /66   Pulse 63   Temp 98 °F (36.7 °C) (Oral)   Resp 12   Ht 5' 2\" (1.575 m)   Wt 256 lb (116.1 kg)   SpO2 95%   BMI 46.82 kg/m²     Daquan Kwon presents today for   Chief Complaint   Patient presents with    Diabetes     follow-up       Is someone accompanying this pt? no    Is the patient using any DME equipment during OV? no    Depression Screening:  3 most recent PHQ Screens 11/26/2019   Little interest or pleasure in doing things Not at all   Feeling down, depressed, irritable, or hopeless Not at all   Total Score PHQ 2 0       Learning Assessment:  Learning Assessment 6/4/2018   PRIMARY LEARNER Patient   HIGHEST LEVEL OF EDUCATION - PRIMARY LEARNER  DID NOT GRADUATE HIGH SCHOOL   BARRIERS PRIMARY LEARNER NONE   CO-LEARNER CAREGIVER No   PRIMARY LANGUAGE ENGLISH   LEARNER PREFERENCE PRIMARY DEMONSTRATION   ANSWERED BY patient   RELATIONSHIP SELF       Abuse Screening:  No flowsheet data found. Fall Risk  No flowsheet data found. Health Maintenance reviewed and discussed and ordered per Provider. Health Maintenance Due   Topic Date Due    Shingrix Vaccine Age 49> (1 of 2) 09/01/2011    Influenza Age 5 to Adult  08/01/2019           Coordination of Care:  1. Have you been to the ER, urgent care clinic since your last visit? Hospitalized since your last visit? no    2. Have you seen or consulted any other health care providers outside of the [address] [place] [address] since your last visit? Include any pap smears or colon screening.  no

## 2020-01-28 ENCOUNTER — HOSPITAL ENCOUNTER (OUTPATIENT)
Dept: LAB | Age: 60
Discharge: HOME | End: 2020-01-28
Payer: MEDICAID

## 2020-01-28 LAB
ANION GAP SERPL CALC-SCNC: 7 MMOL/L (ref 3–18)
BASOPHILS # BLD: 0 K/UL (ref 0–0.1)
BASOPHILS NFR BLD: 0 % (ref 0–2)
BUN SERPL-MCNC: 4 MG/DL (ref 7–18)
BUN/CREAT SERPL: 5 (ref 12–20)
CALCIUM SERPL-MCNC: 8.9 MG/DL (ref 8.5–10.1)
CHLORIDE SERPL-SCNC: 106 MMOL/L (ref 100–111)
CO2 SERPL-SCNC: 24 MMOL/L (ref 21–32)
CREAT SERPL-MCNC: 0.77 MG/DL (ref 0.6–1.3)
CREAT UR-MCNC: 125 MG/DL (ref 30–125)
DIFFERENTIAL METHOD BLD: ABNORMAL
EOSINOPHIL # BLD: 0.1 K/UL (ref 0–0.4)
EOSINOPHIL NFR BLD: 1 % (ref 0–5)
ERYTHROCYTE [DISTWIDTH] IN BLOOD BY AUTOMATED COUNT: 14.7 % (ref 11.6–14.5)
EST. AVERAGE GLUCOSE BLD GHB EST-MCNC: 301 MG/DL
GLUCOSE SERPL-MCNC: 246 MG/DL (ref 74–99)
HBA1C MFR BLD: 12.1 % (ref 4.2–5.6)
HCT VFR BLD AUTO: 39.2 % (ref 36–48)
HGB BLD-MCNC: 13.9 G/DL (ref 13–16)
LYMPHOCYTES # BLD: 3.5 K/UL (ref 0.9–3.6)
LYMPHOCYTES NFR BLD: 44 % (ref 21–52)
MCH RBC QN AUTO: 28.7 PG (ref 24–34)
MCHC RBC AUTO-ENTMCNC: 35.5 G/DL (ref 31–37)
MCV RBC AUTO: 81 FL (ref 74–97)
MICROALBUMIN UR-MCNC: 18.4 MG/DL (ref 0–3)
MICROALBUMIN/CREAT UR-RTO: 147 MG/G (ref 0–30)
MONOCYTES # BLD: 0.4 K/UL (ref 0.05–1.2)
MONOCYTES NFR BLD: 6 % (ref 3–10)
NEUTS SEG # BLD: 3.9 K/UL (ref 1.8–8)
NEUTS SEG NFR BLD: 49 % (ref 40–73)
PLATELET # BLD AUTO: 173 K/UL (ref 135–420)
POTASSIUM SERPL-SCNC: 3.9 MMOL/L (ref 3.5–5.5)
RBC # BLD AUTO: 4.84 M/UL (ref 4.7–5.5)
SODIUM SERPL-SCNC: 137 MMOL/L (ref 136–145)
WBC # BLD AUTO: 8 K/UL (ref 4.6–13.2)

## 2020-01-28 PROCEDURE — 83036 HEMOGLOBIN GLYCOSYLATED A1C: CPT

## 2020-01-28 PROCEDURE — 80048 BASIC METABOLIC PNL TOTAL CA: CPT

## 2020-01-28 PROCEDURE — 85025 COMPLETE CBC W/AUTO DIFF WBC: CPT

## 2020-01-28 PROCEDURE — 82043 UR ALBUMIN QUANTITATIVE: CPT

## 2020-01-28 PROCEDURE — 36415 COLL VENOUS BLD VENIPUNCTURE: CPT

## 2020-02-03 NOTE — PROGRESS NOTES
Please call. B hemoglobin A1c is again high 12.1 last time it was 9.7. We are aiming for target of 7-6.   We will increase the Januvia 200 mg daily

## 2020-02-11 NOTE — PROGRESS NOTES
I called and spoke with pt and pt verified name and d.ob. Bonnie Marin and was made aware of results.

## 2020-02-26 ENCOUNTER — OFFICE VISIT (OUTPATIENT)
Dept: FAMILY MEDICINE CLINIC | Facility: CLINIC | Age: 60
End: 2020-02-26

## 2020-02-26 VITALS
RESPIRATION RATE: 14 BRPM | SYSTOLIC BLOOD PRESSURE: 125 MMHG | HEART RATE: 64 BPM | HEIGHT: 62 IN | OXYGEN SATURATION: 97 % | DIASTOLIC BLOOD PRESSURE: 70 MMHG | WEIGHT: 252 LBS | BODY MASS INDEX: 46.38 KG/M2 | TEMPERATURE: 97.4 F

## 2020-02-26 DIAGNOSIS — F17.200 CURRENT SMOKER: ICD-10-CM

## 2020-02-26 DIAGNOSIS — I10 HTN, GOAL BELOW 130/80: ICD-10-CM

## 2020-02-26 DIAGNOSIS — H61.22 CERUMEN DEBRIS ON TYMPANIC MEMBRANE OF LEFT EAR: ICD-10-CM

## 2020-02-26 DIAGNOSIS — R06.00 DYSPNEA, UNSPECIFIED TYPE: ICD-10-CM

## 2020-02-26 DIAGNOSIS — E78.5 HYPERLIPIDEMIA, UNSPECIFIED HYPERLIPIDEMIA TYPE: ICD-10-CM

## 2020-02-26 DIAGNOSIS — E66.01 MORBID OBESITY (HCC): ICD-10-CM

## 2020-02-26 NOTE — PROGRESS NOTES
Naga Portillo presents today for   Chief Complaint   Patient presents with    Follow-up    Diabetes    Breathing Problem       Daquan AQUILES Kwon preferred language for health care discussion is english. Is someone accompanying this pt? no    Is the patient using any DME equipment during [address]? no    Depression Screening:  3 most recent PHQ Screens 2/26/2020   Little interest or pleasure in doing things Not at all   Feeling down, depressed, irritable, or hopeless Not at all   Total Score PHQ 2 0       Learning Assessment:  Learning Assessment 6/4/2018   PRIMARY LEARNER Patient   HIGHEST LEVEL OF EDUCATION - PRIMARY LEARNER  DID NOT GRADUATE HIGH SCHOOL   BARRIERS PRIMARY LEARNER NONE   CO-LEARNER CAREGIVER No   PRIMARY LANGUAGE ENGLISH   LEARNER PREFERENCE PRIMARY DEMONSTRATION   ANSWERED BY patient   RELATIONSHIP SELF       Abuse Screening:  No flowsheet data found. Health Maintenance reviewed and discussed and ordered per Provider. Health Maintenance Due   Topic Date Due    Shingrix Vaccine Age 50> (1 of 2) 09/01/2011    Foot Exam Q1  02/15/2020   . Nielson Bandar is updated on all     Pt currently taking Antiplatelet therapy? no    Coordination of Care:  1. Have you been to the ER, urgent care clinic since your last visit? Yes 1/28/20 [facility] for SOB, headaches  Hospitalized since your last visit? no    2. Have you seen or consulted any other health care providers outside of the [address] [place] [address] since your last visit? no Include any pap smears or colon screening.  no

## 2020-02-26 NOTE — PATIENT INSTRUCTIONS
High Blood Pressure: Care Instructions Overview It's normal for blood pressure to go up and down throughout the day. But if it stays up, you have high blood pressure. Another name for high blood pressure is hypertension. Despite what a lot of people think, high blood pressure usually doesn't cause headaches or make you feel dizzy or lightheaded. It usually has no symptoms. But it does increase your risk of stroke, heart attack, and other problems. You and your doctor will talk about your risks of these problems based on your blood pressure. Your doctor will give you a goal for your blood pressure. Your goal will be based on your health and your age. Lifestyle changes, such as eating healthy and being active, are always important to help lower blood pressure. You might also take medicine to reach your blood pressure goal. 
Follow-up care is a key part of your treatment and safety. Be sure to make and go to all appointments, and call your doctor if you are having problems. It's also a good idea to know your test results and keep a list of the medicines you take. How can you care for yourself at home? Medical treatment · If you stop taking your medicine, your blood pressure will go back up. You may take one or more types of medicine to lower your blood pressure. Be safe with medicines. Take your medicine exactly as prescribed. Call your doctor if you think you are having a problem with your medicine. · Talk to your doctor before you start taking aspirin every day. Aspirin can help certain people lower their risk of a heart attack or stroke. But taking aspirin isn't right for everyone, because it can cause serious bleeding. · See your doctor regularly. You may need to see the doctor more often at first or until your blood pressure comes down. · If you are taking blood pressure medicine, talk to your doctor before you take decongestants or anti-inflammatory medicine, such as ibuprofen. Some of these medicines can raise blood pressure. · Learn how to check your blood pressure at home. Lifestyle changes · Stay at a healthy weight. This is especially important if you put on weight around the waist. Losing even 10 pounds can help you lower your blood pressure. · If your doctor recommends it, get more exercise. Walking is a good choice. Bit by bit, increase the amount you walk every day. Try for at least 30 minutes on most days of the week. You also may want to swim, bike, or do other activities. · Avoid or limit alcohol. Talk to your doctor about whether you can drink any alcohol. · Try to limit how much sodium you eat to less than 2,300 milligrams (mg) a day. Your doctor may ask you to try to eat less than 1,500 mg a day. · Eat plenty of fruits (such as bananas and oranges), vegetables, legumes, whole grains, and low-fat dairy products. · Lower the amount of saturated fat in your diet. Saturated fat is found in animal products such as milk, cheese, and meat. Limiting these foods may help you lose weight and also lower your risk for heart disease. · Do not smoke. Smoking increases your risk for heart attack and stroke. If you need help quitting, talk to your doctor about stop-smoking programs and medicines. These can increase your chances of quitting for good. When should you call for help? Call 911 anytime you think you may need emergency care. This may mean having symptoms that suggest that your blood pressure is causing a serious heart or blood vessel problem. Your blood pressure may be over 180/120. For example, call 911 if: 
  · You have symptoms of a heart attack. These may include: 
? Chest pain or pressure, or a strange feeling in the chest. 
? Sweating. ? Shortness of breath. ? Nausea or vomiting. ? Pain, pressure, or a strange feeling in the back, neck, jaw, or upper belly or in one or both shoulders or arms. ? Lightheadedness or sudden weakness. ? A fast or irregular heartbeat. · You have symptoms of a stroke. These may include: 
? Sudden numbness, tingling, weakness, or loss of movement in your face, arm, or leg, especially on only one side of your body. ? Sudden vision changes. ? Sudden trouble speaking. ? Sudden confusion or trouble understanding simple statements. ? Sudden problems with walking or balance. ? A sudden, severe headache that is different from past headaches. · You have severe back or belly pain. Do not wait until your blood pressure comes down on its own. Get help right away. Call your doctor now or seek immediate care if: 
  · Your blood pressure is much higher than normal (such as 180/120 or higher), but you don't have symptoms. · You think high blood pressure is causing symptoms, such as: 
? Severe headache. 
? Blurry vision. Watch closely for changes in your health, and be sure to contact your doctor if: 
  · Your blood pressure measures higher than your doctor recommends at least 2 times. That means the top number is higher or the bottom number is higher, or both. · You think you may be having side effects from your blood pressure medicine. Where can you learn more? Go to http://shekhar-pk.info/. Enter B972 in the search box to learn more about \"High Blood Pressure: Care Instructions. \" Current as of: July 22, 2018 Content Version: 12.1 © 5358-4250 Healthwise, Incorporated. Care instructions adapted under license by [employer] (which disclaims liability or warranty for this information). If you have questions about a medical condition or this instruction, always ask your healthcare professional. [place] [address] any warranty or liability for your use of this information. High Blood Pressure: Care Instructions Overview It's normal for blood pressure to go up and down throughout the day.  But if it stays up, you have high blood pressure. Another name for high blood pressure is hypertension. Despite what a lot of people think, high blood pressure usually doesn't cause headaches or make you feel dizzy or lightheaded. It usually has no symptoms. But it does increase your risk of stroke, heart attack, and other problems. You and your doctor will talk about your risks of these problems based on your blood pressure. Your doctor will give you a goal for your blood pressure. Your goal will be based on your health and your age. Lifestyle changes, such as eating healthy and being active, are always important to help lower blood pressure. You might also take medicine to reach your blood pressure goal. 
Follow-up care is a key part of your treatment and safety. Be sure to make and go to all appointments, and call your doctor if you are having problems. It's also a good idea to know your test results and keep a list of the medicines you take. How can you care for yourself at home? Medical treatment · If you stop taking your medicine, your blood pressure will go back up. You may take one or more types of medicine to lower your blood pressure. Be safe with medicines. Take your medicine exactly as prescribed. Call your doctor if you think you are having a problem with your medicine. · Talk to your doctor before you start taking aspirin every day. Aspirin can help certain people lower their risk of a heart attack or stroke. But taking aspirin isn't right for everyone, because it can cause serious bleeding. · See your doctor regularly. You may need to see the doctor more often at first or until your blood pressure comes down. · If you are taking blood pressure medicine, talk to your doctor before you take decongestants or anti-inflammatory medicine, such as ibuprofen. Some of these medicines can raise blood pressure. · Learn how to check your blood pressure at home. Lifestyle changes · Stay at a healthy weight. This is especially important if you put on weight around the waist. Losing even 10 pounds can help you lower your blood pressure. · If your doctor recommends it, get more exercise. Walking is a good choice. Bit by bit, increase the amount you walk every day. Try for at least 30 minutes on most days of the week. You also may want to swim, bike, or do other activities. · Avoid or limit alcohol. Talk to your doctor about whether you can drink any alcohol. · Try to limit how much sodium you eat to less than 2,300 milligrams (mg) a day. Your doctor may ask you to try to eat less than 1,500 mg a day. · Eat plenty of fruits (such as bananas and oranges), vegetables, legumes, whole grains, and low-fat dairy products. · Lower the amount of saturated fat in your diet. Saturated fat is found in animal products such as milk, cheese, and meat. Limiting these foods may help you lose weight and also lower your risk for heart disease. · Do not smoke. Smoking increases your risk for heart attack and stroke. If you need help quitting, talk to your doctor about stop-smoking programs and medicines. These can increase your chances of quitting for good. When should you call for help? Call 911 anytime you think you may need emergency care. This may mean having symptoms that suggest that your blood pressure is causing a serious heart or blood vessel problem. Your blood pressure may be over 180/120. For example, call 911 if: 
  · You have symptoms of a heart attack. These may include: 
? Chest pain or pressure, or a strange feeling in the chest. 
? Sweating. ? Shortness of breath. ? Nausea or vomiting. ? Pain, pressure, or a strange feeling in the back, neck, jaw, or upper belly or in one or both shoulders or arms. ? Lightheadedness or sudden weakness. ? A fast or irregular heartbeat. · You have symptoms of a stroke. These may include: ? Sudden numbness, tingling, weakness, or loss of movement in your face, arm, or leg, especially on only one side of your body. ? Sudden vision changes. ? Sudden trouble speaking. ? Sudden confusion or trouble understanding simple statements. ? Sudden problems with walking or balance. ? A sudden, severe headache that is different from past headaches. · You have severe back or belly pain. Do not wait until your blood pressure comes down on its own. Get help right away. Call your doctor now or seek immediate care if: 
  · Your blood pressure is much higher than normal (such as 180/120 or higher), but you don't have symptoms. · You think high blood pressure is causing symptoms, such as: 
? Severe headache. 
? Blurry vision. Watch closely for changes in your health, and be sure to contact your doctor if: 
  · Your blood pressure measures higher than your doctor recommends at least 2 times. That means the top number is higher or the bottom number is higher, or both. · You think you may be having side effects from your blood pressure medicine. Where can you learn more? Go to http://shekhar-pk.info/. Enter T743 in the search box to learn more about \"High Blood Pressure: Care Instructions. \" Current as of: July 22, 2018 Content Version: 12.1 © 1406-1500 Healthwise, Incorporated. Care instructions adapted under license by "[employer] (which disclaims liability or warranty for this information). If you have questions about a medical condition or this instruction, always ask your healthcare professional. [place] [address] any warranty or liability for your use of this information. High Blood Pressure: Care Instructions Overview It's normal for blood pressure to go up and down throughout the day. But if it stays up, you have high blood pressure. Another name for high blood pressure is hypertension. Despite what a lot of people think, high blood pressure usually doesn't cause headaches or make you feel dizzy or lightheaded. It usually has no symptoms. But it does increase your risk of stroke, heart attack, and other problems. You and your doctor will talk about your risks of these problems based on your blood pressure. Your doctor will give you a goal for your blood pressure. Your goal will be based on your health and your age. Lifestyle changes, such as eating healthy and being active, are always important to help lower blood pressure. You might also take medicine to reach your blood pressure goal. 
Follow-up care is a key part of your treatment and safety. Be sure to make and go to all appointments, and call your doctor if you are having problems. It's also a good idea to know your test results and keep a list of the medicines you take. How can you care for yourself at home? Medical treatment · If you stop taking your medicine, your blood pressure will go back up. You may take one or more types of medicine to lower your blood pressure. Be safe with medicines. Take your medicine exactly as prescribed. Call your doctor if you think you are having a problem with your medicine. · Talk to your doctor before you start taking aspirin every day. Aspirin can help certain people lower their risk of a heart attack or stroke. But taking aspirin isn't right for everyone, because it can cause serious bleeding. · See your doctor regularly. You may need to see the doctor more often at first or until your blood pressure comes down. · If you are taking blood pressure medicine, talk to your doctor before you take decongestants or anti-inflammatory medicine, such as ibuprofen. Some of these medicines can raise blood pressure. · Learn how to check your blood pressure at home. Lifestyle changes · Stay at a healthy weight.  This is especially important if you put on weight around the waist. Losing even 10 pounds can help you lower your blood pressure. · If your doctor recommends it, get more exercise. Walking is a good choice. Bit by bit, increase the amount you walk every day. Try for at least 30 minutes on most days of the week. You also may want to swim, bike, or do other activities. · Avoid or limit alcohol. Talk to your doctor about whether you can drink any alcohol. · Try to limit how much sodium you eat to less than 2,300 milligrams (mg) a day. Your doctor may ask you to try to eat less than 1,500 mg a day. · Eat plenty of fruits (such as bananas and oranges), vegetables, legumes, whole grains, and low-fat dairy products. · Lower the amount of saturated fat in your diet. Saturated fat is found in animal products such as milk, cheese, and meat. Limiting these foods may help you lose weight and also lower your risk for heart disease. · Do not smoke. Smoking increases your risk for heart attack and stroke. If you need help quitting, talk to your doctor about stop-smoking programs and medicines. These can increase your chances of quitting for good. When should you call for help? Call 911 anytime you think you may need emergency care. This may mean having symptoms that suggest that your blood pressure is causing a serious heart or blood vessel problem. Your blood pressure may be over 180/120. For example, call 911 if: 
  · You have symptoms of a heart attack. These may include: 
? Chest pain or pressure, or a strange feeling in the chest. 
? Sweating. ? Shortness of breath. ? Nausea or vomiting. ? Pain, pressure, or a strange feeling in the back, neck, jaw, or upper belly or in one or both shoulders or arms. ? Lightheadedness or sudden weakness. ? A fast or irregular heartbeat. · You have symptoms of a stroke. These may include: 
? Sudden numbness, tingling, weakness, or loss of movement in your face, arm, or leg, especially on only one side of your body. ? Sudden vision changes. ? Sudden trouble speaking. ? Sudden confusion or trouble understanding simple statements. ? Sudden problems with walking or balance. ? A sudden, severe headache that is different from past headaches. · You have severe back or belly pain. Do not wait until your blood pressure comes down on its own. Get help right away. Call your doctor now or seek immediate care if: 
  · Your blood pressure is much higher than normal (such as 180/120 or higher), but you don't have symptoms. · You think high blood pressure is causing symptoms, such as: 
? Severe headache. 
? Blurry vision. Watch closely for changes in your health, and be sure to contact your doctor if: 
  · Your blood pressure measures higher than your doctor recommends at least 2 times. That means the top number is higher or the bottom number is higher, or both. · You think you may be having side effects from your blood pressure medicine. Where can you learn more? Go to http://shekhar-pk.info/. Enter Q169 in the search box to learn more about \"High Blood Pressure: Care Instructions. \" Current as of: July 22, 2018 Content Version: 12.1 © 8898-6229 [employer]. Care instructions adapted under license by [employer] (which disclaims liability or warranty for this information). If you have questions about a medical condition or this instruction, always ask your healthcare professional. [place] [address] any warranty or liability for your use of this information. Type 2 Diabetes: Care Instructions Your Care Instructions Type 2 diabetes is a disease that develops when the body's tissues cannot use insulin properly. Over time, the pancreas cannot make enough insulin. Insulin is a hormone that helps the body's cells use sugar (glucose) for energy. It also helps the body store extra sugar in muscle, fat, and liver cells. Without insulin, the sugar cannot get into the cells to do its work. It stays in the blood instead. This can cause high blood sugar levels. A person has diabetes when the blood sugar stays too high too much of the time. Over time, diabetes can lead to diseases of the heart, blood vessels, nerves, kidneys, and eyes. You may be able to control your blood sugar by losing weight, eating a healthy diet, and getting daily exercise. You may also have to take insulin or other diabetes medicine. Follow-up care is a key part of your treatment and safety. Be sure to make and go to all appointments. Call your doctor if you are having problems. It's also a good idea to know your test results and keep a list of the medicines you take. How can you care for yourself at home? · Keep your blood sugar at a target level (which you set with your doctor). ? Eat a good diet that spreads carbohydrate throughout the day. Carbohydratethe body's main source of fuelaffects blood sugar more than any other nutrient. Carbohydrate is in fruits, vegetables, milk, and yogurt. It also is in breads, cereals, vegetables such as potatoes and corn, and sugary foods such as candy and cakes. ? Aim for 30 minutes of exercise on most, preferably all, days of the week. Walking is a good choice. You also may want to do other activities, such as running, swimming, cycling, or playing tennis or team sports. If your doctor says it's okay, do muscle-strengthening exercises at least 2 times a week. ? Take your medicines exactly as prescribed. Call your doctor if you think you are having a problem with your medicine. You will get more details on the specific medicines your doctor prescribes. · Check your blood sugar as often as your doctor recommends. It is important to keep track of any symptoms you have, such as low blood sugar. Also tell your doctor if you have any changes in your activities, diet, or insulin use. · Talk to your doctor before you start taking aspirin every day. Aspirin can help certain people lower their risk of a heart attack or stroke. But taking aspirin isn't right for everyone, because it can cause serious bleeding. · Do not smoke. If you need help quitting, talk to your doctor about stop-smoking programs and medicines. These can increase your chances of quitting for good. · Keep your cholesterol and blood pressure at normal levels. You may need to take one or more medicines to reach your goals. Take them exactly as directed. Do not stop or change a medicine without talking to your doctor first. 
When should you call for help? Call 911 anytime you think you may need emergency care. For example, call if: 
  · You passed out (lost consciousness), or you suddenly become very sleepy or confused. (You may have very low blood sugar.) Call your doctor now or seek immediate medical care if: 
  · Your blood sugar is 300 mg/dL or is higher than the level your doctor has set for you. · You have symptoms of low blood sugar, such as: ? Sweating. ? Feeling nervous, shaky, and weak. ? Extreme hunger and slight nausea. ? Dizziness and headache. 
? Blurred vision. ? Confusion. Watch closely for changes in your health, and be sure to contact your doctor if: 
  · You often have problems controlling your blood sugar. · You have symptoms of long-term diabetes problems, such as: ? New vision changes. ? New pain, numbness, or tingling in your hands or feet. ? Skin problems. Where can you learn more? Go to http://shekhar-pk.info/. Enter C553 in the search box to learn more about \"Type 2 Diabetes: Care Instructions. \" Current as of: July 25, 2018 Content Version: 12.1 © 1810-8855 Healthwise, Incorporated.  Care instructions adapted under license by [employer] (which disclaims liability or warranty for this information). If you have questions about a medical condition or this instruction, always ask your healthcare professional. [address] any warranty or liability for your use of this information. High Cholesterol: Care Instructions Your Care Instructions Cholesterol is a type of fat in your blood. It is needed for many body functions, such as making new cells. Cholesterol is made by your body. It also comes from food you eat. High cholesterol means that you have too much of the fat in your blood. This raises your risk of a heart attack and stroke. LDL and HDL are part of your total cholesterol. LDL is the \"bad\" cholesterol. High LDL can raise your risk for heart disease, heart attack, and stroke. HDL is the \"good\" cholesterol. It helps clear bad cholesterol from the body. High HDL is linked with a lower risk of heart disease, heart attack, and stroke. Your cholesterol levels help your doctor find out your risk for having a heart attack or stroke. You and your doctor can talk about whether you need to lower your risk and what treatment is best for you. A heart-healthy lifestyle along with medicines can help lower your cholesterol and your risk. The way you choose to lower your risk will depend on how high your risk is for heart attack and stroke. It will also depend on how you feel about taking medicines. Follow-up care is a key part of your treatment and safety. Be sure to make and go to all appointments, and call your doctor if you are having problems. It's also a good idea to know your test results and keep a list of the medicines you take. How can you care for yourself at home? · Eat a variety of foods every day. Good choices include fruits, vegetables, whole grains (like oatmeal), dried beans and peas, nuts and seeds, soy products (like tofu), and fat-free or low-fat dairy products.  
· Replace butter, margarine, and hydrogenated or partially hydrogenated oils with olive and canola oils. (Canola oil margarine without trans fat is fine.) · Replace red meat with fish, poultry, and soy protein (like tofu). · Limit processed and packaged foods like chips, crackers, and cookies. · Bake, broil, or steam foods. Don't up them. · Be physically active. Get at least 30 minutes of exercise on most days of the week. Walking is a good choice. You also may want to do other activities, such as running, swimming, cycling, or playing tennis or team sports. · Stay at a healthy weight or lose weight by making the changes in eating and physical activity listed above. Losing just a small amount of weight, even 5 to 10 pounds, can reduce your risk for having a heart attack or stroke. · Do not smoke. When should you call for help? Watch closely for changes in your health, and be sure to contact your doctor if: 
  · You need help making lifestyle changes. · You have questions about your medicine. Where can you learn more? Go to http://shekhar-pk.info/. Enter H271 in the search box to learn more about \"High Cholesterol: Care Instructions. \" Current as of: July 22, 2018 Content Version: 12.1 © 9584-8623 Healthwise, Incorporated. Care instructions adapted under license by [employer] (which disclaims liability or warranty for this information). If you have questions about a medical condition or this instruction, always ask your healthcare professional. [place] [address] any warranty or liability for your use of this information.

## 2020-02-26 NOTE — PROGRESS NOTES
02/25/20  8:11 AM  had no chief complaint listed for this encounter. HISTORY OF PRESENT ILLNESS   This is a 63 y.o. male who presents in follow up. His problems include hypertension non-insulin-dependent diabetes mellitus hyperlipidemia and dyspnea the patient is not an ideal body weight. [facility] was at [facility] and dyspne on Jan 28th  He was found to have tension headaches  Dyspnea  Dyspnea when taking metformin. He denies any dyspnea at rest at this time no chest pain pressure palpitations orthopnea or PND is admitted to. No exertional dyspnea is admitted to. Tis is iunchanged. 03/29/19   ECHO ADULT COMPLETE 03/29/2019 3/29/2019    Narrative · Estimated left ventricular ejection fraction is 56 - 60%. Visually   measured ejection fraction. Left ventricular mild concentric hypertrophy. No regional wall motion abnormality noted. Signed by: Jeannie Bae MD       03/29/19   ECHO ADULT COMPLETE 03/29/2019 3/29/2019    Narrative · Estimated left ventricular ejection fraction is 56 - 60%. Visually   measured ejection fraction. Left ventricular mild concentric hypertrophy. No regional wall motion abnormality noted. Signed by: Jeannie Bae MD       PFT Results  (Last 3 results in the past 10 years)               09/18/19 0903  PFT COMPLETE Final result    Impression:  [address], [address] [facility]   Pulmonary, Critical Care, and Sleep Medicine       09/18/19           Patient effort: Good   Meets ATS criteria for interpretation       Please refer to scanned document for additional data including technician comments and raw data including flows and flow-volume loops. Impression:   Spirometry, lung volumes, diffusion capacity (corrected) are all normal.  No significant bronchodilator response, however failure to respond to inhaled bronchodilator should not preclude a clinical trial of therapy. Shawn Mcghee MD/MPH   Pulmonary, Critical Care Medicine   [facility]   09/18/19  9:04 AM                                 Essential hypertension  He is not taking the Lisinopril   The patient has no headaches, visual changes, chest pain or pressure,dyspnea, orthopnea, abdominal pain, dysuria, weakness, or paresthesias. BP Readings from Last 3 Encounters:   02/26/20 125/70   11/26/19 124/66   08/29/19 139/76     Lab Results   Component Value Date/Time    Sodium 137 01/28/2020 11:33 AM    Potassium 3.9 01/28/2020 11:33 AM    Chloride 106 01/28/2020 11:33 AM    CO2 24 01/28/2020 11:33 AM    Anion gap 7 01/28/2020 11:33 AM    Glucose 246 (H) 01/28/2020 11:33 AM    BUN 4 (L) 01/28/2020 11:33 AM    Creatinine 0.77 01/28/2020 11:33 AM    BUN/Creatinine ratio 5 (L) 01/28/2020 11:33 AM    GFR est AA >60 01/28/2020 11:33 AM    GFR est non-AA >60 01/28/2020 11:33 AM    Calcium 8.9 01/28/2020 11:33 AM         Key CAD CHF Meds             lisinopril (PRINIVIL, ZESTRIL) 10 mg tablet Take 1 Tab by mouth daily. Indications: Diabetic Nephropathy    atorvastatin (LIPITOR) 40 mg tablet Take 1 Tab by mouth daily. Indications: hyperlipidemia        Diabetes mellitus  The patient stopped the Metformin and the dyspnea improved. He is not taking the Januvia or glucotrol  The patient denies polyuria, polydipsia, or polyphagia. The patient does not  Admit to significant weight gain.   Wt Readings from Last 3 Encounters:   02/26/20 252 lb (114.3 kg)   11/26/19 256 lb (116.1 kg)   08/29/19 263 lb (119.3 kg)       Lab Results   Component Value Date/Time    Hemoglobin A1c 12.1 (H) 01/28/2020 11:33 AM    Hemoglobin A1c (POC) 12.2 05/30/2019 08:13 AM     Lab Results   Component Value Date/Time    Glucose 246 (H) 01/28/2020 11:33 AM    Glucose (POC) 206 (H) 03/30/2014 09:48 AM    Glucose  11/22/2017 09:00 AM     Key Antihyperglycemic Medications             SITagliptin (JANUVIA) 100 mg tablet Take 1 Tab by mouth daily.    metFORMIN ER (GLUCOPHAGE XR) 750 mg tablet Take 1 Tab by mouth daily (with dinner). glipiZIDE (GLUCOTROL) 10 mg tablet Take 1 Tab by mouth two (2) times a day. Hyperlipidemia  The patient has had no problem with the medications  The patient denies muscle aches, headache, GI symptoms or difficulty with mentation  Lab Results   Component Value Date/Time    Cholesterol, total 181 08/29/2019 09:25 AM    HDL Cholesterol 50 08/29/2019 09:25 AM    LDL, calculated 72.4 08/29/2019 09:25 AM    VLDL, calculated 58.6 08/29/2019 09:25 AM    Triglyceride 293 (H) 08/29/2019 09:25 AM    CHOL/HDL Ratio 3.6 08/29/2019 09:25 AM       Key Antihyperlipidemia Meds             atorvastatin (LIPITOR) 40 mg tablet Take 1 Tab by mouth daily. Indications: hyperlipidemia        Current smoker  Occasional smoker, will try to cut down, smokes 3 a week. Left knee pain  Chronic problem. The patient is due for injections. He states that knee replacement was advised at one time. He states he will call and follow up with Dr. Garfield Zavaleta. Active Ambulatory Problems     Diagnosis Date Noted    Hyperlipidemia 06/07/2017    Morbid obesity ([address] 06/07/2017    DM type 2 with diabetic peripheral neuropathy ([address] 01/01/2018    Type 2 diabetes with nephropathy ([address] 03/05/2018    Primary open angle glaucoma     Chronic idiopathic gout involving toe of right foot without tophus 06/04/2018    Diabetic polyneuropathy associated with type 2 diabetes mellitus ([address] 06/04/2018     Resolved Ambulatory Problems     Diagnosis Date Noted    Pneumonia 03/30/2014    Controlled type 2 diabetes mellitus without complication, without long-term current use of insulin ([address] 06/07/2017     Past Medical History:   Diagnosis Date    Diabetes ([address]     Hypertensive retinopathy     Sickle cell trait (HCC)      Current Outpatient Medications   Medication Sig    SITagliptin (JANUVIA) 100 mg tablet Take 1 Tab by mouth daily.     metFORMIN ER (GLUCOPHAGE XR) 750 mg tablet Take 1 Tab by mouth daily (with dinner).  ibuprofen (MOTRIN) 800 mg tablet Take 1 Tab by mouth every eight (8) hours as needed for Pain.  gabapentin (NEURONTIN) 300 mg capsule Take 1 Cap by mouth three (3) times daily.  lisinopril (PRINIVIL, ZESTRIL) 10 mg tablet Take 1 Tab by mouth daily. Indications: Diabetic Nephropathy    glipiZIDE (GLUCOTROL) 10 mg tablet Take 1 Tab by mouth two (2) times a day.  atorvastatin (LIPITOR) 40 mg tablet Take 1 Tab by mouth daily. Indications: hyperlipidemia    allopurinol (ZYLOPRIM) 300 mg tablet Take 1 Tab by mouth daily. Indications: prevention of acute gout attack    multivitamin (ONE A DAY) tablet Take 1 Tab by mouth daily.  glucose blood VI test strips (TRUE METRIX GLUCOSE TEST STRIP) strip Use as directed to test BS 3 times a day    sildenafil citrate (VIAGRA) 100 mg tablet Take 1 Tab by mouth daily as needed.  Blood-Glucose Meter monitoring kit by Does Not Apply route daily.  Lancets misc Use as directed 3 times a day    glucose blood VI test strips (TRUETEST TEST STRIPS) strip by Does Not Apply route three (3) times daily.  timolol (TIMOPTIC-XE) 0.5 % ophthalmic gel-forming instill 1 drop into both eyes once daily    SIMBRINZA 1-0.2 % drps 1 Drop. Indications: 1 drop in both eyes daily     No current facility-administered medications for this visit. Review of Systems   Constitutional: Negative for chills and fever. HENT: Positive for congestion. Negative for ear pain, hearing loss and tinnitus. Eyes: Negative for blurred vision and double vision. Respiratory: Negative for shortness of breath (This occurs at rest with no chest pain pressure or palpitations. ). Cardiovascular: Negative for chest pain, palpitations, orthopnea and leg swelling. Gastrointestinal: Negative for nausea and vomiting.    Musculoskeletal: Positive for back pain (Disabled because of back pain) and joint pain (Knees Motrin taken periodically). Neurological: Negative for tingling, focal weakness and headaches. Psychiatric/Behavioral: The patient is not nervous/anxious and does not have insomnia. Results for orders placed or performed during the hospital encounter of 01/28/20   MICROALBUMIN, UR, RAND W/ MICROALB/CREAT RATIO   Result Value Ref Range    Microalbumin,urine random 18.40 (H) 0 - 3.0 MG/DL    Creatinine, urine 125.00 30 - 125 mg/dL    Microalbumin/Creat ratio (mg/g creat) 147 (H) 0 - 30 mg/g   HEMOGLOBIN A1C WITH EAG   Result Value Ref Range    Hemoglobin A1c 12.1 (H) 4.2 - 5.6 %    Est. average glucose 301 mg/dL   CBC WITH AUTOMATED DIFF   Result Value Ref Range    WBC 8.0 4.6 - 13.2 K/uL    RBC 4.84 4.70 - 5.50 M/uL    HGB 13.9 13.0 - 16.0 g/dL    HCT 39.2 36.0 - 48.0 %    MCV 81.0 74.0 - 97.0 FL    MCH 28.7 24.0 - 34.0 PG    MCHC 35.5 31.0 - 37.0 g/dL    RDW 14.7 (H) 11.6 - 14.5 %    PLATELET 595 060 - 996 K/uL    NEUTROPHILS 49 40 - 73 %    LYMPHOCYTES 44 21 - 52 %    MONOCYTES 6 3 - 10 %    EOSINOPHILS 1 0 - 5 %    BASOPHILS 0 0 - 2 %    ABS. NEUTROPHILS 3.9 1.8 - 8.0 K/UL    ABS. LYMPHOCYTES 3.5 0.9 - 3.6 K/UL    ABS. MONOCYTES 0.4 0.05 - 1.2 K/UL    ABS. EOSINOPHILS 0.1 0.0 - 0.4 K/UL    ABS. BASOPHILS 0.0 0.0 - 0.1 K/UL    DF AUTOMATED     METABOLIC PANEL, BASIC   Result Value Ref Range    Sodium 137 136 - 145 mmol/L    Potassium 3.9 3.5 - 5.5 mmol/L    Chloride 106 100 - 111 mmol/L    CO2 24 21 - 32 mmol/L    Anion gap 7 3.0 - 18 mmol/L    Glucose 246 (H) 74 - 99 mg/dL    BUN 4 (L) 7.0 - 18 MG/DL    Creatinine 0.77 0.6 - 1.3 MG/DL    BUN/Creatinine ratio 5 (L) 12 - 20      GFR est AA >60 >60 ml/min/1.73m2    GFR est non-AA >60 >60 ml/min/1.73m2    Calcium 8.9 8.5 - 10.1 MG/DL       There were no vitals taken for this visit. Physical Exam  Vitals signs and nursing note reviewed. Constitutional:       General: He is not in acute distress. Appearance: He is well-developed.       Comments: Body mass index is 46.82 kg/m². HENT:      Head: Normocephalic. Right Ear: External ear normal.      Left Ear: External ear normal.      Ears:      Comments: leftear clogged wax     Nose: Nose normal.      Mouth/Throat:      Comments: Old missing teeth is appreciated. New are looking pretty good  Eyes:      Pupils: Pupils are equal, round, and reactive to light. Neck:      Musculoskeletal: Neck supple. Thyroid: No thyromegaly. Comments: Carotid bruit absent  Cardiovascular:      Rate and Rhythm: Normal rate and regular rhythm. Heart sounds: No murmur. No friction rub. No gallop. Pulmonary:      Effort: Pulmonary effort is normal. No respiratory distress. Breath sounds: Normal breath sounds. Abdominal:      General: Bowel sounds are normal. There is no distension. Palpations: Abdomen is soft. Genitourinary:     Penis: Normal.       Prostate: Normal.      Rectum: Normal.   Musculoskeletal:         General: No swelling or tenderness. Comments: Knee pain on movement. Lymphadenopathy:      Cervical: No cervical adenopathy. Skin:     General: Skin is warm and dry. Neurological:      Mental Status: He is alert and oriented to person, place, and time. Psychiatric:         Behavior: Behavior normal.         Thought Content: Thought content normal.         Judgment: Judgment normal.                 ASSESSMENT and PLAN    ICD-10-CM ICD-9-CM    1. Uncontrolled type 2 diabetes mellitus without complication, without long-term current use of insulin ([facility]) E11.65 250.02  DIABETES FOOT EXAM      CBC WITH AUTOMATED DIFF      METABOLIC PANEL, BASIC      HEMOGLOBIN A1C WITH EAG      MICROALBUMIN, UR, RAND W/ MICROALB/CREAT RATIO    He hakes the Januvia at night  Invokana 100 mg daily  Stopped the Metformin  Stopped the Glipizide. 2. HTN, goal below 130/80 I10 401.9     Not on medication , follow   3.  Hyperlipidemia, unspecified hyperlipidemia type E78.5 272.4 LIPID PANEL    Not taking the meds, check next visit   4. Morbid obesity (HCC) E66.01 278.01     Decreasing, encouraging   5. Dyspnea, unspecified type R06.00 786.09     May be med related he thinks. Follow , will check if any exertional component. 6. Current smoker F17.200 305.1     The patient [place] try to stop the 3 a week he is smoking now   7. Cerumen debris on tympanic membrane of left ear H61.22 380.4 carbamide peroxide (DEBROX) 6.5 % otic solution         the following changes in treatment are made: The diabetic medications were changed. See orders  lab results and schedule of future lab studies reviewed with patient  reviewed diet, exercise and weight control  very strongly urged to quit smoking to reduce cardiovascular risk    Diabetic foot exam:     Left Foot:   Visual Exam: normal    Pulse DP: 2+ (normal)   Filament test: 5/6   Vibratory sensation: diminished      Right Foot:   Visual Exam: normal    Pulse DP: 1+ (weak)   Filament test: 5/6   Vibratory sensation: diminished        This note was done with the assistance of dragon speech software. Some inadvertent errors or omissions may be presentlab results and schedule of future lab studies reviewed with patient  reviewed diet, exercise and weight control    Health Maintenance Due   Topic Date Due    Shingrix Vaccine Age 49> (1 of 2) 09/01/2011    Foot Exam Q1  02/15/2020     Denttist due eye scheduled foot doc scheduled tomorrow.

## 2020-05-26 ENCOUNTER — VIRTUAL VISIT (OUTPATIENT)
Dept: FAMILY MEDICINE CLINIC | Facility: CLINIC | Age: 60
End: 2020-05-26

## 2020-05-26 DIAGNOSIS — E11.42 DIABETIC POLYNEUROPATHY ASSOCIATED WITH TYPE 2 DIABETES MELLITUS (HCC): ICD-10-CM

## 2020-05-26 DIAGNOSIS — R51.9 HEADACHE DISORDER: ICD-10-CM

## 2020-05-26 DIAGNOSIS — E11.21 TYPE 2 DIABETES WITH NEPHROPATHY (HCC): ICD-10-CM

## 2020-05-26 DIAGNOSIS — I10 HTN, GOAL BELOW 130/80: Primary | ICD-10-CM

## 2020-05-26 DIAGNOSIS — E78.5 HYPERLIPIDEMIA, UNSPECIFIED HYPERLIPIDEMIA TYPE: ICD-10-CM

## 2020-05-26 DIAGNOSIS — F17.200 CURRENT SMOKER: ICD-10-CM

## 2020-05-26 RX ORDER — NAPROXEN 500 MG/1
500 TABLET ORAL 2 TIMES DAILY WITH MEALS
Qty: 20 TAB | Refills: 0 | Status: SHIPPED | OUTPATIENT
Start: 2020-05-26 | End: 2020-06-05

## 2020-05-26 RX ORDER — LISINOPRIL 10 MG/1
10 TABLET ORAL DAILY
Qty: 90 TAB | Refills: 3 | Status: SHIPPED | OUTPATIENT
Start: 2020-05-26 | End: 2021-05-27 | Stop reason: SDUPTHER

## 2020-05-26 RX ORDER — ATORVASTATIN CALCIUM 40 MG/1
40 TABLET, FILM COATED ORAL DAILY
Qty: 90 TAB | Refills: 3 | Status: SHIPPED | OUTPATIENT
Start: 2020-05-26 | End: 2021-05-27 | Stop reason: SDUPTHER

## 2020-05-26 NOTE — PROGRESS NOTES
Consent: Robert Solorio, who was seen by synchronous (real-time) audio-video technology, and/or his healthcare decision maker, is aware that this patient-initiated, Telehealth encounter on 5/26/2020 is a billable service, with coverage as determined by his insurance carrier. He is aware that he may receive a bill and has provided verbal consent to proceed: Yes. The patient was at his residence and I was at the office,and no one else participated in the service. ASSESSMENT and PLAN    ICD-10-CM ICD-9-CM    1. HTN, goal below 130/80 I10 401.9     Continue present meds , refills ordered   2. Type 2 diabetes with nephropathy (HCC) E11.21 250.40 CBC WITH AUTOMATED DIFF     119.75 METABOLIC PANEL, BASIC      HEMOGLOBIN A1C WITH EAG      MICROALBUMIN, UR, RAND W/ MICROALB/CREAT RATIO      lisinopriL (PRINIVIL, ZESTRIL) 10 mg tablet    This is a chronic problem. It is presently well controlled. We will continue the same treatment. labs ordered   3. Hyperlipidemia, unspecified hyperlipidemia type E78.5 272.4 LIPID PANEL      atorvastatin (LIPITOR) 40 mg tablet    Check levels. Insert [facility] chronic   4. Current smoker F17.200 305.1     Stopped one year ago   5. Diabetic polyneuropathy associated with type 2 diabetes mellitus (HCC) E11.42 250.60      357.2     No increase in numbness  Adjust meds if needed   6. Headache disorder R51 784.0 naproxen (Naprosyn) 500 mg tablet      CT HEAD WO CONT    Old and some new features. Will order CT scan of the head.         lab results and schedule of future lab studies reviewed with patient  reviewed diet, exercise and weight control  Health Maintenance Due   Topic Date Due    Shingrix Vaccine Age 49> (1 of 2) 09/01/2011    A1C test (Diabetic or Prediabetic)  04/28/2020       712  Subjective:   Daquan Yuen is a 63 y.o. male who was seen for follow-up for metabolic syndrome. He also complains of a headache. Headaches  The patient presents with intermittent headaches. The patient has been experiencing them for 2 weeks Two weeks. No fever, cough or dyspnea. No one else in the house is experiencing the problem  He has had them before. He is trying nothing for them. He is willing to try over-the-counter medications initially. He has tried Motrin before but not on this occasion. The location is mainly frontal and around the eyes. The onset was gradual.The quality is described as aching and throbbing. The severity of the problem is moderate. The duration and timing of the problem is described as not present in the morning by developing during the day. There is no radiation to the neck or face. Aggravating factors are negative. Modifying factors include nothing so far. Associated signs and symptoms include no visual changes no nausea or vomiting, no weakness or paresthesias. The patient has tried nothing so far. The patient has had several prior episodes this last year. Essential hypertension  The patient has been taking his medication regularly. The patient has had no problem with the medication. The patient has no headaches, visual changes, chest pain or pressure,dyspnea, orthopnea, abdominal pain, dysuria, weakness, or paresthesias. He has not been taking the pressure at home  BP Readings from Last 3 Encounters:   02/26/20 125/70   11/26/19 124/66   08/29/19 139/76     Lab Results   Component Value Date/Time    Sodium 137 01/28/2020 11:33 AM    Potassium 3.9 01/28/2020 11:33 AM    Chloride 106 01/28/2020 11:33 AM    CO2 24 01/28/2020 11:33 AM    Anion gap 7 01/28/2020 11:33 AM    Glucose 246 (H) 01/28/2020 11:33 AM    BUN 4 (L) 01/28/2020 11:33 AM    Creatinine 0.77 01/28/2020 11:33 AM    BUN/Creatinine ratio 5 (L) 01/28/2020 11:33 AM    GFR est AA >60 01/28/2020 11:33 AM    GFR est non-AA >60 01/28/2020 11:33 AM    Calcium 8.9 01/28/2020 11:33 AM             03/29/19   ECHO ADULT COMPLETE 03/29/2019 3/29/2019    Narrative · Estimated left ventricular ejection fraction is 56 - 60%. Visually   measured ejection fraction. Left ventricular mild concentric hypertrophy. No regional wall motion abnormality noted. Signed by: Roxann Mandujano MD     [facility] CHF Meds             lisinopril (PRINIVIL, ZESTRIL) 10 mg tablet Take 1 Tab by mouth daily. Indications: Diabetic Nephropathy    atorvastatin (LIPITOR) 40 mg tablet Take 1 Tab by mouth daily. Indications: hyperlipidemia        Diabetes mellitus  The patient denies polyuria, polydipsia, or polyphagia. The patient does not  Admit to significant weight gain. There has been no problem with the medications. The blood sugar 140 range  Lab Results   Component Value Date/Time    Hemoglobin A1c 12.1 (H) 01/28/2020 11:33 AM    Hemoglobin A1c (POC) 12.2 05/30/2019 08:13 AM     Lab Results   Component Value Date/Time    Glucose 246 (H) 01/28/2020 11:33 AM    Glucose (POC) 206 (H) 03/30/2014 09:48 AM    Glucose  11/22/2017 09:00 AM     Key Antihyperglycemic Medications             canagliflozin (INVOKANA) 100 mg tablet Take 1 Tab by mouth Daily (before breakfast). SITagliptin (JANUVIA) 100 mg tablet Take 1 Tab by mouth daily. metFORMIN ER (GLUCOPHAGE XR) 750 mg tablet Take 1 Tab by mouth daily (with dinner). glipiZIDE (GLUCOTROL) 10 mg tablet Take 1 Tab by mouth two (2) times a day. Hyperlipidemia  The patient has had no problem with the medications  The patient denies muscle aches, headache, GI symptoms or difficulty with mentation. There has been no significant change in glucose control.   No recent change in vision or cataracts  Lab Results   Component Value Date/Time    Cholesterol, total 181 08/29/2019 09:25 AM    HDL Cholesterol 50 08/29/2019 09:25 AM    LDL, calculated 72.4 08/29/2019 09:25 AM    VLDL, calculated 58.6 08/29/2019 09:25 AM    Triglyceride 293 (H) 08/29/2019 09:25 AM    CHOL/HDL Ratio 3.6 08/29/2019 09:25 AM     Key Antihyperlipidemia Meds             atorvastatin (LIPITOR) 40 mg tablet Take 1 Tab by mouth daily. Indications: hyperlipidemia          Current Outpatient Medications   Medication Sig    canagliflozin (INVOKANA) 100 mg tablet Take 1 Tab by mouth Daily (before breakfast).  carbamide peroxide (DEBROX) 6.5 % otic solution Administer 5 Drops into each ear two (2) times a day.  SITagliptin (JANUVIA) 100 mg tablet Take 1 Tab by mouth daily.  metFORMIN ER (GLUCOPHAGE XR) 750 mg tablet Take 1 Tab by mouth daily (with dinner).  ibuprofen (MOTRIN) 800 mg tablet Take 1 Tab by mouth every eight (8) hours as needed for Pain.  gabapentin (NEURONTIN) 300 mg capsule Take 1 Cap by mouth three (3) times daily.  lisinopril (PRINIVIL, ZESTRIL) 10 mg tablet Take 1 Tab by mouth daily. Indications: Diabetic Nephropathy    glipiZIDE (GLUCOTROL) 10 mg tablet Take 1 Tab by mouth two (2) times a day.  atorvastatin (LIPITOR) 40 mg tablet Take 1 Tab by mouth daily. Indications: hyperlipidemia    allopurinol (ZYLOPRIM) 300 mg tablet Take 1 Tab by mouth daily. Indications: prevention of acute gout attack    multivitamin (ONE A DAY) tablet Take 1 Tab by mouth daily.  glucose blood VI test strips (TRUE METRIX GLUCOSE TEST STRIP) strip Use as directed to test BS 3 times a day    sildenafil citrate (VIAGRA) 100 mg tablet Take 1 Tab by mouth daily as needed.  Blood-Glucose Meter monitoring kit by Does Not Apply route daily.  Lancets misc Use as directed 3 times a day    glucose blood VI test strips (TRUETEST TEST STRIPS) strip by Does Not Apply route three (3) times daily.  timolol (TIMOPTIC-XE) 0.5 % ophthalmic gel-forming instill 1 drop into both eyes once daily    SIMBRINZA 1-0.2 % drps 1 Drop. Indications: 1 drop in both eyes daily     No current facility-administered medications for this visit.       No Known Allergies  has Hyperlipidemia, Morbid obesity ([address] DM type 2 with diabetic peripheral neuropathy ([address] Type 2 diabetes with nephropathy ([address] Primary open angle glaucoma, Chronic idiopathic gout involving toe of right foot without tophus, and Diabetic polyneuropathy associated with type 2 diabetes mellitus ([facility] on their problem list.  Past Surgical History:   Procedure Laterality Date    HX OTHER SURGICAL      pt reports gallstone removal     Relationships   Social connections    Talks on phone: Not on file    Gets together: Not on file    Attends [employer] service: Not on file    Active member of club or organization: Not on file    Attends meetings of clubs or organizations: Not on file    Relationship status: Not on file     family history includes Diabetes in his maternal grandmother, mother, and sister; Sickle Cell Trait in an other family member. Review of Systems   Constitutional: Negative for chills, fever and malaise/fatigue. HENT: Negative for congestion and sore throat. Eyes: Negative for blurred vision and redness. Respiratory: Negative for cough, shortness of breath and wheezing. Cardiovascular: Negative for chest pain and leg swelling. Gastrointestinal: Negative for abdominal pain, blood in stool, constipation, diarrhea and heartburn. Genitourinary: Negative for dysuria and urgency. Musculoskeletal: Negative for joint pain and myalgias. Neurological: Positive for headaches. Negative for dizziness, sensory change, speech change and focal weakness. Endo/Heme/Allergies: Does not bruise/bleed easily. Psychiatric/Behavioral: The patient is not nervous/anxious. Objective: There were no vitals taken for this visit. General: alert, cooperative, no distress. Overweight body habitus   Mental  status: normal mood, behavior, speech, dress, motor activity, and thought processes, able to follow commands   HENT: NCAT. Pupils are equal.  Eyelids normal.  No obvious oral lesions are noted. Neck: no visualized mass   Musculoskeletal: Able to move the extremities and stand with as well as walk without difficulty.   No foot lesions noted   Resp: no respiratory distress. Adequate excursion. Neuro: no gross deficits. Skin: . No discoloration or lesions of concern on visible areas. Psychiatric: normal affect, consistent with stated mood, no evidence of hallucinations. Results for orders placed or performed during the hospital encounter of 01/28/20   MICROALBUMIN, UR, RAND W/ MICROALB/CREAT RATIO   Result Value Ref Range    Microalbumin,urine random 18.40 (H) 0 - 3.0 MG/DL    Creatinine, urine 125.00 30 - 125 mg/dL    Microalbumin/Creat ratio (mg/g creat) 147 (H) 0 - 30 mg/g   HEMOGLOBIN A1C WITH EAG   Result Value Ref Range    Hemoglobin A1c 12.1 (H) 4.2 - 5.6 %    Est. average glucose 301 mg/dL   CBC WITH AUTOMATED DIFF   Result Value Ref Range    WBC 8.0 4.6 - 13.2 K/uL    RBC 4.84 4.70 - 5.50 M/uL    HGB 13.9 13.0 - 16.0 g/dL    HCT 39.2 36.0 - 48.0 %    MCV 81.0 74.0 - 97.0 FL    MCH 28.7 24.0 - 34.0 PG    MCHC 35.5 31.0 - 37.0 g/dL    RDW 14.7 (H) 11.6 - 14.5 %    PLATELET 906 599 - 925 K/uL    NEUTROPHILS 49 40 - 73 %    LYMPHOCYTES 44 21 - 52 %    MONOCYTES 6 3 - 10 %    EOSINOPHILS 1 0 - 5 %    BASOPHILS 0 0 - 2 %    ABS. NEUTROPHILS 3.9 1.8 - 8.0 K/UL    ABS. LYMPHOCYTES 3.5 0.9 - 3.6 K/UL    ABS. MONOCYTES 0.4 0.05 - 1.2 K/UL    ABS. EOSINOPHILS 0.1 0.0 - 0.4 K/UL    ABS. BASOPHILS 0.0 0.0 - 0.1 K/UL    DF AUTOMATED     METABOLIC PANEL, BASIC   Result Value Ref Range    Sodium 137 136 - 145 mmol/L    Potassium 3.9 3.5 - 5.5 mmol/L    Chloride 106 100 - 111 mmol/L    CO2 24 21 - 32 mmol/L    Anion gap 7 3.0 - 18 mmol/L    Glucose 246 (H) 74 - 99 mg/dL    BUN 4 (L) 7.0 - 18 MG/DL    Creatinine 0.77 0.6 - 1.3 MG/DL    BUN/Creatinine ratio 5 (L) 12 - 20      GFR est AA >60 >60 ml/min/1.73m2    GFR est non-AA >60 >60 ml/min/1.73m2    Calcium 8.9 8.5 - 10.1 MG/DL     No results found for any visits on 05/26/20. We discussed the expected course, resolution and complications of the diagnosis(es) in detail.   Medication risks, benefits, costs, interactions, and alternatives were discussed as indicated. I advised him to contact the office if his condition worsens, changes or fails to improve as anticipated. He expressed understanding with the diagnosis(es) and plan. Geno Goltz is a 63 y.o. male being evaluated by a video visit encounter for concerns as above. A caregiver was present when appropriate. Due to this being a TeleHealth encounter (During BEMBF-80 public health emergency), evaluation of the following organ systems was limited: Vitals/Constitutional/EENT/Resp/CV/GI//MS/Neuro/Skin/Heme-Lymph-Imm. Pursuant to the emergency declaration under the [facility], 1135 waiver authority and the [employer] Act, this Virtual  Visit was conducted, with patient's (and/or legal guardian's) consent, to reduce the patient's risk of exposure to COVID-19 and provide necessary medical care. Patience Mota MD    This note was done with the assistance of dragon speech software.   Some inadvertent errors or omissions may be present

## 2020-06-04 ENCOUNTER — HOSPITAL ENCOUNTER (OUTPATIENT)
Dept: CT IMAGING | Age: 60
Discharge: HOME | End: 2020-06-04
Attending: EMERGENCY MEDICINE
Payer: MEDICAID

## 2020-06-04 DIAGNOSIS — R51.9 HEADACHE DISORDER: ICD-10-CM

## 2020-06-04 PROCEDURE — 70450 CT HEAD/BRAIN W/O DYE: CPT

## 2020-11-13 RX ORDER — ZOSTER VACCINE RECOMBINANT, ADJUVANTED 50 MCG/0.5
0.5 KIT INTRAMUSCULAR ONCE
Qty: 0.5 ML | Refills: 0 | Status: CANCELLED | OUTPATIENT
Start: 2020-11-13 | End: 2020-11-13

## 2020-11-13 NOTE — PROGRESS NOTES
Consent: Stacie Villarreal, who was seen by audio only technology, and/or his healthcare decision maker, is aware that this patient-initiated, Telehealth audio encounter on 11/16/2020 is a billable service, with coverage as determined by his insurance carrier. He is aware that he may receive a bill and has provided verbal consent to proceed: Yes. The patient was at home and I was at the offices of the [address] [place] [address] no one else participated in the service. ASSESSMENT and PLAN    ICD-10-CM ICD-9-CM    1. Encounter for immunization  Z23 V03.89 varicella-zoster recombinant, PF, (Shingrix, PF,) 50 mcg/0.5 mL susr injection   2. HTN, goal below 130/80  I10 401.9 CBC WITH AUTOMATED DIFF      METABOLIC PANEL, BASIC   3. Type 2 diabetes with nephropathy (HCC)  E11.21 250.40 HEMOGLOBIN A1C WITH EAG     583.81 MICROALBUMIN, UR, RAND W/ MICROALB/CREAT RATIO   4. Hyperlipidemia, unspecified hyperlipidemia type  E78.5 272.4 LIPID PANEL   5. Erectile dysfunction, unspecified erectile dysfunction type  N52.9 607.84 TESTOSTERONE, FREE   6. Peripheral polyneuropathy  G62.9 356.9 gabapentin (NEURONTIN) 100 mg capsule         lab results and schedule of future lab studies reviewed with patient  reviewed diet, exercise and weight control      Health Maintenance Due   Topic Date Due    Shingrix Vaccine Age 49> (1 of 2) 09/01/2011    A1C test (Diabetic or Prediabetic)  04/28/2020    Lipid Screen  08/29/2020    Flu Vaccine (1) 09/01/2020     He agrees will obtain the flu vaccine and the  Shingrix vaccine. 712  Subjective:   Daquan Stanley is a 62 y.o. male who is being seen in follow-up. The patient had a head CAT scan of the head done in June done for headaches which was negative. The patient was seen in May 2020 for hypertension with meds being refilled, type 2 diabetes with nephropathy with follow-up labs being ordered.   He had hyperlipidemia on Lipitor 40 mg daily, was a former smoker but stopped 1 year prior, and a headache disorder for which the CAT scan was ordered. Hypertension  The patient has had no problem with the medication. The patient has no headaches, visual changes, chest pain or pressure,dyspnea, orthopnea, abdominal pain, dysuria, weakness, or paresthesias. BP Readings from Last 3 Encounters:   02/26/20 125/70   11/26/19 124/66   08/29/19 139/76     Lab Results   Component Value Date/Time    Sodium 137 01/28/2020 11:33 AM    Potassium 3.9 01/28/2020 11:33 AM    Chloride 106 01/28/2020 11:33 AM    CO2 24 01/28/2020 11:33 AM    Anion gap 7 01/28/2020 11:33 AM    Glucose 246 (H) 01/28/2020 11:33 AM    BUN 4 (L) 01/28/2020 11:33 AM    Creatinine 0.77 01/28/2020 11:33 AM    BUN/Creatinine ratio 5 (L) 01/28/2020 11:33 AM    GFR est AA >60 01/28/2020 11:33 AM    GFR est non-AA >60 01/28/2020 11:33 AM    Calcium 8.9 01/28/2020 11:33 AM     03/29/19   ECHO ADULT COMPLETE 03/29/2019 3/29/2019    Narrative · Estimated left ventricular ejection fraction is 56 - 60%. Visually   measured ejection fraction. Left ventricular mild concentric hypertrophy. No regional wall motion abnormality noted. Signed by: Kenisha Nava MD     [facility] CHF Meds             atorvastatin (LIPITOR) 40 mg tablet Take 1 Tab by mouth daily. Indications: excessive fat in the blood    lisinopriL (PRINIVIL, ZESTRIL) 10 mg tablet Take 1 Tab by mouth daily. Indications: kidney disease from diabetes        Diabetes mellitus  The patient denies polyuria, polydipsia, or polyphagia. There has been no problem with the medications. Wt Readings from Last 3 Encounters:   02/26/20 252 lb (114.3 kg)   11/26/19 256 lb (116.1 kg)   08/29/19 263 lb (119.3 kg)     body mass index is unknown because there is no height or weight on file.   Lab Results   Component Value Date/Time    Hemoglobin A1c 12.1 (H) 01/28/2020 11:33 AM    Hemoglobin A1c (POC) 12.2 05/30/2019 08:13 AM     Lab Results   Component Value Date/Time    Glucose 246 (H) 01/28/2020 11:33 AM    Glucose (POC) 206 (H) 03/30/2014 09:48 AM    Glucose  11/22/2017 09:00 AM     Key Antihyperglycemic Medications             canagliflozin (INVOKANA) 100 mg tablet Take 1 Tab by mouth Daily (before breakfast). SITagliptin (JANUVIA) 100 mg tablet Take 1 Tab by mouth daily. metFORMIN ER (GLUCOPHAGE XR) 750 mg tablet Take 1 Tab by mouth daily (with dinner). glipiZIDE (GLUCOTROL) 10 mg tablet Take 1 Tab by mouth two (2) times a day. Erectile dysfunction  Present 2 years. The patient states that he does not awaken with an erection and does not obtain 1 throughout the day. There is no history of claudication symptoms on exertion. No redness or paleness of the extremities are noted. He does admit to some numbness of the feet at night. He has been on no medication for treatment of this problem. Chronic headache  Headache better at baseline. He states this is exacerbated by the patient states he has pills taken, but he does not know which ones. He is going to start trying to take each pill separately to see if one of them is giving him the problem. The headache subsides after 1 hour post taking his medications. Hyperlipidemia  The patient has had no problem with the medications  The patient denies muscle aches, headache, GI symptoms or difficulty with mentation. Key Antihyperlipidemia Meds             atorvastatin (LIPITOR) 40 mg tablet Take 1 Tab by mouth daily. Indications: excessive fat in the blood        Lab Results   Component Value Date/Time    Cholesterol, total 181 08/29/2019 09:25 AM    HDL Cholesterol 50 08/29/2019 09:25 AM    LDL, calculated 72.4 08/29/2019 09:25 AM    VLDL, calculated 58.6 08/29/2019 09:25 AM    Triglyceride 293 (H) 08/29/2019 09:25 AM    CHOL/HDL Ratio 3.6 08/29/2019 09:25 AM       Current Outpatient Medications   Medication Sig    atorvastatin (LIPITOR) 40 mg tablet Take 1 Tab by mouth daily.  Indications: excessive fat in the blood    lisinopriL (PRINIVIL, ZESTRIL) 10 mg tablet Take 1 Tab by mouth daily. Indications: kidney disease from diabetes    canagliflozin (INVOKANA) 100 mg tablet Take 1 Tab by mouth Daily (before breakfast).  carbamide peroxide (DEBROX) 6.5 % otic solution Administer 5 Drops into each ear two (2) times a day.  SITagliptin (JANUVIA) 100 mg tablet Take 1 Tab by mouth daily.  metFORMIN ER (GLUCOPHAGE XR) 750 mg tablet Take 1 Tab by mouth daily (with dinner).  gabapentin (NEURONTIN) 300 mg capsule Take 1 Cap by mouth three (3) times daily.  glipiZIDE (GLUCOTROL) 10 mg tablet Take 1 Tab by mouth two (2) times a day.  allopurinol (ZYLOPRIM) 300 mg tablet Take 1 Tab by mouth daily. Indications: prevention of acute gout attack    multivitamin (ONE A DAY) tablet Take 1 Tab by mouth daily.  glucose blood VI test strips (TRUE METRIX GLUCOSE TEST STRIP) strip Use as directed to test BS 3 times a day    sildenafil citrate (VIAGRA) 100 mg tablet Take 1 Tab by mouth daily as needed.  Blood-Glucose Meter monitoring kit by Does Not Apply route daily.  Lancets misc Use as directed 3 times a day    glucose blood VI test strips (TRUETEST TEST STRIPS) strip by Does Not Apply route three (3) times daily.  timolol (TIMOPTIC-XE) 0.5 % ophthalmic gel-forming instill 1 drop into both eyes once daily    SIMBRINZA 1-0.2 % drps 1 Drop. Indications: 1 drop in both eyes daily     No current facility-administered medications for this visit.       No Known Allergies  has Hyperlipidemia, Morbid obesity ([address] DM type 2 with diabetic peripheral neuropathy ([address] Type 2 diabetes with nephropathy ([address] Primary open angle glaucoma, Chronic idiopathic gout involving toe of right foot without tophus, and Diabetic polyneuropathy associated with type 2 diabetes mellitus ([address] on their problem list.  Past Surgical History:   Procedure Laterality Date    HX OTHER SURGICAL      pt reports gallstone removal     Relationships Social connections    Talks on phone: Not on file    Gets together: Not on file    Attends Worship service: Not on file    Active member of club or organization: Not on file    Attends meetings of clubs or organizations: Not on file    Relationship status: Not on file     family history includes Diabetes in his maternal grandmother, mother, and sister; Sickle Cell Trait in an other family member. Review of Systems   Constitutional: Negative for chills, fever, malaise/fatigue and weight loss (Has not checked recently). HENT: Negative for congestion and sore throat. Eyes: Positive for blurred vision (Ophthalmologist added new eyedrop). Negative for redness. Respiratory: Negative for cough, shortness of breath and wheezing. Cardiovascular: Negative for chest pain and leg swelling. PND:    Gastrointestinal: Negative for abdominal pain, blood in stool, constipation, diarrhea and heartburn. Genitourinary: Negative for dysuria and urgency. Musculoskeletal: Negative for joint pain and myalgias. Neurological: Positive for headaches. Negative for dizziness, sensory change, speech change and focal weakness. Endo/Heme/Allergies: Does not bruise/bleed easily. Psychiatric/Behavioral: The patient is not nervous/anxious.          Results for orders placed or performed during the hospital encounter of 01/28/20   MICROALBUMIN, UR, RAND W/ MICROALB/CREAT RATIO   Result Value Ref Range    Microalbumin,urine random 18.40 (H) 0 - 3.0 MG/DL    Creatinine, urine 125.00 30 - 125 mg/dL    Microalbumin/Creat ratio (mg/g creat) 147 (H) 0 - 30 mg/g   HEMOGLOBIN A1C WITH EAG   Result Value Ref Range    Hemoglobin A1c 12.1 (H) 4.2 - 5.6 %    Est. average glucose 301 mg/dL   CBC WITH AUTOMATED DIFF   Result Value Ref Range    WBC 8.0 4.6 - 13.2 K/uL    RBC 4.84 4.70 - 5.50 M/uL    HGB 13.9 13.0 - 16.0 g/dL    HCT 39.2 36.0 - 48.0 %    MCV 81.0 74.0 - 97.0 FL    MCH 28.7 24.0 - 34.0 PG    MCHC 35.5 31.0 - 37.0 g/dL RDW 14.7 (H) 11.6 - 14.5 %    PLATELET 269 693 - 821 K/uL    NEUTROPHILS 49 40 - 73 %    LYMPHOCYTES 44 21 - 52 %    MONOCYTES 6 3 - 10 %    EOSINOPHILS 1 0 - 5 %    BASOPHILS 0 0 - 2 %    ABS. NEUTROPHILS 3.9 1.8 - 8.0 K/UL    ABS. LYMPHOCYTES 3.5 0.9 - 3.6 K/UL    ABS. MONOCYTES 0.4 0.05 - 1.2 K/UL    ABS. EOSINOPHILS 0.1 0.0 - 0.4 K/UL    ABS. BASOPHILS 0.0 0.0 - 0.1 K/UL    DF AUTOMATED     METABOLIC PANEL, BASIC   Result Value Ref Range    Sodium 137 136 - 145 mmol/L    Potassium 3.9 3.5 - 5.5 mmol/L    Chloride 106 100 - 111 mmol/L    CO2 24 21 - 32 mmol/L    Anion gap 7 3.0 - 18 mmol/L    Glucose 246 (H) 74 - 99 mg/dL    BUN 4 (L) 7.0 - 18 MG/DL    Creatinine 0.77 0.6 - 1.3 MG/DL    BUN/Creatinine ratio 5 (L) 12 - 20      GFR est AA >60 >60 ml/min/1.73m2    GFR est non-AA >60 >60 ml/min/1.73m2    Calcium 8.9 8.5 - 10.1 MG/DL       Daquan Kwon   was evaluated through a patient-initiated, synchronous (real-time) audio only encounter, and/or her healthcare decision maker, is aware that it is a billable service, with coverage as determined by her insurance carrier. She provided verbal consent to proceed: Yes. She has not had a related appointment within my department in the past 7 days or scheduled within the next 24 hours. Total Time: minutes: 21-30 minutes    Kathy Keen MD       We discussed the expected course, resolution and complications of the diagnosis(es) in detail. Medication risks, benefits, costs, interactions, and alternatives were discussed as indicated. I advised him to contact the office if his condition worsens, changes or fails to improve as anticipated. He expressed understanding with the diagnosis(es) and plan. Ruddy Stringer is a 62 y.o. male being evaluated by a video visit encounter for concerns as above. A caregiver was present when appropriate.  Due to this being a TeleHealth encounter (During KBQ-46 public health emergency), evaluation of the following organ systems was limited: Vitals/Constitutional/EENT/Resp/CV/GI//MS/Neuro/Skin/Heme-Lymph-Imm. Pursuant to the emergency declaration under the [facility], 1135 waiver authority and the [employer] Act, this Virtual  Visit was conducted, with patient's (and/or legal guardian's) consent, to reduce the patient's risk of exposure to COVID-19 and provide necessary medical care. This note was done with the assistance of dragon speech software.   Some inadvertent errors or omissions may be present

## 2020-11-16 ENCOUNTER — VIRTUAL VISIT (OUTPATIENT)
Dept: FAMILY MEDICINE CLINIC | Age: 60
End: 2020-11-16
Payer: MEDICAID

## 2020-11-16 DIAGNOSIS — N52.9 ERECTILE DYSFUNCTION, UNSPECIFIED ERECTILE DYSFUNCTION TYPE: ICD-10-CM

## 2020-11-16 DIAGNOSIS — G62.9 PERIPHERAL POLYNEUROPATHY: ICD-10-CM

## 2020-11-16 DIAGNOSIS — R51.9 HEADACHE DISORDER: ICD-10-CM

## 2020-11-16 DIAGNOSIS — Z23 ENCOUNTER FOR IMMUNIZATION: ICD-10-CM

## 2020-11-16 DIAGNOSIS — E11.21 TYPE 2 DIABETES WITH NEPHROPATHY (HCC): ICD-10-CM

## 2020-11-16 DIAGNOSIS — I10 HTN, GOAL BELOW 130/80: Primary | ICD-10-CM

## 2020-11-16 DIAGNOSIS — E78.5 HYPERLIPIDEMIA, UNSPECIFIED HYPERLIPIDEMIA TYPE: ICD-10-CM

## 2020-11-16 PROCEDURE — 99443 PR PHYS/QHP TELEPHONE EVALUATION 21-30 MIN: CPT | Performed by: EMERGENCY MEDICINE

## 2020-11-16 RX ORDER — NETARSUDIL 0.2 MG/ML
1 SOLUTION/ DROPS OPHTHALMIC; TOPICAL
COMMUNITY
Start: 2020-11-02

## 2020-11-16 RX ORDER — BRINZOLAMIDE 10 MG/ML
1 SUSPENSION/ DROPS OPHTHALMIC 2 TIMES DAILY
COMMUNITY
Start: 2020-10-16

## 2020-11-16 RX ORDER — GABAPENTIN 100 MG/1
CAPSULE ORAL
Qty: 90 CAP | Refills: 0 | Status: SHIPPED | OUTPATIENT
Start: 2020-11-16 | End: 2021-07-19

## 2020-11-16 RX ORDER — ZOSTER VACCINE RECOMBINANT, ADJUVANTED 50 MCG/0.5
0.5 KIT INTRAMUSCULAR ONCE
Qty: 0.5 ML | Refills: 0 | Status: SHIPPED | OUTPATIENT
Start: 2020-11-16 | End: 2020-11-16

## 2020-12-01 NOTE — PATIENT INSTRUCTIONS
Vaccine Information Statement Influenza (Flu) Vaccine (Inactivated or Recombinant): What You Need to Know Many Vaccine Information Statements are available in [place]ic and other languages. See www.immunize.org/vis Hojas de información sobre vacunas están disponibles en español y en muchos otros idiomas. Visite www.immunize.org/vis 1. Why get vaccinated? Influenza vaccine can prevent influenza (flu). Flu is a contagious disease that spreads around the [facility] every year, usually between October and May. Anyone can get the flu, but it is more dangerous for some people. Infants and young children, people 72years of age and older, pregnant women, and people with certain health conditions or a weakened immune system are at greatest risk of flu complications. Pneumonia, bronchitis, sinus infections and ear infections are examples of flu-related complications. If you have a medical condition, such as heart disease, cancer or diabetes, flu can make it worse. Flu can cause fever and chills, sore throat, muscle aches, fatigue, cough, headache, and runny or stuffy nose. Some people may have vomiting and diarrhea, though this is more common in children than adults. Each year thousands of people in the [facility] die from flu, and many more are hospitalized. Flu vaccine prevents millions of illnesses and flu-related visits to the doctor each year. 2. Influenza vaccines CDC recommends everyone 10months of age and older get vaccinated every flu season. Children 6 months through 6years of age may need 2 doses during a single flu season. Everyone else needs only 1 dose each flu season. It takes about 2 weeks for protection to develop after vaccination. There are many flu viruses, and they are always changing. Each year a new flu vaccine is made to protect against three or four viruses that are likely to cause disease in the upcoming flu season.  Even when the vaccine doesnt exactly match these viruses, it may still provide some protection. Influenza vaccine does not cause flu. Influenza vaccine may be given at the same time as other vaccines. 3. Talk with your health care provider Tell your vaccine provider if the person getting the vaccine: 
 Has had an allergic reaction after a previous dose of influenza vaccine, or has any severe, life-threatening allergies.  Has ever had Guillain-Barré Syndrome (also called GBS). In some cases, your health care provider may decide to postpone influenza vaccination to a future visit. People with minor illnesses, such as a cold, may be vaccinated. People who are moderately or severely ill should usually wait until they recover before getting influenza vaccine. Your health care provider can give you more information. 4. Risks of a reaction  Soreness, redness, and swelling where shot is given, fever, muscle aches, and headache can happen after influenza vaccine.  There may be a very small increased risk of Guillain-Barré Syndrome (GBS) after inactivated influenza vaccine (the flu shot). Zenon Andrew children who get the flu shot along with pneumococcal vaccine (PCV13), and/or DTaP vaccine at the same time might be slightly more likely to have a seizure caused by fever. Tell your health care provider if a child who is getting flu vaccine has ever had a seizure. People sometimes faint after medical procedures, including vaccination. Tell your provider if you feel dizzy or have vision changes or ringing in the ears. As with any medicine, there is a very remote chance of a vaccine causing a severe allergic reaction, other serious injury, or death. 5. What if there is a serious problem? An allergic reaction could occur after the vaccinated person leaves the clinic.  If you see signs of a severe allergic reaction (hives, swelling of the face and throat, difficulty breathing, a fast heartbeat, dizziness, or weakness), call 9-1-1 and get the person to the nearest hospital. 
 
For other signs that concern you, call your health care provider. Adverse reactions should be reported to the Vaccine Adverse Event Reporting System (VAERS). Your health care provider will usually file this report, or you can do it yourself. Visit the VAERS website at www.vaers. hhs.gov or call 1-755.522.8739. VAERS is only for reporting reactions, and VAERS staff do not give medical advice. 6. The National Vaccine Injury Compensation Program 
 
The [facility] Vaccine Injury Compensation Program (VICP) is a federal program that was created to compensate people who may have been injured by certain vaccines. Visit the VICP website at www.hrsa.gov/vaccinecompensation or call 0-798.582.4581 to learn about the program and about filing a claim. There is a time limit to file a claim for compensation. 7. How can I learn more?  Ask your health care provider.  Call your local or state health department.  Contact the Centers for Disease Control and Prevention (CDC): 
- Call 3-459-786-0415 (5-228-EXU-INFO) or 
- Visit CDCs influenza website at www.cdc.gov/flu Vaccine Information Statement (Interim) Inactivated Influenza Vaccine 8/15/2019 
42 ANALILIA Horner [id number] Department of Health and [place] Centers for Disease Control and Prevention Office Use Only

## 2020-12-04 ENCOUNTER — CLINICAL SUPPORT (OUTPATIENT)
Dept: FAMILY MEDICINE CLINIC | Age: 60
End: 2020-12-04
Payer: MEDICAID

## 2020-12-04 DIAGNOSIS — Z23 NEEDS FLU SHOT: ICD-10-CM

## 2020-12-04 DIAGNOSIS — Z23 ENCOUNTER FOR IMMUNIZATION: ICD-10-CM

## 2020-12-04 PROCEDURE — 90471 IMMUNIZATION ADMIN: CPT | Performed by: EMERGENCY MEDICINE

## 2020-12-04 PROCEDURE — 90686 IIV4 VACC NO PRSV 0.5 ML IM: CPT | Performed by: EMERGENCY MEDICINE

## 2021-02-08 ENCOUNTER — TELEPHONE (OUTPATIENT)
Dept: FAMILY MEDICINE CLINIC | Age: 61
End: 2021-02-08

## 2021-02-08 NOTE — TELEPHONE ENCOUNTER
Patient called stating he had a positive Covid test on the 3rd of Feb at [facility] . He would like to get another test to see if he is negative.   Please call him back at [phone]

## 2021-02-10 DIAGNOSIS — U07.1 COVID-19: Primary | ICD-10-CM

## 2021-02-15 ENCOUNTER — HOSPITAL ENCOUNTER (OUTPATIENT)
Dept: LAB | Age: 61
Discharge: HOME | End: 2021-02-15
Payer: MEDICAID

## 2021-02-15 ENCOUNTER — CLINICAL SUPPORT (OUTPATIENT)
Dept: FAMILY MEDICINE CLINIC | Age: 61
End: 2021-02-15

## 2021-02-15 DIAGNOSIS — U07.1 COVID-19: Primary | ICD-10-CM

## 2021-02-15 DIAGNOSIS — U07.1 COVID-19: ICD-10-CM

## 2021-02-15 PROCEDURE — U0003 INFECTIOUS AGENT DETECTION BY NUCLEIC ACID (DNA OR RNA); SEVERE ACUTE RESPIRATORY SYNDROME CORONAVIRUS 2 (SARS-COV-2) (CORONAVIRUS DISEASE [COVID-19]), AMPLIFIED PROBE TECHNIQUE, MAKING USE OF HIGH THROUGHPUT TECHNOLOGIES AS DESCRIBED BY CMS-2020-01-R: HCPCS

## 2021-02-17 LAB — SARS-COV-2, COV2NT: NOT DETECTED

## 2021-02-27 NOTE — PROGRESS NOTES
Consent: Emilio Robbins, who was evaluated by audio only technology, and/or his healthcare decision maker, is aware that this patient-initiated, Telehealth audio encounter on 3/1/2021 is a billable service, with coverage as determined by his insurance carrier. He is aware that he may receive a bill and has provided verbal consent to proceed: Yes. The patient was at home and I was at the offices of the [address] [place] [address] no one else participated in the service. This patient has poorly controlled diabetes. We will recheck labs and referred to pharmacy for education. He has urinary hesitancy. The differential diagnosis includes but is not limited to BPH, prostate cancer, or other etiologies. We will initiate work-up with PSA. Treat with Flomax. ICD-10-CM ICD-9-CM    1. Type 2 diabetes with nephropathy (HCC)  E11.21 250.40 CBC WITH AUTOMATED DIFF     261.31 METABOLIC PANEL, COMPREHENSIVE      HEMOGLOBIN A1C WITH EAG      MICROALBUMIN, UR, RAND W/ MICROALB/CREAT RATIO      glucose blood VI test strips (ASCENSIA AUTODISC VI, ONE TOUCH ULTRA TEST VI) strip    This is a chronic problem that is not controlled. The plan is to obtain follow-up labs and adjust meds. Referral to pharmacist   2. HTN, goal below 130/80  I10 401.9 CBC WITH AUTOMATED DIFF      MAGNESIUM    This is a chronic problem. It is presently well controlled. We will continue the same treatment. 3. Hyperlipidemia, unspecified hyperlipidemia type  E78.5 272.4 LIPID PANEL    This is a chronic problem that is presently stable. Follow up labs/diagnostics ordered   4. Screening for prostate cancer  Z12.5 V76.44 PSA SCREENING (SCREENING)    PSA ordered   5. Urinary hesitancy  R39.11 788.64 PSA SCREENING (SCREENING)      tamsulosin (Flomax) 0.4 mg capsule    Trial of Flomax.   PSA ordered     Follow-up and Dispositions    · Return in about 3 months (around 6/1/2021) for Follow up on illness, Labs/diagnostics/referrals ordered this visit.       lab results and schedule of future lab studies reviewed with patient  Diagnostic and radiologic results and the schedule of future studies were reviewed with the patient  reviewed diet, exercise and weight control  All questions were answered and understood. Health Maintenance Due   Topic Date Due    COVID-19 Vaccine (1 of 2) 09/01/1976    Shingrix Vaccine Age 50> (1 of 2) 09/01/2011    Lipid Screen  08/29/2020    A1C test (Diabetic or Prediabetic)  01/28/2021    MICROALBUMIN Q1  01/28/2021    Foot Exam Q1  02/26/2021       Subjective:   Daquan Livingston is a 62 y.o. male who is being seen in follow-up. The patient has Hyperlipidemia, Morbid obesity ([address] DM type 2 with diabetic peripheral neuropathy ([address] Type 2 diabetes with nephropathy ([address] Primary open angle glaucoma, Chronic idiopathic gout involving toe of right foot without tophus, and Diabetic polyneuropathy associated with type 2 diabetes mellitus ([address] on their problem list..  The patient was seen on11/16/20. The issues addressed at that time  was HTN, goal below 130/80. Diagnoses of Type 2 diabetes with nephropathy ([address] Hyperlipidemia, unspecified hyperlipidemia type, Encounter for immunization, Erectile dysfunction, unspecified erectile dysfunction type, Peripheral polyneuropathy, and Headache disorder were also pertinent to this visit. .  Diabetes mellitus  The patient denies polyuria, polydipsia, or polyphagia. There has been no problem with the medications. He has not checked his glucometer , out of strips for testing  Protity  Wt Readings from Last 3 Encounters:   02/26/20 252 lb (114.3 kg)   11/26/19 256 lb (116.1 kg)   08/29/19 263 lb (119.3 kg)     body mass index is unknown because there is no height or weight on file.   Lab Results   Component Value Date/Time    Hemoglobin A1c 12.1 (H) 01/28/2020 11:33 AM    Hemoglobin A1c (POC) 12.2 05/30/2019 08:13 AM     Lab Results   Component Value Date/Time    Glucose 246 (H) 01/28/2020 11:33 AM    Glucose (POC) 206 (H) 03/30/2014 09:48 AM    Glucose  11/22/2017 09:00 AM     Key Antihyperglycemic Medications             canagliflozin (INVOKANA) 100 mg tablet Take 1 Tab by mouth Daily (before breakfast). SITagliptin (JANUVIA) 100 mg tablet Take 1 Tab by mouth daily. metFORMIN ER (GLUCOPHAGE XR) 750 mg tablet Take 1 Tab by mouth daily (with dinner). glipiZIDE (GLUCOTROL) 10 mg tablet Take 1 Tab by mouth two (2) times a day. Hypertension  The patient has had no problem with the medication. The patient has no headaches, visual changes, chest pain or pressure,dyspnea, orthopnea, abdominal pain, dysuria, weakness, or paresthesias. BP Readings from Last 3 Encounters:   02/26/20 125/70   11/26/19 124/66   08/29/19 139/76       Hyperlipidemia  The patient has had no problem with the medications  The patient denies muscle aches, headache, GI symptoms or difficulty with mentation. Key Antihyperlipidemia Meds             atorvastatin (LIPITOR) 40 mg tablet Take 1 Tab by mouth daily. Indications: excessive fat in the blood        Lab Results   Component Value Date/Time    Cholesterol, total 181 08/29/2019 09:25 AM    HDL Cholesterol 50 08/29/2019 09:25 AM    LDL, calculated 72.4 08/29/2019 09:25 AM    VLDL, calculated 58.6 08/29/2019 09:25 AM    Triglyceride 293 (H) 08/29/2019 09:25 AM    CHOL/HDL Ratio 3.6 08/29/2019 09:25 AM       Urinary hesitancy  The location was the urinary strain. The quality is described as decreased urinary flow although he states that he thinks he empties completely. he onset was 3 months prior. This includes the timing of associated nocturia aggravating factors include drinking fluids late at night. Alleviating factors are described as nothing. Associated symptoms are negative for any hematuria nocturia cloudy urine fevers or chills.       Current Outpatient Medications   Medication Sig    ibuprofen (MOTRIN) 800 mg tablet TAKE 1 TAB BY MOUTH EVERY EIGHT (8) HOURS AS NEEDED FOR PAIN.  gabapentin (NEURONTIN) 100 mg capsule Take 1 at night for 3 nights then 3 at night    Rhopressa 0.02 % drop Administer 1 Drop to both eyes nightly.  Azopt 1 % ophthalmic suspension Administer 1 Drop to both eyes two (2) times a day.  atorvastatin (LIPITOR) 40 mg tablet Take 1 Tab by mouth daily. Indications: excessive fat in the blood    lisinopriL (PRINIVIL, ZESTRIL) 10 mg tablet Take 1 Tab by mouth daily. Indications: kidney disease from diabetes    canagliflozin (INVOKANA) 100 mg tablet Take 1 Tab by mouth Daily (before breakfast).  carbamide peroxide (DEBROX) 6.5 % otic solution Administer 5 Drops into each ear two (2) times a day.  SITagliptin (JANUVIA) 100 mg tablet Take 1 Tab by mouth daily.  metFORMIN ER (GLUCOPHAGE XR) 750 mg tablet Take 1 Tab by mouth daily (with dinner).  glipiZIDE (GLUCOTROL) 10 mg tablet Take 1 Tab by mouth two (2) times a day.  allopurinol (ZYLOPRIM) 300 mg tablet Take 1 Tab by mouth daily. Indications: prevention of acute gout attack    multivitamin (ONE A DAY) tablet Take 1 Tab by mouth daily.  glucose blood VI test strips (TRUE METRIX GLUCOSE TEST STRIP) strip Use as directed to test BS 3 times a day    sildenafil citrate (VIAGRA) 100 mg tablet Take 1 Tab by mouth daily as needed.  Blood-Glucose Meter monitoring kit by Does Not Apply route daily.  Lancets misc Use as directed 3 times a day    glucose blood VI test strips (TRUETEST TEST STRIPS) strip by Does Not Apply route three (3) times daily.  timolol (TIMOPTIC-XE) 0.5 % ophthalmic gel-forming instill 1 drop into both eyes once daily     No current facility-administered medications for this visit.       No Known Allergies  has Hyperlipidemia, Morbid obesity ([address] DM type 2 with diabetic peripheral neuropathy ([address] Type 2 diabetes with nephropathy ([address] Primary open angle glaucoma, Chronic idiopathic gout involving toe of right foot without tophus, and Diabetic polyneuropathy associated with type 2 diabetes mellitus ([facility] on their problem list.  Past Surgical History:   Procedure Laterality Date    HX OTHER SURGICAL      pt reports gallstone removal      reports that he has quit smoking. His smoking use included cigarettes. He has a 7.50 pack-year smoking history. He quit smokeless tobacco use about 5 years ago. He reports that he does not drink alcohol or use drugs. family history includes Diabetes in his maternal grandmother, mother, and sister; Sickle Cell Trait in an other family member. Review of Systems   Constitutional: Negative for chills, fever, malaise/fatigue and weight loss (Has not checked recently). HENT: Negative for congestion and sore throat. Eyes: Positive for blurred vision (Ophthalmologist added new eyedrop). Negative for redness. Respiratory: Negative for cough, shortness of breath and wheezing. Cardiovascular: Negative for chest pain and leg swelling. PND:    Gastrointestinal: Negative for abdominal pain, blood in stool, constipation, diarrhea and heartburn. Genitourinary: Negative for dysuria and urgency. Musculoskeletal: Negative for joint pain and myalgias. Neurological: Positive for headaches. Negative for dizziness, sensory change, speech change and focal weakness. Endo/Heme/Allergies: Does not bruise/bleed easily. Psychiatric/Behavioral: The patient is not nervous/anxious. Results for orders placed or performed during the hospital encounter of 02/15/21   NOVEL CORONAVIRUS (COVID-19)   Result Value Ref Range    SARS-CoV-2 Not Detected Not Detected          Daquan KWAN Richar Warner   was evaluated through a patient-initiated, synchronous (real-time) audio only encounter, and/or her healthcare decision maker, is aware that it is a billable service, with coverage as determined by her insurance carrier. She provided verbal consent to proceed: Yes.  She has not had a related appointment within my department in the past 7 days or scheduled within the next 24 hours. Total Time: minutes: 21-30 minutes    Luz Elena Sutherland MD       We discussed the expected course, resolution and complications of the diagnosis(es) in detail. Medication risks, benefits, costs, interactions, and alternatives were discussed as indicated. I advised him to contact the office if his condition worsens, changes or fails to improve as anticipated. He expressed understanding with the diagnosis(es) and plan. Maxine Gottlieb is a 62 y.o. male being evaluated by a video visit encounter for concerns as above. A caregiver was present when appropriate. Due to this being a TeleHealth encounter (During [facility]- public [facility] emergency), evaluation of the following organ systems was limited: Vitals/Constitutional/EENT/Resp/CV/GI//MS/Neuro/Skin/Heme-Lymph-Imm. Pursuant to the emergency declaration under the [facility] waiver authority and the [employer] and [employer] Act, this Virtual  Visit was conducted, with patient's (and/or legal guardian's) consent, to reduce the patient's risk of exposure to COVID-19 and provide necessary medical care. This note was done with the assistance of dragon speech software.   Some inadvertent errors or omissions may be present

## 2021-03-01 ENCOUNTER — VIRTUAL VISIT (OUTPATIENT)
Dept: FAMILY MEDICINE CLINIC | Age: 61
End: 2021-03-01
Payer: MEDICAID

## 2021-03-01 DIAGNOSIS — E78.5 HYPERLIPIDEMIA, UNSPECIFIED HYPERLIPIDEMIA TYPE: ICD-10-CM

## 2021-03-01 DIAGNOSIS — I10 HTN, GOAL BELOW 130/80: ICD-10-CM

## 2021-03-01 DIAGNOSIS — Z12.5 SCREENING FOR PROSTATE CANCER: ICD-10-CM

## 2021-03-01 DIAGNOSIS — R39.11 URINARY HESITANCY: ICD-10-CM

## 2021-03-01 DIAGNOSIS — E11.21 TYPE 2 DIABETES WITH NEPHROPATHY (HCC): Primary | ICD-10-CM

## 2021-03-01 PROCEDURE — 99443 PR PHYS/QHP TELEPHONE EVALUATION 21-30 MIN: CPT | Performed by: EMERGENCY MEDICINE

## 2021-03-07 RX ORDER — TAMSULOSIN HYDROCHLORIDE 0.4 MG/1
CAPSULE ORAL
Qty: 30 CAP | Refills: 3 | Status: SHIPPED | OUTPATIENT
Start: 2021-03-07 | End: 2021-08-13

## 2021-03-10 ENCOUNTER — TELEPHONE (OUTPATIENT)
Dept: FAMILY MEDICINE CLINIC | Age: 61
End: 2021-03-10

## 2021-03-10 NOTE — TELEPHONE ENCOUNTER
Called patient to schedule appointment for PharmD diabetes education and management per referral from PCP Dr. Cecille Galindo. Confirmed patient's . Scheduled patient for PharmD telephone appointment on 3/23. Instructed patient to have all medications and BG readings ready for appointment. Patient expressed understanding and had no further questions. Thank you,  Khoi Liz.  Pam Collier, CHELLYD, BCPS    CLINICAL PHARMACY CONSULT: MED RECONCILIATION/REVIEW ADDENDUM    For Pharmacy Admin Tracking Only    PHSO: PHSO Patient?: No  Time Spent (min): 5

## 2021-03-12 ENCOUNTER — HOSPITAL ENCOUNTER (OUTPATIENT)
Dept: LAB | Age: 61
Discharge: HOME | End: 2021-03-12
Payer: MEDICAID

## 2021-03-12 DIAGNOSIS — I10 HTN, GOAL BELOW 130/80: ICD-10-CM

## 2021-03-12 DIAGNOSIS — Z12.5 SCREENING FOR PROSTATE CANCER: ICD-10-CM

## 2021-03-12 DIAGNOSIS — R39.11 URINARY HESITANCY: ICD-10-CM

## 2021-03-12 DIAGNOSIS — E11.21 TYPE 2 DIABETES WITH NEPHROPATHY (HCC): ICD-10-CM

## 2021-03-12 DIAGNOSIS — E78.5 HYPERLIPIDEMIA, UNSPECIFIED HYPERLIPIDEMIA TYPE: ICD-10-CM

## 2021-03-12 DIAGNOSIS — N52.9 ERECTILE DYSFUNCTION, UNSPECIFIED ERECTILE DYSFUNCTION TYPE: ICD-10-CM

## 2021-03-12 LAB
ALBUMIN SERPL-MCNC: 3.9 G/DL (ref 3.4–5)
ALBUMIN/GLOB SERPL: 1.1 {RATIO} (ref 0.8–1.7)
ALP SERPL-CCNC: 123 U/L (ref 45–117)
ALT SERPL-CCNC: 33 U/L (ref 16–61)
ANION GAP SERPL CALC-SCNC: 7 MMOL/L (ref 3–18)
AST SERPL-CCNC: 19 U/L (ref 10–38)
BASOPHILS # BLD: 0 K/UL (ref 0–0.1)
BASOPHILS NFR BLD: 0 % (ref 0–2)
BILIRUB SERPL-MCNC: 0.9 MG/DL (ref 0.2–1)
BUN SERPL-MCNC: 6 MG/DL (ref 7–18)
BUN/CREAT SERPL: 8 (ref 12–20)
CALCIUM SERPL-MCNC: 9.1 MG/DL (ref 8.5–10.1)
CHLORIDE SERPL-SCNC: 105 MMOL/L (ref 100–111)
CHOLEST SERPL-MCNC: 184 MG/DL
CO2 SERPL-SCNC: 27 MMOL/L (ref 21–32)
CREAT SERPL-MCNC: 0.8 MG/DL (ref 0.6–1.3)
CREAT UR-MCNC: 83 MG/DL (ref 30–125)
DIFFERENTIAL METHOD BLD: ABNORMAL
EOSINOPHIL # BLD: 0.1 K/UL (ref 0–0.4)
EOSINOPHIL NFR BLD: 1 % (ref 0–5)
ERYTHROCYTE [DISTWIDTH] IN BLOOD BY AUTOMATED COUNT: 13.9 % (ref 11.6–14.5)
EST. AVERAGE GLUCOSE BLD GHB EST-MCNC: 295 MG/DL
GLOBULIN SER CALC-MCNC: 3.5 G/DL (ref 2–4)
GLUCOSE SERPL-MCNC: 274 MG/DL (ref 74–99)
HBA1C MFR BLD: 11.9 % (ref 4.2–5.6)
HCT VFR BLD AUTO: 39.6 % (ref 36–48)
HDLC SERPL-MCNC: 57 MG/DL (ref 40–60)
HDLC SERPL: 3.2 {RATIO} (ref 0–5)
HGB BLD-MCNC: 14 G/DL (ref 13–16)
LDLC SERPL CALC-MCNC: 85.8 MG/DL (ref 0–100)
LIPID PROFILE,FLP: ABNORMAL
LYMPHOCYTES # BLD: 3.2 K/UL (ref 0.9–3.6)
LYMPHOCYTES NFR BLD: 40 % (ref 21–52)
MAGNESIUM SERPL-MCNC: 2.4 MG/DL (ref 1.6–2.6)
MCH RBC QN AUTO: 29.2 PG (ref 24–34)
MCHC RBC AUTO-ENTMCNC: 35.4 G/DL (ref 31–37)
MCV RBC AUTO: 82.5 FL (ref 74–97)
MICROALBUMIN UR-MCNC: 8.49 MG/DL (ref 0–3)
MICROALBUMIN/CREAT UR-RTO: 102 MG/G (ref 0–30)
MONOCYTES # BLD: 0.5 K/UL (ref 0.05–1.2)
MONOCYTES NFR BLD: 7 % (ref 3–10)
NEUTS SEG # BLD: 4.2 K/UL (ref 1.8–8)
NEUTS SEG NFR BLD: 52 % (ref 40–73)
PLATELET # BLD AUTO: 196 K/UL (ref 135–420)
PMV BLD AUTO: 12.2 FL (ref 9.2–11.8)
POTASSIUM SERPL-SCNC: 4 MMOL/L (ref 3.5–5.5)
PROT SERPL-MCNC: 7.4 G/DL (ref 6.4–8.2)
PSA SERPL-MCNC: 0.5 NG/ML (ref 0–4)
RBC # BLD AUTO: 4.8 M/UL (ref 4.7–5.5)
SODIUM SERPL-SCNC: 139 MMOL/L (ref 136–145)
TRIGL SERPL-MCNC: 206 MG/DL (ref ?–150)
VLDLC SERPL CALC-MCNC: 41.2 MG/DL
WBC # BLD AUTO: 8 K/UL (ref 4.6–13.2)

## 2021-03-12 PROCEDURE — 82043 UR ALBUMIN QUANTITATIVE: CPT

## 2021-03-12 PROCEDURE — 80053 COMPREHEN METABOLIC PANEL: CPT

## 2021-03-12 PROCEDURE — 84153 ASSAY OF PSA TOTAL: CPT

## 2021-03-12 PROCEDURE — 84402 ASSAY OF FREE TESTOSTERONE: CPT

## 2021-03-12 PROCEDURE — 83036 HEMOGLOBIN GLYCOSYLATED A1C: CPT

## 2021-03-12 PROCEDURE — 85025 COMPLETE CBC W/AUTO DIFF WBC: CPT

## 2021-03-12 PROCEDURE — 80061 LIPID PANEL: CPT

## 2021-03-12 PROCEDURE — 36415 COLL VENOUS BLD VENIPUNCTURE: CPT

## 2021-03-12 PROCEDURE — 83735 ASSAY OF MAGNESIUM: CPT

## 2021-03-17 LAB — TESTOST FREE SERPL-MCNC: 8.2 PG/ML (ref 7.2–24)

## 2021-03-23 ENCOUNTER — VIRTUAL VISIT (OUTPATIENT)
Dept: FAMILY MEDICINE CLINIC | Age: 61
End: 2021-03-23

## 2021-03-23 DIAGNOSIS — E11.21 TYPE 2 DIABETES WITH NEPHROPATHY (HCC): Primary | ICD-10-CM

## 2021-03-23 NOTE — Clinical Note
Hi Dr. Gustavo Boyd,  Spoke with Mr. Sarina Durant for diabetes management. He states that he is not taking any of his medications except metformin and eye drops. He has experienced a lot of side effects over the years which has deterred him from taking anything. We spent a lot of time discussing the importance of taking medications and I think he is ready to start again. Would start by increasing his metformin ER to 1000 mg BID when you get a chance. From there, we can discuss medication options to add next.      Thanks,  Brett Hernández

## 2021-03-24 NOTE — PROGRESS NOTES
Pharmacy Progress Note - Diabetes Management    S/O: Mr. Josiah Martines is a 62 y.o. male, referred by Dr. Kurt Hoang MD was contacted today for diabetes management telephone visit. Patient's last A1c was 11.9% in March 2021. This is a decrease from 12.1% in January 2020. Brief History: Patient states that he has had diabetes for a while (over 10 years). He has been on and off of medications during this time due to the side effects he was experiencing. He states that he wasn't sure which medication was causing which side effects, so he stopped taking most of them. Currently, he states that he is only taking metformin (once daily) and his eye drops. Current anti-hyperglycemic regimen include(s):    - Metformin  mg once daily  - Januvia 100 mg once daily - not taking   - Invokanna 100 mg once daily - not taking   - Glipizide 10 mg BID - not taking     Patient denies adherence to his medications  Does report being on insulin in the past, but it made him itch     ROS:  Today, Pt endorses:  - Symptoms of Hyperglycemia: none  - Symptoms of Hypoglycemia: none    Self Monitoring Blood Glucose (SMBG) or CGM:  - Has supplies but not checking sugars at home    Nutrition/Lifestyle Modifications:  - Eats 2-3 meals/day ;  - Breakfast: Cereal (frosted flakes)  - Lunch/Dinner: May skip lunch. Meals are usually fried chicken or fish meal with greens and marcela slaw. Tries not to get fries or biscuits   - Snack(s): Eats sweets daily (ice cream), but has cut back on foods like chips/crackers   - Beverage(s): Mostly water     - Physical Activity:   - Exercises daily per his report       Vitals:   Wt Readings from Last 3 Encounters:   02/26/20 252 lb (114.3 kg)   11/26/19 256 lb (116.1 kg)   08/29/19 263 lb (119.3 kg)     BP Readings from Last 3 Encounters:   02/26/20 125/70   11/26/19 124/66   08/29/19 139/76     Pulse Readings from Last 3 Encounters:   02/26/20 64   11/26/19 63   08/29/19 67       Past Medical History:   Diagnosis Date    Diabetes ([facility]     DM type 2 with diabetic peripheral neuropathy ([facility] 2018    Dr Debbie Dobson Hypertensive retinopathy     Morbid obesity ([facility]     Pneumonia     Primary open angle glaucoma     Sickle cell trait (HCC)      No Known Allergies    Current Outpatient Medications   Medication Sig    glucose blood VI test strips (ASCENSIA AUTODISC VI, ONE TOUCH ULTRA TEST VI) strip Use to check blood glucose daily    tamsulosin (Flomax) 0.4 mg capsule Take 1 tab by mouth daily    ibuprofen (MOTRIN) 800 mg tablet TAKE 1 TAB BY MOUTH EVERY EIGHT (8) HOURS AS NEEDED FOR PAIN.  gabapentin (NEURONTIN) 100 mg capsule Take 1 at night for 3 nights then 3 at night    Rhopressa 0.02 % drop Administer 1 Drop to both eyes nightly.  Azopt 1 % ophthalmic suspension Administer 1 Drop to both eyes two (2) times a day.  atorvastatin (LIPITOR) 40 mg tablet Take 1 Tab by mouth daily. Indications: excessive fat in the blood    lisinopriL (PRINIVIL, ZESTRIL) 10 mg tablet Take 1 Tab by mouth daily. Indications: kidney disease from diabetes    canagliflozin (INVOKANA) 100 mg tablet Take 1 Tab by mouth Daily (before breakfast).  carbamide peroxide (DEBROX) 6.5 % otic solution Administer 5 Drops into each ear two (2) times a day.  SITagliptin (JANUVIA) 100 mg tablet Take 1 Tab by mouth daily.  metFORMIN ER (GLUCOPHAGE XR) 750 mg tablet Take 1 Tab by mouth daily (with dinner).  glipiZIDE (GLUCOTROL) 10 mg tablet Take 1 Tab by mouth two (2) times a day.  allopurinol (ZYLOPRIM) 300 mg tablet Take 1 Tab by mouth daily. Indications: prevention of acute gout attack    multivitamin (ONE A DAY) tablet Take 1 Tab by mouth daily.  glucose blood VI test strips (TRUE METRIX GLUCOSE TEST STRIP) strip Use as directed to test BS 3 times a day    sildenafil citrate (VIAGRA) 100 mg tablet Take 1 Tab by mouth daily as needed.  Blood-Glucose Meter monitoring kit by Does Not Apply route daily.     Lancets misc Use as directed 3 times a day    glucose blood VI test strips (TRUETEST TEST STRIPS) strip by Does Not Apply route three (3) times daily.  timolol (TIMOPTIC-XE) 0.5 % ophthalmic gel-forming instill 1 drop into both eyes once daily     No current facility-administered medications for this visit. Lab Results   Component Value Date/Time    Sodium 139 03/12/2021 08:36 AM    Potassium 4.0 03/12/2021 08:36 AM    Chloride 105 03/12/2021 08:36 AM    CO2 27 03/12/2021 08:36 AM    Anion gap 7 03/12/2021 08:36 AM    Glucose 274 (H) 03/12/2021 08:36 AM    BUN 6 (L) 03/12/2021 08:36 AM    Creatinine 0.80 03/12/2021 08:36 AM    BUN/Creatinine ratio 8 (L) 03/12/2021 08:36 AM    GFR est AA >60 03/12/2021 08:36 AM    GFR est non-AA >60 03/12/2021 08:36 AM    Calcium 9.1 03/12/2021 08:36 AM    Bilirubin, total 0.9 03/12/2021 08:36 AM    Alk.  phosphatase 123 (H) 03/12/2021 08:36 AM    Protein, total 7.4 03/12/2021 08:36 AM    Albumin 3.9 03/12/2021 08:36 AM    Globulin 3.5 03/12/2021 08:36 AM    A-G Ratio 1.1 03/12/2021 08:36 AM    ALT (SGPT) 33 03/12/2021 08:36 AM       Lab Results   Component Value Date/Time    Cholesterol, total 184 03/12/2021 08:36 AM    HDL Cholesterol 57 03/12/2021 08:36 AM    LDL, calculated 85.8 03/12/2021 08:36 AM    VLDL, calculated 41.2 03/12/2021 08:36 AM    Triglyceride 206 (H) 03/12/2021 08:36 AM    CHOL/HDL Ratio 3.2 03/12/2021 08:36 AM       Lab Results   Component Value Date/Time    WBC 8.0 03/12/2021 08:36 AM    HGB 14.0 03/12/2021 08:36 AM    HCT 39.6 03/12/2021 08:36 AM    PLATELET 219 16/03/2061 08:36 AM    MCV 82.5 03/12/2021 08:36 AM       Lab Results   Component Value Date/Time    Microalbumin/Creat ratio (mg/g creat) 102 (H) 03/12/2021 02:46 PM    Microalbumin,urine random 8.49 (H) 03/12/2021 02:46 PM    Microalbumin urine (POC) 80 05/30/2019 10:50 AM       HbA1c:  Lab Results   Component Value Date/Time    Hemoglobin A1c 11.9 (H) 03/12/2021 08:38 AM    Hemoglobin A1c (POC) 12.2 05/30/2019 08:13 AM     No components found for: 2     Last Point of Care HGB A1C  Hemoglobin A1c (POC)   Date Value Ref Range Status   05/30/2019 12.2 % Final        CrCl cannot be calculated (Unknown ideal weight. ). A/P:    Diabetes Management:  - Per ADA guidelines, Pt's A1c is not at goal of < 7%. - Spent most of visit today discussing importance of medication adherence and blood glucose control in avoidance of diabetic complications or further progression if already present  - Acknowledged patient's hesitation with medications due to potential side effects, but discussed that there are a variety of options available that may be more tolerable for him   - Patient agreement to start taking medications again. Will add/increase doses one at a time to ensure patient is tolerating each medication okay  - Will start by increasing metformin to 1000 mg twice daily. Will ask PCP to sign prescription.  - Follow up in 2 weeks for further discussion regarding blood sugar monitoring and review additional medication options moving forward     Nutrition/Lifestyle Modifications:  - Reviewed with patient utilization of the plate method as a way to encourage healthy eating. Reviewed carbohydrate and non-carbohydrate rich foods, carbohydrate content of various foods, appropriate serving sizes for carbohydrates, reading the nutrition label focusing on total carbohydrates while being mindful of serving size, recommended serving sizes for carbohydrates for meals and snacks, and discussion regarding fruits as a carbohydrate. Medication reconciliation was completed during the visit. There are no discontinued medications. Notifications of recommendations will be sent to Dr. Lena Hamm MD for review. Will follow up with patient in 2 week(s). Thank you,  Marleen Valdivia.  Jeannine Mccann, PHARMD, BCPS                          CLINICAL PHARMACY CONSULT: MED RECONCILIATION/REVIEW ADDENDUM    For Pharmacy Admin Tracking Only    PHSO: PHSO Patient?: No  Total # of Interventions Recommended: Count: 2  - Increased Dose #: 1  - Diabetes/nutrition education   Total Interventions Accepted: 2  Time Spent (min): 30

## 2021-04-06 ENCOUNTER — TELEPHONE (OUTPATIENT)
Dept: FAMILY MEDICINE CLINIC | Age: 61
End: 2021-04-06

## 2021-04-06 NOTE — TELEPHONE ENCOUNTER
Pharmacy Progress Note - Telephone Encounter    S/O: Mr. Sinan Boykin 62 y.o. male, referred by Dr. Kasey Brenard MD, was contacted via an outbound telephone call to discuss  today. Verified patients identifiers (name & ) per HIPAA policy. - Will hold off on follow up visit with patient today. He is out of his metformin and has not been taking anything for his diabetes   - Patient also states that he has not been checking blood sugars   - Will send message to PCP to send in refills for metformin and to increase dose to 1000 mg ER BID with meals. - Instructed him to start checking blood sugars at least once daily (fasting) again   - Will schedule follow up once he is able to restart medications       Thank you,  Danika Ac.  Arlette Osuna, PHARMD, [facility]        CLINICAL PHARMACY CONSULT: MED RECONCILIATION/REVIEW ADDENDUM    For Pharmacy Admin Tracking Only    PHSO: PHSO Patient?: No  Time Spent (min): 10

## 2021-04-11 DIAGNOSIS — E11.65 UNCONTROLLED TYPE 2 DIABETES MELLITUS WITH HYPERGLYCEMIA (HCC): ICD-10-CM

## 2021-04-11 RX ORDER — METFORMIN HYDROCHLORIDE 1000 MG/1
1000 TABLET ORAL 2 TIMES DAILY WITH MEALS
Qty: 180 TAB | Refills: 3 | Status: SHIPPED | OUTPATIENT
Start: 2021-04-11 | End: 2021-09-06

## 2021-05-27 DIAGNOSIS — E78.5 HYPERLIPIDEMIA, UNSPECIFIED HYPERLIPIDEMIA TYPE: ICD-10-CM

## 2021-05-27 DIAGNOSIS — E11.21 TYPE 2 DIABETES WITH NEPHROPATHY (HCC): ICD-10-CM

## 2021-05-27 RX ORDER — LISINOPRIL 10 MG/1
TABLET ORAL
Qty: 90 TABLET | Refills: 3 | Status: SHIPPED | OUTPATIENT
Start: 2021-05-27 | End: 2021-09-06

## 2021-05-27 RX ORDER — ATORVASTATIN CALCIUM 40 MG/1
40 TABLET, FILM COATED ORAL DAILY
Qty: 90 TABLET | Refills: 3 | Status: SHIPPED | OUTPATIENT
Start: 2021-05-27 | End: 2021-09-06

## 2021-05-30 NOTE — PROGRESS NOTES
06/09/21      ICD-10-CM ICD-9-CM    1. Hyperlipidemia, unspecified hyperlipidemia type  E78.5 272.4 LIPID PANEL   2. Type 2 diabetes with nephropathy (HCC)  N49.09 128.86 METABOLIC PANEL, BASIC     583.81 HEMOGLOBIN A1C WITH EAG      MICROALBUMIN, UR, RAND W/ MICROALB/CREAT RATIO   3. HTN, goal below 130/80  I10 401.9 CBC WITH AUTOMATED DIFF   4. Chronic idiopathic gout involving toe of right foot without tophus  M1A.0710 274.02 allopurinoL (ZYLOPRIM) 300 mg tablet   5. Encounter for immunization  Z23 V03.89 varicella-zoster recombinant, PF, (Shingrix, PF,) 50 mcg/0.5 mL susr injection   6. Urinary frequency  R35.0 788.41 PSA SCREENING (SCREENING)         The patient has essential hypertension which is in good control continue present regimen. Patient has type 2 diabetes mellitus with nephropathy previously not in good control. He has seen the pharmacist.  Follow-up hemoglobin A1c ordered. Hyperlipidemia  lab results and schedule of future lab studies reviewed with patient  Diagnostic and radiologic results and the schedule of future studies were reviewed with the patient  reviewed diet, exercise and weight control  All questions were answered and understood.               Health Maintenance Due   Topic Date Due    COVID-19 Vaccine (1) Never done    Shingrix Vaccine Age 50> (1 of 2) Never done    Foot Exam Q1  02/26/2021    Eye Exam Retinal or Dilated  05/06/2021    A1C test (Diabetic or Prediabetic)  06/12/2021   Dr Que Franks, seen 2 months ago for IOP    Subjective:   Daquan Dean is a 62 y.o. male has Hyperlipidemia, Morbid obesity ([address] DM type 2 with diabetic peripheral neuropathy ([address] Type 2 diabetes with nephropathy ([address] Primary open angle glaucoma, Chronic idiopathic gout involving toe of right foot without tophus, and Diabetic polyneuropathy associated with type 2 diabetes mellitus ([address] on their problem list..  The patient was seen in March 2021 for type 2 diabetes mellitus with nephropathy which was not controlled. He was referred to pharmacy. The patient had hypertension that was well controlled. He was also seen for hyperlipidemia and follow-up labs were ordered. He was screened for prostate cancer urinary hesitancy. For this we will trial Flomax. He has follow-up with pharmacy. His hemoglobin A1c in March 2021 was 11.9. He had 102 microalbumin. He has seen pharmacist for assistance with diabetic management. Hyperlipidemia. Previously in good control except for hypertriglyceridemia. Follow-up lipid panel ordered. The patient has history of gout with no recurrence at this time. The patient has urinary frequency and hesitancy with decreased stream.  Will order PSA. May need further work-up including ultrasound or referral.  The you 3 months for or durable medical equipment 100 what kind of is is the A1c is he states that he does not tolerate the CPAP machine apparatus nasal plugs too well and wants to try something different. Here but I will  Follow Up Chronic Condition (HTN DM Cholesterol ) and Medication Refill    Hypertension  The patient has had no problem with the medication. The patient has no headaches, visual changes, chest pain or pressure,dyspnea, orthopnea, or PND  Diabetes mellitus  The patient denies polyuria, polydipsia, or polyphagia. There has been no problem with the medications. No recent readings at home. Hyperlipidemia  The patient has had no problem with the medications. The patient denies any myalgias or weakness. No abdominal discomfort admitted to. No history of CVAs chest pain or cardiovascular disease or peripheral vascular symptoms. BP Readings from Last 3 Encounters:   06/09/21 138/78   02/26/20 125/70   11/26/19 124/66       ECHO ADULT COMPLETE 03/29/2019 3/29/2019    Interpretation Summary  · Estimated left ventricular ejection fraction is 56 - 60%. Visually measured ejection fraction. Left ventricular mild concentric hypertrophy.  No regional wall motion abnormality noted. Signed by: Elaina Obrien MD on 3/29/2019  4:17 PM    Lab Results   Component Value Date/Time    Hemoglobin A1c 11.9 (H) 03/12/2021 08:38 AM    Hemoglobin A1c (POC) 12.2 05/30/2019 08:13 AM     Lab Results   Component Value Date/Time    Cholesterol, total 184 03/12/2021 08:36 AM    HDL Cholesterol 57 03/12/2021 08:36 AM    LDL, calculated 85.8 03/12/2021 08:36 AM    VLDL, calculated 41.2 03/12/2021 08:36 AM    Triglyceride 206 (H) 03/12/2021 08:36 AM    CHOL/HDL Ratio 3.2 03/12/2021 08:36 AM             Current Outpatient Medications   Medication Sig    atorvastatin (LIPITOR) 40 mg tablet TAKE 1 TAB BY MOUTH DAILY. INDICATIONS: EXCESSIVE FAT IN THE BLOOD    lisinopriL (PRINIVIL, ZESTRIL) 10 mg tablet TAKE 1 TABLET BY MOUTH DAILY    metFORMIN (GLUCOPHAGE) 1,000 mg tablet Take 1 Tab by mouth two (2) times daily (with meals).  glucose blood VI test strips (ASCENSIA AUTODISC VI, ONE TOUCH ULTRA TEST VI) strip Use to check blood glucose daily    tamsulosin (Flomax) 0.4 mg capsule Take 1 tab by mouth daily    ibuprofen (MOTRIN) 800 mg tablet TAKE 1 TAB BY MOUTH EVERY EIGHT (8) HOURS AS NEEDED FOR PAIN.  gabapentin (NEURONTIN) 100 mg capsule Take 1 at night for 3 nights then 3 at night    Rhopressa 0.02 % drop Administer 1 Drop to both eyes nightly.  Azopt 1 % ophthalmic suspension Administer 1 Drop to both eyes two (2) times a day.  canagliflozin (INVOKANA) 100 mg tablet Take 1 Tab by mouth Daily (before breakfast).  carbamide peroxide (DEBROX) 6.5 % otic solution Administer 5 Drops into each ear two (2) times a day.  SITagliptin (JANUVIA) 100 mg tablet Take 1 Tab by mouth daily.  glipiZIDE (GLUCOTROL) 10 mg tablet Take 1 Tab by mouth two (2) times a day.  allopurinol (ZYLOPRIM) 300 mg tablet Take 1 Tab by mouth daily. Indications: prevention of acute gout attack    multivitamin (ONE A DAY) tablet Take 1 Tab by mouth daily.     glucose blood VI test strips (TRUE METRIX GLUCOSE TEST STRIP) strip Use as directed to test BS 3 times a day    sildenafil citrate (VIAGRA) 100 mg tablet Take 1 Tab by mouth daily as needed.  Blood-Glucose Meter monitoring kit by Does Not Apply route daily.  Lancets misc Use as directed 3 times a day    glucose blood VI test strips (TRUETEST TEST STRIPS) strip by Does Not Apply route three (3) times daily.  timolol (TIMOPTIC-XE) 0.5 % ophthalmic gel-forming instill 1 drop into both eyes once daily     No current facility-administered medications for this visit. No Known Allergies  has Hyperlipidemia, Morbid obesity ([address] DM type 2 with diabetic peripheral neuropathy ([address] Type 2 diabetes with nephropathy ([address] Primary open angle glaucoma, Chronic idiopathic gout involving toe of right foot without tophus, and Diabetic polyneuropathy associated with type 2 diabetes mellitus ([address] on their problem list.    Past Surgical History:   Procedure Laterality Date    HX OTHER SURGICAL      pt reports gallstone removal      reports that he has quit smoking. His smoking use included cigarettes. He has a 7.50 pack-year smoking history. He quit smokeless tobacco use about 5 years ago. He reports that he does not drink alcohol and does not use drugs. family history includes Diabetes in his maternal grandmother, mother, and sister; Sickle Cell Trait in an other family member. Review of Systems   Constitutional: Negative for chills, fever, malaise/fatigue and weight loss (Has not checked recently). HENT: Negative for congestion and sore throat. Eyes: Positive for blurred vision (Ophthalmologist added new eyedrop). Negative for redness. Respiratory: Negative for cough, shortness of breath and wheezing. Cardiovascular: Negative for chest pain and leg swelling. PND:    Gastrointestinal: Negative for abdominal pain, blood in stool, constipation, diarrhea and heartburn. Genitourinary: Positive for frequency.  Negative for dysuria and urgency. Nocturia 3 times  Has to force urine out   Musculoskeletal: Negative for joint pain and myalgias. Neurological: Positive for headaches. Negative for dizziness, sensory change, speech change and focal weakness. Endo/Heme/Allergies: Does not bruise/bleed easily. Psychiatric/Behavioral: The patient is not nervous/anxious. Visit Vitals  /78 (BP 1 Location: Right arm)   Pulse 63   Temp 98.2 °F (36.8 °C) (Oral)   Resp 16   Ht 5' 2\" (1.575 m)   Wt 244 lb (110.7 kg)   SpO2 95%   BMI 44.63 kg/m²       Physical Exam  Vitals and nursing note reviewed. Constitutional:       General: He is not in acute distress. Appearance: He is well-developed. Comments:        HENT:      Head: Normocephalic. Right Ear: External ear normal.      Left Ear: External ear normal.      Ears:      Comments: leftear clogged wax     Nose: Nose normal.      Mouth/Throat:      Comments: Old missing teeth is appreciated. New are looking pretty good  Eyes:      Pupils: Pupils are equal, round, and reactive to light. Neck:      Thyroid: No thyromegaly. Comments: Carotid bruit absent  Cardiovascular:      Rate and Rhythm: Normal rate and regular rhythm. Heart sounds: No murmur heard. No friction rub. No gallop. Pulmonary:      Effort: Pulmonary effort is normal. No respiratory distress. Breath sounds: Normal breath sounds. Abdominal:      General: Bowel sounds are normal. There is no distension. Palpations: Abdomen is soft. Genitourinary:     Penis: Normal.       Prostate: Normal.      Rectum: Normal.   Musculoskeletal:         General: No swelling or tenderness. Cervical back: Neck supple. Comments: Knee pain on movement. Lymphadenopathy:      Cervical: No cervical adenopathy. Skin:     General: Skin is warm and dry. Neurological:      Mental Status: He is alert and oriented to person, place, and time.    Psychiatric:         Behavior: Behavior normal.         Thought Content: Thought content normal.         Judgment: Judgment normal.        Diabetic foot exam:   vib r big    Left Foot:   Visual Exam: normal    Pulse DP: 2+ (normal)   Filament test: normal sensation    Vibratory sensation: normal      Right Foot:   Visual Exam: normal    Pulse DP: 2+ (normal)   Filament test: normal sensation    Vibratory sensation: diminished      Results for orders placed or performed during the hospital encounter of 03/12/21   TESTOSTERONE, FREE   Result Value Ref Range    Free testosterone (Direct) 8.2 7.2 - 24.0 pg/mL   CBC WITH AUTOMATED DIFF   Result Value Ref Range    WBC 8.0 4.6 - 13.2 K/uL    RBC 4.80 4.70 - 5.50 M/uL    HGB 14.0 13.0 - 16.0 g/dL    HCT 39.6 36.0 - 48.0 %    MCV 82.5 74.0 - 97.0 FL    MCH 29.2 24.0 - 34.0 PG    MCHC 35.4 31.0 - 37.0 g/dL    RDW 13.9 11.6 - 14.5 %    PLATELET 042 122 - 478 K/uL    MPV 12.2 (H) 9.2 - 11.8 FL    NEUTROPHILS 52 40 - 73 %    LYMPHOCYTES 40 21 - 52 %    MONOCYTES 7 3 - 10 %    EOSINOPHILS 1 0 - 5 %    BASOPHILS 0 0 - 2 %    ABS. NEUTROPHILS 4.2 1.8 - 8.0 K/UL    ABS. LYMPHOCYTES 3.2 0.9 - 3.6 K/UL    ABS. MONOCYTES 0.5 0.05 - 1.2 K/UL    ABS. EOSINOPHILS 0.1 0.0 - 0.4 K/UL    ABS. BASOPHILS 0.0 0.0 - 0.1 K/UL    DF AUTOMATED     METABOLIC PANEL, COMPREHENSIVE   Result Value Ref Range    Sodium 139 136 - 145 mmol/L    Potassium 4.0 3.5 - 5.5 mmol/L    Chloride 105 100 - 111 mmol/L    CO2 27 21 - 32 mmol/L    Anion gap 7 3.0 - 18 mmol/L    Glucose 274 (H) 74 - 99 mg/dL    BUN 6 (L) 7.0 - 18 MG/DL    Creatinine 0.80 0.6 - 1.3 MG/DL    BUN/Creatinine ratio 8 (L) 12 - 20      GFR est AA >60 >60 ml/min/1.73m2    GFR est non-AA >60 >60 ml/min/1.73m2    Calcium 9.1 8.5 - 10.1 MG/DL    Bilirubin, total 0.9 0.2 - 1.0 MG/DL    ALT (SGPT) 33 16 - 61 U/L    AST (SGOT) 19 10 - 38 U/L    Alk.  phosphatase 123 (H) 45 - 117 U/L    Protein, total 7.4 6.4 - 8.2 g/dL    Albumin 3.9 3.4 - 5.0 g/dL    Globulin 3.5 2.0 - 4.0 g/dL    A-G Ratio 1.1 0.8 - 1.7     LIPID PANEL   Result Value Ref Range    LIPID PROFILE          Cholesterol, total 184 <200 MG/DL    Triglyceride 206 (H) <150 MG/DL    HDL Cholesterol 57 40 - 60 MG/DL    LDL, calculated 85.8 0 - 100 MG/DL    VLDL, calculated 41.2 MG/DL    CHOL/HDL Ratio 3.2 0 - 5.0     HEMOGLOBIN A1C WITH EAG   Result Value Ref Range    Hemoglobin A1c 11.9 (H) 4.2 - 5.6 %    Est. average glucose 295 mg/dL   MICROALBUMIN, UR, RAND W/ MICROALB/CREAT RATIO   Result Value Ref Range    Microalbumin,urine random 8.49 (H) 0 - 3.0 MG/DL    Creatinine, urine 83.00 30 - 125 mg/dL    Microalbumin/Creat ratio (mg/g creat) 102 (H) 0 - 30 mg/g   MAGNESIUM   Result Value Ref Range    Magnesium 2.4 1.6 - 2.6 mg/dL   PSA SCREENING (SCREENING)   Result Value Ref Range    Prostate Specific Ag 0.5 0.0 - 4.0 ng/mL       We discussed the expected course, resolution and complications of the diagnosis(es) in detail. Medication risks, benefits, costs, interactions, and alternatives were discussed as indicated. I advised him to contact the office if his condition worsens, changes or fails to improve as anticipated. He expressed understanding with the diagnosis(es) and plan. This note was done with the assistance of dragon speech software.   Some inadvertent errors or omissions may be present

## 2021-06-09 ENCOUNTER — TELEPHONE (OUTPATIENT)
Dept: FAMILY MEDICINE CLINIC | Age: 61
End: 2021-06-09

## 2021-06-09 ENCOUNTER — OFFICE VISIT (OUTPATIENT)
Dept: FAMILY MEDICINE CLINIC | Age: 61
End: 2021-06-09
Payer: MEDICAID

## 2021-06-09 VITALS
HEART RATE: 63 BPM | WEIGHT: 244 LBS | RESPIRATION RATE: 16 BRPM | TEMPERATURE: 98.2 F | SYSTOLIC BLOOD PRESSURE: 138 MMHG | OXYGEN SATURATION: 95 % | DIASTOLIC BLOOD PRESSURE: 78 MMHG | BODY MASS INDEX: 44.9 KG/M2 | HEIGHT: 62 IN

## 2021-06-09 DIAGNOSIS — Z23 ENCOUNTER FOR IMMUNIZATION: ICD-10-CM

## 2021-06-09 DIAGNOSIS — Z72.51 UNPROTECTED SEX: Primary | ICD-10-CM

## 2021-06-09 DIAGNOSIS — E78.5 HYPERLIPIDEMIA, UNSPECIFIED HYPERLIPIDEMIA TYPE: ICD-10-CM

## 2021-06-09 DIAGNOSIS — R35.0 URINARY FREQUENCY: ICD-10-CM

## 2021-06-09 DIAGNOSIS — I10 HTN, GOAL BELOW 130/80: ICD-10-CM

## 2021-06-09 DIAGNOSIS — M1A.0710 CHRONIC IDIOPATHIC GOUT INVOLVING TOE OF RIGHT FOOT WITHOUT TOPHUS: ICD-10-CM

## 2021-06-09 DIAGNOSIS — E11.21 TYPE 2 DIABETES WITH NEPHROPATHY (HCC): Primary | ICD-10-CM

## 2021-06-09 PROCEDURE — 99214 OFFICE O/P EST MOD 30 MIN: CPT | Performed by: EMERGENCY MEDICINE

## 2021-06-09 RX ORDER — ZOSTER VACCINE RECOMBINANT, ADJUVANTED 50 MCG/0.5
0.5 KIT INTRAMUSCULAR ONCE
Qty: 0.5 ML | Refills: 0 | Status: SHIPPED | OUTPATIENT
Start: 2021-06-09 | End: 2021-06-09

## 2021-06-09 RX ORDER — ALLOPURINOL 300 MG/1
300 TABLET ORAL DAILY
Qty: 90 TABLET | Refills: 3 | Status: SHIPPED | OUTPATIENT
Start: 2021-06-09 | End: 2021-09-06

## 2021-06-09 NOTE — PROGRESS NOTES
Elon Aschoff is a 62 y.o. male that is here for a   Chief Complaint   Patient presents with    Follow Up Chronic Condition     HTN DM Cholesterol          1. Have you been to the ER, urgent care clinic since your last visit? Hospitalized since your last visit?no    2. Have you seen or consulted any other health care providers outside of the [address] [place] [address] since your last visit? Include any pap smears or colon screening.  no      Health Maintenance reviewed - yes      Upcoming Appts  no      VORB: No orders of the defined types were placed in this encounter.   Maki Aldrich MD/ Katerina Jones MA

## 2021-06-09 NOTE — PATIENT INSTRUCTIONS
Hyperlipidemia: After Your Visit Your Care Instructions Hyperlipidemia is too much fat in your blood. The body has several kinds of fat, including cholesterol and triglycerides. Your body needs fat for many things, such as making new cells. But too much fat in your blood increases your chances of having a heart attack or stroke. You may be able to lower your cholesterol and triglycerides with a heart-healthy diet, exercise, and if needed, medicine. Your doctor may want you to try lifestyle changes first to see whether they lower the fat in your blood. You may need to take medicine if lifestyle changes do not lower the fat in your blood enough. Follow-up care is a key part of your treatment and safety. Be sure to make and go to all appointments, and call your doctor if you are having problems. Its also a good idea to know your test results and keep a list of the medicines you take. How can you care for yourself at home? Take your medicines · Take your medicines exactly as prescribed. Call your doctor if you think you are having a problem with your medicine. · If you take medicine to lower your cholesterol, go to follow-up visits. You will need to have blood tests. · Do not take large doses of niacin, which is a B vitamin, while taking medicine called statins. It may increase the chance of muscle pain and liver problems. · Talk to your doctor about avoiding grapefruit juice if you are taking statins. Grapefruit juice can raise the level of this medicine in your blood. This could increase side effects. Eat more fruits, vegetables, and fiber · Fruits and vegetables have lots of nutrients that help protect against heart disease, and they have littleif anyfat. Try to eat at least five servings a day. Dark green, deep orange, or yellow fruits and vegetables are healthy choices. · Keep carrots, celery, and other veggies handy for snacks.  Buy fruit that is in season and store it where you can see it so that you will be tempted to eat it. Cook dishes that have a lot of veggies in them, such as stir-fries and soups. · Foods high in fiber may reduce your cholesterol and provide important vitamins and minerals. High-fiber foods include whole-grain cereals and breads, oatmeal, beans, brown rice, citrus fruits, and apples. · Buy whole-grain breads and cereals instead of white bread and pastries. Limit saturated fat · Read food labels and try to avoid saturated fat and trans fat. They increase your risk of heart disease. · Use olive or canola oil when you cook. Try cholesterol-lowering spreads, such as Benecol or Take Control. · Bake, broil, grill, or steam foods instead of frying them. · Limit the amount of high-fat meats you eat, including hot dogs and sausages. Cut out all visible fat when you prepare meat. · Eat fish, skinless poultry, and soy products such as tofu instead of high-fat meats. Soybeans may be especially good for your heart. Eat at least two servings of fish a week. Certain fish, such as salmon, contain omega-3 fatty acids, which may help reduce your risk of heart attack. · Choose low-fat or fat-free milk and dairy products. Get exercise, limit alcohol, and quit smoking · Get more exercise. Work with your doctor to set up an exercise program. Even if you can do only a small amount, exercise will help you get stronger, have more energy, and manage your weight and your stress. Walking is an easy way to get exercise. Gradually increase the amount you walk every day. Aim for at least 30 minutes on most days of the week. You also may want to swim, bike, or do other activities. · Limit alcohol to no more than 2 drinks a day for men and 1 drink a day for women. · Do not smoke. If you need help quitting, talk to your doctor about stop-smoking programs and medicines. These can increase your chances of quitting for good. When should you call for help?  
Call 911 anytime you think you may need emergency care. For example, call if: 
· You have symptoms of a heart attack. These may include: ¨ Chest pain or pressure, or a strange feeling in the chest. 
¨ Sweating. ¨ Shortness of breath. ¨ Nausea or vomiting. ¨ Pain, pressure, or a strange feeling in the back, neck, jaw, or upper belly or in one or both shoulders or arms. ¨ Lightheadedness or sudden weakness. ¨ A fast or irregular heartbeat. After you call 911, the  may tell you to chew 1 adult-strength or 2 to 4 low-dose aspirin. Wait for an ambulance. Do not try to drive yourself. · You have signs of a stroke. These may include: 
¨ Sudden numbness, paralysis, or weakness in your face, arm, or leg, especially on only one side of your body. ¨ New problems with walking or balance. ¨ Sudden vision changes. ¨ Drooling or slurred speech. ¨ New problems speaking or understanding simple statements, or feeling confused. ¨ A sudden, severe headache that is different from past headaches. · You passed out (lost consciousness). Call your doctor now or seek immediate medical care if: 
· You have muscle pain or weakness. Watch closely for changes in your health, and be sure to contact your doctor if: 
· You are very tired. · You have an upset stomach, gas, constipation, or belly pain or cramps. Where can you learn more? Go to [employer] Enter C406 in the search box to learn more about \"Hyperlipidemia: After Your Visit. \"  
© 6178-7099 [employer]. Care instructions adapted under license by [employer] (which disclaims liability or warranty for this information). This care instruction is for use with your licensed healthcare professional. If you have questions about a medical condition or this instruction, always ask your healthcare professional. [place] [address] any warranty or liability for your use of this information. Content Version: 5.0.743944; Last Revised: October 13, 2011 Type 2 Diabetes: Care Instructions Your Care Instructions Type 2 diabetes is a disease that develops when the body's tissues cannot use insulin properly. Over time, the pancreas cannot make enough insulin. Insulin is a hormone that helps the body's cells use sugar (glucose) for energy. It also helps the body store extra sugar in muscle, fat, and liver cells. Without insulin, the sugar cannot get into the cells to do its work. It stays in the blood instead. This can cause high blood sugar levels. A person has diabetes when the blood sugar stays too high too much of the time. Over time, diabetes can lead to diseases of the heart, blood vessels, nerves, kidneys, and eyes. You may be able to control your blood sugar by losing weight, eating a healthy diet, and getting daily exercise. You may also have to take insulin or other diabetes medicine. Follow-up care is a key part of your treatment and safety. Be sure to make and go to all appointments. Call your doctor if you are having problems. It's also a good idea to know your test results and keep a list of the medicines you take. How can you care for yourself at home? · Keep your blood sugar at a target level (which you set with your doctor). ? Carbohydratethe body's main source of fuelaffects blood sugar more than any other nutrient. Carbohydrate is in fruits, vegetables, milk, and yogurt. It also is in breads, cereals, vegetables such as potatoes and corn, and sugary foods such as candy and cakes. Follow your meal plan to know how much carbohydrate to eat at each meal and snack. ? Aim for 30 minutes of exercise on most, preferably all, days of the week. Walking is a good choice. You also may want to do other activities, such as running, swimming, cycling, or playing tennis or team sports. Try to do muscle-strengthening exercises at least 2 times a week. ? Take your medicines exactly as prescribed.  Call your doctor if you think you are having a problem with your medicine. You will get more details on the specific medicines your doctor prescribes. · Check your blood sugar as often as your doctor recommends. It is important to keep track of any symptoms you have, such as low blood sugar. Also tell your doctor if you have any changes in your activities, diet, or insulin use. · Talk to your doctor before you start taking aspirin every day. Aspirin can help certain people lower their risk of a heart attack or stroke. But taking aspirin isn't right for everyone, because it can cause serious bleeding. · Do not smoke. If you need help quitting, talk to your doctor about stop-smoking programs and medicines. These can increase your chances of quitting for good. · Keep your cholesterol and blood pressure at normal levels. You may need to take one or more medicines to reach your goals. Take them exactly as directed. Do not stop or change a medicine without talking to your doctor first. 
When should you call for help? Call 911 anytime you think you may need emergency care. For example, call if: 
  · You passed out (lost consciousness), or you suddenly become very sleepy or confused. (You may have very low blood sugar.) Call your doctor now or seek immediate medical care if: 
  · Your blood sugar is 300 mg/dL or is higher than the level your doctor has set for you. · You have symptoms of low blood sugar, such as: ? Sweating. ? Feeling nervous, shaky, and weak. ? Extreme hunger and slight nausea. ? Dizziness and headache. 
? Blurred vision. ? Confusion. Watch closely for changes in your health, and be sure to contact your doctor if: 
  · You often have problems controlling your blood sugar. · You have symptoms of long-term diabetes problems, such as: ? New vision changes. ? New pain, numbness, or tingling in your hands or feet. ? Skin problems. Where can you learn more? Go to http://www.gray.com/ Enter C553 in the search box to learn more about \"Type 2 Diabetes: Care Instructions. \" Current as of: August 31, 2020               Content Version: 12.8 © 2006-2021 [employer]. Care instructions adapted under license by [employer] (which disclaims liability or warranty for this information). If you have questions about a medical condition or this instruction, always ask your healthcare professional. [address] any warranty or liability for your use of this information. High Blood Pressure: Care Instructions Overview It's normal for blood pressure to go up and down throughout the day. But if it stays up, you have high blood pressure. Another name for high blood pressure is hypertension. Despite what a lot of people think, high blood pressure usually doesn't cause headaches or make you feel dizzy or lightheaded. It usually has no symptoms. But it does increase your risk of stroke, heart attack, and other problems. You and your doctor will talk about your risks of these problems based on your blood pressure. Your doctor will give you a goal for your blood pressure. Your goal will be based on your health and your age. Lifestyle changes, such as eating healthy and being active, are always important to help lower blood pressure. You might also take medicine to reach your blood pressure goal. 
Follow-up care is a key part of your treatment and safety. Be sure to make and go to all appointments, and call your doctor if you are having problems. It's also a good idea to know your test results and keep a list of the medicines you take. How can you care for yourself at home? Medical treatment · If you stop taking your medicine, your blood pressure will go back up. You may take one or more types of medicine to lower your blood pressure. Be safe with medicines. Take your medicine exactly as prescribed. Call your doctor if you think you are having a problem with your medicine.  
· Talk to your doctor before you start taking aspirin every day. Aspirin can help certain people lower their risk of a heart attack or stroke. But taking aspirin isn't right for everyone, because it can cause serious bleeding. · See your doctor regularly. You may need to see the doctor more often at first or until your blood pressure comes down. · If you are taking blood pressure medicine, talk to your doctor before you take decongestants or anti-inflammatory medicine, such as ibuprofen. Some of these medicines can raise blood pressure. · Learn how to check your blood pressure at home. Lifestyle changes · Stay at a healthy weight. This is especially important if you put on weight around the waist. Losing even 10 pounds can help you lower your blood pressure. · If your doctor recommends it, get more exercise. Walking is a good choice. Bit by bit, increase the amount you walk every day. Try for at least 30 minutes on most days of the week. You also may want to swim, bike, or do other activities. · Avoid or limit alcohol. Talk to your doctor about whether you can drink any alcohol. · Try to limit how much sodium you eat to less than 2,300 milligrams (mg) a day. Your doctor may ask you to try to eat less than 1,500 mg a day. · Eat plenty of fruits (such as bananas and oranges), vegetables, legumes, whole grains, and low-fat dairy products. · Lower the amount of saturated fat in your diet. Saturated fat is found in animal products such as milk, cheese, and meat. Limiting these foods may help you lose weight and also lower your risk for heart disease. · Do not smoke. Smoking increases your risk for heart attack and stroke. If you need help quitting, talk to your doctor about stop-smoking programs and medicines. These can increase your chances of quitting for good. When should you call for help? Call  911 anytime you think you may need emergency care.  This may mean having symptoms that suggest that your blood pressure is causing a serious heart or blood vessel problem. Your blood pressure may be over 180/120. For example, call 911 if: 
  · You have symptoms of a heart attack. These may include: 
? Chest pain or pressure, or a strange feeling in the chest. 
? Sweating. ? Shortness of breath. ? Nausea or vomiting. ? Pain, pressure, or a strange feeling in the back, neck, jaw, or upper belly or in one or both shoulders or arms. ? Lightheadedness or sudden weakness. ? A fast or irregular heartbeat. · You have symptoms of a stroke. These may include: 
? Sudden numbness, tingling, weakness, or loss of movement in your face, arm, or leg, especially on only one side of your body. ? Sudden vision changes. ? Sudden trouble speaking. ? Sudden confusion or trouble understanding simple statements. ? Sudden problems with walking or balance. ? A sudden, severe headache that is different from past headaches. · You have severe back or belly pain. Do not wait until your blood pressure comes down on its own. Get help right away. Call your doctor now or seek immediate care if: 
  · Your blood pressure is much higher than normal (such as 180/120 or higher), but you don't have symptoms. · You think high blood pressure is causing symptoms, such as: 
? Severe headache. 
? Blurry vision. Watch closely for changes in your health, and be sure to contact your doctor if: 
  · Your blood pressure measures higher than your doctor recommends at least 2 times. That means the top number is higher or the bottom number is higher, or both. · You think you may be having side effects from your blood pressure medicine. Where can you learn more? Go to http://www.gray.com/ Enter F757 in the search box to learn more about \"High Blood Pressure: Care Instructions. \" Current as of: August 31, 2020               Content Version: 12.8 © 2383-1614 [employer].   
Care instructions adapted under license by [address] (which disclaims liability or warranty for this information). If you have questions about a medical condition or this instruction, always ask your healthcare professional. [address] any warranty or liability for your use of this information.

## 2021-07-19 DIAGNOSIS — G62.9 PERIPHERAL POLYNEUROPATHY: ICD-10-CM

## 2021-07-19 RX ORDER — GABAPENTIN 100 MG/1
CAPSULE ORAL
Qty: 90 CAPSULE | Refills: 0 | Status: SHIPPED | OUTPATIENT
Start: 2021-07-19

## 2021-07-31 NOTE — PROGRESS NOTES
08/03/21          ICD-10-CM ICD-9-CM    1. HTN, goal below 130/80  I10 401.9 CBC WITH AUTOMATED DIFF      METABOLIC PANEL, COMPREHENSIVE      HEMOGLOBIN A1C WITH EAG      MICROALBUMIN, UR, RAND W/ MICROALB/CREAT RATIO    k   2. Type 2 diabetes with nephropathy (HCC)  E11.21 250.40 empagliflozin (Jardiance) 25 mg tablet     583.81    3. Hyperlipidemia, unspecified hyperlipidemia type  E78.5 272.4 LIPID PANEL   4. Unprotected sex  Z72.51 V69.2 HIV 1/2 AG/AB, 4TH GENERATION,W RFLX CONFIRM   5. Morbid obesity ([facility] [address]  E66.01 278.01      Follow-up and Dispositions    · Return in about 3 months (around 11/3/2021) for Labs/diagnostics/referrals ordered this visit, Follow up on illness. Assessment and Plan  Essential hypertension, good control, continue present regimen. Type 2 diabetes mellitus with nephropathy. Not in control. The patient is only taking the Metformin. Will trial Jardiance, stop Invokana. 10-minute conversation about the need to lower his hemoglobin A1c. Hyperlipidemia good except for hypertriglyceridemia. Follow-up labs ordered. Unprotected sex. I will order HIV test.  History of urinary hesitancy. Prostate-specific antigen normal in March 2021. If this continues to be a problem consider Flomax. Morbid obesity, discussed diet exercise and need for weight loss. Lab results and schedule of future lab studies reviewed with patient  Diagnostic and radiologic results and the schedule of future studies were reviewed with the patient  reviewed diet, exercise and weight control  reviewed medications and side effects in detail  All questions were answered and understood. Health Maintenance Due   Topic Date Due    Eye Exam Retinal or Dilated  05/06/2021   Claudean Mews see Dr. Nu Blair in one month, ophthalmology follow-up. Shingles vaccine received, he states.   Colonoscopy due in one year    Subjective:   Daquan Gagnon is a 62 y.o. male has Hyperlipidemia, Morbid obesity ([facility] [address] DM type 2 with diabetic peripheral neuropathy ([facility] [address] Type 2 diabetes with nephropathy ([facility] [address] Primary open angle glaucoma, Chronic idiopathic gout involving toe of right foot without tophus, and Diabetic polyneuropathy associated with type 2 diabetes mellitus ([facility] [address] on their problem list.. No chief complaint on file. The patient was seen 6/9/2021 for essential hypertension which is in good control. The patient had type 2 diabetes mellitus with nephropathy not in good control. He was following up with the pharmacist.  Hemoglobin A1c was ordered.  11.1. The patient has hyperlipidemia and labs were ordered for follow-up. Hypertension  The patient has no headaches, visual changes, chest pain or pressure,dyspnea, orthopnea, or PND. There is no problem with the medication. Diabetes mellitus  The patient is only taking the Metformin. He cites a side effect from the other medications. He states however that the Metformin makes him jittery. The patient denies polyuria, polydipsia, or polyphagia. The patient denies any other aggravating or relieving factors at this time there has been no problem with the medications. Hyperlipidemia   The patient denies any myalgias or weakness. No abdominal discomfort admitted to. The patient denies any difficulty with or side effects from the medication. BP Readings from Last 3 Encounters:   08/03/21 133/76   06/09/21 138/78   02/26/20 125/70           Lab Results   Component Value Date/Time    Hemoglobin A1c 11.9 (H) 03/12/2021 08:38 AM    Hemoglobin A1c (POC) 12.2 05/30/2019 08:13 AM     Lab Results   Component Value Date/Time    Cholesterol, total 184 03/12/2021 08:36 AM    HDL Cholesterol 57 03/12/2021 08:36 AM    LDL, calculated 85.8 03/12/2021 08:36 AM    VLDL, calculated 41.2 03/12/2021 08:36 AM    Triglyceride 206 (H) 03/12/2021 08:36 AM    CHOL/HDL Ratio 3.2 03/12/2021 08:36 AM     Overweight  The patient states that the weight has not changed. .  Patient's diet is not low calorie. He does not carb count. .  The patient denies edema of the lower extremities. Obesity comorbid conditions include diabetes, high cholesterol and high blood pressure   body mass index is 44.45 kg/m². Wt Readings from Last 3 Encounters:   08/03/21 243 lb (110.2 kg)   06/09/21 244 lb (110.7 kg)   02/26/20 252 lb (114.3 kg)     Key Obesity Meds             metFORMIN (GLUCOPHAGE) 1,000 mg tablet (Taking) Take 1 Tab by mouth two (2) times daily (with meals). No results found for this visit on 08/03/21 (from the past 6 hour(s)). No results found for: TSH, TSH2, TSH3, TSHP, TSHEXT          Current Outpatient Medications   Medication Sig    gabapentin (NEURONTIN) 100 mg capsule TAKE 1 CAPSULE BY MOUTH AT NIGHT FOR 3 NIGHTS, THEN 3 CAPSULES BY MOUTH AT NIGHT    allopurinoL (ZYLOPRIM) 300 mg tablet Take 1 Tablet by mouth daily. Indications: treatment to prevent acute gout attack    atorvastatin (LIPITOR) 40 mg tablet TAKE 1 TAB BY MOUTH DAILY. INDICATIONS: EXCESSIVE FAT IN THE BLOOD    lisinopriL (PRINIVIL, ZESTRIL) 10 mg tablet TAKE 1 TABLET BY MOUTH DAILY    metFORMIN (GLUCOPHAGE) 1,000 mg tablet Take 1 Tab by mouth two (2) times daily (with meals).  glucose blood VI test strips (ASCENSIA AUTODISC VI, ONE TOUCH ULTRA TEST VI) strip Use to check blood glucose daily    tamsulosin (Flomax) 0.4 mg capsule Take 1 tab by mouth daily (Patient not taking: Reported on 6/9/2021)    ibuprofen (MOTRIN) 800 mg tablet TAKE 1 TAB BY MOUTH EVERY EIGHT (8) HOURS AS NEEDED FOR PAIN.  Rhopressa 0.02 % drop Administer 1 Drop to both eyes nightly.  Azopt 1 % ophthalmic suspension Administer 1 Drop to both eyes two (2) times a day.  canagliflozin (INVOKANA) 100 mg tablet Take 1 Tab by mouth Daily (before breakfast). (Patient not taking: Reported on 6/9/2021)    carbamide peroxide (DEBROX) 6.5 % otic solution Administer 5 Drops into each ear two (2) times a day.  (Patient not taking: Reported on 6/9/2021)    SITagliptin (JANUVIA) 100 mg tablet Take 1 Tab by mouth daily. (Patient not taking: Reported on 6/9/2021)    glipiZIDE (GLUCOTROL) 10 mg tablet Take 1 Tab by mouth two (2) times a day. (Patient not taking: Reported on 6/9/2021)    multivitamin (ONE A DAY) tablet Take 1 Tab by mouth daily. (Patient not taking: Reported on 6/9/2021)    glucose blood VI test strips (TRUE METRIX GLUCOSE TEST STRIP) strip Use as directed to test BS 3 times a day    sildenafil citrate (VIAGRA) 100 mg tablet Take 1 Tab by mouth daily as needed.  Blood-Glucose Meter monitoring kit by Does Not Apply route daily.  Lancets misc Use as directed 3 times a day    glucose blood VI test strips (TRUETEST TEST STRIPS) strip by Does Not Apply route three (3) times daily.  timolol (TIMOPTIC-XE) 0.5 % ophthalmic gel-forming instill 1 drop into both eyes once daily     No current facility-administered medications for this visit. No Known Allergies  has Hyperlipidemia, Morbid obesity ([address] DM type 2 with diabetic peripheral neuropathy ([address] Type 2 diabetes with nephropathy ([address] Primary open angle glaucoma, Chronic idiopathic gout involving toe of right foot without tophus, and Diabetic polyneuropathy associated with type 2 diabetes mellitus ([address] on their problem list.    Past Surgical History:   Procedure Laterality Date    HX OTHER SURGICAL      pt reports gallstone removal      reports that he has quit smoking. His smoking use included cigarettes. He has a 7.50 pack-year smoking history. He quit smokeless tobacco use about 5 years ago. He reports that he does not drink alcohol and does not use drugs. family history includes Diabetes in his maternal grandmother, mother, and sister; Sickle Cell Trait in an other family member. Review of Systems   Constitutional: Negative for chills, fever, malaise/fatigue and weight loss (Has not checked recently). HENT: Negative for congestion and sore throat.     Eyes: Positive for blurred vision (Ophthalmologist added new eyedrop). Negative for redness. Respiratory: Negative for cough, shortness of breath and wheezing. Cardiovascular: Negative for chest pain and leg swelling. PND:    Gastrointestinal: Negative for abdominal pain, blood in stool, constipation, diarrhea and heartburn. Genitourinary: Positive for frequency. Negative for dysuria and urgency. History of unprotected sex. Musculoskeletal: Negative for joint pain and myalgias. Neurological: Positive for headaches. Negative for dizziness, sensory change, speech change and focal weakness. Endo/Heme/Allergies: Does not bruise/bleed easily. Psychiatric/Behavioral: The patient is not nervous/anxious. Visit Vitals  /76 (BP 1 Location: Right arm)   Pulse 62   Temp 97.2 °F (36.2 °C) (Oral)   Resp 16   Ht 5' 2\" (1.575 m)   Wt 243 lb (110.2 kg)   SpO2 95%   BMI 44.45 kg/m²       Physical Exam  Vitals and nursing note reviewed. Constitutional:       General: He is not in acute distress. Appearance: He is well-developed. He is obese. Comments:        HENT:      Head: Normocephalic. Right Ear: External ear normal.      Left Ear: External ear normal.      Ears:      Comments: leftear clogged wax     Nose: Nose normal.      Mouth/Throat:      Comments: Old missing teeth is appreciated. New are looking pretty good  Eyes:      Pupils: Pupils are equal, round, and reactive to light. Comments: Funduscopic exam using panoptic scope. I do not see any exudates or neovascularity. For follow-up with ophthalmology formally in the next month. Neck:      Thyroid: No thyromegaly. Comments: Carotid bruit absent  Cardiovascular:      Rate and Rhythm: Normal rate and regular rhythm. Heart sounds: No murmur heard. No friction rub. No gallop. Pulmonary:      Effort: Pulmonary effort is normal. No respiratory distress. Breath sounds: Normal breath sounds.    Abdominal:      General: Bowel sounds are normal. There is no distension. Palpations: Abdomen is soft. Musculoskeletal:         General: No swelling or tenderness. Cervical back: Neck supple. Comments: Knee pain on movement. Lymphadenopathy:      Cervical: No cervical adenopathy. Skin:     General: Skin is warm and dry. Neurological:      Mental Status: He is alert and oriented to person, place, and time. Psychiatric:         Behavior: Behavior normal.         Thought Content:  Thought content normal.         Judgment: Judgment normal.              Lab Results   Component Value Date/Time    WBC 6.9 08/06/2021 10:23 AM    HGB 14.2 08/06/2021 10:23 AM    HCT 41.7 08/06/2021 10:23 AM    PLATELET 047 77/86/6614 10:23 AM    MCV 84.8 08/06/2021 10:23 AM     Lab Results   Component Value Date/Time    Hemoglobin A1c 10.9 (H) 08/06/2021 10:23 AM    Hemoglobin A1c 11.9 (H) 03/12/2021 08:38 AM    Hemoglobin A1c 12.1 (H) 01/28/2020 11:33 AM    Glucose 247 (H) 08/06/2021 10:23 AM    Glucose (POC) 206 (H) 03/30/2014 09:48 AM    Glucose  11/22/2017 09:00 AM    Microalbumin/Creat ratio (mg/g creat) 86 (H) 08/06/2021 10:23 AM    Microalbumin,urine random 4.32 (H) 08/06/2021 10:23 AM    LDL, calculated 60.8 08/06/2021 10:23 AM    Creatinine, POC 0.7 01/25/2013 09:14 AM    Creatinine 0.72 08/06/2021 10:23 AM      Lab Results   Component Value Date/Time    Hemoglobin A1c 10.9 (H) 08/06/2021 10:23 AM    Hemoglobin A1c (POC) 12.2 05/30/2019 08:13 AM      Lab Results   Component Value Date/Time    Cholesterol, total 149 08/06/2021 10:23 AM    HDL Cholesterol 52 08/06/2021 10:23 AM    LDL, calculated 60.8 08/06/2021 10:23 AM    Triglyceride 181 (H) 08/06/2021 10:23 AM    CHOL/HDL Ratio 2.9 08/06/2021 10:23 AM     Lab Results   Component Value Date/Time    Sodium 134 (L) 08/06/2021 10:23 AM    Potassium 3.8 08/06/2021 10:23 AM    Chloride 104 08/06/2021 10:23 AM    CO2 24 08/06/2021 10:23 AM    Anion gap 6 08/06/2021 10:23 AM    Glucose 247 (H) 08/06/2021 10:23 AM    BUN 7 08/06/2021 10:23 AM    Creatinine 0.72 08/06/2021 10:23 AM    BUN/Creatinine ratio 10 (L) 08/06/2021 10:23 AM    GFR est AA >60 08/06/2021 10:23 AM    GFR est non-AA >60 08/06/2021 10:23 AM    Calcium 8.8 08/06/2021 10:23 AM        We discussed the expected course, resolution and complications of the diagnosis(es) in detail. Medication risks, benefits, costs, interactions, and alternatives were discussed as indicated. I advised him to contact the office if his condition worsens, changes or fails to improve as anticipated. He expressed understanding with the diagnosis(es) and plan. This note was done with the assistance of dragon speech software.   Some inadvertent errors or omissions may be present

## 2021-08-03 ENCOUNTER — OFFICE VISIT (OUTPATIENT)
Dept: FAMILY MEDICINE CLINIC | Age: 61
End: 2021-08-03
Payer: MEDICAID

## 2021-08-03 VITALS
HEIGHT: 62 IN | BODY MASS INDEX: 44.72 KG/M2 | SYSTOLIC BLOOD PRESSURE: 133 MMHG | DIASTOLIC BLOOD PRESSURE: 76 MMHG | OXYGEN SATURATION: 95 % | TEMPERATURE: 97.2 F | WEIGHT: 243 LBS | RESPIRATION RATE: 16 BRPM | HEART RATE: 62 BPM

## 2021-08-03 DIAGNOSIS — E66.01 MORBID OBESITY (HCC): ICD-10-CM

## 2021-08-03 DIAGNOSIS — E11.21 TYPE 2 DIABETES WITH NEPHROPATHY (HCC): ICD-10-CM

## 2021-08-03 DIAGNOSIS — E78.5 HYPERLIPIDEMIA, UNSPECIFIED HYPERLIPIDEMIA TYPE: ICD-10-CM

## 2021-08-03 DIAGNOSIS — I10 HTN, GOAL BELOW 130/80: Primary | ICD-10-CM

## 2021-08-03 DIAGNOSIS — Z72.51 UNPROTECTED SEX: ICD-10-CM

## 2021-08-03 PROCEDURE — 99214 OFFICE O/P EST MOD 30 MIN: CPT | Performed by: EMERGENCY MEDICINE

## 2021-08-03 NOTE — PATIENT INSTRUCTIONS
Hyperlipidemia: After Your Visit  Your Care Instructions  Hyperlipidemia is too much fat in your blood. The body has several kinds of fat, including cholesterol and triglycerides. Your body needs fat for many things, such as making new cells. But too much fat in your blood increases your chances of having a heart attack or stroke. You may be able to lower your cholesterol and triglycerides with a heart-healthy diet, exercise, and if needed, medicine. Your doctor may want you to try lifestyle changes first to see whether they lower the fat in your blood. You may need to take medicine if lifestyle changes do not lower the fat in your blood enough. Follow-up care is a key part of your treatment and safety. Be sure to make and go to all appointments, and call your doctor if you are having problems. Its also a good idea to know your test results and keep a list of the medicines you take. How can you care for yourself at home? Take your medicines  · Take your medicines exactly as prescribed. Call your doctor if you think you are having a problem with your medicine. · If you take medicine to lower your cholesterol, go to follow-up visits. You will need to have blood tests. · Do not take large doses of niacin, which is a B vitamin, while taking medicine called statins. It may increase the chance of muscle pain and liver problems. · Talk to your doctor about avoiding grapefruit juice if you are taking statins. Grapefruit juice can raise the level of this medicine in your blood. This could increase side effects. Eat more fruits, vegetables, and fiber  · Fruits and vegetables have lots of nutrients that help protect against heart disease, and they have littleif anyfat. Try to eat at least five servings a day. Dark green, deep orange, or yellow fruits and vegetables are healthy choices. · Keep carrots, celery, and other veggies handy for snacks.  Buy fruit that is in season and store it where you can see it so that you will be tempted to eat it. Cook dishes that have a lot of veggies in them, such as stir-fries and soups. · Foods high in fiber may reduce your cholesterol and provide important vitamins and minerals. High-fiber foods include whole-grain cereals and breads, oatmeal, beans, brown rice, citrus fruits, and apples. · Buy whole-grain breads and cereals instead of white bread and pastries. Limit saturated fat  · Read food labels and try to avoid saturated fat and trans fat. They increase your risk of heart disease. · Use olive or canola oil when you cook. Try cholesterol-lowering spreads, such as Benecol or Take Control. · Bake, broil, grill, or steam foods instead of frying them. · Limit the amount of high-fat meats you eat, including hot dogs and sausages. Cut out all visible fat when you prepare meat. · Eat fish, skinless poultry, and soy products such as tofu instead of high-fat meats. Soybeans may be especially good for your heart. Eat at least two servings of fish a week. Certain fish, such as salmon, contain omega-3 fatty acids, which may help reduce your risk of heart attack. · Choose low-fat or fat-free milk and dairy products. Get exercise, limit alcohol, and quit smoking  · Get more exercise. Work with your doctor to set up an exercise program. Even if you can do only a small amount, exercise will help you get stronger, have more energy, and manage your weight and your stress. Walking is an easy way to get exercise. Gradually increase the amount you walk every day. Aim for at least 30 minutes on most days of the week. You also may want to swim, bike, or do other activities. · Limit alcohol to no more than 2 drinks a day for men and 1 drink a day for women. · Do not smoke. If you need help quitting, talk to your doctor about stop-smoking programs and medicines. These can increase your chances of quitting for good. When should you call for help?   Call 911 anytime you think you may need emergency care. For example, call if:  · You have symptoms of a heart attack. These may include:  ¨ Chest pain or pressure, or a strange feeling in the chest.  ¨ Sweating. ¨ Shortness of breath. ¨ Nausea or vomiting. ¨ Pain, pressure, or a strange feeling in the back, neck, jaw, or upper belly or in one or both shoulders or arms. ¨ Lightheadedness or sudden weakness. ¨ A fast or irregular heartbeat. After you call 911, the  may tell you to chew 1 adult-strength or 2 to 4 low-dose aspirin. Wait for an ambulance. Do not try to drive yourself. · You have signs of a stroke. These may include:  ¨ Sudden numbness, paralysis, or weakness in your face, arm, or leg, especially on only one side of your body. ¨ New problems with walking or balance. ¨ Sudden vision changes. ¨ Drooling or slurred speech. ¨ New problems speaking or understanding simple statements, or feeling confused. ¨ A sudden, severe headache that is different from past headaches. · You passed out (lost consciousness). Call your doctor now or seek immediate medical care if:  · You have muscle pain or weakness. Watch closely for changes in your health, and be sure to contact your doctor if:  · You are very tired. · You have an upset stomach, gas, constipation, or belly pain or cramps. Where can you learn more? Go to Supramed.be  Enter C406 in the search box to learn more about \"Hyperlipidemia: After Your Visit. \"   © 1275-4216 [employer]. Care instructions adapted under license by [facility] (which disclaims liability or warranty for this information). This care instruction is for use with your licensed healthcare professional. If you have questions about a medical condition or this instruction, always ask your healthcare professional. [place] [address] any warranty or liability for your use of this information.   Content Version: 9.8.938000; Last Revised: October 13, 2011 Type 2 Diabetes: Care Instructions  Your Care Instructions     Type 2 diabetes is a disease that develops when the body's tissues cannot use insulin properly. Over time, the pancreas cannot make enough insulin. Insulin is a hormone that helps the body's cells use sugar (glucose) for energy. It also helps the body store extra sugar in muscle, fat, and liver cells. Without insulin, the sugar cannot get into the cells to do its work. It stays in the blood instead. This can cause high blood sugar levels. A person has diabetes when the blood sugar stays too high too much of the time. Over time, diabetes can lead to diseases of the heart, blood vessels, nerves, kidneys, and eyes. You may be able to control your blood sugar by losing weight, eating a healthy diet, and getting daily exercise. You may also have to take insulin or other diabetes medicine. Follow-up care is a key part of your treatment and safety. Be sure to make and go to all appointments. Call your doctor if you are having problems. It's also a good idea to know your test results and keep a list of the medicines you take. How can you care for yourself at home? · Keep your blood sugar at a target level (which you set with your doctor). ? Carbohydratethe body's main source of fuelaffects blood sugar more than any other nutrient. Carbohydrate is in fruits, vegetables, milk, and yogurt. It also is in breads, cereals, vegetables such as potatoes and corn, and sugary foods such as candy and cakes. Follow your meal plan to know how much carbohydrate to eat at each meal and snack. ? Aim for 30 minutes of exercise on most, preferably all, days of the week. Walking is a good choice. You also may want to do other activities, such as running, swimming, cycling, or playing tennis or team sports. Try to do muscle-strengthening exercises at least 2 times a week. ? Take your medicines exactly as prescribed.  Call your doctor if you think you are having a problem with your medicine. You will get more details on the specific medicines your doctor prescribes. · Check your blood sugar as often as your doctor recommends. It is important to keep track of any symptoms you have, such as low blood sugar. Also tell your doctor if you have any changes in your activities, diet, or insulin use. · Talk to your doctor before you start taking aspirin every day. Aspirin can help certain people lower their risk of a heart attack or stroke. But taking aspirin isn't right for everyone, because it can cause serious bleeding. · Do not smoke. If you need help quitting, talk to your doctor about stop-smoking programs and medicines. These can increase your chances of quitting for good. · Keep your cholesterol and blood pressure at normal levels. You may need to take one or more medicines to reach your goals. Take them exactly as directed. Do not stop or change a medicine without talking to your doctor first.  When should you call for help? Call 911 anytime you think you may need emergency care. For example, call if:    · You passed out (lost consciousness), or you suddenly become very sleepy or confused. (You may have very low blood sugar.)   Call your doctor now or seek immediate medical care if:    · Your blood sugar is 300 mg/dL or is higher than the level your doctor has set for you. · You have symptoms of low blood sugar, such as:  ? Sweating. ? Feeling nervous, shaky, and weak. ? Extreme hunger and slight nausea. ? Dizziness and headache.  ? Blurred vision. ? Confusion. Watch closely for changes in your health, and be sure to contact your doctor if:    · You often have problems controlling your blood sugar. · You have symptoms of long-term diabetes problems, such as:  ? New vision changes. ? New pain, numbness, or tingling in your hands or feet. ? Skin problems. Where can you learn more?   Go to http://www.gray.com/  Enter C553 in the search box to learn more about \"Type 2 Diabetes: Care Instructions. \"  Current as of: August 31, 2020               Content Version: 12.8  © 2006-2021 [employer]. Care instructions adapted under license by [employer] (which disclaims liability or warranty for this information). If you have questions about a medical condition or this instruction, always ask your healthcare professional. [address] any warranty or liability for your use of this information. High Blood Pressure: Care Instructions  Overview     It's normal for blood pressure to go up and down throughout the day. But if it stays up, you have high blood pressure. Another name for high blood pressure is hypertension. Despite what a lot of people think, high blood pressure usually doesn't cause headaches or make you feel dizzy or lightheaded. It usually has no symptoms. But it does increase your risk of stroke, heart attack, and other problems. You and your doctor will talk about your risks of these problems based on your blood pressure. Your doctor will give you a goal for your blood pressure. Your goal will be based on your health and your age. Lifestyle changes, such as eating healthy and being active, are always important to help lower blood pressure. You might also take medicine to reach your blood pressure goal.  Follow-up care is a key part of your treatment and safety. Be sure to make and go to all appointments, and call your doctor if you are having problems. It's also a good idea to know your test results and keep a list of the medicines you take. How can you care for yourself at home? Medical treatment  · If you stop taking your medicine, your blood pressure will go back up. You may take one or more types of medicine to lower your blood pressure. Be safe with medicines. Take your medicine exactly as prescribed.  Call your doctor if you think you are having a problem with your medicine. · Talk to your doctor before you start taking aspirin every day. Aspirin can help certain people lower their risk of a heart attack or stroke. But taking aspirin isn't right for everyone, because it can cause serious bleeding. · See your doctor regularly. You may need to see the doctor more often at first or until your blood pressure comes down. · If you are taking blood pressure medicine, talk to your doctor before you take decongestants or anti-inflammatory medicine, such as ibuprofen. Some of these medicines can raise blood pressure. · Learn how to check your blood pressure at home. Lifestyle changes  · Stay at a healthy weight. This is especially important if you put on weight around the waist. Losing even 10 pounds can help you lower your blood pressure. · If your doctor recommends it, get more exercise. Walking is a good choice. Bit by bit, increase the amount you walk every day. Try for at least 30 minutes on most days of the week. You also may want to swim, bike, or do other activities. · Avoid or limit alcohol. Talk to your doctor about whether you can drink any alcohol. · Try to limit how much sodium you eat to less than 2,300 milligrams (mg) a day. Your doctor may ask you to try to eat less than 1,500 mg a day. · Eat plenty of fruits (such as bananas and oranges), vegetables, legumes, whole grains, and low-fat dairy products. · Lower the amount of saturated fat in your diet. Saturated fat is found in animal products such as milk, cheese, and meat. Limiting these foods may help you lose weight and also lower your risk for heart disease. · Do not smoke. Smoking increases your risk for heart attack and stroke. If you need help quitting, talk to your doctor about stop-smoking programs and medicines. These can increase your chances of quitting for good. When should you call for help? Call  911 anytime you think you may need emergency care.  This may mean having symptoms that suggest that your blood pressure is causing a serious heart or blood vessel problem. Your blood pressure may be over 180/120. For example, call 911 if:    · You have symptoms of a heart attack. These may include:  ? Chest pain or pressure, or a strange feeling in the chest.  ? Sweating. ? Shortness of breath. ? Nausea or vomiting. ? Pain, pressure, or a strange feeling in the back, neck, jaw, or upper belly or in one or both shoulders or arms. ? Lightheadedness or sudden weakness. ? A fast or irregular heartbeat. · You have symptoms of a stroke. These may include:  ? Sudden numbness, tingling, weakness, or loss of movement in your face, arm, or leg, especially on only one side of your body. ? Sudden vision changes. ? Sudden trouble speaking. ? Sudden confusion or trouble understanding simple statements. ? Sudden problems with walking or balance. ? A sudden, severe headache that is different from past headaches. · You have severe back or belly pain. Do not wait until your blood pressure comes down on its own. Get help right away. Call your doctor now or seek immediate care if:    · Your blood pressure is much higher than normal (such as 180/120 or higher), but you don't have symptoms. · You think high blood pressure is causing symptoms, such as:  ? Severe headache.  ? Blurry vision. Watch closely for changes in your health, and be sure to contact your doctor if:    · Your blood pressure measures higher than your doctor recommends at least 2 times. That means the top number is higher or the bottom number is higher, or both. · You think you may be having side effects from your blood pressure medicine. Where can you learn more? Go to http://www.gray.com/  Enter [id number] in the search box to learn more about \"High Blood Pressure: Care Instructions. \"  Current as of: August 31, 2020               Content Version: 12.8  © 9421-3415 [employer].    Care instructions adapted under license by [employer] (which disclaims liability or warranty for this information). If you have questions about a medical condition or this instruction, always ask your healthcare professional. [address] any warranty or liability for your use of this information.

## 2021-08-03 NOTE — PROGRESS NOTES
Maricarmen Villarreal is a 62 y.o. male that is here for a No chief complaint on file. 1. Have you been to the ER, urgent care clinic since your last visit? Hospitalized since your last visit?no    2. Have you seen or consulted any other health care providers outside of the [address] [place] [address] since your last visit? Include any pap smears or colon screening.  no      Health Maintenance reviewed - yes      Upcoming Appts  no      VORB: No orders of the defined types were placed in this encounter.   Zina Brownlee MD/ Jayashree Chambers MA

## 2021-08-06 ENCOUNTER — APPOINTMENT (OUTPATIENT)
Dept: FAMILY MEDICINE CLINIC | Age: 61
End: 2021-08-06

## 2021-08-06 ENCOUNTER — HOSPITAL ENCOUNTER (OUTPATIENT)
Dept: LAB | Age: 61
Discharge: HOME | End: 2021-08-06
Payer: MEDICAID

## 2021-08-06 DIAGNOSIS — R35.0 URINARY FREQUENCY: ICD-10-CM

## 2021-08-06 DIAGNOSIS — E78.5 HYPERLIPIDEMIA, UNSPECIFIED HYPERLIPIDEMIA TYPE: ICD-10-CM

## 2021-08-06 DIAGNOSIS — Z72.51 UNPROTECTED SEX: ICD-10-CM

## 2021-08-06 DIAGNOSIS — I10 HTN, GOAL BELOW 130/80: ICD-10-CM

## 2021-08-06 LAB
ALBUMIN SERPL-MCNC: 3.8 G/DL (ref 3.4–5)
ALBUMIN/GLOB SERPL: 1.1 {RATIO} (ref 0.8–1.7)
ALP SERPL-CCNC: 104 U/L (ref 45–117)
ALT SERPL-CCNC: 29 U/L (ref 16–61)
ANION GAP SERPL CALC-SCNC: 6 MMOL/L (ref 3–18)
AST SERPL-CCNC: 21 U/L (ref 10–38)
BASOPHILS # BLD: 0 K/UL (ref 0–0.1)
BASOPHILS NFR BLD: 0 % (ref 0–2)
BILIRUB SERPL-MCNC: 0.9 MG/DL (ref 0.2–1)
BUN SERPL-MCNC: 7 MG/DL (ref 7–18)
BUN/CREAT SERPL: 10 (ref 12–20)
CALCIUM SERPL-MCNC: 8.8 MG/DL (ref 8.5–10.1)
CHLORIDE SERPL-SCNC: 104 MMOL/L (ref 100–111)
CHOLEST SERPL-MCNC: 149 MG/DL
CO2 SERPL-SCNC: 24 MMOL/L (ref 21–32)
CREAT SERPL-MCNC: 0.72 MG/DL (ref 0.6–1.3)
CREAT UR-MCNC: 50 MG/DL (ref 30–125)
DIFFERENTIAL METHOD BLD: ABNORMAL
EOSINOPHIL # BLD: 0.1 K/UL (ref 0–0.4)
EOSINOPHIL NFR BLD: 2 % (ref 0–5)
ERYTHROCYTE [DISTWIDTH] IN BLOOD BY AUTOMATED COUNT: 13.5 % (ref 11.6–14.5)
EST. AVERAGE GLUCOSE BLD GHB EST-MCNC: 266 MG/DL
GLOBULIN SER CALC-MCNC: 3.5 G/DL (ref 2–4)
GLUCOSE SERPL-MCNC: 247 MG/DL (ref 74–99)
HBA1C MFR BLD: 10.9 % (ref 4.2–5.6)
HCT VFR BLD AUTO: 41.7 % (ref 36–48)
HDLC SERPL-MCNC: 52 MG/DL (ref 40–60)
HDLC SERPL: 2.9 {RATIO} (ref 0–5)
HGB BLD-MCNC: 14.2 G/DL (ref 13–16)
LDLC SERPL CALC-MCNC: 60.8 MG/DL (ref 0–100)
LIPID PROFILE,FLP: ABNORMAL
LYMPHOCYTES # BLD: 3.4 K/UL (ref 0.9–3.6)
LYMPHOCYTES NFR BLD: 49 % (ref 21–52)
MCH RBC QN AUTO: 28.9 PG (ref 24–34)
MCHC RBC AUTO-ENTMCNC: 34.1 G/DL (ref 31–37)
MCV RBC AUTO: 84.8 FL (ref 74–97)
MICROALBUMIN UR-MCNC: 4.32 MG/DL (ref 0–3)
MICROALBUMIN/CREAT UR-RTO: 86 MG/G (ref 0–30)
MONOCYTES # BLD: 0.4 K/UL (ref 0.05–1.2)
MONOCYTES NFR BLD: 6 % (ref 3–10)
NEUTS SEG # BLD: 3 K/UL (ref 1.8–8)
NEUTS SEG NFR BLD: 44 % (ref 40–73)
PLATELET # BLD AUTO: 194 K/UL (ref 135–420)
PMV BLD AUTO: 12.2 FL (ref 9.2–11.8)
POTASSIUM SERPL-SCNC: 3.8 MMOL/L (ref 3.5–5.5)
PROT SERPL-MCNC: 7.3 G/DL (ref 6.4–8.2)
PSA SERPL-MCNC: 0.4 NG/ML (ref 0–4)
RBC # BLD AUTO: 4.92 M/UL (ref 4.35–5.65)
SODIUM SERPL-SCNC: 134 MMOL/L (ref 136–145)
TRIGL SERPL-MCNC: 181 MG/DL (ref ?–150)
VLDLC SERPL CALC-MCNC: 36.2 MG/DL
WBC # BLD AUTO: 6.9 K/UL (ref 4.6–13.2)

## 2021-08-06 PROCEDURE — 82043 UR ALBUMIN QUANTITATIVE: CPT

## 2021-08-06 PROCEDURE — 80053 COMPREHEN METABOLIC PANEL: CPT

## 2021-08-06 PROCEDURE — 87389 HIV-1 AG W/HIV-1&-2 AB AG IA: CPT

## 2021-08-06 PROCEDURE — 85025 COMPLETE CBC W/AUTO DIFF WBC: CPT

## 2021-08-06 PROCEDURE — 36415 COLL VENOUS BLD VENIPUNCTURE: CPT

## 2021-08-06 PROCEDURE — 83036 HEMOGLOBIN GLYCOSYLATED A1C: CPT

## 2021-08-06 PROCEDURE — 80061 LIPID PANEL: CPT

## 2021-08-06 PROCEDURE — 84153 ASSAY OF PSA TOTAL: CPT

## 2021-08-07 DIAGNOSIS — E78.1 HYPERTRIGLYCERIDEMIA: ICD-10-CM

## 2021-08-07 DIAGNOSIS — E78.5 HYPERLIPIDEMIA, UNSPECIFIED HYPERLIPIDEMIA TYPE: Primary | ICD-10-CM

## 2021-08-07 RX ORDER — OMEGA-3-ACID ETHYL ESTERS 1 G/1
2 CAPSULE, LIQUID FILLED ORAL 2 TIMES DAILY WITH MEALS
Qty: 120 CAPSULE | Refills: 3 | Status: SHIPPED | OUTPATIENT
Start: 2021-08-07 | End: 2021-09-06

## 2021-08-07 NOTE — PROGRESS NOTES
If blood sugar averages around 240. Let us know how the additional Keren Beat is being tolerated. His hemoglobin A1c is 10.9. Make sure he follows up with the pharmacist.His prostate antigen is within normal limits. Question if he is still having trouble with slow urine. He still has elevated Triglycerides. We will start him on Lovaza.

## 2021-08-09 LAB
HIV 1+2 AB+HIV1 P24 AG SERPL QL IA: NONREACTIVE
HIV12 RESULT COMMENT, HHIVC: NORMAL

## 2021-09-06 ENCOUNTER — HOSPITAL ENCOUNTER (EMERGENCY)
Age: 61
Discharge: HOME | End: 2021-09-06
Attending: STUDENT IN AN ORGANIZED HEALTH CARE EDUCATION/TRAINING PROGRAM
Payer: MEDICAID

## 2021-09-06 VITALS
HEART RATE: 65 BPM | HEIGHT: 61 IN | BODY MASS INDEX: 43.43 KG/M2 | OXYGEN SATURATION: 100 % | TEMPERATURE: 99.3 F | SYSTOLIC BLOOD PRESSURE: 122 MMHG | DIASTOLIC BLOOD PRESSURE: 72 MMHG | RESPIRATION RATE: 16 BRPM | WEIGHT: 230 LBS

## 2021-09-06 DIAGNOSIS — M54.42 ACUTE LEFT-SIDED LOW BACK PAIN WITH LEFT-SIDED SCIATICA: ICD-10-CM

## 2021-09-06 DIAGNOSIS — R79.89 ELEVATED LFTS: Primary | ICD-10-CM

## 2021-09-06 LAB
ALBUMIN SERPL-MCNC: 3.6 G/DL (ref 3.4–5)
ALBUMIN/GLOB SERPL: 0.9 {RATIO} (ref 0.8–1.7)
ALP SERPL-CCNC: 140 U/L (ref 45–117)
ALT SERPL-CCNC: 187 U/L (ref 16–61)
ANION GAP SERPL CALC-SCNC: 6 MMOL/L (ref 3–18)
AST SERPL-CCNC: 121 U/L (ref 10–38)
ATRIAL RATE: 57 BPM
BASOPHILS # BLD: 0 K/UL (ref 0–0.1)
BASOPHILS NFR BLD: 0 % (ref 0–2)
BILIRUB SERPL-MCNC: 1.7 MG/DL (ref 0.2–1)
BUN SERPL-MCNC: 7 MG/DL (ref 7–18)
BUN/CREAT SERPL: 9 (ref 12–20)
CALCIUM SERPL-MCNC: 8.4 MG/DL (ref 8.5–10.1)
CALCULATED P AXIS, ECG09: 46 DEGREES
CALCULATED R AXIS, ECG10: 42 DEGREES
CALCULATED T AXIS, ECG11: 29 DEGREES
CHLORIDE SERPL-SCNC: 109 MMOL/L (ref 100–111)
CK MB CFR SERPL CALC: NORMAL % (ref 0–4)
CK MB SERPL-MCNC: <1 NG/ML (ref 5–25)
CK SERPL-CCNC: 122 U/L (ref 39–308)
CO2 SERPL-SCNC: 25 MMOL/L (ref 21–32)
CREAT SERPL-MCNC: 0.75 MG/DL (ref 0.6–1.3)
DIAGNOSIS, 93000: NORMAL
DIFFERENTIAL METHOD BLD: NORMAL
EOSINOPHIL # BLD: 0.1 K/UL (ref 0–0.4)
EOSINOPHIL NFR BLD: 1 % (ref 0–5)
ERYTHROCYTE [DISTWIDTH] IN BLOOD BY AUTOMATED COUNT: 13.3 % (ref 11.6–14.5)
GLOBULIN SER CALC-MCNC: 3.8 G/DL (ref 2–4)
GLUCOSE SERPL-MCNC: 105 MG/DL (ref 74–99)
HCT VFR BLD AUTO: 40.3 % (ref 36–48)
HGB BLD-MCNC: 13.9 G/DL (ref 13–16)
LIPASE SERPL-CCNC: 31 U/L (ref 73–393)
LYMPHOCYTES # BLD: 3.6 K/UL (ref 0.9–3.6)
LYMPHOCYTES NFR BLD: 41 % (ref 21–52)
MCH RBC QN AUTO: 29.3 PG (ref 24–34)
MCHC RBC AUTO-ENTMCNC: 34.5 G/DL (ref 31–37)
MCV RBC AUTO: 85 FL (ref 78–100)
MONOCYTES # BLD: 0.5 K/UL (ref 0.05–1.2)
MONOCYTES NFR BLD: 5 % (ref 3–10)
NEUTS SEG # BLD: 4.6 K/UL (ref 1.8–8)
NEUTS SEG NFR BLD: 52 % (ref 40–73)
P-R INTERVAL, ECG05: 182 MS
PLATELET # BLD AUTO: 175 K/UL (ref 135–420)
PMV BLD AUTO: 11.2 FL (ref 9.2–11.8)
POTASSIUM SERPL-SCNC: 3 MMOL/L (ref 3.5–5.5)
PROT SERPL-MCNC: 7.4 G/DL (ref 6.4–8.2)
Q-T INTERVAL, ECG07: 430 MS
QRS DURATION, ECG06: 102 MS
QTC CALCULATION (BEZET), ECG08: 418 MS
RBC # BLD AUTO: 4.74 M/UL (ref 4.35–5.65)
SODIUM SERPL-SCNC: 140 MMOL/L (ref 136–145)
TROPONIN I SERPL-MCNC: <0.02 NG/ML (ref 0–0.04)
VENTRICULAR RATE, ECG03: 57 BPM
WBC # BLD AUTO: 8.8 K/UL (ref 4.6–13.2)

## 2021-09-06 PROCEDURE — 83690 ASSAY OF LIPASE: CPT

## 2021-09-06 PROCEDURE — 80053 COMPREHEN METABOLIC PANEL: CPT

## 2021-09-06 PROCEDURE — 85025 COMPLETE CBC W/AUTO DIFF WBC: CPT

## 2021-09-06 PROCEDURE — 74011250637 HC RX REV CODE- 250/637: Performed by: STUDENT IN AN ORGANIZED HEALTH CARE EDUCATION/TRAINING PROGRAM

## 2021-09-06 PROCEDURE — 82553 CREATINE MB FRACTION: CPT

## 2021-09-06 PROCEDURE — 74011250636 HC RX REV CODE- 250/636: Performed by: STUDENT IN AN ORGANIZED HEALTH CARE EDUCATION/TRAINING PROGRAM

## 2021-09-06 PROCEDURE — 99283 EMERGENCY DEPT VISIT LOW MDM: CPT

## 2021-09-06 PROCEDURE — 96360 HYDRATION IV INFUSION INIT: CPT

## 2021-09-06 PROCEDURE — 93005 ELECTROCARDIOGRAM TRACING: CPT

## 2021-09-06 PROCEDURE — U0003 INFECTIOUS AGENT DETECTION BY NUCLEIC ACID (DNA OR RNA); SEVERE ACUTE RESPIRATORY SYNDROME CORONAVIRUS 2 (SARS-COV-2) (CORONAVIRUS DISEASE [COVID-19]), AMPLIFIED PROBE TECHNIQUE, MAKING USE OF HIGH THROUGHPUT TECHNOLOGIES AS DESCRIBED BY CMS-2020-01-R: HCPCS

## 2021-09-06 RX ORDER — METHOCARBAMOL 750 MG/1
750 TABLET, FILM COATED ORAL 4 TIMES DAILY
Qty: 24 TABLET | Refills: 0 | Status: SHIPPED | OUTPATIENT
Start: 2021-09-06 | End: 2021-11-03

## 2021-09-06 RX ORDER — POTASSIUM CHLORIDE 20 MEQ/1
40 TABLET, EXTENDED RELEASE ORAL
Status: COMPLETED | OUTPATIENT
Start: 2021-09-06 | End: 2021-09-06

## 2021-09-06 RX ADMIN — SODIUM CHLORIDE 1000 ML: 900 INJECTION, SOLUTION INTRAVENOUS at 16:12

## 2021-09-06 RX ADMIN — POTASSIUM CHLORIDE 40 MEQ: 1500 TABLET, EXTENDED RELEASE ORAL at 16:13

## 2021-09-06 NOTE — ED TRIAGE NOTES
Patient states symptoms began on Saturday. He states experiencing chest pain, sweats, back pain and two episodes of vomiting on Saturday. He states calling 911 for incident but declining transport to hospital for further evaluation. He presents today with c /o left lower back pain that radiates down posterior left leg. He states vomiting has resolved. States having diarrhea yesterday. Denies any vomiting or diarrhea today. Denies chest pain today.

## 2021-09-06 NOTE — DISCHARGE INSTRUCTIONS
Please make an appointment with your primary care doctor as soon as possible regarding your elevated liver enzymes. Make sure you are drinking plenty of water and other fluids. Avoid alcohol. Return to the the emergency department for any worsening symptoms including abdominal pain or persistent vomiting.

## 2021-09-06 NOTE — ED PROVIDER NOTES
EMERGENCY DEPARTMENT HISTORY AND PHYSICAL EXAM    I have evaluated the patient at 3:40 PM      Date: 9/6/2021  Patient Name: Missael Guerrero    History of Presenting Illness     Chief Complaint   Patient presents with    Back Pain         History Provided By: Patient  Location/Duration/Severity/Modifying factors   71-year-old male presenting to the emergency department for evaluation of left lower back pain and spasming. Patient states that the symptoms have been persistent since Saturday night. Exacerbated with sitting at the hip or side bending at the waist.  Describes an aching with occasional sharp shooting pains down his left lower extremity. States that he pain started after having dinner on Saturday night. He felt sick afterward and had a bout of diarrhea as well as emesis that time. States that he thinks that he retched too hard and pulled a muscle in his back that is continuing to give him discomfort. States that the vomiting and diarrhea have since resolved. has been using gabapentin and Motrin with little relief. He denies any fevers or chills, IV drug use, incontinence of bowel or bladder, saddle anesthesia. PCP: Annmarie Shoemaker MD    Current Facility-Administered Medications   Medication Dose Route Frequency Provider Last Rate Last Admin    sodium chloride 0.9 % bolus infusion 1,000 mL  1,000 mL IntraVENous ONCE Keya Morinide, DO 1,000 mL/hr at 09/06/21 1612 1,000 mL at 09/06/21 1612     Current Outpatient Medications   Medication Sig Dispense Refill    methocarbamoL (ROBAXIN) 750 mg tablet Take 1 Tablet by mouth four (4) times daily. 24 Tablet 0    tamsulosin (FLOMAX) 0.4 mg capsule TAKE 1 CAPSULE BY MOUTH EVERY DAY 90 Capsule 3    empagliflozin (Jardiance) 25 mg tablet Take 1 Tablet by mouth daily.  90 Tablet 3    gabapentin (NEURONTIN) 100 mg capsule TAKE 1 CAPSULE BY MOUTH AT NIGHT FOR 3 NIGHTS, THEN 3 CAPSULES BY MOUTH AT NIGHT 90 Capsule 0    Rhopressa 0.02 % drop Administer 1 Drop to both eyes nightly.  Azopt 1 % ophthalmic suspension Administer 1 Drop to both eyes two (2) times a day.  timolol (TIMOPTIC-XE) 0.5 % ophthalmic gel-forming instill 1 drop into both eyes once daily  0    glucose blood VI test strips (ASCENSIA AUTODISC VI, ONE TOUCH ULTRA TEST VI) strip Use to check blood glucose daily 100 Strip 11    glucose blood VI test strips (TRUE METRIX GLUCOSE TEST STRIP) strip Use as directed to test BS 3 times a day 100 Strip 11    Blood-Glucose Meter monitoring kit by Does Not Apply route daily. 1 Kit 0    Lancets misc Use as directed 3 times a day 90 Each 11    glucose blood VI test strips (TRUETEST TEST STRIPS) strip by Does Not Apply route three (3) times daily. 80 Strip 11       Past History     Past Medical History:  Past Medical History:   Diagnosis Date    Diabetes ([facility]     DM type 2 with diabetic peripheral neuropathy ([facility] 2018    Dr Arely Morales Hypertensive retinopathy     Morbid obesity ([facility]     Pneumonia     Primary open angle glaucoma     Sickle cell trait ([facility]        Past Surgical History:  Past Surgical History:   Procedure Laterality Date    HX OTHER SURGICAL      pt reports gallstone removal       Family History:  Family History   Problem Relation Age of Onset    Diabetes Mother     Diabetes Maternal Grandmother     Sickle Cell Trait Other     Diabetes Sister        Social History:  Social History     Tobacco Use    Smoking status: Former Smoker     Packs/day: 0.25     Years: 30.00     Pack years: 7.50     Types: Cigarettes    Smokeless tobacco: Former User     Quit date: 2016    Tobacco comment: per pt stopped last year    Substance Use Topics    Alcohol use: No    Drug use: No       Allergies:  No Known Allergies      Review of Systems       Review of Systems   Constitutional: Negative for activity change, chills, diaphoresis, fatigue and fever.    Respiratory: Negative for cough, chest tightness, shortness of breath, wheezing and stridor. Cardiovascular: Negative for chest pain and palpitations. Gastrointestinal: Negative for abdominal distention, abdominal pain, constipation, diarrhea, nausea and vomiting. Genitourinary: Negative for difficulty urinating, dysuria and hematuria. Musculoskeletal: Positive for back pain. Negative for joint swelling and myalgias. Left paralumbar pain   Skin: Negative for rash. Neurological: Negative for dizziness, weakness and headaches. Psychiatric/Behavioral: Negative for agitation. The patient is not nervous/anxious. Physical Exam     Visit Vitals  /72 (BP 1 Location: Left upper arm, BP Patient Position: At rest)   Pulse 65   Temp 99.3 °F (37.4 °C)   Resp 16   Ht 5' 1\" (1.549 m)   Wt 104.3 kg (230 lb)   SpO2 100%   BMI 43.46 kg/m²         Physical Exam  Constitutional:       General: He is not in acute distress. Appearance: He is not toxic-appearing. HENT:      Head: Normocephalic and atraumatic. Eyes:      Extraocular Movements: Extraocular movements intact. Cardiovascular:      Rate and Rhythm: Normal rate and regular rhythm. Heart sounds: Normal heart sounds. No murmur heard. No friction rub. No gallop. Pulmonary:      Effort: Pulmonary effort is normal.      Breath sounds: Normal breath sounds. Abdominal:      General: There is no distension. Palpations: Abdomen is soft. There is no mass. Tenderness: There is no abdominal tenderness. There is no guarding. Hernia: No hernia is present. Musculoskeletal:         General: No swelling, tenderness or deformity. Cervical back: Normal range of motion and neck supple. Comments: Positive left-sided straight leg raise. Tenderness to palpation along the left para lumbar spine into the left sacroiliac joint. Skin:     General: Skin is warm and dry. Findings: No rash. Neurological:      General: No focal deficit present.       Mental Status: He is alert and oriented to person, place, and time. Sensory: No sensory deficit. Motor: No weakness. Comments: Normal sensation and motor strength in the left bilateral lower extremities. 2+ patellar deep tendon reflexes bilaterally. Psychiatric:         Mood and Affect: Mood normal.           Diagnostic Study Results     Labs -  Recent Results (from the past 12 hour(s))   CBC WITH AUTOMATED DIFF    Collection Time: 09/06/21  3:00 PM   Result Value Ref Range    WBC 8.8 4.6 - 13.2 K/uL    RBC 4.74 4.35 - 5.65 M/uL    HGB 13.9 13.0 - 16.0 g/dL    HCT 40.3 36.0 - 48.0 %    MCV 85.0 78.0 - 100.0 FL    MCH 29.3 24.0 - 34.0 PG    MCHC 34.5 31.0 - 37.0 g/dL    RDW 13.3 11.6 - 14.5 %    PLATELET 908 973 - 332 K/uL    MPV 11.2 9.2 - 11.8 FL    NEUTROPHILS 52 40 - 73 %    LYMPHOCYTES 41 21 - 52 %    MONOCYTES 5 3 - 10 %    EOSINOPHILS 1 0 - 5 %    BASOPHILS 0 0 - 2 %    ABS. NEUTROPHILS 4.6 1.8 - 8.0 K/UL    ABS. LYMPHOCYTES 3.6 0.9 - 3.6 K/UL    ABS. MONOCYTES 0.5 0.05 - 1.2 K/UL    ABS. EOSINOPHILS 0.1 0.0 - 0.4 K/UL    ABS.  BASOPHILS 0.0 0.0 - 0.1 K/UL    DF AUTOMATED     LIPASE    Collection Time: 09/06/21  3:30 PM   Result Value Ref Range    Lipase 31 (L) 73 - 393 U/L   CARDIAC PANEL,(CK, CKMB & TROPONIN)    Collection Time: 09/06/21  3:30 PM   Result Value Ref Range    CK - MB <1.0 <3.6 ng/ml    CK-MB Index  0.0 - 4.0 %     CALCULATION NOT PERFORMED WHEN RESULT IS BELOW LINEAR LIMIT     39 - 308 U/L    Troponin-I, QT <0.02 0.0 - 3.644 NG/ML   METABOLIC PANEL, COMPREHENSIVE    Collection Time: 09/06/21  3:30 PM   Result Value Ref Range    Sodium 140 136 - 145 mmol/L    Potassium 3.0 (L) 3.5 - 5.5 mmol/L    Chloride 109 100 - 111 mmol/L    CO2 25 21 - 32 mmol/L    Anion gap 6 3.0 - 18 mmol/L    Glucose 105 (H) 74 - 99 mg/dL    BUN 7 7.0 - 18 MG/DL    Creatinine 0.75 0.6 - 1.3 MG/DL    BUN/Creatinine ratio 9 (L) 12 - 20      GFR est AA >60 >60 ml/min/1.73m2    GFR est non-AA >60 >60 ml/min/1.73m2    Calcium 8.4 (L) 8.5 - 10.1 MG/DL    Bilirubin, total 1.7 (H) 0.2 - 1.0 MG/DL    ALT (SGPT) 187 (H) 16 - 61 U/L    AST (SGOT) 121 (H) 10 - 38 U/L    Alk. phosphatase 140 (H) 45 - 117 U/L    Protein, total 7.4 6.4 - 8.2 g/dL    Albumin 3.6 3.4 - 5.0 g/dL    Globulin 3.8 2.0 - 4.0 g/dL    A-G Ratio 0.9 0.8 - 1.7     EKG, 12 LEAD, INITIAL    Collection Time: 09/06/21  4:02 PM   Result Value Ref Range    Ventricular Rate 57 BPM    Atrial Rate 57 BPM    P-R Interval 182 ms    QRS Duration 102 ms    Q-T Interval 430 ms    QTC Calculation (Bezet) 418 ms    Calculated P Axis 46 degrees    Calculated R Axis 42 degrees    Calculated T Axis 29 degrees    Diagnosis       Sinus bradycardia  Otherwise normal ECG  When compared with ECG of 15-APR-2010 08:51,  No significant change was found         Radiologic Studies -   No orders to display         Medical Decision Making   I am the first provider for this patient. I reviewed the vital signs, available nursing notes, past medical history, past surgical history, family history and social history. Vital Signs-Reviewed the patient's vital signs. EKG: Sinus bradycardia at a rate of 57 bpm.  No ST elevations or depressions. Normal intervals. Records Reviewed: Nursing Notes, Old Medical Records and Previous Laboratory Studies (Time of Review: 3:40 PM)    ED Course: Progress Notes, Reevaluation, and Consults:         Provider Notes (Medical Decision Making):   MDM  Number of Diagnoses or Management Options  Acute left-sided low back pain with left-sided sciatica  Elevated LFTs  Diagnosis management comments: 62-year-old male presenting to the emergency department for evaluation of left-sided paralumbar back pain which is consistent with muscle spasm/sciatica. He has no red flag symptoms concerning for cauda equina syndrome. He is neurologically intact in all extremities. He appears in no acute distress and his vital signs are stable. He is afebrile.   Plan on screening lab work given his recent illness. If this is negative he can be discharged with muscle relaxants and NSAIDs. He does not require imaging at this time. [id number]: His blood work is notable for elevated LFTs as well as hypokalemia 3.0. This could be residual from his nausea and vomiting over the weekend and due partially to dehydration. We will give him IV fluids here as well as oral potassium. He does not have right upper quadrant tenderness. Nor does he have any GI symptoms now. I do not think that this needs to be further worked up at this time and this can be reevaluated on an outpatient basis. He has been instructed to make an appointment with his primary care doctor soon as possible to follow these abnormal lab findings. I have advised him on ED return precautions. Verbalizes understanding and agreement with plan. Diagnosis     Clinical Impression:   1. Elevated LFTs    2. Acute left-sided low back pain with left-sided sciatica        Disposition: home    Follow-up Information     Follow up With Specialties Details [address] [place] [address] EMERGENCY DEPT Emergency Medicine  As needed, If symptoms worsen [id number] [facility]    Tamika Acevedo MD Internal Medicine Call in 1 day  [address] [address]             Patient's Medications   Start Taking    METHOCARBAMOL (ROBAXIN) 750 MG TABLET    Take 1 Tablet by mouth four (4) times daily. Continue Taking    AZOPT 1 % OPHTHALMIC SUSPENSION    Administer 1 Drop to both eyes two (2) times a day. BLOOD-GLUCOSE METER MONITORING KIT    by Does Not Apply route daily. EMPAGLIFLOZIN (JARDIANCE) 25 MG TABLET    Take 1 Tablet by mouth daily.     GABAPENTIN (NEURONTIN) 100 MG CAPSULE    TAKE 1 CAPSULE BY MOUTH AT NIGHT FOR 3 NIGHTS, THEN 3 CAPSULES BY MOUTH AT NIGHT    GLUCOSE BLOOD VI TEST STRIPS (ASCENSIA AUTODISC VI, ONE TOUCH ULTRA TEST VI) STRIP    Use to check blood glucose daily    GLUCOSE BLOOD VI TEST STRIPS (TRUE METRIX GLUCOSE TEST STRIP) STRIP    Use as directed to test BS 3 times a day    GLUCOSE BLOOD VI TEST STRIPS (TRUETEST TEST STRIPS) STRIP    by Does Not Apply route three (3) times daily. LANCETS MISC    Use as directed 3 times a day    RHOPRESSA 0.02 % DROP    Administer 1 Drop to both eyes nightly. TAMSULOSIN (FLOMAX) 0.4 MG CAPSULE    TAKE 1 CAPSULE BY MOUTH EVERY DAY    TIMOLOL (TIMOPTIC-XE) 0.5 % OPHTHALMIC GEL-FORMING    instill 1 drop into both eyes once daily   These Medications have changed    No medications on file   Stop Taking    ALLOPURINOL (ZYLOPRIM) 300 MG TABLET    Take 1 Tablet by mouth daily. Indications: treatment to prevent acute gout attack    ATORVASTATIN (LIPITOR) 40 MG TABLET    TAKE 1 TAB BY MOUTH DAILY. INDICATIONS: EXCESSIVE FAT IN THE BLOOD    CARBAMIDE PEROXIDE (DEBROX) 6.5 % OTIC SOLUTION    Administer 5 Drops into each ear two (2) times a day. GLIPIZIDE (GLUCOTROL) 10 MG TABLET    Take 1 Tab by mouth two (2) times a day. IBUPROFEN (MOTRIN) 800 MG TABLET    TAKE 1 TAB BY MOUTH EVERY EIGHT (8) HOURS AS NEEDED FOR PAIN. LISINOPRIL (PRINIVIL, ZESTRIL) 10 MG TABLET    TAKE 1 TABLET BY MOUTH DAILY    METFORMIN (GLUCOPHAGE) 1,000 MG TABLET    Take 1 Tab by mouth two (2) times daily (with meals). MULTIVITAMIN (ONE A DAY) TABLET    Take 1 Tab by mouth daily. OMEGA-3 ACID ETHYL ESTERS (LOVAZA) 1 GRAM CAPSULE    Take 2 Capsules by mouth two (2) times daily (with meals). SILDENAFIL CITRATE (VIAGRA) 100 MG TABLET    Take 1 Tab by mouth daily as needed. SITAGLIPTIN (JANUVIA) 100 MG TABLET    Take 1 Tab by mouth daily. Disclaimer: Sections of this note are dictated using utilizing voice recognition software. Minor typographical errors may be present. If questions arise, please do not hesitate to contact me or call our department.

## 2021-09-06 NOTE — ED NOTES
I performed a brief history of the patient here in triage and I have determined that pt will need further treatment and evaluation from the main side ER physician. I have placed initial orders based on the history to help in expediting patients care.      MARK Cain 2:33 PM

## 2021-09-09 LAB — SARS-COV-2, COV2NT: NOT DETECTED

## 2021-09-20 ENCOUNTER — VIRTUAL VISIT (OUTPATIENT)
Dept: FAMILY MEDICINE CLINIC | Age: 61
End: 2021-09-20
Payer: MEDICAID

## 2021-09-20 DIAGNOSIS — R79.89 ELEVATED LFTS: Primary | ICD-10-CM

## 2021-09-20 DIAGNOSIS — E87.6 HYPOKALEMIA: ICD-10-CM

## 2021-09-20 DIAGNOSIS — M54.2 CERVICAL PAIN: ICD-10-CM

## 2021-09-20 PROCEDURE — 99214 OFFICE O/P EST MOD 30 MIN: CPT | Performed by: EMERGENCY MEDICINE

## 2021-09-20 RX ORDER — POTASSIUM CHLORIDE 20 MEQ/1
20 TABLET, EXTENDED RELEASE ORAL DAILY
Qty: 30 TABLET | Refills: 1 | Status: SHIPPED | OUTPATIENT
Start: 2021-09-20 | End: 2021-11-03

## 2021-09-20 RX ORDER — CELECOXIB 50 MG/1
50 CAPSULE ORAL 2 TIMES DAILY
Qty: 40 CAPSULE | Refills: 0 | Status: SHIPPED | OUTPATIENT
Start: 2021-09-20 | End: 2021-10-10

## 2021-09-20 NOTE — PROGRESS NOTES
09/20/21        ICD-10-CM ICD-9-CM    1. Elevated LFTs  X92.55 656.8 METABOLIC PANEL, COMPREHENSIVE      CBC WITH AUTOMATED DIFF      [facility]   2. Hypokalemia  E87.6 276.8 potassium chloride (K-DUR, KLOR-CON) 20 mEq tablet   3. Cervical pain  M54.2 723.1 XR SPINE CERV 4 OR 5 V      celecoxib (CELEBREX) 50 mg capsule     Follow-up and Dispositions    · Return in about 3 months (around 12/20/2021) for Follow up on illness. Assessment and Plan  Our gentleman has pain in the neck and pain in the right tooth. He is completing a course of Keflex. He also has neck pain. Question if this is related to the toothache or whether or not he has a muscle strain or some viral illness or other illness. We will x-ray the neck. He is instructed to going to have it done. In addition we will add a short course of Celebrex. The patient has hypokalemia. This will be repleted. The patient had abdominal discomfort when he went to the emergency department although he has primarily neck pain now. Will order ultrasound of the liver gallbladder, pancreas and aorta as the patient has elevated liver function tests which are new. The patient's hypertension and diabetes and hyperlipidemia will be addressed next visit. Instructed to call for follow-up in 2 weeks. Lab results and schedule of future lab studies reviewed with patient  reviewed diet, exercise and weight control  All questions were answered and understood. Health Maintenance Due   Topic Date Due    Eye Exam Retinal or Dilated  05/06/2021    Flu Vaccine (1) 09/01/2021       Subjective:   Dc Mullins is a 62 y.o. male has Hyperlipidemia, Morbid obesity ([address] DM type 2 with diabetic peripheral neuropathy ([address] Type 2 diabetes with nephropathy ([address] Primary open angle glaucoma, Chronic idiopathic gout involving toe of right foot without tophus, and Diabetic polyneuropathy associated with type 2 diabetes mellitus ([address] on their problem list..   No chief complaint on file. The patient was last seen on 8/3/2021. At that time he had essential hypertension which was in good control. The patient had diabetes mellitus with nephropathy that was not in control. We discussed taking medication more regularly and would like to trial Jardiance at that time. The patient had hyperlipidemia/primarily hypertriglyceridemia at this time. Follow-up labs were ordered. Unprotected sex and HIV test was ordered. History of urinary hesitancy and a PSA was normal in March 2021. We were going to consider using Flomax. Unhealthy weight and we discussed diet and exercise. We are going to consider medication if the weight loss is not significant. The patient was seen in the ED 9/6/2021. He was discharged with a diagnosis of elevated LFTs. He also had acute left-sided low back pain with left-sided sciatica. The patient  stated he was experiencing chest pains, sweats and back pain. His findings were consistent with muscle spasm at that time. Neck pain  The onset was September 6. He has a bad tooth on the top tooth row, with pain. It hurt to chew. He was seen in the ED for this problem, he states as well as other complaints. He states that he was subsequently seen by a dentist.Given Cephalexin. He is completing this medication now. He was also given Methocarbamol for the neck pain. In the posterior neck hurts to move. No fever or chills previously but today he admits to 1 chills. There is no history of any accidental fall or motor vehicle collision. Has not been in contact with anyone else with headaches or neck pain. Covid test in the ED negative. Back pain  Patient presents complaining of a  history of low back pain. There was associated left-sided sciatica. There was associated back spasm. The pain is described as an ache with an occasional superimposed lancinating pain down the left lower extremity. The pain is also aggravated with sitting or lateral flexion.   The onset was while seated. There was associated diarrhea and emesis. Patient states that when he vomited the pain began. The patient had Motrin and gabapentin with minimal relief. There was no incontinence and no saddle anesthesia. Attended the ED the patient was examined. He had a positive left left-sided straight leg raising test.  There was also tenderness along the left paralumbar spine that he stated to them and now to me, reproduced the discomfort. He was noted to have normal sensation and reflexes. In the ED he was also found to have LFTs on that visit. Troponins were negative. Potassium was 3.   alk phos 140. Total bilirubin 1.7. CBC unremarkable. EKG showed sinus bradycardia cardia otherwise within normal limits. It was seen in the ED but the problem on 9/6/2021. The onset was 2 days prior. It began with chest pain sweats back pain and 2 episodes of vomiting. Initially called 911 but then declined transport. Hypertension  The patient has no headaches, visual changes, chest pain or pressure,dyspnea, orthopnea, or PND. There is no problem with the medication. Diabetes mellitus  The patient denies polyuria, polydipsia, or polyphagia. The patient denies any other aggravating or relieving factors There has been no problem with the medications. Hyperlipidemia   The patient denies any myalgias or weakness. No abdominal discomfort admitted to. The patient denies any difficulty with or side effects from the medication.      BP Readings from Last 3 Encounters:   09/06/21 122/72   08/03/21 133/76   06/09/21 138/78       Lab Results   Component Value Date/Time    Hemoglobin A1c 10.9 (H) 08/06/2021 10:23 AM    Hemoglobin A1c (POC) 12.2 05/30/2019 08:13 AM     Lab Results   Component Value Date/Time    Cholesterol, total 149 08/06/2021 10:23 AM    HDL Cholesterol 52 08/06/2021 10:23 AM    LDL, calculated 60.8 08/06/2021 10:23 AM    VLDL, calculated 36.2 08/06/2021 10:23 AM Triglyceride 181 (H) 08/06/2021 10:23 AM    CHOL/HDL Ratio 2.9 08/06/2021 10:23 AM               Lab Results   Component Value Date/Time    Sodium 140 09/06/2021 03:30 PM    Potassium 3.0 (L) 09/06/2021 03:30 PM    Chloride 109 09/06/2021 03:30 PM    CO2 25 09/06/2021 03:30 PM    Anion gap 6 09/06/2021 03:30 PM    Glucose 105 (H) 09/06/2021 03:30 PM    BUN 7 09/06/2021 03:30 PM    Creatinine 0.75 09/06/2021 03:30 PM    BUN/Creatinine ratio 9 (L) 09/06/2021 03:30 PM    GFR est AA >60 09/06/2021 03:30 PM    GFR est non-AA >60 09/06/2021 03:30 PM    Calcium 8.4 (L) 09/06/2021 03:30 PM    Bilirubin, total 1.7 (H) 09/06/2021 03:30 PM    Alk. phosphatase 140 (H) 09/06/2021 03:30 PM    Protein, total 7.4 09/06/2021 03:30 PM    Albumin 3.6 09/06/2021 03:30 PM    Globulin 3.8 09/06/2021 03:30 PM    A-G Ratio 0.9 09/06/2021 03:30 PM    ALT (SGPT) 187 (H) 09/06/2021 03:30 PM       Current Outpatient Medications   Medication Sig    methocarbamoL (ROBAXIN) 750 mg tablet Take 1 Tablet by mouth four (4) times daily.  tamsulosin (FLOMAX) 0.4 mg capsule TAKE 1 CAPSULE BY MOUTH EVERY DAY    empagliflozin (Jardiance) 25 mg tablet Take 1 Tablet by mouth daily.  gabapentin (NEURONTIN) 100 mg capsule TAKE 1 CAPSULE BY MOUTH AT NIGHT FOR 3 NIGHTS, THEN 3 CAPSULES BY MOUTH AT NIGHT    glucose blood VI test strips (ASCENSIA AUTODISC VI, ONE TOUCH ULTRA TEST VI) strip Use to check blood glucose daily    Rhopressa 0.02 % drop Administer 1 Drop to both eyes nightly.  Azopt 1 % ophthalmic suspension Administer 1 Drop to both eyes two (2) times a day.  glucose blood VI test strips (TRUE METRIX GLUCOSE TEST STRIP) strip Use as directed to test BS 3 times a day    Blood-Glucose Meter monitoring kit by Does Not Apply route daily.  Lancets misc Use as directed 3 times a day    glucose blood VI test strips (TRUETEST TEST STRIPS) strip by Does Not Apply route three (3) times daily.     timolol (TIMOPTIC-XE) 0.5 % ophthalmic gel-forming instill 1 drop into both eyes once daily     No current facility-administered medications for this visit. No Known Allergies  has Hyperlipidemia, Morbid obesity ([address] DM type 2 with diabetic peripheral neuropathy ([address] Type 2 diabetes with nephropathy ([address] Primary open angle glaucoma, Chronic idiopathic gout involving toe of right foot without tophus, and Diabetic polyneuropathy associated with type 2 diabetes mellitus ([address] on their problem list.    Past Surgical History:   Procedure Laterality Date    HX OTHER SURGICAL      pt reports gallstone removal      reports that he has quit smoking. His smoking use included cigarettes. He has a 7.50 pack-year smoking history. He quit smokeless tobacco use about 5 years ago. He reports that he does not drink alcohol and does not use drugs. family history includes Diabetes in his maternal grandmother, mother, and sister; Sickle Cell Trait in an other family member. Review of Systems   Constitutional: Negative for chills, fever, malaise/fatigue and weight loss (Has not checked recently). HENT: Negative for congestion and sore throat. Eyes: Positive for blurred vision (Ophthalmologist added new eyedrop). Negative for redness. Respiratory: Negative for cough, shortness of breath and wheezing. Cardiovascular: Negative for chest pain and leg swelling. PND:    Gastrointestinal: Negative for abdominal pain, blood in stool, constipation, diarrhea and heartburn. Genitourinary: Positive for frequency. Negative for dysuria and urgency. History of unprotected sex. Musculoskeletal: Positive for back pain. Negative for joint pain and myalgias. Neurological: Positive for headaches. Negative for dizziness, sensory change, speech change and focal weakness. Endo/Heme/Allergies: Does not bruise/bleed easily. Psychiatric/Behavioral: The patient is not nervous/anxious.         Physical Exam  Constitutional:       General: He is not in acute distress. HENT:      Right Ear: External ear normal.      Left Ear: External ear normal.      Nose: Nose normal.      Mouth/Throat:      Mouth: Mucous membranes are moist.      Pharynx: Oropharynx is clear. Eyes:      General: No scleral icterus. Extraocular Movements: Extraocular movements intact. Pupils: Pupils are equal, round, and reactive to light. Pulmonary:      Effort: Pulmonary effort is normal.   Musculoskeletal:         General: No swelling. Right lower leg: No edema. Left lower leg: No edema. Skin:     Coloration: Skin is not jaundiced. Findings: No erythema. Neurological:      Mental Status: He is alert and oriented to person, place, and time. Coordination: Coordination normal.      Gait: Gait normal.   Psychiatric:         Mood and Affect: Mood normal.         Behavior: Behavior normal.          We discussed the expected course, resolution and complications of the diagnosis(es) in detail. Medication risks, benefits, costs, interactions, and alternatives were discussed as indicated. I advised him to contact the office if his condition worsens, changes or fails to improve as anticipated. He expressed understanding with the diagnosis(es) and plan. This note was done with the assistance of dragon speech software.   Some inadvertent errors or omissions may be present

## 2021-09-23 ENCOUNTER — HOSPITAL ENCOUNTER (OUTPATIENT)
Dept: ULTRASOUND IMAGING | Age: 61
Discharge: HOME | End: 2021-09-23
Attending: EMERGENCY MEDICINE
Payer: MEDICAID

## 2021-09-23 DIAGNOSIS — R79.89 ELEVATED LFTS: ICD-10-CM

## 2021-09-23 PROCEDURE — 76705 ECHO EXAM OF ABDOMEN: CPT

## 2021-09-27 DIAGNOSIS — R79.89 ELEVATED LFTS: Primary | ICD-10-CM

## 2021-09-27 NOTE — PROGRESS NOTES
Please call. The ultrasound of the liver just shows a fatty liver and he can improve this by decreasing the fat and fried foods as well as concentrated sweets in his diet. I also ordered some hepatitis tests.

## 2021-11-02 NOTE — PROGRESS NOTES
11/02/21  5:07 PM    ICD-10-CM ICD-9-CM    1. HTN, goal below 130/80  I10 401.9 CBC WITH AUTOMATED DIFF   2. Type 2 diabetes with nephropathy (HCC)  E11.21 250.40 HEMOGLOBIN A1C WITH EAG     583.81 MICROALBUMIN, UR, RAND W/ MICROALB/CREAT RATIO      varicella-zoster recombinant, PF, (Shingrix, PF,) 50 mcg/0.5 mL susr injection   3. Hyperlipidemia, unspecified hyperlipidemia type  E78.5 272.4 LIPID PANEL   4. Morbid obesity (HCC)  E66.01 278.01    5. Constipation, unspecified constipation type  K59.00 564.00 docusate sodium (COLACE) 100 mg capsule   6. Hypokalemia  E87.6 276.8      Follow-up and Dispositions    · Return in about 3 months (around 2/3/2022) for Follow up on illness, Labs/diagnostics/referrals ordered this visit. Assessment and plan  Essential hypertension chronic problem good control continue present regimen. Diabetes mellitus type 2 chronic problem not controlled improving. Follow-up labs. May need adjustment of meds. Hyperlipidemia chronic problem primarily hypertriglyceridemia. Recheck labs. May need Lovaza. Discussed diet and exercise. Unhealthy weight. Chronic problem, worsening, discussed diet and exercise. Consider medication. Admits to constipation. Will trial Colace. Consider MiraLAX. Hypokalemia, resolved. No longer on medication. Ultrasound showed fatty liver. Discussed the ramifications. Will initiate treatment with diet. Liver function tests were within normal limits last visit, recheck. Lab results and schedule of future lab studies reviewed with patient  Diagnostic and radiologic results and the schedule of future studies were reviewed with the patient  reviewed diet, exercise and weight control  All questions were answered and understood.             Health Maintenance Due   Topic Date Due    Eye Exam Retinal or Dilated  05/06/2021    Flu Vaccine (1) 09/01/2021    Shingrix Vaccine Age 50> (2 of 2) 09/25/2021    A1C test (Diabetic or Prediabetic) 11/06/2021   Had eyes checked To be seen Nov 11 2021eye pressure elevated    Subjective:   Michelle Hung is a 62 y.o. male has Hyperlipidemia, Morbid obesity ([address] DM type 2 with diabetic peripheral neuropathy ([address] Type 2 diabetes with nephropathy ([address] Primary open angle glaucoma, Chronic idiopathic gout involving toe of right foot without tophus, and Diabetic polyneuropathy associated with type 2 diabetes mellitus ([address] on their problem list.. No chief complaint on file. Our gentleman has pain in the neck and pain in the right tooth. He is completing a course of Keflex. He also has neck pain. Question if this is related to the toothache or whether or not he has a muscle strain or some viral illness or other illness. We will x-ray the neck. He is instructed to going to have it done. In addition we will add a short course of Celebrex. The patient has hypokalemia. This will be repleted. The patient had abdominal discomfort when he went to the emergency department although he has primarily neck pain now. Will order ultrasound of the liver gallbladder, pancreas and aorta as the patient has elevated liver function tests which are new. The patient's hypertension and diabetes and hyperlipidemia will be addressed next visit. Instructed to call for follow-up in 2 weeks. Lab results and schedule of future lab studies reviewed with patient  reviewed diet, exercise and weight control  All questions were answered and understood. The patient was last seen on 8/3/2021. At that time he had essential hypertension which was in good control. The patient had diabetes mellitus with nephropathy that was not in control. We discussed taking medication more regularly and would like to trial Jardiance at that time. The patient had hyperlipidemia/primarily hypertriglyceridemia at this time. Follow-up labs were ordered. Unprotected sex and HIV test was ordered.   History of urinary hesitancy and a PSA was normal in March 2021. We were going to consider using Flomax. Unhealthy weight and we discussed diet and exercise. We are going to consider medication if the weight loss is not significant. The patient was seen in the ED 9/6/2021. He was discharged with a diagnosis of elevated LFTs. He also had acute left-sided low back pain with left-sided sciatica. The patient  stated he was experiencing chest pains, sweats and back pain. His findings were consistent with muscle spasm at that time. Hypertension  The patient has no headaches, visual changes, chest pain or pressure,dyspnea, orthopnea, or PND. There is no problem with the medication. Diabetes mellitus  The patient denies polyuria, polydipsia, or polyphagia. The patient denies any other aggravating or relieving factors There has been no problem with the medications. Hyperlipidemia   The patient denies any myalgias or weakness. No abdominal discomfort admitted to. The patient denies any difficulty with or side effects from the medication.    135/64  BP Readings from Last 3 Encounters:   09/06/21 122/72   08/03/21 133/76   06/09/21 138/78       Lab Results   Component Value Date/Time    Hemoglobin A1c 10.9 (H) 08/06/2021 10:23 AM    Hemoglobin A1c (POC) 12.2 05/30/2019 08:13 AM     Lab Results   Component Value Date/Time    Cholesterol, total 149 08/06/2021 10:23 AM    HDL Cholesterol 52 08/06/2021 10:23 AM    LDL, calculated 60.8 08/06/2021 10:23 AM    VLDL, calculated 36.2 08/06/2021 10:23 AM    Triglyceride 181 (H) 08/06/2021 10:23 AM    CHOL/HDL Ratio 2.9 08/06/2021 10:23 AM         Lab Results   Component Value Date/Time    Sodium 140 09/06/2021 03:30 PM    Potassium 3.0 (L) 09/06/2021 03:30 PM    Chloride 109 09/06/2021 03:30 PM    CO2 25 09/06/2021 03:30 PM    Anion gap 6 09/06/2021 03:30 PM    Glucose 105 (H) 09/06/2021 03:30 PM    BUN 7 09/06/2021 03:30 PM    Creatinine 0.75 09/06/2021 03:30 PM    BUN/Creatinine ratio 9 (L) 09/06/2021 03:30 PM    GFR est AA >60 09/06/2021 03:30 PM    GFR est non-AA >60 09/06/2021 03:30 PM    Calcium 8.4 (L) 09/06/2021 03:30 PM    Bilirubin, total 1.7 (H) 09/06/2021 03:30 PM    Alk. phosphatase 140 (H) 09/06/2021 03:30 PM    Protein, total 7.4 09/06/2021 03:30 PM    Albumin 3.6 09/06/2021 03:30 PM    Globulin 3.8 09/06/2021 03:30 PM    A-G Ratio 0.9 09/06/2021 03:30 PM    ALT (SGPT) 187 (H) 09/06/2021 03:30 PM     Fatty liver  This is a chronic problem. It is unchanged. The problem is not in good control. There are no new aggravating or relieving factors. There is no history of any new associated signs, symptoms, or complications. The treatment is unchanged and there are no side effects from it. He is willing to trial diet. Current Outpatient Medications   Medication Sig    potassium chloride (K-DUR, KLOR-CON) 20 mEq tablet Take 1 Tablet by mouth daily.  methocarbamoL (ROBAXIN) 750 mg tablet Take 1 Tablet by mouth four (4) times daily.  tamsulosin (FLOMAX) 0.4 mg capsule TAKE 1 CAPSULE BY MOUTH EVERY DAY    empagliflozin (Jardiance) 25 mg tablet Take 1 Tablet by mouth daily.  gabapentin (NEURONTIN) 100 mg capsule TAKE 1 CAPSULE BY MOUTH AT NIGHT FOR 3 NIGHTS, THEN 3 CAPSULES BY MOUTH AT NIGHT    glucose blood VI test strips (ASCENSIA AUTODISC VI, ONE TOUCH ULTRA TEST VI) strip Use to check blood glucose daily    Rhopressa 0.02 % drop Administer 1 Drop to both eyes nightly.  Azopt 1 % ophthalmic suspension Administer 1 Drop to both eyes two (2) times a day.  glucose blood VI test strips (TRUE METRIX GLUCOSE TEST STRIP) strip Use as directed to test BS 3 times a day    Blood-Glucose Meter monitoring kit by Does Not Apply route daily.  Lancets misc Use as directed 3 times a day    glucose blood VI test strips (TRUETEST TEST STRIPS) strip by Does Not Apply route three (3) times daily.     timolol (TIMOPTIC-XE) 0.5 % ophthalmic gel-forming instill 1 drop into both eyes once daily     No current facility-administered medications for this visit. No Known Allergies  has Hyperlipidemia, Morbid obesity ([address] DM type 2 with diabetic peripheral neuropathy ([address] Type 2 diabetes with nephropathy ([address] Primary open angle glaucoma, Chronic idiopathic gout involving toe of right foot without tophus, and Diabetic polyneuropathy associated with type 2 diabetes mellitus ([address] on their problem list.    Past Surgical History:   Procedure Laterality Date    HX OTHER SURGICAL      pt reports gallstone removal      reports that he has quit smoking. His smoking use included cigarettes. He has a 7.50 pack-year smoking history. He quit smokeless tobacco use about 5 years ago. He reports that he does not drink alcohol and does not use drugs. family history includes Diabetes in his maternal grandmother, mother, and sister; Sickle Cell Trait in an other family member. Review of Systems   Constitutional: Negative for chills, fever, malaise/fatigue and weight loss (Has not checked recently). HENT: Negative for congestion and sore throat. Eyes: Positive for blurred vision (Ophthalmologist added new eyedrop). Negative for redness. Respiratory: Negative for cough, shortness of breath and wheezing. Cardiovascular: Negative for chest pain and leg swelling. PND:    Gastrointestinal: Negative for abdominal pain, blood in stool, constipation, diarrhea and heartburn. Genitourinary: Positive for frequency. Negative for dysuria and urgency. History of unprotected sex. Musculoskeletal: Positive for back pain. Negative for joint pain and myalgias. Neurological: Positive for headaches. Negative for dizziness, sensory change, speech change and focal weakness. Endo/Heme/Allergies: Does not bruise/bleed easily. Psychiatric/Behavioral: The patient is not nervous/anxious. There were no vitals taken for this visit. Physical Exam  Constitutional:       General: He is not in acute distress.   HENT:      Right Ear: External ear normal.      Left Ear: External ear normal.      Nose: Nose normal.      Mouth/Throat:      Mouth: Mucous membranes are moist.      Pharynx: Oropharynx is clear. Eyes:      General: No scleral icterus. Extraocular Movements: Extraocular movements intact. Pupils: Pupils are equal, round, and reactive to light. Pulmonary:      Effort: Pulmonary effort is normal.   Musculoskeletal:         General: No swelling. Right lower leg: No edema. Left lower leg: No edema. Skin:     Coloration: Skin is not jaundiced. Findings: No erythema. Neurological:      Mental Status: He is alert and oriented to person, place, and time. Coordination: Coordination normal.      Gait: Gait normal.   Psychiatric:         Mood and Affect: Mood normal.         Behavior: Behavior normal.          We discussed the expected course, resolution and complications of the diagnosis(es) in detail. Medication risks, benefits, costs, interactions, and alternatives were discussed as indicated. I advised him to contact the office if his condition worsens, changes or fails to improve as anticipated. He expressed understanding with the diagnosis(es) and plan. This note was done with the assistance of dragon speech software.   Some inadvertent errors or omissions may be present

## 2021-11-03 ENCOUNTER — VIRTUAL VISIT (OUTPATIENT)
Dept: FAMILY MEDICINE CLINIC | Age: 61
End: 2021-11-03
Payer: MEDICAID

## 2021-11-03 DIAGNOSIS — E87.6 HYPOKALEMIA: ICD-10-CM

## 2021-11-03 DIAGNOSIS — E66.01 MORBID OBESITY (HCC): ICD-10-CM

## 2021-11-03 DIAGNOSIS — K59.00 CONSTIPATION, UNSPECIFIED CONSTIPATION TYPE: ICD-10-CM

## 2021-11-03 DIAGNOSIS — I10 HTN, GOAL BELOW 130/80: Primary | ICD-10-CM

## 2021-11-03 DIAGNOSIS — E78.5 HYPERLIPIDEMIA, UNSPECIFIED HYPERLIPIDEMIA TYPE: ICD-10-CM

## 2021-11-03 DIAGNOSIS — E11.21 TYPE 2 DIABETES WITH NEPHROPATHY (HCC): ICD-10-CM

## 2021-11-03 PROCEDURE — 99214 OFFICE O/P EST MOD 30 MIN: CPT | Performed by: EMERGENCY MEDICINE

## 2021-11-03 RX ORDER — DOCUSATE SODIUM 100 MG/1
100 CAPSULE, LIQUID FILLED ORAL 2 TIMES DAILY
Qty: 60 CAPSULE | Refills: 2 | Status: SHIPPED | OUTPATIENT
Start: 2021-11-03 | End: 2022-02-01

## 2021-11-03 RX ORDER — ZOSTER VACCINE RECOMBINANT, ADJUVANTED 50 MCG/0.5
0.5 KIT INTRAMUSCULAR ONCE
Qty: 0.5 ML | Refills: 0 | Status: SHIPPED | OUTPATIENT
Start: 2021-11-03 | End: 2021-11-03

## 2021-11-03 NOTE — PATIENT INSTRUCTIONS
Hyperlipidemia: After Your Visit Your Care Instructions Hyperlipidemia is too much fat in your blood. The body has several kinds of fat, including cholesterol and triglycerides. Your body needs fat for many things, such as making new cells. But too much fat in your blood increases your chances of having a heart attack or stroke. You may be able to lower your cholesterol and triglycerides with a heart-healthy diet, exercise, and if needed, medicine. Your doctor may want you to try lifestyle changes first to see whether they lower the fat in your blood. You may need to take medicine if lifestyle changes do not lower the fat in your blood enough. Follow-up care is a key part of your treatment and safety. Be sure to make and go to all appointments, and call your doctor if you are having problems. Its also a good idea to know your test results and keep a list of the medicines you take. How can you care for yourself at home? Take your medicines · Take your medicines exactly as prescribed. Call your doctor if you think you are having a problem with your medicine. · If you take medicine to lower your cholesterol, go to follow-up visits. You will need to have blood tests. · Do not take large doses of niacin, which is a B vitamin, while taking medicine called statins. It may increase the chance of muscle pain and liver problems. · Talk to your doctor about avoiding grapefruit juice if you are taking statins. Grapefruit juice can raise the level of this medicine in your blood. This could increase side effects. Eat more fruits, vegetables, and fiber · Fruits and vegetables have lots of nutrients that help protect against heart disease, and they have littleif anyfat. Try to eat at least five servings a day. Dark green, deep orange, or yellow fruits and vegetables are healthy choices. · Keep carrots, celery, and other veggies handy for snacks.  Buy fruit that is in season and store it where you can see it so that you will be tempted to eat it. Cook dishes that have a lot of veggies in them, such as stir-fries and soups. · Foods high in fiber may reduce your cholesterol and provide important vitamins and minerals. High-fiber foods include whole-grain cereals and breads, oatmeal, beans, brown rice, citrus fruits, and apples. · Buy whole-grain breads and cereals instead of white bread and pastries. Limit saturated fat · Read food labels and try to avoid saturated fat and trans fat. They increase your risk of heart disease. · Use olive or canola oil when you cook. Try cholesterol-lowering spreads, such as Benecol or Take Control. · Bake, broil, grill, or steam foods instead of frying them. · Limit the amount of high-fat meats you eat, including hot dogs and sausages. Cut out all visible fat when you prepare meat. · Eat fish, skinless poultry, and soy products such as tofu instead of high-fat meats. Soybeans may be especially good for your heart. Eat at least two servings of fish a week. Certain fish, such as salmon, contain omega-3 fatty acids, which may help reduce your risk of heart attack. · Choose low-fat or fat-free milk and dairy products. Get exercise, limit alcohol, and quit smoking · Get more exercise. Work with your doctor to set up an exercise program. Even if you can do only a small amount, exercise will help you get stronger, have more energy, and manage your weight and your stress. Walking is an easy way to get exercise. Gradually increase the amount you walk every day. Aim for at least 30 minutes on most days of the week. You also may want to swim, bike, or do other activities. · Limit alcohol to no more than 2 drinks a day for men and 1 drink a day for women. · Do not smoke. If you need help quitting, talk to your doctor about stop-smoking programs and medicines. These can increase your chances of quitting for good. When should you call for help?  
Call 911 anytime you think you may need emergency care. For example, call if: 
· You have symptoms of a heart attack. These may include: ¨ Chest pain or pressure, or a strange feeling in the chest. 
¨ Sweating. ¨ Shortness of breath. ¨ Nausea or vomiting. ¨ Pain, pressure, or a strange feeling in the back, neck, jaw, or upper belly or in one or both shoulders or arms. ¨ Lightheadedness or sudden weakness. ¨ A fast or irregular heartbeat. After you call 911, the  may tell you to chew 1 adult-strength or 2 to 4 low-dose aspirin. Wait for an ambulance. Do not try to drive yourself. · You have signs of a stroke. These may include: 
¨ Sudden numbness, paralysis, or weakness in your face, arm, or leg, especially on only one side of your body. ¨ New problems with walking or balance. ¨ Sudden vision changes. ¨ Drooling or slurred speech. ¨ New problems speaking or understanding simple statements, or feeling confused. ¨ A sudden, severe headache that is different from past headaches. · You passed out (lost consciousness). Call your doctor now or seek immediate medical care if: 
· You have muscle pain or weakness. Watch closely for changes in your health, and be sure to contact your doctor if: 
· You are very tired. · You have an upset stomach, gas, constipation, or belly pain or cramps. Where can you learn more? Go to [employer] Enter C406 in the search box to learn more about \"Hyperlipidemia: After Your Visit. \"  
© 1299-6018 Healthwise, [employer]orporated. Care instructions adapted under license by [facility] (which disclaims liability or warranty for this information). This care instruction is for use with your licensed healthcare professional. If you have questions about a medical condition or this instruction, always ask your healthcare professional. [place] [address] any warranty or liability for your use of this information. Content Version: 5.9.756730; Last Revised: October 13, 2011 Type 2 Diabetes: Care Instructions Your Care Instructions Type 2 diabetes is a disease that develops when the body's tissues cannot use insulin properly. Over time, the pancreas cannot make enough insulin. Insulin is a hormone that helps the body's cells use sugar (glucose) for energy. It also helps the body store extra sugar in muscle, fat, and liver cells. Without insulin, the sugar cannot get into the cells to do its work. It stays in the blood instead. This can cause high blood sugar levels. A person has diabetes when the blood sugar stays too high too much of the time. Over time, diabetes can lead to diseases of the heart, blood vessels, nerves, kidneys, and eyes. You may be able to control your blood sugar by losing weight, eating a healthy diet, and getting daily exercise. You may also have to take insulin or other diabetes medicine. Follow-up care is a key part of your treatment and safety. Be sure to make and go to all appointments. Call your doctor if you are having problems. It's also a good idea to know your test results and keep a list of the medicines you take. How can you care for yourself at home? · Keep your blood sugar at a target level (which you set with your doctor). ? Carbohydratethe body's main source of fuelaffects blood sugar more than any other nutrient. Carbohydrate is in fruits, vegetables, milk, and yogurt. It also is in breads, cereals, vegetables such as potatoes and corn, and sugary foods such as candy and cakes. Follow your meal plan to know how much carbohydrate to eat at each meal and snack. ? Aim for 30 minutes of exercise on most, preferably all, days of the week. Walking is a good choice. You also may want to do other activities, such as running, swimming, cycling, or playing tennis or team sports. Try to do muscle-strengthening exercises at least 2 times a week. ? Take your medicines exactly as prescribed.  Call your doctor if you think you are having a problem with your medicine. You will get more details on the specific medicines your doctor prescribes. · Check your blood sugar as often as your doctor recommends. It is important to keep track of any symptoms you have, such as low blood sugar. Also tell your doctor if you have any changes in your activities, diet, or insulin use. · Talk to your doctor before you start taking aspirin every day. Aspirin can help certain people lower their risk of a heart attack or stroke. But taking aspirin isn't right for everyone, because it can cause serious bleeding. · Do not smoke. If you need help quitting, talk to your doctor about stop-smoking programs and medicines. These can increase your chances of quitting for good. · Keep your cholesterol and blood pressure at normal levels. You may need to take one or more medicines to reach your goals. Take them exactly as directed. Do not stop or change a medicine without talking to your doctor first. 
When should you call for help? Call 911 anytime you think you may need emergency care. For example, call if: 
  · You passed out (lost consciousness), or you suddenly become very sleepy or confused. (You may have very low blood sugar.) Call your doctor now or seek immediate medical care if: 
  · Your blood sugar is 300 mg/dL or is higher than the level your doctor has set for you. · You have symptoms of low blood sugar, such as: ? Sweating. ? Feeling nervous, shaky, and weak. ? Extreme hunger and slight nausea. ? Dizziness and headache. 
? Blurred vision. ? Confusion. Watch closely for changes in your health, and be sure to contact your doctor if: 
  · You often have problems controlling your blood sugar. · You have symptoms of long-term diabetes problems, such as: ? New vision changes. ? New pain, numbness, or tingling in your hands or feet. ? Skin problems. Where can you learn more? Go to http://www.gray.com/ Enter C553 in the search box to learn more about \"Type 2 Diabetes: Care Instructions. \" Current as of: August 31, 2020               Content Version: 12.8 © 2006-2021 [employer]. Care instructions adapted under license by [employer] (which disclaims liability or warranty for this information). If you have questions about a medical condition or this instruction, always ask your healthcare professional. [address] any warranty or liability for your use of this information. High Blood Pressure: Care Instructions Overview It's normal for blood pressure to go up and down throughout the day. But if it stays up, you have high blood pressure. Another name for high blood pressure is hypertension. Despite what a lot of people think, high blood pressure usually doesn't cause headaches or make you feel dizzy or lightheaded. It usually has no symptoms. But it does increase your risk of stroke, heart attack, and other problems. You and your doctor will talk about your risks of these problems based on your blood pressure. Your doctor will give you a goal for your blood pressure. Your goal will be based on your health and your age. Lifestyle changes, such as eating healthy and being active, are always important to help lower blood pressure. You might also take medicine to reach your blood pressure goal. 
Follow-up care is a key part of your treatment and safety. Be sure to make and go to all appointments, and call your doctor if you are having problems. It's also a good idea to know your test results and keep a list of the medicines you take. How can you care for yourself at home? Medical treatment · If you stop taking your medicine, your blood pressure will go back up. You may take one or more types of medicine to lower your blood pressure. Be safe with medicines. Take your medicine exactly as prescribed.  Call your doctor if you think you are having a problem with your medicine. · Talk to your doctor before you start taking aspirin every day. Aspirin can help certain people lower their risk of a heart attack or stroke. But taking aspirin isn't right for everyone, because it can cause serious bleeding. · See your doctor regularly. You may need to see the doctor more often at first or until your blood pressure comes down. · If you are taking blood pressure medicine, talk to your doctor before you take decongestants or anti-inflammatory medicine, such as ibuprofen. Some of these medicines can raise blood pressure. · Learn how to check your blood pressure at home. Lifestyle changes · Stay at a healthy weight. This is especially important if you put on weight around the waist. Losing even 10 pounds can help you lower your blood pressure. · If your doctor recommends it, get more exercise. Walking is a good choice. Bit by bit, increase the amount you walk every day. Try for at least 30 minutes on most days of the week. You also may want to swim, bike, or do other activities. · Avoid or limit alcohol. Talk to your doctor about whether you can drink any alcohol. · Try to limit how much sodium you eat to less than 2,300 milligrams (mg) a day. Your doctor may ask you to try to eat less than 1,500 mg a day. · Eat plenty of fruits (such as bananas and oranges), vegetables, legumes, whole grains, and low-fat dairy products. · Lower the amount of saturated fat in your diet. Saturated fat is found in animal products such as milk, cheese, and meat. Limiting these foods may help you lose weight and also lower your risk for heart disease. · Do not smoke. Smoking increases your risk for heart attack and stroke. If you need help quitting, talk to your doctor about stop-smoking programs and medicines. These can increase your chances of quitting for good. When should you call for help? Call  911 anytime you think you may need emergency care.  This may mean having symptoms that suggest that your blood pressure is causing a serious heart or blood vessel problem. Your blood pressure may be over 180/120. For example, call 911 if: 
  · You have symptoms of a heart attack. These may include: 
? Chest pain or pressure, or a strange feeling in the chest. 
? Sweating. ? Shortness of breath. ? Nausea or vomiting. ? Pain, pressure, or a strange feeling in the back, neck, jaw, or upper belly or in one or both shoulders or arms. ? Lightheadedness or sudden weakness. ? A fast or irregular heartbeat. · You have symptoms of a stroke. These may include: 
? Sudden numbness, tingling, weakness, or loss of movement in your face, arm, or leg, especially on only one side of your body. ? Sudden vision changes. ? Sudden trouble speaking. ? Sudden confusion or trouble understanding simple statements. ? Sudden problems with walking or balance. ? A sudden, severe headache that is different from past headaches. · You have severe back or belly pain. Do not wait until your blood pressure comes down on its own. Get help right away. Call your doctor now or seek immediate care if: 
  · Your blood pressure is much higher than normal (such as 180/120 or higher), but you don't have symptoms. · You think high blood pressure is causing symptoms, such as: 
? Severe headache. 
? Blurry vision. Watch closely for changes in your health, and be sure to contact your doctor if: 
  · Your blood pressure measures higher than your doctor recommends at least 2 times. That means the top number is higher or the bottom number is higher, or both. · You think you may be having side effects from your blood pressure medicine. Where can you learn more? Go to http://www.gray.com/ Enter Q120 in the search box to learn more about \"High Blood Pressure: Care Instructions. \" Current as of: August 31, 2020               Content Version: 12.8 © 4700-5713 [employer].   
Care instructions adapted under license by [employer] (which disclaims liability or warranty for this information). If you have questions about a medical condition or this instruction, always ask your healthcare professional. [address] any warranty or liability for your use of this information. Hyperlipidemia: After Your Visit Your Care Instructions Hyperlipidemia is too much fat in your blood. The body has several kinds of fat, including cholesterol and triglycerides. Your body needs fat for many things, such as making new cells. But too much fat in your blood increases your chances of having a heart attack or stroke. You may be able to lower your cholesterol and triglycerides with a heart-healthy diet, exercise, and if needed, medicine. Your doctor may want you to try lifestyle changes first to see whether they lower the fat in your blood. You may need to take medicine if lifestyle changes do not lower the fat in your blood enough. Follow-up care is a key part of your treatment and safety. Be sure to make and go to all appointments, and call your doctor if you are having problems. Its also a good idea to know your test results and keep a list of the medicines you take. How can you care for yourself at home? Take your medicines · Take your medicines exactly as prescribed. Call your doctor if you think you are having a problem with your medicine. · If you take medicine to lower your cholesterol, go to follow-up visits. You will need to have blood tests. · Do not take large doses of niacin, which is a B vitamin, while taking medicine called statins. It may increase the chance of muscle pain and liver problems. · Talk to your doctor about avoiding grapefruit juice if you are taking statins. Grapefruit juice can raise the level of this medicine in your blood. This could increase side effects. Eat more fruits, vegetables, and fiber · Fruits and vegetables have lots of nutrients that help protect against heart disease, and they have littleif anyfat. Try to eat at least five servings a day. Dark green, deep orange, or yellow fruits and vegetables are healthy choices. · Keep carrots, celery, and other veggies handy for snacks. Buy fruit that is in season and store it where you can see it so that you will be tempted to eat it. Cook dishes that have a lot of veggies in them, such as stir-fries and soups. · Foods high in fiber may reduce your cholesterol and provide important vitamins and minerals. High-fiber foods include whole-grain cereals and breads, oatmeal, beans, brown rice, citrus fruits, and apples. · Buy whole-grain breads and cereals instead of white bread and pastries. Limit saturated fat · Read food labels and try to avoid saturated fat and trans fat. They increase your risk of heart disease. · Use olive or canola oil when you cook. Try cholesterol-lowering spreads, such as Benecol or Take Control. · Bake, broil, grill, or steam foods instead of frying them. · Limit the amount of high-fat meats you eat, including hot dogs and sausages. Cut out all visible fat when you prepare meat. · Eat fish, skinless poultry, and soy products such as tofu instead of high-fat meats. Soybeans may be especially good for your heart. Eat at least two servings of fish a week. Certain fish, such as salmon, contain omega-3 fatty acids, which may help reduce your risk of heart attack. · Choose low-fat or fat-free milk and dairy products. Get exercise, limit alcohol, and quit smoking · Get more exercise. Work with your doctor to set up an exercise program. Even if you can do only a small amount, exercise will help you get stronger, have more energy, and manage your weight and your stress. Walking is an easy way to get exercise. Gradually increase the amount you walk every day. Aim for at least 30 minutes on most days of the week. You also may want to swim, bike, or do other activities.  
· Limit alcohol to no more than 2 drinks a day for men and 1 drink a day for women. · Do not smoke. If you need help quitting, talk to your doctor about stop-smoking programs and medicines. These can increase your chances of quitting for good. When should you call for help? Call 911 anytime you think you may need emergency care. For example, call if: 
· You have symptoms of a heart attack. These may include: ¨ Chest pain or pressure, or a strange feeling in the chest. 
¨ Sweating. ¨ Shortness of breath. ¨ Nausea or vomiting. ¨ Pain, pressure, or a strange feeling in the back, neck, jaw, or upper belly or in one or both shoulders or arms. ¨ Lightheadedness or sudden weakness. ¨ A fast or irregular heartbeat. After you call 911, the  may tell you to chew 1 adult-strength or 2 to 4 low-dose aspirin. Wait for an ambulance. Do not try to drive yourself. · You have signs of a stroke. These may include: 
¨ Sudden numbness, paralysis, or weakness in your face, arm, or leg, especially on only one side of your body. ¨ New problems with walking or balance. ¨ Sudden vision changes. ¨ Drooling or slurred speech. ¨ New problems speaking or understanding simple statements, or feeling confused. ¨ A sudden, severe headache that is different from past headaches. · You passed out (lost consciousness). Call your doctor now or seek immediate medical care if: 
· You have muscle pain or weakness. Watch closely for changes in your health, and be sure to contact your doctor if: 
· You are very tired. · You have an upset stomach, gas, constipation, or belly pain or cramps. Where can you learn more? Go to [employer] Enter C406 in the search box to learn more about \"Hyperlipidemia: After Your Visit. \"  
© 9647-3420 Healthwise, Incorporated. Care instructions adapted under license by [facility] (which disclaims liability or warranty for this information).  This care instruction is for use with your licensed healthcare professional. If you have questions about a medical condition or this instruction, always ask your healthcare professional. [address] any warranty or liability for your use of this information. Content Version: 8.0.950246; Last Revised: October 13, 2011 Type 2 Diabetes: Care Instructions Your Care Instructions Type 2 diabetes is a disease that develops when the body's tissues cannot use insulin properly. Over time, the pancreas cannot make enough insulin. Insulin is a hormone that helps the body's cells use sugar (glucose) for energy. It also helps the body store extra sugar in muscle, fat, and liver cells. Without insulin, the sugar cannot get into the cells to do its work. It stays in the blood instead. This can cause high blood sugar levels. A person has diabetes when the blood sugar stays too high too much of the time. Over time, diabetes can lead to diseases of the heart, blood vessels, nerves, kidneys, and eyes. You may be able to control your blood sugar by losing weight, eating a healthy diet, and getting daily exercise. You may also have to take insulin or other diabetes medicine. Follow-up care is a key part of your treatment and safety. Be sure to make and go to all appointments. Call your doctor if you are having problems. It's also a good idea to know your test results and keep a list of the medicines you take. How can you care for yourself at home? · Keep your blood sugar at a target level (which you set with your doctor). ? Carbohydratethe body's main source of fuelaffects blood sugar more than any other nutrient. Carbohydrate is in fruits, vegetables, milk, and yogurt. It also is in breads, cereals, vegetables such as potatoes and corn, and sugary foods such as candy and cakes. Follow your meal plan to know how much carbohydrate to eat at each meal and snack. ?  Aim for 30 minutes of exercise on most, preferably all, days of the week. Walking is a good choice. You also may want to do other activities, such as running, swimming, cycling, or playing tennis or team sports. Try to do muscle-strengthening exercises at least 2 times a week. ? Take your medicines exactly as prescribed. Call your doctor if you think you are having a problem with your medicine. You will get more details on the specific medicines your doctor prescribes. · Check your blood sugar as often as your doctor recommends. It is important to keep track of any symptoms you have, such as low blood sugar. Also tell your doctor if you have any changes in your activities, diet, or insulin use. · Talk to your doctor before you start taking aspirin every day. Aspirin can help certain people lower their risk of a heart attack or stroke. But taking aspirin isn't right for everyone, because it can cause serious bleeding. · Do not smoke. If you need help quitting, talk to your doctor about stop-smoking programs and medicines. These can increase your chances of quitting for good. · Keep your cholesterol and blood pressure at normal levels. You may need to take one or more medicines to reach your goals. Take them exactly as directed. Do not stop or change a medicine without talking to your doctor first. 
When should you call for help? Call 911 anytime you think you may need emergency care. For example, call if: 
  · You passed out (lost consciousness), or you suddenly become very sleepy or confused. (You may have very low blood sugar.) Call your doctor now or seek immediate medical care if: 
  · Your blood sugar is 300 mg/dL or is higher than the level your doctor has set for you. · You have symptoms of low blood sugar, such as: ? Sweating. ? Feeling nervous, shaky, and weak. ? Extreme hunger and slight nausea. ? Dizziness and headache. 
? Blurred vision. ? Confusion.   
Watch closely for changes in your health, and be sure to contact your doctor if: 
  · You often have problems controlling your blood sugar. · You have symptoms of long-term diabetes problems, such as: ? New vision changes. ? New pain, numbness, or tingling in your hands or feet. ? Skin problems. Where can you learn more? Go to http://www.gray.com/ Enter C553 in the search box to learn more about \"Type 2 Diabetes: Care Instructions. \" Current as of: August 31, 2020               Content Version: 12.8 © 2006-2021 [employer]. Care instructions adapted under license by [employer] (which disclaims liability or warranty for this information). If you have questions about a medical condition or this instruction, always ask your healthcare professional. [address] any warranty or liability for your use of this information. High Blood Pressure: Care Instructions Overview It's normal for blood pressure to go up and down throughout the day. But if it stays up, you have high blood pressure. Another name for high blood pressure is hypertension. Despite what a lot of people think, high blood pressure usually doesn't cause headaches or make you feel dizzy or lightheaded. It usually has no symptoms. But it does increase your risk of stroke, heart attack, and other problems. You and your doctor will talk about your risks of these problems based on your blood pressure. Your doctor will give you a goal for your blood pressure. Your goal will be based on your health and your age. Lifestyle changes, such as eating healthy and being active, are always important to help lower blood pressure. You might also take medicine to reach your blood pressure goal. 
Follow-up care is a key part of your treatment and safety. Be sure to make and go to all appointments, and call your doctor if you are having problems. It's also a good idea to know your test results and keep a list of the medicines you take.  
How can you care for yourself at home? 
Medical treatment · If you stop taking your medicine, your blood pressure will go back up. You may take one or more types of medicine to lower your blood pressure. Be safe with medicines. Take your medicine exactly as prescribed. Call your doctor if you think you are having a problem with your medicine. · Talk to your doctor before you start taking aspirin every day. Aspirin can help certain people lower their risk of a heart attack or stroke. But taking aspirin isn't right for everyone, because it can cause serious bleeding. · See your doctor regularly. You may need to see the doctor more often at first or until your blood pressure comes down. · If you are taking blood pressure medicine, talk to your doctor before you take decongestants or anti-inflammatory medicine, such as ibuprofen. Some of these medicines can raise blood pressure. · Learn how to check your blood pressure at home. Lifestyle changes · Stay at a healthy weight. This is especially important if you put on weight around the waist. Losing even 10 pounds can help you lower your blood pressure. · If your doctor recommends it, get more exercise. Walking is a good choice. Bit by bit, increase the amount you walk every day. Try for at least 30 minutes on most days of the week. You also may want to swim, bike, or do other activities. · Avoid or limit alcohol. Talk to your doctor about whether you can drink any alcohol. · Try to limit how much sodium you eat to less than 2,300 milligrams (mg) a day. Your doctor may ask you to try to eat less than 1,500 mg a day. · Eat plenty of fruits (such as bananas and oranges), vegetables, legumes, whole grains, and low-fat dairy products. · Lower the amount of saturated fat in your diet. Saturated fat is found in animal products such as milk, cheese, and meat. Limiting these foods may help you lose weight and also lower your risk for heart disease. · Do not smoke.  Smoking increases your risk for heart attack and stroke. If you need help quitting, talk to your doctor about stop-smoking programs and medicines. These can increase your chances of quitting for good. When should you call for help? Call  911 anytime you think you may need emergency care. This may mean having symptoms that suggest that your blood pressure is causing a serious heart or blood vessel problem. Your blood pressure may be over 180/120. For example, call 911 if: 
  · You have symptoms of a heart attack. These may include: 
? Chest pain or pressure, or a strange feeling in the chest. 
? Sweating. ? Shortness of breath. ? Nausea or vomiting. ? Pain, pressure, or a strange feeling in the back, neck, jaw, or upper belly or in one or both shoulders or arms. ? Lightheadedness or sudden weakness. ? A fast or irregular heartbeat. · You have symptoms of a stroke. These may include: 
? Sudden numbness, tingling, weakness, or loss of movement in your face, arm, or leg, especially on only one side of your body. ? Sudden vision changes. ? Sudden trouble speaking. ? Sudden confusion or trouble understanding simple statements. ? Sudden problems with walking or balance. ? A sudden, severe headache that is different from past headaches. · You have severe back or belly pain. Do not wait until your blood pressure comes down on its own. Get help right away. Call your doctor now or seek immediate care if: 
  · Your blood pressure is much higher than normal (such as 180/120 or higher), but you don't have symptoms. · You think high blood pressure is causing symptoms, such as: 
? Severe headache. 
? Blurry vision. Watch closely for changes in your health, and be sure to contact your doctor if: 
  · Your blood pressure measures higher than your doctor recommends at least 2 times. That means the top number is higher or the bottom number is higher, or both.   
  · You think you may be having side effects from your blood pressure medicine. Where can you learn more? Go to http://www.gray.com/ Enter A237 in the search box to learn more about \"High Blood Pressure: Care Instructions. \" Current as of: August 31, 2020               Content Version: 12.8 © 2006-2021 [employer]. Care instructions adapted under license by [employer] (which disclaims liability or warranty for this information). If you have questions about a medical condition or this instruction, always ask your healthcare professional. [address] any warranty or liability for your use of this information. Nonalcoholic Steatohepatitis (SIEGEL): Care Instructions Overview Nonalcoholic steatohepatitis (SIEGEL) is liver inflammation. It is caused by a buildup of fat in the liver. The fat buildup is not caused by drinking alcohol. Because of the inflammation, the liver does not work as well as it should. SIEGEL is part of a group of liver diseases called nonalcoholic fatty liver disease. In these diseases, fat builds up in the liver and sometimes causes liver damage. This damage can get worse over time. Follow-up care is a key part of your treatment and safety. Be sure to make and go to all appointments, and call your doctor if you are having problems. It's also a good idea to know your test results and keep a list of the medicines you take. How can you care for yourself at home? · Stay at a healthy weight. Or if you need to, slowly get to a healthy weight. · Control your cholesterol. Talk to your doctor about ways to lower your cholesterol, if needed. You might try getting active, taking medicines, and making healthy changes to your diet. · Eat healthy foods. This includes fruits, vegetables, lean meats and dairy, and whole grains. · If you have diabetes, keep your blood sugar in your target range. · Get at least 30 minutes of exercise on most days of the week.  Walking is a good choice. · Limit alcohol, or do not drink. Alcohol can damage the liver and cause health problems. · Get immunized. Having SIEGEL increases your risk for infections, so it's important to get all recommended vaccines. When should you call for help? Call 911 anytime you think you may need emergency care. For example, call if: 
  · You have trouble breathing.  
  · You vomit blood or what looks like coffee grounds. Call your doctor now or seek immediate medical care if: 
  · You feel very sleepy or confused.  
  · You have new or worse belly pain.  
  · You have a fever.  
  · There is a new or increasing yellow tint to your skin or the whites of your eyes.  
  · You have any abnormal bleeding, such as: 
? Nosebleeds. ? Vaginal bleeding that is different (heavier, more frequent, at a different time of the month) than what you are used to. 
? Bloody or black stools, or rectal bleeding. ? Bloody or pink urine. Watch closely for changes in your health, and be sure to contact your doctor if: 
  · Your belly is getting bigger.  
  · You are gaining weight.  
  · You have any problems. Where can you learn more? Go to http://www.gray.com/ Enter T488 in the search box to learn more about \"Nonalcoholic Steatohepatitis (SIEGEL): Care Instructions. \" Current as of: February 10, 2021               Content Version: 13.0 © 7823-2464 [employer]. Care instructions adapted under license by [employer] (which disclaims liability or warranty for this information). If you have questions about a medical condition or this instruction, always ask your healthcare professional. [place] [address] any warranty or liability for your use of this information.

## 2022-01-30 NOTE — PROGRESS NOTES
ICD-10-CM ICD-9-CM    1. HTN, goal below 130/80  I10 401.9    2. Type 2 diabetes with nephropathy (HCC)  E11.21 250.40      583.81    3. Hyperlipidemia, unspecified hyperlipidemia type  E78.5 272.4    4. Cerumen debris on tympanic membrane of left ear  H61.22 380.4 carbamide peroxide (DEBROX) 6.5 % otic solution   5. Morbid obesity ([address]  E66.01 278.01    6. Encounter for immunization  Z23 V03.89 ZOSTER VACC RECOMBINANT ADJUVANTED   7. Screening for colon cancer  Z12.11 V76.51 REFERRAL TO GASTROENTEROLOGY     Follow-up and Dispositions    · Return in about 3 months (around 5/2/2022) for Follow up on illness, Labs/diagnostics/referrals ordered this visit. Assessment and plan  Hypertension good control continue present regimen. Type 2 diabetes mellitus with nephropathy presently uncontrolled on last visit. Recheck labs ordered. States he will have them done soon. Cerumen debris left tympanic eardrum Debrox ordered. Unhealthy weight slight weight gain of 2 pounds. Discussed diet and exercise will follow closely. Prescription written for shingles vaccine. The patient is going to check and make sure he has not had it done before. States he has a 10-year follow-up needed for colonoscopy and this was scheduled. Recheck in 3 months. Lab results and schedule of future lab studies reviewed with patient  Diagnostic and radiologic results and the schedule of future studies were reviewed with the patient  Reviewed diet, exercise and weight control  All questions answered and understood.           Subjective:   Daquan Willett is a 62 y.o. male has Hyperlipidemia, Morbid obesity ([address] DM type 2 with diabetic peripheral neuropathy ([address] Type 2 diabetes with nephropathy ([address] Primary open angle glaucoma, Chronic idiopathic gout involving toe of right foot without tophus, and Diabetic polyneuropathy associated with type 2 diabetes mellitus ([address] on their problem list..  Chief Complaint   Patient presents with    Follow Up Chronic Condition     DM       The last visit was 11/3/2021. Labs were ordered and are pending. He states that he will try to get them in the next few weeks. Essential hypertension chronic problem good control continue present regimen. Diabetes mellitus type 2 chronic problem not controlled but improving. Follow-up labs. May need adjustment of meds. Hyperlipidemia chronic problem primarily hypertriglyceridemia. Recheck labs. May need Lovaza. Discussed diet and exercise. Unhealthy weight. Chronic problem, worsening, discussed diet and exercise. Consider medication. Admits to constipation. Will trial Colace. Consider MiraLAX. Hypokalemia, resolved. No longer on medication. Ultrasound showed fatty liver. Discussed the ramifications. Will initiate treatment with diet. Liver function tests were within normal limits last visit, recheck. Our gentleman had pain in the neck and pain in the right tooth. He is completing a course of Keflex. He also has neck pain. Question if this is related to the toothache or whether or not he has a muscle strain or some viral illness or other illness. We will x-ray the neck. Pending in addition we will add a short course of Celebrex. The patient has hypokalemia. This will be repleted. The patient had abdominal discomfort when he went to the emergency department although he has primarily neck pain now. Shefali Ahumada He was seen on 8/3/2021. At that time he had essential hypertension which was in good control. The patient had diabetes mellitus with nephropathy that was not in control. We discussed taking medication more regularly and would like to trial Jardiance at that time. The patient had hyperlipidemia/primarily hypertriglyceridemia at this time. Follow-up labs were ordered. Unprotected sex and HIV test was ordered. History of urinary hesitancy and a PSA was normal in March 2021. We were going to consider using Flomax.   The patient was seen in the ED 9/6/2021. He was discharged with a diagnosis of elevated LFTs. He also had acute left-sided low back pain with left-sided sciatica. The patient  stated he was experiencing chest pains, sweats and back pain. His findings were consistent with muscle spasm at that time. Hypertension  The patient has no headaches, visual changes, chest pain or pressure,dyspnea, orthopnea, or PND. There is no problem with the medication. Diabetes mellitus  The patient denies polyuria, polydipsia, or polyphagia. The patient denies any other aggravating or relieving factors There has been no problem with the medications. Hyperlipidemia   The patient denies any myalgias or weakness. No abdominal discomfort admitted to. The patient denies any difficulty with or side effects from the medication. 135/64  BP Readings from Last 3 Encounters:   02/02/22 113/63   09/06/21 122/72   08/03/21 133/76       Lab Results   Component Value Date/Time    Hemoglobin A1c 10.9 (H) 08/06/2021 10:23 AM    Hemoglobin A1c (POC) 12.2 05/30/2019 08:13 AM     Lab Results   Component Value Date/Time    Cholesterol, total 149 08/06/2021 10:23 AM    HDL Cholesterol 52 08/06/2021 10:23 AM    LDL, calculated 60.8 08/06/2021 10:23 AM    VLDL, calculated 36.2 08/06/2021 10:23 AM    Triglyceride 181 (H) 08/06/2021 10:23 AM    CHOL/HDL Ratio 2.9 08/06/2021 10:23 AM         Lab Results   Component Value Date/Time    Sodium 140 09/06/2021 03:30 PM    Potassium 3.0 (L) 09/06/2021 03:30 PM    Chloride 109 09/06/2021 03:30 PM    CO2 25 09/06/2021 03:30 PM    Anion gap 6 09/06/2021 03:30 PM    Glucose 105 (H) 09/06/2021 03:30 PM    BUN 7 09/06/2021 03:30 PM    Creatinine 0.75 09/06/2021 03:30 PM    BUN/Creatinine ratio 9 (L) 09/06/2021 03:30 PM    GFR est AA >60 09/06/2021 03:30 PM    GFR est non-AA >60 09/06/2021 03:30 PM    Calcium 8.4 (L) 09/06/2021 03:30 PM    Bilirubin, total 1.7 (H) 09/06/2021 03:30 PM    Alk.  phosphatase 140 (H) 09/06/2021 03:30 PM    Protein, total 7.4 09/06/2021 03:30 PM    Albumin 3.6 09/06/2021 03:30 PM    Globulin 3.8 09/06/2021 03:30 PM    A-G Ratio 0.9 09/06/2021 03:30 PM    ALT (SGPT) 187 (H) 09/06/2021 03:30 PM     Fatty liver  This is a chronic problem. It is unchanged. The problem is not in good control. There are no new aggravating or relieving factors. There is no history of any new associated signs, symptoms, or complications. The treatment is unchanged and there are no side effects from it. He is willing to trial diet. Also willing to increase statins if necessary. Overweight  The patient states that the weight has increased. Patient's diet has been nonrestricted. The patient denies edema or ascites. Obesity comorbid conditions include diabetes, high cholesterol and high blood pressure   body mass index is 43.84 kg/m². Wt Readings from Last 3 Encounters:   02/02/22 232 lb (105.2 kg)   09/06/21 230 lb (104.3 kg)   08/03/21 243 lb (110.2 kg)       Review of Systems   Constitutional: Negative for chills, fever, malaise/fatigue and weight loss (Has not checked recently). HENT: Negative for congestion and sore throat. Eyes: Negative for blurred vision (Ophthalmologist added new eyedrop) and redness. Respiratory: Negative for cough, shortness of breath and wheezing. Cardiovascular: Negative for chest pain and leg swelling. PND:    Gastrointestinal: Negative for abdominal pain, blood in stool, constipation, diarrhea and heartburn. Genitourinary: Negative for dysuria, frequency and urgency. History of unprotected sex. Musculoskeletal: Negative for back pain, joint pain and myalgias. Neurological: Negative for dizziness, sensory change, speech change, focal weakness and headaches. Endo/Heme/Allergies: Does not bruise/bleed easily. Psychiatric/Behavioral: The patient is not nervous/anxious.       Health Maintenance Due   Topic Date Due    Eye Exam Retinal or Dilated  05/06/2021    Flu Vaccine (1) 09/01/2021    COVID-19 Vaccine (3 - Booster for Moderna series) 09/20/2021    Shingrix Vaccine Age 50> (2 of 2) 09/25/2021    A1C test (Diabetic or Prediabetic)  11/06/2021   Due to see ophthalmology Feb 11        Current Outpatient Medications   Medication Sig    carbamide peroxide (DEBROX) 6.5 % otic solution Administer 5 Drops into each ear two (2) times a day.  tamsulosin (FLOMAX) 0.4 mg capsule TAKE 1 CAPSULE BY MOUTH EVERY DAY    empagliflozin (Jardiance) 25 mg tablet Take 1 Tablet by mouth daily.  gabapentin (NEURONTIN) 100 mg capsule TAKE 1 CAPSULE BY MOUTH AT NIGHT FOR 3 NIGHTS, THEN 3 CAPSULES BY MOUTH AT NIGHT    glucose blood VI test strips (ASCENSIA AUTODISC VI, ONE TOUCH ULTRA TEST VI) strip Use to check blood glucose daily    Rhopressa 0.02 % drop Administer 1 Drop to both eyes nightly.  Azopt 1 % ophthalmic suspension Administer 1 Drop to both eyes two (2) times a day.  glucose blood VI test strips (TRUE METRIX GLUCOSE TEST STRIP) strip Use as directed to test BS 3 times a day    Blood-Glucose Meter monitoring kit by Does Not Apply route daily.  Lancets misc Use as directed 3 times a day    glucose blood VI test strips (TRUETEST TEST STRIPS) strip by Does Not Apply route three (3) times daily.  timolol (TIMOPTIC-XE) 0.5 % ophthalmic gel-forming instill 1 drop into both eyes once daily    ibuprofen (MOTRIN) 800 mg tablet Take 1 Tablet by mouth every eight (8) hours as needed for Pain. No current facility-administered medications for this visit.      No Known Allergies  has Hyperlipidemia, Morbid obesity ([address] DM type 2 with diabetic peripheral neuropathy ([address] Type 2 diabetes with nephropathy ([address] Primary open angle glaucoma, Chronic idiopathic gout involving toe of right foot without tophus, and Diabetic polyneuropathy associated with type 2 diabetes mellitus ([address] on their problem list.    Past Surgical History:   Procedure Laterality Date    HX OTHER SURGICAL      pt reports gallstone removal      reports that he has quit smoking. His smoking use included cigarettes. He has a 7.50 pack-year smoking history. He quit smokeless tobacco use about 6 years ago. He reports that he does not drink alcohol and does not use drugs. family history includes Diabetes in his maternal grandmother, mother, and sister; Sickle Cell Trait in an other family member. Visit Vitals  /63 (BP 1 Location: Right arm, BP Patient Position: Sitting, BP Cuff Size: Large adult long)   Pulse 64   Temp (!) 96.5 °F (35.8 °C) (Temporal)   Resp 16   Ht 5' 1\" (1.549 m)   Wt 232 lb (105.2 kg)   SpO2 99%   BMI 43.84 kg/m²       Physical Exam  Nursing note reviewed. Constitutional:       General: He is not in acute distress. HENT:      Head: Normocephalic. Right Ear: Tympanic membrane and external ear normal.      Left Ear: External ear normal. There is impacted cerumen. Nose: Nose normal.      Mouth/Throat:      Mouth: Mucous membranes are moist.      Pharynx: Oropharynx is clear. Eyes:      General: No scleral icterus. Extraocular Movements: Extraocular movements intact. Pupils: Pupils are equal, round, and reactive to light. Cardiovascular:      Rate and Rhythm: Normal rate and regular rhythm. Heart sounds: Normal heart sounds. Pulmonary:      Effort: Pulmonary effort is normal. No respiratory distress. Breath sounds: No wheezing or rales. Abdominal:      General: Bowel sounds are normal. There is no distension. Palpations: Abdomen is soft. Tenderness: There is no abdominal tenderness. Musculoskeletal:         General: No swelling. Cervical back: No rigidity. Right lower leg: No edema. Left lower leg: No edema. Skin:     Coloration: Skin is not jaundiced. Findings: No erythema. Neurological:      Mental Status: He is alert and oriented to person, place, and time.       Coordination: Coordination normal.      Gait: Gait normal.   Psychiatric:         Mood and Affect: Mood normal.         Behavior: Behavior normal.         Thought Content: Thought content normal.          We discussed the expected course, resolution and complications of the diagnosis(es) in detail. Medication risks, benefits, costs, interactions, and alternatives were discussed as indicated. I advised him to contact the office if his condition worsens, changes or fails to improve as anticipated. He expressed understanding with the diagnosis(es) and plan. This note was done with the assistance of [employer] software.   Some inadvertent errors or omissions may be present

## 2022-02-02 ENCOUNTER — OFFICE VISIT (OUTPATIENT)
Dept: FAMILY MEDICINE CLINIC | Age: 62
End: 2022-02-02
Payer: MEDICAID

## 2022-02-02 VITALS
BODY MASS INDEX: 43.8 KG/M2 | DIASTOLIC BLOOD PRESSURE: 63 MMHG | SYSTOLIC BLOOD PRESSURE: 113 MMHG | WEIGHT: 232 LBS | RESPIRATION RATE: 16 BRPM | HEART RATE: 64 BPM | TEMPERATURE: 96.5 F | HEIGHT: 61 IN | OXYGEN SATURATION: 99 %

## 2022-02-02 DIAGNOSIS — E66.01 MORBID OBESITY (HCC): ICD-10-CM

## 2022-02-02 DIAGNOSIS — I10 HTN, GOAL BELOW 130/80: Primary | ICD-10-CM

## 2022-02-02 DIAGNOSIS — E78.5 HYPERLIPIDEMIA, UNSPECIFIED HYPERLIPIDEMIA TYPE: ICD-10-CM

## 2022-02-02 DIAGNOSIS — H61.22 CERUMEN DEBRIS ON TYMPANIC MEMBRANE OF LEFT EAR: ICD-10-CM

## 2022-02-02 DIAGNOSIS — Z23 ENCOUNTER FOR IMMUNIZATION: ICD-10-CM

## 2022-02-02 DIAGNOSIS — Z12.11 SCREENING FOR COLON CANCER: ICD-10-CM

## 2022-02-02 DIAGNOSIS — E11.21 TYPE 2 DIABETES WITH NEPHROPATHY (HCC): ICD-10-CM

## 2022-02-02 PROCEDURE — 99214 OFFICE O/P EST MOD 30 MIN: CPT | Performed by: EMERGENCY MEDICINE

## 2022-02-02 RX ORDER — ZOSTER VACCINE RECOMBINANT, ADJUVANTED 50 MCG/0.5
0.5 KIT INTRAMUSCULAR ONCE
Qty: 0.5 ML | Refills: 0 | Status: CANCELLED | OUTPATIENT
Start: 2022-02-02 | End: 2022-02-02

## 2022-02-02 NOTE — PROGRESS NOTES
Sirena Foster is a 62 y.o. male that is here for a   Chief Complaint   Patient presents with    Follow Up Chronic Condition     DM         1. Have you been to the ER, urgent care clinic since your last visit? Hospitalized since your last visit?no    2. Have you seen or consulted any other health care providers outside of the [address] [place] [address] since your last visit? Include any pap smears or colon screening.  no      Health Maintenance reviewed - yes      Upcoming Appts  no      VORB: No orders of the defined types were placed in this encounter.   Juana Dorsey MD/ Adriana Amaya MA

## 2022-02-02 NOTE — PATIENT INSTRUCTIONS
Hyperlipidemia: After Your Visit  Your Care Instructions  Hyperlipidemia is too much fat in your blood. The body has several kinds of fat, including cholesterol and triglycerides. Your body needs fat for many things, such as making new cells. But too much fat in your blood increases your chances of having a heart attack or stroke. You may be able to lower your cholesterol and triglycerides with a heart-healthy diet, exercise, and if needed, medicine. Your doctor may want you to try lifestyle changes first to see whether they lower the fat in your blood. You may need to take medicine if lifestyle changes do not lower the fat in your blood enough. Follow-up care is a key part of your treatment and safety. Be sure to make and go to all appointments, and call your doctor if you are having problems. Its also a good idea to know your test results and keep a list of the medicines you take. How can you care for yourself at home? Take your medicines  · Take your medicines exactly as prescribed. Call your doctor if you think you are having a problem with your medicine. · If you take medicine to lower your cholesterol, go to follow-up visits. You will need to have blood tests. · Do not take large doses of niacin, which is a B vitamin, while taking medicine called statins. It may increase the chance of muscle pain and liver problems. · Talk to your doctor about avoiding grapefruit juice if you are taking statins. Grapefruit juice can raise the level of this medicine in your blood. This could increase side effects. Eat more fruits, vegetables, and fiber  · Fruits and vegetables have lots of nutrients that help protect against heart disease, and they have littleif anyfat. Try to eat at least five servings a day. Dark green, deep orange, or yellow fruits and vegetables are healthy choices. · Keep carrots, celery, and other veggies handy for snacks.  Buy fruit that is in season and store it where you can see it so that you will be tempted to eat it. Cook dishes that have a lot of veggies in them, such as stir-fries and soups. · Foods high in fiber may reduce your cholesterol and provide important vitamins and minerals. High-fiber foods include whole-grain cereals and breads, oatmeal, beans, brown rice, citrus fruits, and apples. · Buy whole-grain breads and cereals instead of white bread and pastries. Limit saturated fat  · Read food labels and try to avoid saturated fat and trans fat. They increase your risk of heart disease. · Use olive or canola oil when you cook. Try cholesterol-lowering spreads, such as Benecol or Take Control. · Bake, broil, grill, or steam foods instead of frying them. · Limit the amount of high-fat meats you eat, including hot dogs and sausages. Cut out all visible fat when you prepare meat. · Eat fish, skinless poultry, and soy products such as tofu instead of high-fat meats. Soybeans may be especially good for your heart. Eat at least two servings of fish a week. Certain fish, such as salmon, contain omega-3 fatty acids, which may help reduce your risk of heart attack. · Choose low-fat or fat-free milk and dairy products. Get exercise, limit alcohol, and quit smoking  · Get more exercise. Work with your doctor to set up an exercise program. Even if you can do only a small amount, exercise will help you get stronger, have more energy, and manage your weight and your stress. Walking is an easy way to get exercise. Gradually increase the amount you walk every day. Aim for at least 30 minutes on most days of the week. You also may want to swim, bike, or do other activities. · Limit alcohol to no more than 2 drinks a day for men and 1 drink a day for women. · Do not smoke. If you need help quitting, talk to your doctor about stop-smoking programs and medicines. These can increase your chances of quitting for good. When should you call for help?   Call 911 anytime you think you may need emergency care. For example, call if:  · You have symptoms of a heart attack. These may include:  ¨ Chest pain or pressure, or a strange feeling in the chest.  ¨ Sweating. ¨ Shortness of breath. ¨ Nausea or vomiting. ¨ Pain, pressure, or a strange feeling in the back, neck, jaw, or upper belly or in one or both shoulders or arms. ¨ Lightheadedness or sudden weakness. ¨ A fast or irregular heartbeat. After you call 911, the  may tell you to chew 1 adult-strength or 2 to 4 low-dose aspirin. Wait for an ambulance. Do not try to drive yourself. · You have signs of a stroke. These may include:  ¨ Sudden numbness, paralysis, or weakness in your face, arm, or leg, especially on only one side of your body. ¨ New problems with walking or balance. ¨ Sudden vision changes. ¨ Drooling or slurred speech. ¨ New problems speaking or understanding simple statements, or feeling confused. ¨ A sudden, severe headache that is different from past headaches. · You passed out (lost consciousness). Call your doctor now or seek immediate medical care if:  · You have muscle pain or weakness. Watch closely for changes in your health, and be sure to contact your doctor if:  · You are very tired. · You have an upset stomach, gas, constipation, or belly pain or cramps. Where can you learn more? Go to [email or web address]  Enter C406 in the search box to learn more about \"Hyperlipidemia: After Your Visit. \"   © 9459-8070 Health[employer], Incorporated. Care instructions adapted under license by [facility] (which disclaims liability or warranty for this information). This care instruction is for use with your licensed healthcare professional. If you have questions about a medical condition or this instruction, always ask your healthcare professional. [place] [address] any warranty or liability for your use of this information.   Content Version: 2.3.502382; Last Revised: October 13, 2011 Type 2 Diabetes: Care Instructions  Your Care Instructions     Type 2 diabetes is a disease that develops when the body's tissues cannot use insulin properly. Over time, the pancreas cannot make enough insulin. Insulin is a hormone that helps the body's cells use sugar (glucose) for energy. It also helps the body store extra sugar in muscle, fat, and liver cells. Without insulin, the sugar cannot get into the cells to do its work. It stays in the blood instead. This can cause high blood sugar levels. A person has diabetes when the blood sugar stays too high too much of the time. Over time, diabetes can lead to diseases of the heart, blood vessels, nerves, kidneys, and eyes. You may be able to control your blood sugar by losing weight, eating a healthy diet, and getting daily exercise. You may also have to take insulin or other diabetes medicine. Follow-up care is a key part of your treatment and safety. Be sure to make and go to all appointments. Call your doctor if you are having problems. It's also a good idea to know your test results and keep a list of the medicines you take. How can you care for yourself at home? · Keep your blood sugar at a target level (which you set with your doctor). ? Carbohydratethe body's main source of fuelaffects blood sugar more than any other nutrient. Carbohydrate is in fruits, vegetables, milk, and yogurt. It also is in breads, cereals, vegetables such as potatoes and corn, and sugary foods such as candy and cakes. Follow your meal plan to know how much carbohydrate to eat at each meal and snack. ? Aim for 30 minutes of exercise on most, preferably all, days of the week. Walking is a good choice. You also may want to do other activities, such as running, swimming, cycling, or playing tennis or team sports. Try to do muscle-strengthening exercises at least 2 times a week. ? Take your medicines exactly as prescribed.  Call your doctor if you think you are having a problem with your medicine. You will get more details on the specific medicines your doctor prescribes. · Check your blood sugar as often as your doctor recommends. It is important to keep track of any symptoms you have, such as low blood sugar. Also tell your doctor if you have any changes in your activities, diet, or insulin use. · Talk to your doctor before you start taking aspirin every day. Aspirin can help certain people lower their risk of a heart attack or stroke. But taking aspirin isn't right for everyone, because it can cause serious bleeding. · Do not smoke. If you need help quitting, talk to your doctor about stop-smoking programs and medicines. These can increase your chances of quitting for good. · Keep your cholesterol and blood pressure at normal levels. You may need to take one or more medicines to reach your goals. Take them exactly as directed. Do not stop or change a medicine without talking to your doctor first.  When should you call for help? Call 911 anytime you think you may need emergency care. For example, call if:    · You passed out (lost consciousness), or you suddenly become very sleepy or confused. (You may have very low blood sugar.)   Call your doctor now or seek immediate medical care if:    · Your blood sugar is 300 mg/dL or is higher than the level your doctor has set for you. · You have symptoms of low blood sugar, such as:  ? Sweating. ? Feeling nervous, shaky, and weak. ? Extreme hunger and slight nausea. ? Dizziness and headache.  ? Blurred vision. ? Confusion. Watch closely for changes in your health, and be sure to contact your doctor if:    · You often have problems controlling your blood sugar. · You have symptoms of long-term diabetes problems, such as:  ? New vision changes. ? New pain, numbness, or tingling in your hands or feet. ? Skin problems. Where can you learn more?   Go to http://www.gray.com/  Enter C553 in the search box to learn more about \"Type 2 Diabetes: Care Instructions. \"  Current as of: August 31, 2020               Content Version: 12.8  © 2006-2021 [employer]. Care instructions adapted under license by [employer] (which disclaims liability or warranty for this information). If you have questions about a medical condition or this instruction, always ask your healthcare professional. [address] any warranty or liability for your use of this information. High Blood Pressure: Care Instructions  Overview     It's normal for blood pressure to go up and down throughout the day. But if it stays up, you have high blood pressure. Another name for high blood pressure is hypertension. Despite what a lot of people think, high blood pressure usually doesn't cause headaches or make you feel dizzy or lightheaded. It usually has no symptoms. But it does increase your risk of stroke, heart attack, and other problems. You and your doctor will talk about your risks of these problems based on your blood pressure. Your doctor will give you a goal for your blood pressure. Your goal will be based on your health and your age. Lifestyle changes, such as eating healthy and being active, are always important to help lower blood pressure. You might also take medicine to reach your blood pressure goal.  Follow-up care is a key part of your treatment and safety. Be sure to make and go to all appointments, and call your doctor if you are having problems. It's also a good idea to know your test results and keep a list of the medicines you take. How can you care for yourself at home? Medical treatment  · If you stop taking your medicine, your blood pressure will go back up. You may take one or more types of medicine to lower your blood pressure. Be safe with medicines. Take your medicine exactly as prescribed.  Call your doctor if you think you are having a problem with your medicine. · Talk to your doctor before you start taking aspirin every day. Aspirin can help certain people lower their risk of a heart attack or stroke. But taking aspirin isn't right for everyone, because it can cause serious bleeding. · See your doctor regularly. You may need to see the doctor more often at first or until your blood pressure comes down. · If you are taking blood pressure medicine, talk to your doctor before you take decongestants or anti-inflammatory medicine, such as ibuprofen. Some of these medicines can raise blood pressure. · Learn how to check your blood pressure at home. Lifestyle changes  · Stay at a healthy weight. This is especially important if you put on weight around the waist. Losing even 10 pounds can help you lower your blood pressure. · If your doctor recommends it, get more exercise. Walking is a good choice. Bit by bit, increase the amount you walk every day. Try for at least 30 minutes on most days of the week. You also may want to swim, bike, or do other activities. · Avoid or limit alcohol. Talk to your doctor about whether you can drink any alcohol. · Try to limit how much sodium you eat to less than 2,300 milligrams (mg) a day. Your doctor may ask you to try to eat less than 1,500 mg a day. · Eat plenty of fruits (such as bananas and oranges), vegetables, legumes, whole grains, and low-fat dairy products. · Lower the amount of saturated fat in your diet. Saturated fat is found in animal products such as milk, cheese, and meat. Limiting these foods may help you lose weight and also lower your risk for heart disease. · Do not smoke. Smoking increases your risk for heart attack and stroke. If you need help quitting, talk to your doctor about stop-smoking programs and medicines. These can increase your chances of quitting for good. When should you call for help? Call  911 anytime you think you may need emergency care.  This may mean having symptoms that suggest that your blood pressure is causing a serious heart or blood vessel problem. Your blood pressure may be over 180/120. For example, call 911 if:    · You have symptoms of a heart attack. These may include:  ? Chest pain or pressure, or a strange feeling in the chest.  ? Sweating. ? Shortness of breath. ? Nausea or vomiting. ? Pain, pressure, or a strange feeling in the back, neck, jaw, or upper belly or in one or both shoulders or arms. ? Lightheadedness or sudden weakness. ? A fast or irregular heartbeat. · You have symptoms of a stroke. These may include:  ? Sudden numbness, tingling, weakness, or loss of movement in your face, arm, or leg, especially on only one side of your body. ? Sudden vision changes. ? Sudden trouble speaking. ? Sudden confusion or trouble understanding simple statements. ? Sudden problems with walking or balance. ? A sudden, severe headache that is different from past headaches. · You have severe back or belly pain. Do not wait until your blood pressure comes down on its own. Get help right away. Call your doctor now or seek immediate care if:    · Your blood pressure is much higher than normal (such as 180/120 or higher), but you don't have symptoms. · You think high blood pressure is causing symptoms, such as:  ? Severe headache.  ? Blurry vision. Watch closely for changes in your health, and be sure to contact your doctor if:    · Your blood pressure measures higher than your doctor recommends at least 2 times. That means the top number is higher or the bottom number is higher, or both. · You think you may be having side effects from your blood pressure medicine. Where can you learn more? Go to http://www.gray.com/  Enter [id number] in the search box to learn more about \"High Blood Pressure: Care Instructions. \"  Current as of: August 31, 2020               Content Version: 12.8  © 5026-3904 [employer].    Care instructions adapted under license by [employer] (which disclaims liability or warranty for this information). If you have questions about a medical condition or this instruction, always ask your healthcare professional. [address] any warranty or liability for your use of this information. Earwax Blockage: Care Instructions  Your Care Instructions     Earwax is a natural substance that protects the ear canal. Normally, earwax drains from the ears and does not cause problems. Sometimes earwax builds up and hardens. Earwax blockage (also called cerumen impaction) can cause some loss of hearing and pain. When wax is tightly packed, you will need to have your doctor remove it. Follow-up care is a key part of your treatment and safety. Be sure to make and go to all appointments, and call your doctor if you are having problems. It's also a good idea to know your test results and keep a list of the medicines you take. How can you care for yourself at home? · Do not try to remove earwax with cotton swabs, fingers, or other objects. This can make the blockage worse and damage the eardrum. · If your doctor recommends that you try to remove earwax at home:  ? Soften and loosen the earwax with warm mineral oil. You also can try hydrogen peroxide mixed with an equal amount of room temperature water. Place 2 drops of the fluid, warmed to body temperature, in the ear two times a day for up to 5 days. ? Once the wax is loose and soft, all that is usually needed to remove it from the ear canal is a gentle, warm shower. Direct the water into the ear, then tip your head to let the earwax drain out. Dry your ear thoroughly with a hair dryer set on low. Hold the dryer several inches from your ear. ? If the warm mineral oil and shower do not work, use an over-the-counter wax softener. Read and follow all instructions on the label.  After using the wax softener, use an ear syringe to gently flush the ear. Make sure the flushing solution is body temperature. Cool or hot fluids in the ear can cause dizziness. When should you call for help? Call your doctor now or seek immediate medical care if:    · Pus or blood drains from your ear.     · Your ears are ringing or feel full.     · You have a loss of hearing. Watch closely for changes in your health, and be sure to contact your doctor if:    · You have pain or reduced hearing after 1 week of home treatment.     · You have any new symptoms, such as nausea or balance problems. Where can you learn more? Go to http://www.gray.com/  Enter Q495 in the search box to learn more about \"Earwax Blockage: Care Instructions. \"  Current as of: July 1, 2021               Content Version: 13.0  © 2006-2021 Healthwise, Incorporated. Care instructions adapted under license by [employer] (which disclaims liability or warranty for this information). If you have questions about a medical condition or this instruction, always ask your healthcare professional. [place] [address] any warranty or liability for your use of this information.

## 2022-02-08 DIAGNOSIS — M25.512 ACUTE PAIN OF LEFT SHOULDER: ICD-10-CM

## 2022-02-08 DIAGNOSIS — R51.9 HEADACHE DISORDER: ICD-10-CM

## 2022-02-08 DIAGNOSIS — M25.512 SHOULDER PAIN, LEFT: Primary | ICD-10-CM

## 2022-02-08 NOTE — TELEPHONE ENCOUNTER
Requested Prescriptions     Pending Prescriptions Disp Refills    ibuprofen (MOTRIN) 400 mg tablet       Sig: Take  by mouth every six (6) hours as needed for Pain.      Patient is asking if he can get 800 mg please

## 2022-02-09 DIAGNOSIS — M25.512 ACUTE PAIN OF LEFT SHOULDER: Primary | ICD-10-CM

## 2022-02-09 RX ORDER — IBUPROFEN 800 MG/1
800 TABLET ORAL
Qty: 90 TABLET | Refills: 1 | Status: SHIPPED | OUTPATIENT
Start: 2022-02-09 | End: 2022-05-06

## 2022-02-09 RX ORDER — IBUPROFEN 400 MG/1
400 TABLET ORAL
Qty: 120 TABLET | Refills: 1 | OUTPATIENT
Start: 2022-02-09

## 2022-03-18 PROBLEM — E11.21 TYPE 2 DIABETES WITH NEPHROPATHY (HCC): Status: ACTIVE | Noted: 2018-03-05

## 2022-03-19 PROBLEM — E11.42 DM TYPE 2 WITH DIABETIC PERIPHERAL NEUROPATHY (HCC): Status: ACTIVE | Noted: 2018-01-01

## 2022-03-19 PROBLEM — E66.01 MORBID OBESITY (HCC): Status: ACTIVE | Noted: 2017-06-07

## 2022-03-19 PROBLEM — E11.42 DIABETIC POLYNEUROPATHY ASSOCIATED WITH TYPE 2 DIABETES MELLITUS (HCC): Status: ACTIVE | Noted: 2018-06-04

## 2022-03-19 PROBLEM — M1A.0710 CHRONIC IDIOPATHIC GOUT INVOLVING TOE OF RIGHT FOOT WITHOUT TOPHUS: Status: ACTIVE | Noted: 2018-06-04

## 2022-03-19 PROBLEM — E78.5 HYPERLIPIDEMIA: Status: ACTIVE | Noted: 2017-06-07

## 2022-04-28 ENCOUNTER — HOSPITAL ENCOUNTER (OUTPATIENT)
Dept: LAB | Age: 62
Discharge: HOME | End: 2022-04-28
Payer: MEDICAID

## 2022-04-28 DIAGNOSIS — E11.21 TYPE 2 DIABETES WITH NEPHROPATHY (HCC): ICD-10-CM

## 2022-04-28 DIAGNOSIS — E78.5 HYPERLIPIDEMIA, UNSPECIFIED HYPERLIPIDEMIA TYPE: ICD-10-CM

## 2022-04-28 DIAGNOSIS — I10 HTN, GOAL BELOW 130/80: ICD-10-CM

## 2022-04-28 DIAGNOSIS — R79.89 ELEVATED LFTS: ICD-10-CM

## 2022-04-28 LAB
ALBUMIN SERPL-MCNC: 3.9 G/DL (ref 3.4–5)
ALBUMIN/GLOB SERPL: 1.1 {RATIO} (ref 0.8–1.7)
ALP SERPL-CCNC: 69 U/L (ref 45–117)
ALT SERPL-CCNC: 30 U/L (ref 16–61)
ANION GAP SERPL CALC-SCNC: 6 MMOL/L (ref 3–18)
AST SERPL-CCNC: 20 U/L (ref 10–38)
BASOPHILS # BLD: 0 K/UL (ref 0–0.1)
BASOPHILS NFR BLD: 1 % (ref 0–2)
BILIRUB SERPL-MCNC: 1.2 MG/DL (ref 0.2–1)
BUN SERPL-MCNC: 5 MG/DL (ref 7–18)
BUN/CREAT SERPL: 6 (ref 12–20)
CALCIUM SERPL-MCNC: 9.2 MG/DL (ref 8.5–10.1)
CHLORIDE SERPL-SCNC: 107 MMOL/L (ref 100–111)
CHOLEST SERPL-MCNC: 163 MG/DL
CO2 SERPL-SCNC: 28 MMOL/L (ref 21–32)
CREAT SERPL-MCNC: 0.79 MG/DL (ref 0.6–1.3)
CREAT UR-MCNC: 138 MG/DL (ref 30–125)
DIFFERENTIAL METHOD BLD: ABNORMAL
EOSINOPHIL # BLD: 0.1 K/UL (ref 0–0.4)
EOSINOPHIL NFR BLD: 2 % (ref 0–5)
ERYTHROCYTE [DISTWIDTH] IN BLOOD BY AUTOMATED COUNT: 13.4 % (ref 11.6–14.5)
EST. AVERAGE GLUCOSE BLD GHB EST-MCNC: 169 MG/DL
GLOBULIN SER CALC-MCNC: 3.4 G/DL (ref 2–4)
GLUCOSE SERPL-MCNC: 118 MG/DL (ref 74–99)
HBA1C MFR BLD: 7.5 % (ref 4.2–5.6)
HCT VFR BLD AUTO: 40.4 % (ref 36–48)
HDLC SERPL-MCNC: 54 MG/DL (ref 40–60)
HDLC SERPL: 3 {RATIO} (ref 0–5)
HGB BLD-MCNC: 13.7 G/DL (ref 13–16)
IMM GRANULOCYTES # BLD AUTO: 0 K/UL (ref 0–0.04)
IMM GRANULOCYTES NFR BLD AUTO: 0 % (ref 0–0.5)
LDLC SERPL CALC-MCNC: 66.4 MG/DL (ref 0–100)
LIPID PROFILE,FLP: ABNORMAL
LYMPHOCYTES # BLD: 3.1 K/UL (ref 0.9–3.6)
LYMPHOCYTES NFR BLD: 45 % (ref 21–52)
MCH RBC QN AUTO: 29.8 PG (ref 24–34)
MCHC RBC AUTO-ENTMCNC: 33.9 G/DL (ref 31–37)
MCV RBC AUTO: 88 FL (ref 78–100)
MICROALBUMIN UR-MCNC: 3.09 MG/DL (ref 0–3)
MICROALBUMIN/CREAT UR-RTO: 22 MG/G (ref 0–30)
MONOCYTES # BLD: 0.4 K/UL (ref 0.05–1.2)
MONOCYTES NFR BLD: 5 % (ref 3–10)
NEUTS SEG # BLD: 3.2 K/UL (ref 1.8–8)
NEUTS SEG NFR BLD: 47 % (ref 40–73)
NRBC # BLD: 0 K/UL (ref 0–0.01)
NRBC BLD-RTO: 0 PER 100 WBC
PLATELET # BLD AUTO: 184 K/UL (ref 135–420)
PMV BLD AUTO: 12.1 FL (ref 9.2–11.8)
POTASSIUM SERPL-SCNC: 4.1 MMOL/L (ref 3.5–5.5)
PROT SERPL-MCNC: 7.3 G/DL (ref 6.4–8.2)
RBC # BLD AUTO: 4.59 M/UL (ref 4.35–5.65)
SODIUM SERPL-SCNC: 141 MMOL/L (ref 136–145)
TRIGL SERPL-MCNC: 213 MG/DL (ref ?–150)
VLDLC SERPL CALC-MCNC: 42.6 MG/DL
WBC # BLD AUTO: 6.9 K/UL (ref 4.6–13.2)

## 2022-04-28 PROCEDURE — 80061 LIPID PANEL: CPT

## 2022-04-28 PROCEDURE — 85025 COMPLETE CBC W/AUTO DIFF WBC: CPT

## 2022-04-28 PROCEDURE — 36415 COLL VENOUS BLD VENIPUNCTURE: CPT

## 2022-04-28 PROCEDURE — 86803 HEPATITIS C AB TEST: CPT

## 2022-04-28 PROCEDURE — 83036 HEMOGLOBIN GLYCOSYLATED A1C: CPT

## 2022-04-28 PROCEDURE — 80053 COMPREHEN METABOLIC PANEL: CPT

## 2022-04-28 PROCEDURE — 87340 HEPATITIS B SURFACE AG IA: CPT

## 2022-04-28 PROCEDURE — 82043 UR ALBUMIN QUANTITATIVE: CPT

## 2022-04-29 LAB
HBV SURFACE AG SER QL: <0.1 INDEX
HBV SURFACE AG SER QL: NEGATIVE
HCV AB SER IA-ACNC: 0.07 INDEX
HCV AB SERPL QL IA: NEGATIVE
HCV COMMENT,HCGAC: NORMAL

## 2022-05-02 ENCOUNTER — OFFICE VISIT (OUTPATIENT)
Dept: FAMILY MEDICINE CLINIC | Age: 62
End: 2022-05-02
Payer: MEDICAID

## 2022-05-02 VITALS
TEMPERATURE: 96.8 F | SYSTOLIC BLOOD PRESSURE: 134 MMHG | RESPIRATION RATE: 16 BRPM | DIASTOLIC BLOOD PRESSURE: 66 MMHG | BODY MASS INDEX: 43.8 KG/M2 | WEIGHT: 232 LBS | HEIGHT: 61 IN | OXYGEN SATURATION: 95 % | HEART RATE: 51 BPM

## 2022-05-02 DIAGNOSIS — E11.21 TYPE 2 DIABETES WITH NEPHROPATHY (HCC): ICD-10-CM

## 2022-05-02 DIAGNOSIS — I10 ESSENTIAL HYPERTENSION: Primary | ICD-10-CM

## 2022-05-02 DIAGNOSIS — M79.642 LEFT HAND PAIN: ICD-10-CM

## 2022-05-02 DIAGNOSIS — E78.5 HYPERLIPIDEMIA, UNSPECIFIED HYPERLIPIDEMIA TYPE: ICD-10-CM

## 2022-05-02 DIAGNOSIS — E66.01 MORBID OBESITY (HCC): ICD-10-CM

## 2022-05-02 PROCEDURE — 3051F HG A1C>EQUAL 7.0%<8.0%: CPT | Performed by: EMERGENCY MEDICINE

## 2022-05-02 PROCEDURE — 99214 OFFICE O/P EST MOD 30 MIN: CPT | Performed by: EMERGENCY MEDICINE

## 2022-05-02 RX ORDER — DICLOFENAC SODIUM 10 MG/G
2 GEL TOPICAL 4 TIMES DAILY
Qty: 100 G | Refills: 3 | Status: SHIPPED | OUTPATIENT
Start: 2022-05-02

## 2022-05-02 NOTE — PROGRESS NOTES
Luke Horne is a 62 y.o. male that is here for a   Chief Complaint   Patient presents with    Follow Up Chronic Condition         1. Have you been to the ER, urgent care clinic since your last visit? Hospitalized since your last visit?no    2. Have you seen or consulted any other health care providers outside of the [address] [place] [address] since your last visit? Include any pap smears or colon screening.  no      Health Maintenance reviewed - yes      Upcoming Appts  no      VORB: No orders of the defined types were placed in this encounter.   Timmy Hurtado MD/ Lila Webb MA

## 2022-05-02 NOTE — PROGRESS NOTES
ICD-10-CM ICD-9-CM    1. Essential hypertension  I10 401.9    2. Type 2 diabetes with nephropathy (HCC)  E11.21 250.40      583.81    3. Hyperlipidemia, unspecified hyperlipidemia type  E78.5 272.4    4. Morbid obesity (HCC)  E66.01 278.01          Assessment and plan  Hypertension doing well. Continue present regimen. Diabetes mellitus type 2 improving. Hemoglobin A1c down to 7.5. Continue present regimen with no change in treatment and follow-up in 3 to 4 months. Cholesterol in good control. Fatty liver. We discussed what this means. We reviewed his work-up on hepatitis which was negative so far. Follow. Left hand pain. No evidence of injury. Some scraping or lichenification on the index and middle MCPs. Follow closely arthritic work-up if it continues. Use Voltaren gel for now. Lab results and schedule of future lab studies reviewed with patient  Reviewed medications and side effects in detail  Reviewed diet, exercise and weight control  All questions answered and understood. Subjective:   Toni Lau is a 62 y.o. male has Hyperlipidemia, Morbid obesity ([address] DM type 2 with diabetic peripheral neuropathy ([address] Type 2 diabetes with nephropathy ([address] Primary open angle glaucoma, Chronic idiopathic gout involving toe of right foot without tophus, and Diabetic polyneuropathy associated with type 2 diabetes mellitus ([address] on their problem list..  Chief Complaint   Patient presents with    Follow Up Chronic Condition     pt states \" that he still have urinary     Labs     results         Seen previously by our practice on 2/2/2022. Labs 4/28/2022: CBC unremarkable. CMP reassuring. Glucose 118. Hemoglobin A1c 7.5. Microalbumin to creatinine ratio 22, within normal limits. LFTs not elevated today. Hepatitis B test negative. Hepatitis C test negative. Cholesterol 163, LDL 66.4, triglycerides slightly elevated at 213.   May need Lovaza  Hypertension good control continue present regimen. Cerumen debris left tympanic eardrum Debrox ordered. Unhealthy weight slight weight gain of 2 pounds. Discussed diet and exercise will follow closely. Prescription written for shingles vaccine. The patient is going to check and make sure he has not had it done before. States he has a 10-year follow-up needed for colonoscopy and this was scheduled. Pending  Admits to constipation. Will trial Colace. Consider MiraLAX. Hypokalemia, resolved. No longer on medication. Ultrasound showed fatty liver. Discussed the ramifications. Will initiate treatment with diet. Liver function tests were within normal limits last visit, recheck. Our gentleman had pain in the neck and pain in the right tooth. He is completing a course of Keflex. He also has neck pain. Question if this is related to the toothache or whether or not he has a muscle strain or some viral illness or other illness. We will x-ray the neck. Pending in addition we will add a short course of Celebrex. The patient has hypokalemia. Repleted. History of urinary hesitancy and a PSA was normal in March 2021. We were going to consider using Flomax. The patient was seen in the ED 9/6/2021. He was discharged with a diagnosis of elevated LFTs. Hand arthritis  Worse on bending, present 2 days. No injury. Taking nothing for relief. Does not work   Hypertension  The patient has no headaches, visual changes, chest pain or pressure,dyspnea, orthopnea, or PND. There is no problem with the medication. Diabetes mellitus  The patient denies polyuria, polydipsia, or polyphagia. The patient denies any other aggravating or relieving factors There has been no problem with the medications. Hyperlipidemia   The patient denies any myalgias or weakness. No abdominal discomfort admitted to. The patient denies any difficulty with or side effects from the medication.    135/64  BP Readings from Last 3 Encounters:   05/02/22 134/66   02/02/22 113/63 09/06/21 122/72       Lab Results   Component Value Date/Time    Hemoglobin A1c 7.5 (H) 04/28/2022 10:57 AM    Hemoglobin A1c (POC) 12.2 05/30/2019 08:13 AM     Lab Results   Component Value Date/Time    Cholesterol, total 163 04/28/2022 10:57 AM    HDL Cholesterol 54 04/28/2022 10:57 AM    LDL, calculated 66.4 04/28/2022 10:57 AM    VLDL, calculated 42.6 04/28/2022 10:57 AM    Triglyceride 213 (H) 04/28/2022 10:57 AM    CHOL/HDL Ratio 3.0 04/28/2022 10:57 AM         Lab Results   Component Value Date/Time    Sodium 141 04/28/2022 10:57 AM    Potassium 4.1 04/28/2022 10:57 AM    Chloride 107 04/28/2022 10:57 AM    CO2 28 04/28/2022 10:57 AM    Anion gap 6 04/28/2022 10:57 AM    Glucose 118 (H) 04/28/2022 10:57 AM    BUN 5 (L) 04/28/2022 10:57 AM    Creatinine 0.79 04/28/2022 10:57 AM    BUN/Creatinine ratio 6 (L) 04/28/2022 10:57 AM    GFR est AA >60 04/28/2022 10:57 AM    GFR est non-AA >60 04/28/2022 10:57 AM    Calcium 9.2 04/28/2022 10:57 AM    Bilirubin, total 1.2 (H) 04/28/2022 10:57 AM    Alk. phosphatase 69 04/28/2022 10:57 AM    Protein, total 7.3 04/28/2022 10:57 AM    Albumin 3.9 04/28/2022 10:57 AM    Globulin 3.4 04/28/2022 10:57 AM    A-G Ratio 1.1 04/28/2022 10:57 AM    ALT (SGPT) 30 04/28/2022 10:57 AM     Fatty liver  This is a chronic problem. It is unchanged. The problem is not in good control. There are no new aggravating or relieving factors. There is no history of any new associated signs, symptoms, or complications. The treatment is unchanged and there are no side effects from it. He is willing to trial diet. Also willing to increase statins if necessary. At one point he had elevated LFTs. We reviewed his hep C and hep B test which were negative. Overweight  The patient states that the weight has increased. Patient's diet has been nonrestricted. The patient denies edema or ascites.   Obesity comorbid conditions include diabetes, high cholesterol and high blood pressure   body mass index is 43.84 kg/m². Wt Readings from Last 3 Encounters:   05/02/22 232 lb (105.2 kg)   02/02/22 232 lb (105.2 kg)   09/06/21 230 lb (104.3 kg)       Review of Systems   Constitutional: Negative for chills, fever, malaise/fatigue and weight loss (Has not checked recently). HENT: Negative for congestion and sore throat. Eyes: Negative for blurred vision (Ophthalmologist added new eyedrop) and redness. Respiratory: Negative for cough, shortness of breath and wheezing. Cardiovascular: Negative for chest pain and leg swelling. PND:    Gastrointestinal: Negative for abdominal pain, blood in stool, constipation, diarrhea and heartburn. Genitourinary: Negative for dysuria, frequency and urgency. History of unprotected sex. Musculoskeletal: Positive for joint pain (Left hand) and myalgias. Negative for back pain. Neurological: Negative for dizziness, sensory change, speech change, focal weakness and headaches. Endo/Heme/Allergies: Does not bruise/bleed easily. Psychiatric/Behavioral: The patient is not nervous/anxious. Health Maintenance Due   Topic Date Due    Pneumococcal 0-64 years (2 - PCV) 05/30/2020    Eye Exam Retinal or Dilated  05/06/2021    COVID-19 Vaccine (3 - Booster for Moderna series) 09/20/2021    Shingrix Vaccine Age 50> (2 of 2) 09/25/2021   Due to see ophthalmology on Thursday, Dr. Naty Collazo 5/5/22        Current Outpatient Medications   Medication Sig    ibuprofen (MOTRIN) 800 mg tablet Take 1 Tablet by mouth every eight (8) hours as needed for Pain.  carbamide peroxide (DEBROX) 6.5 % otic solution Administer 5 Drops into each ear two (2) times a day.  tamsulosin (FLOMAX) 0.4 mg capsule TAKE 1 CAPSULE BY MOUTH EVERY DAY    empagliflozin (Jardiance) 25 mg tablet Take 1 Tablet by mouth daily.     gabapentin (NEURONTIN) 100 mg capsule TAKE 1 CAPSULE BY MOUTH AT NIGHT FOR 3 NIGHTS, THEN 3 CAPSULES BY MOUTH AT NIGHT    glucose blood VI test strips (ASCENSIA AUTODISC VI, ONE TOUCH ULTRA TEST VI) strip Use to check blood glucose daily    Rhopressa 0.02 % drop Administer 1 Drop to both eyes nightly.  Azopt 1 % ophthalmic suspension Administer 1 Drop to both eyes two (2) times a day.  glucose blood VI test strips (TRUE METRIX GLUCOSE TEST STRIP) strip Use as directed to test BS 3 times a day    Blood-Glucose Meter monitoring kit by Does Not Apply route daily.  Lancets misc Use as directed 3 times a day    glucose blood VI test strips (TRUETEST TEST STRIPS) strip by Does Not Apply route three (3) times daily.  timolol (TIMOPTIC-XE) 0.5 % ophthalmic gel-forming instill 1 drop into both eyes once daily     No current facility-administered medications for this visit. No Known Allergies  has Hyperlipidemia, Morbid obesity ([address] DM type 2 with diabetic peripheral neuropathy ([address] Type 2 diabetes with nephropathy ([address] Primary open angle glaucoma, Chronic idiopathic gout involving toe of right foot without tophus, and Diabetic polyneuropathy associated with type 2 diabetes mellitus ([address] on their problem list.    Past Surgical History:   Procedure Laterality Date    HX OTHER SURGICAL      pt reports gallstone removal      reports that he has quit smoking. His smoking use included cigarettes. He has a 7.50 pack-year smoking history. He quit smokeless tobacco use about 6 years ago. He reports that he does not drink alcohol and does not use drugs. family history includes Diabetes in his maternal grandmother, mother, and sister; Sickle Cell Trait in an other family member. Visit Vitals  /66 (BP 1 Location: Right arm, BP Patient Position: Sitting, BP Cuff Size: Large adult)   Pulse (!) 51   Temp 96.8 °F (36 °C) (Temporal)   Resp 16   Ht 5' 1\" (1.549 m)   Wt 232 lb (105.2 kg)   SpO2 95%   BMI 43.84 kg/m²       Physical Exam  Nursing note reviewed. Constitutional:       General: He is not in acute distress. HENT:      Head: Normocephalic. Right Ear: Tympanic membrane and external ear normal.      Left Ear: External ear normal. There is impacted cerumen. Nose: Nose normal.      Mouth/Throat:      Mouth: Mucous membranes are moist.      Pharynx: Oropharynx is clear. Eyes:      General: No scleral icterus. Extraocular Movements: Extraocular movements intact. Pupils: Pupils are equal, round, and reactive to light. Cardiovascular:      Rate and Rhythm: Normal rate and regular rhythm. Heart sounds: Normal heart sounds. Pulmonary:      Effort: Pulmonary effort is normal. No respiratory distress. Breath sounds: No wheezing or rales. Abdominal:      General: Bowel sounds are normal. There is no distension. Palpations: Abdomen is soft. Tenderness: There is no abdominal tenderness. Musculoskeletal:         General: No swelling. Cervical back: No rigidity. Right lower leg: No edema. Left lower leg: No edema. Comments: Some crepitus of the joints of the left hand. There is thickening of the skin over the MCPs of the index and middle fingers. (Patient remembers pushing off to stand up using his fists frequently. ). Skin:     Coloration: Skin is not jaundiced. Findings: No erythema. Neurological:      Mental Status: He is alert and oriented to person, place, and time. Coordination: Coordination normal.      Gait: Gait normal.   Psychiatric:         Mood and Affect: Mood normal.         Behavior: Behavior normal.         Thought Content: Thought content normal.          We discussed the expected course, resolution and complications of the diagnosis(es) in detail. Medication risks, benefits, costs, interactions, and alternatives were discussed as indicated. I advised him to contact the office if his condition worsens, changes or fails to improve as anticipated. He expressed understanding with the diagnosis(es) and plan.       This note was done with the assistance of [employer] software.   Some inadvertent errors or omissions may be present

## 2022-05-02 NOTE — PATIENT INSTRUCTIONS
Hyperlipidemia: After Your Visit  Your Care Instructions  Hyperlipidemia is too much fat in your blood. The body has several kinds of fat, including cholesterol and triglycerides. Your body needs fat for many things, such as making new cells. But too much fat in your blood increases your chances of having a heart attack or stroke. You may be able to lower your cholesterol and triglycerides with a heart-healthy diet, exercise, and if needed, medicine. Your doctor may want you to try lifestyle changes first to see whether they lower the fat in your blood. You may need to take medicine if lifestyle changes do not lower the fat in your blood enough. Follow-up care is a key part of your treatment and safety. Be sure to make and go to all appointments, and call your doctor if you are having problems. Its also a good idea to know your test results and keep a list of the medicines you take. How can you care for yourself at home? Take your medicines  · Take your medicines exactly as prescribed. Call your doctor if you think you are having a problem with your medicine. · If you take medicine to lower your cholesterol, go to follow-up visits. You will need to have blood tests. · Do not take large doses of niacin, which is a B vitamin, while taking medicine called statins. It may increase the chance of muscle pain and liver problems. · Talk to your doctor about avoiding grapefruit juice if you are taking statins. Grapefruit juice can raise the level of this medicine in your blood. This could increase side effects. Eat more fruits, vegetables, and fiber  · Fruits and vegetables have lots of nutrients that help protect against heart disease, and they have littleif anyfat. Try to eat at least five servings a day. Dark green, deep orange, or yellow fruits and vegetables are healthy choices. · Keep carrots, celery, and other veggies handy for snacks.  Buy fruit that is in season and store it where you can see it so that you will be tempted to eat it. Cook dishes that have a lot of veggies in them, such as stir-fries and soups. · Foods high in fiber may reduce your cholesterol and provide important vitamins and minerals. High-fiber foods include whole-grain cereals and breads, oatmeal, beans, brown rice, citrus fruits, and apples. · Buy whole-grain breads and cereals instead of white bread and pastries. Limit saturated fat  · Read food labels and try to avoid saturated fat and trans fat. They increase your risk of heart disease. · Use olive or canola oil when you cook. Try cholesterol-lowering spreads, such as Benecol or Take Control. · Bake, broil, grill, or steam foods instead of frying them. · Limit the amount of high-fat meats you eat, including hot dogs and sausages. Cut out all visible fat when you prepare meat. · Eat fish, skinless poultry, and soy products such as tofu instead of high-fat meats. Soybeans may be especially good for your heart. Eat at least two servings of fish a week. Certain fish, such as salmon, contain omega-3 fatty acids, which may help reduce your risk of heart attack. · Choose low-fat or fat-free milk and dairy products. Get exercise, limit alcohol, and quit smoking  · Get more exercise. Work with your doctor to set up an exercise program. Even if you can do only a small amount, exercise will help you get stronger, have more energy, and manage your weight and your stress. Walking is an easy way to get exercise. Gradually increase the amount you walk every day. Aim for at least 30 minutes on most days of the week. You also may want to swim, bike, or do other activities. · Limit alcohol to no more than 2 drinks a day for men and 1 drink a day for women. · Do not smoke. If you need help quitting, talk to your doctor about stop-smoking programs and medicines. These can increase your chances of quitting for good. When should you call for help?   Call 911 anytime you think you may need emergency care. For example, call if:  · You have symptoms of a heart attack. These may include:  ¨ Chest pain or pressure, or a strange feeling in the chest.  ¨ Sweating. ¨ Shortness of breath. ¨ Nausea or vomiting. ¨ Pain, pressure, or a strange feeling in the back, neck, jaw, or upper belly or in one or both shoulders or arms. ¨ Lightheadedness or sudden weakness. ¨ A fast or irregular heartbeat. After you call 911, the  may tell you to chew 1 adult-strength or 2 to 4 low-dose aspirin. Wait for an ambulance. Do not try to drive yourself. · You have signs of a stroke. These may include:  ¨ Sudden numbness, paralysis, or weakness in your face, arm, or leg, especially on only one side of your body. ¨ New problems with walking or balance. ¨ Sudden vision changes. ¨ Drooling or slurred speech. ¨ New problems speaking or understanding simple statements, or feeling confused. ¨ A sudden, severe headache that is different from past headaches. · You passed out (lost consciousness). Call your doctor now or seek immediate medical care if:  · You have muscle pain or weakness. Watch closely for changes in your health, and be sure to contact your doctor if:  · You are very tired. · You have an upset stomach, gas, constipation, or belly pain or cramps. Where can you learn more? Go to TaskR[employer]  Enter C406 in the search box to learn more about \"Hyperlipidemia: After Your Visit. \"   © 2257-7350 [employer]. Care instructions adapted under license by [employer] (which disclaims liability or warranty for this information). This care instruction is for use with your licensed healthcare professional. If you have questions about a medical condition or this instruction, always ask your healthcare professional. [place] [address] any warranty or liability for your use of this information.   Content Version: 9.7.855118; Last Revised: October 13, 2011 Type 2 Diabetes: Care Instructions  Your Care Instructions     Type 2 diabetes is a disease that develops when the body's tissues cannot use insulin properly. Over time, the pancreas cannot make enough insulin. Insulin is a hormone that helps the body's cells use sugar (glucose) for energy. It also helps the body store extra sugar in muscle, fat, and liver cells. Without insulin, the sugar cannot get into the cells to do its work. It stays in the blood instead. This can cause high blood sugar levels. A person has diabetes when the blood sugar stays too high too much of the time. Over time, diabetes can lead to diseases of the heart, blood vessels, nerves, kidneys, and eyes. You may be able to control your blood sugar by losing weight, eating a healthy diet, and getting daily exercise. You may also have to take insulin or other diabetes medicine. Follow-up care is a key part of your treatment and safety. Be sure to make and go to all appointments. Call your doctor if you are having problems. It's also a good idea to know your test results and keep a list of the medicines you take. How can you care for yourself at home? · Keep your blood sugar at a target level (which you set with your doctor). ? Carbohydratethe body's main source of fuelaffects blood sugar more than any other nutrient. Carbohydrate is in fruits, vegetables, milk, and yogurt. It also is in breads, cereals, vegetables such as potatoes and corn, and sugary foods such as candy and cakes. Follow your meal plan to know how much carbohydrate to eat at each meal and snack. ? Aim for 30 minutes of exercise on most, preferably all, days of the week. Walking is a good choice. You also may want to do other activities, such as running, swimming, cycling, or playing tennis or team sports. Try to do muscle-strengthening exercises at least 2 times a week. ? Take your medicines exactly as prescribed.  Call your doctor if you think you are having a problem with your medicine. You will get more details on the specific medicines your doctor prescribes. · Check your blood sugar as often as your doctor recommends. It is important to keep track of any symptoms you have, such as low blood sugar. Also tell your doctor if you have any changes in your activities, diet, or insulin use. · Talk to your doctor before you start taking aspirin every day. Aspirin can help certain people lower their risk of a heart attack or stroke. But taking aspirin isn't right for everyone, because it can cause serious bleeding. · Do not smoke. If you need help quitting, talk to your doctor about stop-smoking programs and medicines. These can increase your chances of quitting for good. · Keep your cholesterol and blood pressure at normal levels. You may need to take one or more medicines to reach your goals. Take them exactly as directed. Do not stop or change a medicine without talking to your doctor first.  When should you call for help? Call 911 anytime you think you may need emergency care. For example, call if:    · You passed out (lost consciousness), or you suddenly become very sleepy or confused. (You may have very low blood sugar.)   Call your doctor now or seek immediate medical care if:    · Your blood sugar is 300 mg/dL or is higher than the level your doctor has set for you. · You have symptoms of low blood sugar, such as:  ? Sweating. ? Feeling nervous, shaky, and weak. ? Extreme hunger and slight nausea. ? Dizziness and headache.  ? Blurred vision. ? Confusion. Watch closely for changes in your health, and be sure to contact your doctor if:    · You often have problems controlling your blood sugar. · You have symptoms of long-term diabetes problems, such as:  ? New vision changes. ? New pain, numbness, or tingling in your hands or feet. ? Skin problems. Where can you learn more?   Go to http://www.gray.com/  Enter C553 in the search box to learn more about \"Type 2 Diabetes: Care Instructions. \"  Current as of: August 31, 2020               Content Version: 12.8  © 2006-2021 [employer]. Care instructions adapted under license by [employer] (which disclaims liability or warranty for this information). If you have questions about a medical condition or this instruction, always ask your healthcare professional. [address] any warranty or liability for your use of this information. High Blood Pressure: Care Instructions  Overview     It's normal for blood pressure to go up and down throughout the day. But if it stays up, you have high blood pressure. Another name for high blood pressure is hypertension. Despite what a lot of people think, high blood pressure usually doesn't cause headaches or make you feel dizzy or lightheaded. It usually has no symptoms. But it does increase your risk of stroke, heart attack, and other problems. You and your doctor will talk about your risks of these problems based on your blood pressure. Your doctor will give you a goal for your blood pressure. Your goal will be based on your health and your age. Lifestyle changes, such as eating healthy and being active, are always important to help lower blood pressure. You might also take medicine to reach your blood pressure goal.  Follow-up care is a key part of your treatment and safety. Be sure to make and go to all appointments, and call your doctor if you are having problems. It's also a good idea to know your test results and keep a list of the medicines you take. How can you care for yourself at home? Medical treatment  · If you stop taking your medicine, your blood pressure will go back up. You may take one or more types of medicine to lower your blood pressure. Be safe with medicines. Take your medicine exactly as prescribed.  Call your doctor if you think you are having a problem with your medicine. · Talk to your doctor before you start taking aspirin every day. Aspirin can help certain people lower their risk of a heart attack or stroke. But taking aspirin isn't right for everyone, because it can cause serious bleeding. · See your doctor regularly. You may need to see the doctor more often at first or until your blood pressure comes down. · If you are taking blood pressure medicine, talk to your doctor before you take decongestants or anti-inflammatory medicine, such as ibuprofen. Some of these medicines can raise blood pressure. · Learn how to check your blood pressure at home. Lifestyle changes  · Stay at a healthy weight. This is especially important if you put on weight around the waist. Losing even 10 pounds can help you lower your blood pressure. · If your doctor recommends it, get more exercise. Walking is a good choice. Bit by bit, increase the amount you walk every day. Try for at least 30 minutes on most days of the week. You also may want to swim, bike, or do other activities. · Avoid or limit alcohol. Talk to your doctor about whether you can drink any alcohol. · Try to limit how much sodium you eat to less than 2,300 milligrams (mg) a day. Your doctor may ask you to try to eat less than 1,500 mg a day. · Eat plenty of fruits (such as bananas and oranges), vegetables, legumes, whole grains, and low-fat dairy products. · Lower the amount of saturated fat in your diet. Saturated fat is found in animal products such as milk, cheese, and meat. Limiting these foods may help you lose weight and also lower your risk for heart disease. · Do not smoke. Smoking increases your risk for heart attack and stroke. If you need help quitting, talk to your doctor about stop-smoking programs and medicines. These can increase your chances of quitting for good. When should you call for help? Call  911 anytime you think you may need emergency care.  This may mean having symptoms that suggest that your blood pressure is causing a serious heart or blood vessel problem. Your blood pressure may be over 180/120. For example, call 911 if:    · You have symptoms of a heart attack. These may include:  ? Chest pain or pressure, or a strange feeling in the chest.  ? Sweating. ? Shortness of breath. ? Nausea or vomiting. ? Pain, pressure, or a strange feeling in the back, neck, jaw, or upper belly or in one or both shoulders or arms. ? Lightheadedness or sudden weakness. ? A fast or irregular heartbeat. · You have symptoms of a stroke. These may include:  ? Sudden numbness, tingling, weakness, or loss of movement in your face, arm, or leg, especially on only one side of your body. ? Sudden vision changes. ? Sudden trouble speaking. ? Sudden confusion or trouble understanding simple statements. ? Sudden problems with walking or balance. ? A sudden, severe headache that is different from past headaches. · You have severe back or belly pain. Do not wait until your blood pressure comes down on its own. Get help right away. Call your doctor now or seek immediate care if:    · Your blood pressure is much higher than normal (such as 180/120 or higher), but you don't have symptoms. · You think high blood pressure is causing symptoms, such as:  ? Severe headache.  ? Blurry vision. Watch closely for changes in your health, and be sure to contact your doctor if:    · Your blood pressure measures higher than your doctor recommends at least 2 times. That means the top number is higher or the bottom number is higher, or both. · You think you may be having side effects from your blood pressure medicine. Where can you learn more? Go to http://www.gray.com/  Enter [id number] in the search box to learn more about \"High Blood Pressure: Care Instructions. \"  Current as of: August 31, 2020               Content Version: 12.8  © 5677-9524 [employer].    Care instructions adapted under license by [employer] (which disclaims liability or warranty for this information). If you have questions about a medical condition or this instruction, always ask your healthcare professional. [address] any warranty or liability for your use of this information. Learning About Carbohydrate (Carb) Counting and Eating Out When You Have Diabetes  Why plan your meals? Meal planning can be a key part of managing diabetes. Planning meals and snacks with the right balance of carbohydrate, protein, and fat can help you keep your blood sugar at the target level you set with your doctor. You don't have to eat special foods. You can eat what your family eats, including sweets once in a while. But you do have to pay attention to how often you eat and how much you eat of certain foods. You may want to work with a dietitian or a diabetes educator. They can give you tips and meal ideas and can answer your questions about meal planning. This health professional can also help you reach a healthy weight if that is one of your goals. What should you know about eating carbs? Managing the amount of carbohydrate (carbs) you eat is an important part of healthy meals when you have diabetes. Carbohydrate is found in many foods. · Learn which foods have carbs. And learn the amounts of carbs in different foods. ? Bread, cereal, pasta, and rice have about 15 grams of carbs in a serving. A serving is 1 slice of bread (1 ounce), ½ cup of cooked cereal, or 1/3 cup of cooked pasta or rice. ? Fruits have 15 grams of carbs in a serving. A serving is 1 small fresh fruit, such as an apple or orange; ½ of a banana; ½ cup of cooked or canned fruit; ½ cup of fruit juice; 1 cup of melon or raspberries; or 2 tablespoons of dried fruit. ? Milk and no-sugar-added yogurt have 15 grams of carbs in a serving. A serving is 1 cup of milk or 3/4 cup (6 oz) of no-sugar-added yogurt.   ? Starchy vegetables have 15 grams of carbs in a serving. A serving is ½ cup of mashed potatoes or sweet potato; 1 cup winter squash; ½ of a small baked potato; ½ cup of cooked beans; or ½ cup cooked corn or green peas. · Learn how much carbs to eat each day and at each meal. A dietitian or certified diabetes educator can teach you how to keep track of the amount of carbs you eat. This is called carbohydrate counting. · If you are not sure how to count carbohydrate grams, use the plate method to plan meals. It is a quick way to make sure that you have a balanced meal. It also can help you manage the amount of carbohydrate you eat at meals. ? Divide your plate by types of foods. Put non-starchy vegetables on half the plate, meat or other protein food on one-quarter of the plate, and a grain or starchy vegetable in the final quarter of the plate. To this you can add a small piece of fruit and 1 cup of milk or yogurt, depending on how many carbs you are supposed to eat at a meal.  · Try to eat about the same amount of carbs at each meal. Do not \"save up\" your daily allowance of carbs to eat at one meal.  · Proteins have very little or no carbs. Examples of proteins are beef, chicken, turkey, fish, eggs, tofu, cheese, cottage cheese, and peanut butter. How can you eat out and still eat healthy? · Learn to estimate the serving sizes of foods that have carbohydrate. If you measure food at home, it will be easier to estimate the amount in a serving of restaurant food. · If the meal you order has too much carbohydrate (such as potatoes, corn, or baked beans), ask to have a low-carbohydrate food instead. Ask for a salad or non-starchy vegetables like broccoli, cauliflower, green beans, or peppers. · If you eat more carbohydrate at a meal than you had planned, take a walk or do other exercise. This will help lower your blood sugar. What are some tips for eating healthy?   · Limit saturated fat, such as the fat from meat and dairy products. This is a healthy choice because people who have diabetes are at higher risk of heart disease. So choose lean cuts of meat and nonfat or low-fat dairy products. Use olive or canola oil instead of butter or shortening when cooking. · Don't skip meals. Your blood sugar may drop too low if you skip meals and take insulin or certain medicines for diabetes. · Check with your doctor before you drink alcohol. Alcohol can cause your blood sugar to drop too low. Alcohol can also cause a bad reaction if you take certain diabetes medicines. Follow-up care is a key part of your treatment and safety. Be sure to make and go to all appointments, and call your doctor if you are having problems. It's also a good idea to know your test results and keep a list of the medicines you take. Where can you learn more? Go to http://www.burleson.com/  Enter I147 in the search box to learn more about \"Learning About Carbohydrate (Carb) Counting and Eating Out When You Have Diabetes. \"  Current as of: September 8, 2021               Content Version: 13.2  © 2006-2022 [employer]. Care instructions adapted under license by [employer] (which disclaims liability or warranty for this information). If you have questions about a medical condition or this instruction, always ask your healthcare professional. [address] any warranty or liability for your use of this information. Learning About the [address] Diet  What is the Mediterranean diet? The Mediterranean diet is a style of eating rather than a diet plan. It features foods eaten in [place], [place], [place], and other countries along the [facility]. It emphasizes eating foods like fish, fruits, vegetables, beans, high-fiber breads and whole grains, nuts, and olive oil. This style of eating includes limited red meat, cheese, and sweets.   Why choose the Mediterranean diet?  A Mediterranean-style diet may improve heart health. It contains more fat than other heart-healthy diets. But the fats are mainly from nuts, unsaturated oils (such as fish oils and olive oil), and certain nut or seed oils (such as canola, soybean, or flaxseed oil). These fats may help protect the heart and blood vessels. How can you get started on the Mediterranean diet? Here are some things you can do to switch to a more Mediterranean way of eating. What to eat  · Eat a variety of fruits and vegetables each day, such as grapes, blueberries, tomatoes, broccoli, peppers, figs, olives, spinach, eggplant, beans, lentils, and chickpeas. · Eat a variety of whole-grain foods each day, such as oats, brown rice, and whole wheat bread, pasta, and couscous. · Eat fish at least 2 times a week. Try tuna, salmon, mackerel, lake trout, herring, or sardines. · Eat moderate amounts of low-fat dairy products, such as milk, cheese, or yogurt. · Eat moderate amounts of poultry and eggs. · Choose healthy (unsaturated) fats, such as nuts, olive oil, and certain nut or seed oils like canola, soybean, and flaxseed. · Limit unhealthy (saturated) fats, such as butter, palm oil, and coconut oil. And limit fats found in animal products, such as meat and dairy products made with whole milk. Try to eat red meat only a few times a month in very small amounts. · Limit sweets and desserts to only a few times a week. This includes sugar-sweetened drinks like soda. The Mediterranean diet may also include red wine with your meal1 glass each day for women and up to 2 glasses a day for men. Tips for eating at home  · Use herbs, spices, garlic, lemon zest, and citrus juice instead of salt to add flavor to foods. · Add avocado slices to your sandwich instead of palma. · Have fish for lunch or dinner instead of red meat. Brush the fish with olive oil, and broil or grill it.   · Sprinkle your salad with seeds or nuts instead of cheese. · Cook with olive or canola oil instead of butter or oils that are high in saturated fat. · Switch from 2% milk or whole milk to 1% or fat-free milk. · Dip raw vegetables in a vinaigrette dressing or hummus instead of dips made from mayonnaise or sour cream.  · Have a piece of fruit for dessert instead of a piece of cake. Try baked apples, or have some dried fruit. Tips for eating out  · Try broiled, grilled, baked, or poached fish instead of having it fried or breaded. · Ask your  to have your meals prepared with olive oil instead of butter. · Order dishes made with marinara sauce or sauces made from olive oil. Avoid sauces made from cream or mayonnaise. · Choose whole-grain breads, whole wheat pasta and pizza crust, brown rice, beans, and lentils. · Cut back on butter or margarine on bread. Instead, you can dip your bread in a small amount of olive oil. · Ask for a side salad or grilled vegetables instead of french fries or chips. Where can you learn more? Go to http://www.I AM [email or web address]  Enter O407 in the search box to learn more about \"Learning About the Mediterranean Diet. \"  Current as of: September 8, 2021               Content Version: 13.2  © 0971-3423 [employer]. Care instructions adapted under license by [employer] (which disclaims liability or warranty for this information). If you have questions about a medical condition or this instruction, always ask your healthcare professional. [place] [address] any warranty or liability for your use of this information.

## 2022-06-23 DIAGNOSIS — E11.21 TYPE 2 DIABETES WITH NEPHROPATHY (HCC): ICD-10-CM

## 2022-08-17 ENCOUNTER — CLINICAL SUPPORT (OUTPATIENT)
Dept: SURGERY | Age: 62
End: 2022-08-17

## 2022-08-17 VITALS
TEMPERATURE: 97.5 F | WEIGHT: 236 LBS | OXYGEN SATURATION: 98 % | BODY MASS INDEX: 43.43 KG/M2 | RESPIRATION RATE: 18 BRPM | HEART RATE: 81 BPM | HEIGHT: 62 IN

## 2022-08-17 DIAGNOSIS — E11.21 TYPE 2 DIABETES WITH NEPHROPATHY (HCC): ICD-10-CM

## 2022-08-17 DIAGNOSIS — Z12.11 COLON CANCER SCREENING: Primary | ICD-10-CM

## 2022-08-17 DIAGNOSIS — Z01.818 PRE-OP TESTING: ICD-10-CM

## 2022-08-17 NOTE — PROGRESS NOTES
Colon Screen    Patient: Guille Dominguez MRN: [id number]  SSN: [id number]    YOB: 1960  Age: 62 y.o. Sex: male        Subjective:   Daquan Martipiedad Lucas was referred by his PCP, Ritika Tay MD.  Patient referred for colonoscopy for   Screening colonoscopy. Patient denies rectal pain or bleeding. Abdominal surgeries as described below, specifically none. Family history as described below, specifically none. Last colonoscopy was 10 years ago, which was normal. This was done at [facility], they do not take patients insurance. No Known Allergies    Past Medical History:   Diagnosis Date    Diabetes ([facility] [address]     DM type 2 with diabetic peripheral neuropathy ([facility] [address] 2018    Dr Kyleigh Carmona    Hypertensive retinopathy     Knee pain, left     Morbid obesity ([facility] [address]     Pneumonia     Primary open angle glaucoma     Sickle cell trait ([facility] [address]      Past Surgical History:   Procedure Laterality Date    HX COLONOSCOPY  2012    normal, 10 year recall    HX OTHER SURGICAL      pt reports gallstone removal      Family History   Problem Relation Age of Onset    Diabetes Mother     Diabetes Maternal Grandmother     Sickle Cell Trait Other     Diabetes Sister      Social History     Tobacco Use    Smoking status: Former     Packs/day: 0.25     Years: 30.00     Pack years: 7.50     Types: Cigarettes    Smokeless tobacco: Former     Quit date: 2016    Tobacco comments:     per pt stopped last year    Substance Use Topics    Alcohol use: No      Prior to Admission medications    Medication Sig Start Date End Date Taking? Authorizing Provider   empagliflozin (Jardiance) 25 mg tablet Take 1 Tablet by mouth daily. 6/23/22  Yes Ritika Tay MD   ibuprofen (MOTRIN) 800 mg tablet TAKE 1 TABLET BY MOUTH EVERY EIGHT HOURS AS NEEDED FOR PAIN. 5/6/22  Yes Ritika Tay MD   diclofenac (VOLTAREN) 1 % gel Apply 2 g to affected area four (4) times daily.  5/2/22  Yes Ritika Tay MD   carbamide peroxide (DEBROX) 6.5 % otic solution Administer 5 Drops into each ear two (2) times a day. 2/2/22  Yes Cory Blackwood MD   tamsulosin (FLOMAX) 0.4 mg capsule TAKE 1 CAPSULE BY MOUTH EVERY DAY 8/13/21  Yes Isaac Redding MD   gabapentin (NEURONTIN) 100 mg capsule TAKE 1 CAPSULE BY MOUTH AT NIGHT FOR 3 NIGHTS, THEN 3 CAPSULES BY MOUTH AT NIGHT 7/19/21  Yes Cory Blackwood MD   glucose blood VI test strips (ASCENSIA AUTODISC VI, ONE TOUCH ULTRA TEST VI) strip Use to check blood glucose daily 3/7/21  Yes Cory Blackwood MD   Rhopressa 0.02 % drop Administer 1 Drop to both eyes nightly. 11/2/20  Yes Provider, Historical   Azopt 1 % ophthalmic suspension Administer 1 Drop to both eyes two (2) times a day. 10/16/20  Yes Provider, Historical   glucose blood VI test strips (TRUE METRIX GLUCOSE TEST STRIP) strip Use as directed to test BS 3 times a day 7/26/17  Yes Malika Guzman MD   Blood-Glucose Meter monitoring kit by Does Not Apply route daily. 7/19/17  Yes Malika Guzman MD   Lancets misc Use as directed 3 times a day 7/19/17  Yes Malika Guzman MD   glucose blood VI test strips (TRUETEST TEST STRIPS) strip by Does Not Apply route three (3) times daily. 7/19/17  Yes Malika Guzman MD   timolol (TIMOPTIC-XE) 0.5 % ophthalmic gel-forming instill 1 drop into both eyes once daily 4/24/17  Yes Provider, Historical          Review of Systems:  Review of Systems   Constitutional: Negative. HENT: Negative. Eyes: Negative. Respiratory: Negative. Cardiovascular: Negative. Gastrointestinal: Negative. Genitourinary: Negative. Musculoskeletal:  Positive for joint pain. Negative for back pain, falls, myalgias and neck pain. Skin: Negative. Neurological: Negative. Endo/Heme/Allergies:  Negative for environmental allergies and polydipsia. Bruises/bleeds easily. Psychiatric/Behavioral: Negative.            Risks colonoscopy described- colon injury, missed lesion, anesthesia problems, bleeding       Lenka Barboza, LPN  August 17, 0632  1:39 PM

## 2023-01-06 ENCOUNTER — HOSPITAL ENCOUNTER (OUTPATIENT)
Dept: LAB | Age: 63
End: 2023-01-06
Payer: MEDICAID

## 2023-01-06 ENCOUNTER — TRANSCRIBE ORDER (OUTPATIENT)
Dept: REGISTRATION | Age: 63
End: 2023-01-06

## 2023-01-06 DIAGNOSIS — E11.21 TYPE 2 DIABETES WITH NEPHROPATHY (HCC): ICD-10-CM

## 2023-01-06 DIAGNOSIS — Z01.818 PRE-OP TESTING: ICD-10-CM

## 2023-01-06 DIAGNOSIS — I10 ESSENTIAL HYPERTENSION: ICD-10-CM

## 2023-01-06 DIAGNOSIS — E78.5 HYPERLIPIDEMIA, UNSPECIFIED HYPERLIPIDEMIA TYPE: ICD-10-CM

## 2023-01-06 LAB
ALBUMIN SERPL-MCNC: 4.1 G/DL (ref 3.4–5)
ALBUMIN/GLOB SERPL: 1.2 (ref 0.8–1.7)
ALP SERPL-CCNC: 73 U/L (ref 45–117)
ALT SERPL-CCNC: 35 U/L (ref 16–61)
ANION GAP SERPL CALC-SCNC: 4 MMOL/L (ref 3–18)
AST SERPL-CCNC: 26 U/L (ref 10–38)
ATRIAL RATE: 53 BPM
BASOPHILS # BLD: 0 K/UL (ref 0–0.1)
BASOPHILS NFR BLD: 1 % (ref 0–2)
BILIRUB SERPL-MCNC: 1.2 MG/DL (ref 0.2–1)
BUN SERPL-MCNC: 9 MG/DL (ref 7–18)
BUN/CREAT SERPL: 10 (ref 12–20)
CALCIUM SERPL-MCNC: 9 MG/DL (ref 8.5–10.1)
CALCULATED P AXIS, ECG09: 64 DEGREES
CALCULATED R AXIS, ECG10: 68 DEGREES
CALCULATED T AXIS, ECG11: 48 DEGREES
CHLORIDE SERPL-SCNC: 107 MMOL/L (ref 100–111)
CHOLEST SERPL-MCNC: 148 MG/DL
CO2 SERPL-SCNC: 28 MMOL/L (ref 21–32)
CREAT SERPL-MCNC: 0.87 MG/DL (ref 0.6–1.3)
CREAT UR-MCNC: 62 MG/DL (ref 30–125)
DIAGNOSIS, 93000: NORMAL
DIFFERENTIAL METHOD BLD: ABNORMAL
EOSINOPHIL # BLD: 0.1 K/UL (ref 0–0.4)
EOSINOPHIL NFR BLD: 2 % (ref 0–5)
ERYTHROCYTE [DISTWIDTH] IN BLOOD BY AUTOMATED COUNT: 14.3 % (ref 11.6–14.5)
EST. AVERAGE GLUCOSE BLD GHB EST-MCNC: 151 MG/DL
EST. AVERAGE GLUCOSE BLD GHB EST-MCNC: 151 MG/DL
GLOBULIN SER CALC-MCNC: 3.3 G/DL (ref 2–4)
GLUCOSE SERPL-MCNC: 105 MG/DL (ref 74–99)
HBA1C MFR BLD: 6.9 % (ref 4.2–5.6)
HBA1C MFR BLD: 6.9 % (ref 4.2–5.6)
HCT VFR BLD AUTO: 41.5 % (ref 36–48)
HDLC SERPL-MCNC: 54 MG/DL (ref 40–60)
HDLC SERPL: 2.7 (ref 0–5)
HGB BLD-MCNC: 14.4 G/DL (ref 13–16)
IMM GRANULOCYTES # BLD AUTO: 0 K/UL (ref 0–0.04)
IMM GRANULOCYTES NFR BLD AUTO: 0 % (ref 0–0.5)
LDLC SERPL CALC-MCNC: 64.2 MG/DL (ref 0–100)
LIPID PROFILE,FLP: NORMAL
LYMPHOCYTES # BLD: 3.5 K/UL (ref 0.9–3.6)
LYMPHOCYTES NFR BLD: 51 % (ref 21–52)
MCH RBC QN AUTO: 30.6 PG (ref 24–34)
MCHC RBC AUTO-ENTMCNC: 34.7 G/DL (ref 31–37)
MCV RBC AUTO: 88.3 FL (ref 78–100)
MICROALBUMIN UR-MCNC: 2.14 MG/DL (ref 0–3)
MICROALBUMIN/CREAT UR-RTO: 35 MG/G (ref 0–30)
MONOCYTES # BLD: 0.3 K/UL (ref 0.05–1.2)
MONOCYTES NFR BLD: 4 % (ref 3–10)
NEUTS SEG # BLD: 2.8 K/UL (ref 1.8–8)
NEUTS SEG NFR BLD: 42 % (ref 40–73)
NRBC # BLD: 0 K/UL (ref 0–0.01)
NRBC BLD-RTO: 0 PER 100 WBC
P-R INTERVAL, ECG05: 180 MS
PLATELET # BLD AUTO: 187 K/UL (ref 135–420)
PMV BLD AUTO: 12.4 FL (ref 9.2–11.8)
POTASSIUM SERPL-SCNC: 3.9 MMOL/L (ref 3.5–5.5)
PROT SERPL-MCNC: 7.4 G/DL (ref 6.4–8.2)
Q-T INTERVAL, ECG07: 432 MS
QRS DURATION, ECG06: 92 MS
QTC CALCULATION (BEZET), ECG08: 405 MS
RBC # BLD AUTO: 4.7 M/UL (ref 4.35–5.65)
SODIUM SERPL-SCNC: 139 MMOL/L (ref 136–145)
TRIGL SERPL-MCNC: 149 MG/DL (ref ?–150)
VENTRICULAR RATE, ECG03: 53 BPM
VLDLC SERPL CALC-MCNC: 29.8 MG/DL
WBC # BLD AUTO: 6.8 K/UL (ref 4.6–13.2)

## 2023-01-06 PROCEDURE — 36415 COLL VENOUS BLD VENIPUNCTURE: CPT

## 2023-01-06 PROCEDURE — 82043 UR ALBUMIN QUANTITATIVE: CPT

## 2023-01-06 PROCEDURE — 80053 COMPREHEN METABOLIC PANEL: CPT

## 2023-01-06 PROCEDURE — 80061 LIPID PANEL: CPT

## 2023-01-06 PROCEDURE — 85025 COMPLETE CBC W/AUTO DIFF WBC: CPT

## 2023-01-06 PROCEDURE — 83036 HEMOGLOBIN GLYCOSYLATED A1C: CPT

## 2023-01-06 PROCEDURE — 93005 ELECTROCARDIOGRAM TRACING: CPT

## 2023-01-11 NOTE — PERIOP NOTES
Alf Wei PAT phone assessment completed on 1/11/23. The following instructions were reviewed with Alf Wei and he verbalized understanding. Do NOT eat or drink anything, including candy, gum, or ice chips after midnight on 1/18/23, unless you have specific instructions from your surgeon or anesthesia provider to do so. You may brush your teeth before coming to the hospital.  No smoking 24 hours prior to the day of surgery. No alcohol 24 hours prior to the day of surgery. No recreational drugs for one week prior to the day of surgery. Leave all valuables, including money/purse, at home. Remove all jewelry, nail polish, acrylic nails, and makeup (including mascara); no lotions powders, deodorant, or perfume/cologne/after shave on the skin. Glasses/contact lenses and dentures may be worn to the hospital.  They will be removed prior to surgery. Call your doctor if symptoms of a cold or illness develop within 24-48 hours prior to your surgery. 10.  AN ADULT MUST DRIVE YOU HOME AFTER OUTPATIENT SURGERY. 11.  If you are having an outpatient procedure, please make arrangements for a responsible adult to be with you for 24 hours after your surgery. Special Instructions:      Bring list of CURRENT medications. Bring any pertinent legal medical records. Take these medications the morning of surgery with a sip of water:  per surgeon  Follow physician instructions about insulin. Follow physician instructions about stopping anticoagulants. Complete bowel prep per MD instructions.

## 2023-01-15 NOTE — PATIENT INSTRUCTIONS
Learning About High Blood Pressure  It's normal for blood pressure to go up and down throughout the day. But if it stays up, you have high blood pressure (hypertension). High blood pressure means that your blood is pressing on your arteries with too much force. Usually it doesn't cause symptoms. But over time, it can cause damage. High blood pressure increases the risk of stroke, heart attack, vision loss, and dementia. Your doctor will give you a goal for your blood pressure. Your goal will be based on your health and age. Eating healthy foods, not smoking, and being active can help lower blood pressure. You might also take medicine to reach your blood pressure goal.  Tips for preventing high blood pressure    Stay at a healthy weight. Talk to your doctor about what a healthy weight is for you. Limit salt (sodium). Use no or less salt in recipes. And buy foods labeled \"unsalted,\" \"sodium-free,\" or \"low-sodium. \" Foods labeled \"reduced-sodium\" and \"light sodium\" may still have too much sodium. Find new ways to flavor food. Try garlic, lemon juice, onion, vinegar, herbs, and spices instead of salt. Avoid things like soy sauce, steak sauce, mustard, and ketchup. Get at least 30 minutes of exercise on most days of the week. Walking is a good choice. Swimming, running, or team sports may also work for you. Limit alcohol. Men should have no more than 2 drinks a day. Women should have no more than 1. Eat plenty of fruits, vegetables, and low-fat dairy products. Eat less saturated fat (like red meat and full fat dairy), and limit sweets and sugary beverages. Understanding the numbers  Two numbers tell you your blood pressure. The first (top) number shows how hard the blood pushes on your artery walls when your heart pumps. The second (bottom) number shows how hard the blood pushes on your artery walls between heartbeats, when your heart relaxes. Where can you learn more?   Go to http://www.gray.com/  Enter P501 in the search box to learn more about \"Learning About High Blood Pressure. \"  Current as of: October 6, 2021               Content Version: 13.4  © 2006-2022 Healthwise, [facility]. Care instructions adapted under license by [employer] (which disclaims liability or warranty for this information). If you have questions about a medical condition or this instruction, always ask your healthcare professional. [address] any warranty or liability for your use of this information. Learning About Carbohydrate (Carb) Counting and Eating Out When You Have Diabetes  Why plan your meals? Meal planning can be a key part of managing diabetes. Planning meals and snacks with the right balance of carbohydrate, protein, and fat can help you keep your blood sugar at the target level you set with your doctor. You don't have to eat special foods. You can eat what your family eats, including sweets once in a while. But you do have to pay attention to how often you eat and how much you eat of certain foods. You may want to work with a dietitian or a diabetes educator. They can give you tips and meal ideas and can answer your questions about meal planning. This health professional can also help you reach a healthy weight if that is one of your goals. What should you know about eating carbs? Managing the amount of carbohydrate (carbs) you eat is an important part of healthy meals when you have diabetes. Carbohydrate is found in many foods. Learn which foods have carbs. And learn the amounts of carbs in different foods. Bread, cereal, pasta, and rice have about 15 grams of carbs in a serving. A serving is 1 slice of bread (1 ounce), ½ cup of cooked cereal, or 1/3 cup of cooked pasta or rice. Fruits have 15 grams of carbs in a serving.  A serving is 1 small fresh fruit, such as an apple or orange; ½ of a banana; ½ cup of cooked or canned fruit; ½ cup of fruit juice; 1 cup of melon or raspberries; or 2 tablespoons of dried fruit. Milk and no-sugar-added yogurt have 15 grams of carbs in a serving. A serving is 1 cup of milk or 3/4 cup (6 oz) of no-sugar-added yogurt. Starchy vegetables have 15 grams of carbs in a serving. A serving is ½ cup of mashed potatoes or sweet potato; 1 cup winter squash; ½ of a small baked potato; ½ cup of cooked beans; or ½ cup cooked corn or green peas. Learn how much carbs to eat each day and at each meal. A dietitian or certified diabetes educator can teach you how to keep track of the amount of carbs you eat. This is called carbohydrate counting. If you are not sure how to count carbohydrate grams, use the plate method to plan meals. It is a quick way to make sure that you have a balanced meal. It also can help you manage the amount of carbohydrate you eat at meals. Divide your plate by types of foods. Put non-starchy vegetables on half the plate, meat or other protein food on one-quarter of the plate, and a grain or starchy vegetable in the final quarter of the plate. To this you can add a small piece of fruit and 1 cup of milk or yogurt, depending on how many carbs you are supposed to eat at a meal.  Try to eat about the same amount of carbs at each meal. Do not \"save up\" your daily allowance of carbs to eat at one meal.  Proteins have very little or no carbs. Examples of proteins are beef, chicken, turkey, fish, eggs, tofu, cheese, cottage cheese, and peanut butter. How can you eat out and still eat healthy? Learn to estimate the serving sizes of foods that have carbohydrate. If you measure food at home, it will be easier to estimate the amount in a serving of restaurant food. If the meal you order has too much carbohydrate (such as potatoes, corn, or baked beans), ask to have a low-carbohydrate food instead.  Ask for a salad or non-starchy vegetables like broccoli, cauliflower, green beans, or peppers. If you eat more carbohydrate at a meal than you had planned, take a walk or do other exercise. This will help lower your blood sugar. What are some tips for eating healthy? Limit saturated fat, such as the fat from meat and dairy products. This is a healthy choice because people who have diabetes are at higher risk of heart disease. So choose lean cuts of meat and nonfat or low-fat dairy products. Use olive or canola oil instead of butter or shortening when cooking. Don't skip meals. Your blood sugar may drop too low if you skip meals and take insulin or certain medicines for diabetes. Check with your doctor before you drink alcohol. Alcohol can cause your blood sugar to drop too low. Alcohol can also cause a bad reaction if you take certain diabetes medicines. Follow-up care is a key part of your treatment and safety. Be sure to make and go to all appointments, and call your doctor if you are having problems. It's also a good idea to know your test results and keep a list of the medicines you take. Where can you learn more? Go to http://www.burleson.com/  Enter I147 in the search box to learn more about \"Learning About Carbohydrate (Carb) Counting and Eating Out When You Have Diabetes. \"  Current as of: May 9, 2022               Content Version: 13.4  © 2006-2022 Healthwise, Incorporated. Care instructions adapted under license by [employer] (which disclaims liability or warranty for this information). If you have questions about a medical condition or this instruction, always ask your healthcare professional. [place] [address] any warranty or liability for your use of this information. Learning About High Cholesterol  What is high cholesterol? High cholesterol means that you have too much cholesterol in your blood. Cholesterol is a type of fat. It's needed for many body functions, such as making new cells. It's made by your body.  It also comes from food you eat (meat and dairy products). Having high cholesterol can lead to the buildup of fatty deposits called plaque (say \"plak\") in artery walls. This can increase your risk of heart attack and stroke. When doctors talk about high cholesterol, they are talking about your total cholesterol and LDL cholesterol levels. LDL is sometimes called the \"bad\" cholesterol. Your doctor may also speak about HDL levels. HDL is sometimes called the \"good\" cholesterol. High HDL is linked with a lower risk for heart attack and stroke. How can you help prevent high cholesterol? Eat high-fiber foods, such as fruits, vegetables, and whole grains. Eat lean proteins, such as seafood, lean meats, and beans. Eat healthy fats, such as canola and olive oil. Choose foods that are low in saturated fat, such as avocados, nuts, and low-fat milk or yogurt. Limit sodium and alcohol. Limit drinks and foods with added sugar. Try to be active at least 2½ hours a week. Get to and stay at a healthy weight. Don't use tobacco or nicotine, and talk to your doctor if you need help quitting. How is high cholesterol treated? Treatment focuses on heart-healthy lifestyle changes. It may also include medicines. Treatment reduces your risk for a heart attack or stroke. The goal of treatment is to lower your LDL cholesterol levels. Where can you learn more? Go to http://www.burleson.com/  Enter Q621 in the search box to learn more about \"Learning About High Cholesterol. \"  Current as of: October 6, 2021               Content Version: 13.4  © 2006-2022 [employer]. Care instructions adapted under license by [employer] (which disclaims liability or warranty for this information).  If you have questions about a medical condition or this instruction, always ask your healthcare professional. Mariya Howard disclaims any warranty or liability for your use of this information. Type 2 Diabetes: Care Instructions  Your Care Instructions     Type 2 diabetes is a disease that develops when the body's tissues cannot use insulin properly. Over time, the pancreas cannot make enough insulin. Insulin is a hormone that helps the body's cells use sugar (glucose) for energy. It also helps the body store extra sugar in muscle, fat, and liver cells. Without insulin, the sugar cannot get into the cells to do its work. It stays in the blood instead. This can cause high blood sugar levels. A person has diabetes when the blood sugar stays too high too much of the time. Over time, diabetes can lead to diseases of the heart, blood vessels, nerves, kidneys, and eyes. You may be able to control your blood sugar by losing weight, eating a healthy diet, and getting daily exercise. You may also have to take insulin or other diabetes medicine. Follow-up care is a key part of your treatment and safety. Be sure to make and go to all appointments. Call your doctor if you are having problems. It's also a good idea to know your test results and keep a list of the medicines you take. How can you care for yourself at home? Keep your blood sugar at a target level (which you set with your doctor). Carbohydrate--the body's main source of fuel--affects blood sugar more than any other nutrient. Carbohydrate is in fruits, vegetables, milk, and yogurt. It also is in breads, cereals, vegetables such as potatoes and corn, and sugary foods such as candy and cakes. Follow your meal plan to know how much carbohydrate to eat at each meal and snack. Aim for 30 minutes of exercise on most, preferably all, days of the week. Walking is a good choice. You also may want to do other activities, such as running, swimming, cycling, or playing tennis or team sports. Try to do muscle-strengthening exercises at least 2 times a week. Take your medicines exactly as prescribed.  Call your doctor if you think you are having a problem with your medicine. You will get more details on the specific medicines your doctor prescribes. Check your blood sugar as often as your doctor recommends. It is important to keep track of any symptoms you have, such as low blood sugar. Also tell your doctor if you have any changes in your activities, diet, or insulin use. Talk to your doctor before you start taking aspirin every day. Aspirin can help certain people lower their risk of a heart attack or stroke. But taking aspirin isn't right for everyone, because it can cause serious bleeding. Do not smoke. If you need help quitting, talk to your doctor about stop-smoking programs and medicines. These can increase your chances of quitting for good. Keep your cholesterol and blood pressure at normal levels. You may need to take one or more medicines to reach your goals. Take them exactly as directed. Do not stop or change a medicine without talking to your doctor first.  When should you call for help? Call 911 anytime you think you may need emergency care. For example, call if:    You passed out (lost consciousness), or you suddenly become very sleepy or confused. (You may have very low blood sugar.)   Call your doctor now or seek immediate medical care if:    Your blood sugar is 300 mg/dL or is higher than the level your doctor has set for you. You have symptoms of low blood sugar, such as:  Sweating. Feeling nervous, shaky, and weak. Extreme hunger and slight nausea. Dizziness and headache. Blurred vision. Confusion. Watch closely for changes in your health, and be sure to contact your doctor if:    You often have problems controlling your blood sugar. You have symptoms of long-term diabetes problems, such as:  New vision changes. New pain, numbness, or tingling in your hands or feet. Skin problems. Where can you learn more?   Go to http://www.gray.com/  Enter C553 in the search box to learn more about \"Type 2 Diabetes: Care Instructions. \"  Current as of: August 31, 2020               Content Version: 13.0  © 2006-2021 [employer]. Care instructions adapted under license by [employer] (which disclaims liability or warranty for this information). If you have questions about a medical condition or this instruction, always ask your healthcare professional. [address] any warranty or liability for your use of this information. Learning About Carbohydrate (Carb) Counting and Eating Out When You Have Diabetes  Why plan your meals? Meal planning can be a key part of managing diabetes. Planning meals and snacks with the right balance of carbohydrate, protein, and fat can help you keep your blood sugar at the target level you set with your doctor. You don't have to eat special foods. You can eat what your family eats, including sweets once in a while. But you do have to pay attention to how often you eat and how much you eat of certain foods. You may want to work with a dietitian or a certified diabetes educator. He or she can give you tips and meal ideas and can answer your questions about meal planning. This health professional can also help you reach a healthy weight if that is one of your goals. What should you know about eating carbs? Managing the amount of carbohydrate (carbs) you eat is an important part of healthy meals when you have diabetes. Carbohydrate is found in many foods. Learn which foods have carbs. And learn the amounts of carbs in different foods. Bread, cereal, pasta, and rice have about 15 grams of carbs in a serving. A serving is 1 slice of bread (1 ounce), ½ cup of cooked cereal, or 1/3 cup of cooked pasta or rice. Fruits have 15 grams of carbs in a serving.  A serving is 1 small fresh fruit, such as an apple or orange; ½ of a banana; ½ cup of cooked or canned fruit; ½ cup of fruit juice; 1 cup of melon or raspberries; or 2 tablespoons of dried fruit. Milk and no-sugar-added yogurt have 15 grams of carbs in a serving. A serving is 1 cup of milk or 2/3 cup of no-sugar-added yogurt. Starchy vegetables have 15 grams of carbs in a serving. A serving is ½ cup of mashed potatoes or sweet potato; 1 cup winter squash; ½ of a small baked potato; ½ cup of cooked beans; or ½ cup cooked corn or green peas. Learn how much carbs to eat each day and at each meal. A dietitian or CDE can teach you how to keep track of the amount of carbs you eat. This is called carbohydrate counting. If you are not sure how to count carbohydrate grams, use the Plate Method to plan meals. It is a good, quick way to make sure that you have a balanced meal. It also helps you spread carbs throughout the day. Divide your plate by types of foods. Put non-starchy vegetables on half the plate, meat or other protein food on one-quarter of the plate, and a grain or starchy vegetable in the final quarter of the plate. To this you can add a small piece of fruit and 1 cup of milk or yogurt, depending on how many carbs you are supposed to eat at a meal.  Try to eat about the same amount of carbs at each meal. Do not \"save up\" your daily allowance of carbs to eat at one meal.  Proteins have very little or no carbs per serving. Examples of proteins are beef, chicken, turkey, fish, eggs, tofu, cheese, cottage cheese, and peanut butter. A serving size of meat is 3 ounces, which is about the size of a deck of cards. Examples of meat substitute serving sizes (equal to 1 ounce of meat) are 1/4 cup of cottage cheese, 1 egg, 1 tablespoon of peanut butter, and ½ cup of tofu. How can you eat out and still eat healthy? Learn to estimate the serving sizes of foods that have carbohydrate. If you measure food at home, it will be easier to estimate the amount in a serving of restaurant food.   If the meal you order has too much carbohydrate (such as potatoes, corn, or baked beans), ask to have a low-carbohydrate food instead. Ask for a salad or green vegetables. If you use insulin, check your blood sugar before and after eating out to help you plan how much to eat in the future. If you eat more carbohydrate at a meal than you had planned, take a walk or do other exercise. This will help lower your blood sugar. What are some tips for eating healthy? Limit saturated fat, such as the fat from meat and dairy products. This is a healthy choice because people who have diabetes are at higher risk of heart disease. So choose lean cuts of meat and nonfat or low-fat dairy products. Use olive or canola oil instead of butter or shortening when cooking. Don't skip meals. Your blood sugar may drop too low if you skip meals and take insulin or certain medicines for diabetes. Check with your doctor before you drink alcohol. Alcohol can cause your blood sugar to drop too low. Alcohol can also cause a bad reaction if you take certain diabetes medicines. Follow-up care is a key part of your treatment and safety. Be sure to make and go to all appointments, and call your doctor if you are having problems. It's also a good idea to know your test results and keep a list of the medicines you take. Where can you learn more? Go to http://shekhar-pk.info/  Enter I147 in the search box to learn more about \"Learning About Carbohydrate (Carb) Counting and Eating Out When You Have Diabetes. \"  Current as of: December 17, 2020               Content Version: 13.0  © 2006-2021 [employer]. Care instructions adapted under license by [employer] (which disclaims liability or warranty for this information). If you have questions about a medical condition or this instruction, always ask your healthcare professional. [place] [address] any warranty or liability for your use of this information.          Diabetes Blood Sugar Emergencies: Your Action Plan  How can you prevent a blood sugar emergency? An important part of living with diabetes is keeping your blood sugar in your target range. You'll need to know what to do if it's too high or too low. Managing your blood sugar levels helps you avoid emergencies. This care sheet will teach you about the signs of high and low blood sugar. It will help you make an action plan with your doctor for when these signs occur. Low blood sugar is more likely to happen if you take certain medicines for diabetes. It can also happen if you skip a meal, drink alcohol, or exercise more than usual.  You may get high blood sugar if you eat differently than you normally do. One example is eating more carbohydrate than usual. Having a cold, the flu, or other sudden illness can also cause high blood sugar levels. Levels can also rise if you miss a dose of medicine. Any change in how you take your medicine may affect your blood sugar level. So it's important to work with your doctor before you make any changes. Track your blood sugar  Work with your doctor to fill in the blank spaces below that apply to you. Track your levels, know your target range, and write down ways you can get your blood sugar back in your target range. A log book can help you track your levels. Take the book to all of your medical appointments. Check your blood sugar _____ times a day, at these times:________________________________________________. (For example: Before meals, at bedtime, before exercise, during exercise, other.)  Your blood sugar target range before a meal is ___________________. Your blood sugar target range after a meal is _______________________. Do this--___________________________________________________--to get your blood sugar back within your safe range if your blood sugar results are _________________________________________.  (For example: Less than 70 or above 250 mg/dL.)  Call your doctor when your blood sugar results are ___________________________________. (For example: Less than 70 or above 250 mg/dL.)  What are the symptoms of low and high blood sugar? Common symptoms of low blood sugar are sweating and feeling shaky, weak, hungry, or confused. Symptoms can start quickly. Common symptoms of high blood sugar are feeling very thirsty or very hungry. You may also pass urine more often than usual. You may have blurry vision and may lose weight without trying. But some people may have high or low blood sugar without having any symptoms. That's a good reason to check your blood sugar on a regular schedule. What should you do if you have symptoms? Work with your doctor to fill in the blank spaces below that apply to you. Low blood sugar and \"the rule of 15\"  If you have symptoms of low blood sugar, check your blood sugar. If it's below _____ ( for example, below 70), eat or drink a quick-sugar food that has about 15 grams of carbohydrate. Your goal is to get your level back to your safe range. Check your blood sugar again 15 minutes later. If it's still not in your target range, take another 15 grams of carbohydrate and check your blood sugar again in 15 minutes. Repeat this until you reach your target. Then go back to your regular testing schedule. Children usually need less than 15 grams of carbohydrate. Check with your doctor or diabetes educator for the amount that is right for your child. When you have low blood sugar, it's best to stop or reduce any physical activity until your blood sugar is back in your target range and is stable. If you must stay active, eat or drink 30 grams of carbohydrate. Then check your blood sugar again in 15 minutes. If it's not in your target range, take another 30 grams of carbohydrates. Check your blood sugar again in 15 minutes. Keep doing this until you reach your target. You can then go back to your regular testing schedule.   If your symptoms or blood sugar levels are getting worse or have not improved after 15 minutes, seek medical care right away. If you take insulin, always carry a glucagon emergency kit. Be sure your family, friends, and coworkers know how to give glucagon. Here are some examples of quick-sugar foods with 15 grams of carbohydrate:  3 or 4 glucose tablets  1 tablespoon (3 teaspoons) table sugar  ½ cup to ¾ cup (4 to 6 ounces) of fruit juice or regular (not diet) soda  Hard candy (such as 6 Life Savers)  High blood sugar  If you have symptoms of high blood sugar, check your blood sugar. Your goal is to get your level back to your target range. If it's above ______ ( for example, above 250), follow these steps: If you missed a dose of your diabetes medicine, take it now. Take only the amount of medicine that you have been prescribed. Do not take more or less medicine. Give yourself insulin if your doctor has prescribed it for high blood sugar. Test for ketones, if the doctor told you to do so. If the results of the ketone test show a moderate-to-large amount of ketones, call the doctor for advice. Wait 30 minutes after you take the extra insulin or the missed medicine. Check your blood sugar again. If your symptoms or blood sugar levels are getting worse or have not improved after taking these steps, seek medical care right away. Follow-up care is a key part of your treatment and safety. Be sure to make and go to all appointments, and call your doctor if you are having problems. It's also a good idea to know your test results and keep a list of the medicines you take. Where can you learn more? Go to http://www.gray.com/  Enter I917 in the search box to learn more about \"Diabetes Blood Sugar Emergencies: Your Action Plan. \"  Current as of: August 31, 2020               Content Version: 13.0  © 1165-1398 [employer], Incorporated.    Care instructions adapted under license by [employer] (which disclaims liability or warranty for this information). If you have questions about a medical condition or this instruction, always ask your healthcare professional. [address] any warranty or liability for your use of this information. Hyperlipidemia: After Your Visit  Your Care Instructions  Hyperlipidemia is too much fat in your blood. The body has several kinds of fat, including cholesterol and triglycerides. Your body needs fat for many things, such as making new cells. But too much fat in your blood increases your chances of having a heart attack or stroke. You may be able to lower your cholesterol and triglycerides with a heart-healthy diet, exercise, and if needed, medicine. Your doctor may want you to try lifestyle changes first to see whether they lower the fat in your blood. You may need to take medicine if lifestyle changes do not lower the fat in your blood enough. Follow-up care is a key part of your treatment and safety. Be sure to make and go to all appointments, and call your doctor if you are having problems. Its also a good idea to know your test results and keep a list of the medicines you take. How can you care for yourself at home? Take your medicines  Take your medicines exactly as prescribed. Call your doctor if you think you are having a problem with your medicine. If you take medicine to lower your cholesterol, go to follow-up visits. You will need to have blood tests. Do not take large doses of niacin, which is a B vitamin, while taking medicine called statins. It may increase the chance of muscle pain and liver problems. Talk to your doctor about avoiding grapefruit juice if you are taking statins. Grapefruit juice can raise the level of this medicine in your blood. This could increase side effects. Eat more fruits, vegetables, and fiber  Fruits and vegetables have lots of nutrients that help protect against heart disease, and they have little--if any--fat.  Try to eat at least five servings a day. Dark green, deep orange, or yellow fruits and vegetables are healthy choices. Keep carrots, celery, and other veggies handy for snacks. Buy fruit that is in season and store it where you can see it so that you will be tempted to eat it. Cook dishes that have a lot of veggies in them, such as stir-fries and soups. Foods high in fiber may reduce your cholesterol and provide important vitamins and minerals. High-fiber foods include whole-grain cereals and breads, oatmeal, beans, brown rice, citrus fruits, and apples. Buy whole-grain breads and cereals instead of white bread and pastries. Limit saturated fat  Read food labels and try to avoid saturated fat and trans fat. They increase your risk of heart disease. Use olive or canola oil when you cook. Try cholesterol-lowering spreads, such as Benecol or Take Control. Bake, broil, grill, or steam foods instead of frying them. Limit the amount of high-fat meats you eat, including hot dogs and sausages. Cut out all visible fat when you prepare meat. Eat fish, skinless poultry, and soy products such as tofu instead of high-fat meats. Soybeans may be especially good for your heart. Eat at least two servings of fish a week. Certain fish, such as salmon, contain omega-3 fatty acids, which may help reduce your risk of heart attack. Choose low-fat or fat-free milk and dairy products. Get exercise, limit alcohol, and quit smoking  Get more exercise. Work with your doctor to set up an exercise program. Even if you can do only a small amount, exercise will help you get stronger, have more energy, and manage your weight and your stress. Walking is an easy way to get exercise. Gradually increase the amount you walk every day. Aim for at least 30 minutes on most days of the week. You also may want to swim, bike, or do other activities. Limit alcohol to no more than 2 drinks a day for men and 1 drink a day for women. Do not smoke.  If you need help quitting, talk to your doctor about stop-smoking programs and medicines. These can increase your chances of quitting for good. When should you call for help? Call 911 anytime you think you may need emergency care. For example, call if:  You have symptoms of a heart attack. These may include:  Chest pain or pressure, or a strange feeling in the chest.  Sweating. Shortness of breath. Nausea or vomiting. Pain, pressure, or a strange feeling in the back, neck, jaw, or upper belly or in one or both shoulders or arms. Lightheadedness or sudden weakness. A fast or irregular heartbeat. After you call 911, the  may tell you to chew 1 adult-strength or 2 to 4 low-dose aspirin. Wait for an ambulance. Do not try to drive yourself. You have signs of a stroke. These may include:  Sudden numbness, paralysis, or weakness in your face, arm, or leg, especially on only one side of your body. New problems with walking or balance. Sudden vision changes. Drooling or slurred speech. New problems speaking or understanding simple statements, or feeling confused. A sudden, severe headache that is different from past headaches. You passed out (lost consciousness). Call your doctor now or seek immediate medical care if:  You have muscle pain or weakness. Watch closely for changes in your health, and be sure to contact your doctor if:  You are very tired. You have an upset stomach, gas, constipation, or belly pain or cramps. Where can you learn more? Go to [employer]  Enter C406 in the search box to learn more about \"Hyperlipidemia: After Your Visit. \"   © 1704-3739 Healthwise, Incorporated. Care instructions adapted under license by [facility] (which disclaims liability or warranty for this information).  This care instruction is for use with your licensed healthcare professional. If you have questions about a medical condition or this instruction, always ask your healthcare professional. [employer] disclaims any warranty or liability for your use of this information. Content Version: 9.8.288428; Last Revised: October 13, 2011                 Type 2 Diabetes: Care Instructions  Your Care Instructions     Type 2 diabetes is a disease that develops when the body's tissues cannot use insulin properly. Over time, the pancreas cannot make enough insulin. Insulin is a hormone that helps the body's cells use sugar (glucose) for energy. It also helps the body store extra sugar in muscle, fat, and liver cells. Without insulin, the sugar cannot get into the cells to do its work. It stays in the blood instead. This can cause high blood sugar levels. A person has diabetes when the blood sugar stays too high too much of the time. Over time, diabetes can lead to diseases of the heart, blood vessels, nerves, kidneys, and eyes. You may be able to control your blood sugar by losing weight, eating a healthy diet, and getting daily exercise. You may also have to take insulin or other diabetes medicine. Follow-up care is a key part of your treatment and safety. Be sure to make and go to all appointments. Call your doctor if you are having problems. It's also a good idea to know your test results and keep a list of the medicines you take. How can you care for yourself at home? Keep your blood sugar at a target level (which you set with your doctor). Carbohydrate--the body's main source of fuel--affects blood sugar more than any other nutrient. Carbohydrate is in fruits, vegetables, milk, and yogurt. It also is in breads, cereals, vegetables such as potatoes and corn, and sugary foods such as candy and cakes. Follow your meal plan to know how much carbohydrate to eat at each meal and snack. Aim for 30 minutes of exercise on most, preferably all, days of the week. Walking is a good choice. You also may want to do other activities, such as running, swimming, cycling, or playing tennis or team sports.  Try to do muscle-strengthening exercises at least 2 times a week. Take your medicines exactly as prescribed. Call your doctor if you think you are having a problem with your medicine. You will get more details on the specific medicines your doctor prescribes. Check your blood sugar as often as your doctor recommends. It is important to keep track of any symptoms you have, such as low blood sugar. Also tell your doctor if you have any changes in your activities, diet, or insulin use. Talk to your doctor before you start taking aspirin every day. Aspirin can help certain people lower their risk of a heart attack or stroke. But taking aspirin isn't right for everyone, because it can cause serious bleeding. Do not smoke. If you need help quitting, talk to your doctor about stop-smoking programs and medicines. These can increase your chances of quitting for good. Keep your cholesterol and blood pressure at normal levels. You may need to take one or more medicines to reach your goals. Take them exactly as directed. Do not stop or change a medicine without talking to your doctor first.  When should you call for help? Call 911 anytime you think you may need emergency care. For example, call if:    You passed out (lost consciousness), or you suddenly become very sleepy or confused. (You may have very low blood sugar.)   Call your doctor now or seek immediate medical care if:    Your blood sugar is 300 mg/dL or is higher than the level your doctor has set for you. You have symptoms of low blood sugar, such as:  Sweating. Feeling nervous, shaky, and weak. Extreme hunger and slight nausea. Dizziness and headache. Blurred vision. Confusion. Watch closely for changes in your health, and be sure to contact your doctor if:    You often have problems controlling your blood sugar. You have symptoms of long-term diabetes problems, such as:  New vision changes. New pain, numbness, or tingling in your hands or feet.   Skin problems. Where can you learn more? Go to http://www.gray.com/  Enter C553 in the search box to learn more about \"Type 2 Diabetes: Care Instructions. \"  Current as of: August 31, 2020               Content Version: 12.8  © 2006-2021 [employer]. Care instructions adapted under license by [employer] (which disclaims liability or warranty for this information). If you have questions about a medical condition or this instruction, always ask your healthcare professional. [address] any warranty or liability for your use of this information. High Blood Pressure: Care Instructions  Overview     It's normal for blood pressure to go up and down throughout the day. But if it stays up, you have high blood pressure. Another name for high blood pressure is hypertension. Despite what a lot of people think, high blood pressure usually doesn't cause headaches or make you feel dizzy or lightheaded. It usually has no symptoms. But it does increase your risk of stroke, heart attack, and other problems. You and your doctor will talk about your risks of these problems based on your blood pressure. Your doctor will give you a goal for your blood pressure. Your goal will be based on your health and your age. Lifestyle changes, such as eating healthy and being active, are always important to help lower blood pressure. You might also take medicine to reach your blood pressure goal.  Follow-up care is a key part of your treatment and safety. Be sure to make and go to all appointments, and call your doctor if you are having problems. It's also a good idea to know your test results and keep a list of the medicines you take. How can you care for yourself at home? Medical treatment  If you stop taking your medicine, your blood pressure will go back up. You may take one or more types of medicine to lower your blood pressure. Be safe with medicines.  Take your medicine exactly as prescribed. Call your doctor if you think you are having a problem with your medicine. Talk to your doctor before you start taking aspirin every day. Aspirin can help certain people lower their risk of a heart attack or stroke. But taking aspirin isn't right for everyone, because it can cause serious bleeding. See your doctor regularly. You may need to see the doctor more often at first or until your blood pressure comes down. If you are taking blood pressure medicine, talk to your doctor before you take decongestants or anti-inflammatory medicine, such as ibuprofen. Some of these medicines can raise blood pressure. Learn how to check your blood pressure at home. Lifestyle changes  Stay at a healthy weight. This is especially important if you put on weight around the waist. Losing even 10 pounds can help you lower your blood pressure. If your doctor recommends it, get more exercise. Walking is a good choice. Bit by bit, increase the amount you walk every day. Try for at least 30 minutes on most days of the week. You also may want to swim, bike, or do other activities. Avoid or limit alcohol. Talk to your doctor about whether you can drink any alcohol. Try to limit how much sodium you eat to less than 2,300 milligrams (mg) a day. Your doctor may ask you to try to eat less than 1,500 mg a day. Eat plenty of fruits (such as bananas and oranges), vegetables, legumes, whole grains, and low-fat dairy products. Lower the amount of saturated fat in your diet. Saturated fat is found in animal products such as milk, cheese, and meat. Limiting these foods may help you lose weight and also lower your risk for heart disease. Do not smoke. Smoking increases your risk for heart attack and stroke. If you need help quitting, talk to your doctor about stop-smoking programs and medicines. These can increase your chances of quitting for good. When should you call for help?    Call  911 anytime you think you may need emergency care. This may mean having symptoms that suggest that your blood pressure is causing a serious heart or blood vessel problem. Your blood pressure may be over 180/120. For example, call 911 if:    You have symptoms of a heart attack. These may include:  Chest pain or pressure, or a strange feeling in the chest.  Sweating. Shortness of breath. Nausea or vomiting. Pain, pressure, or a strange feeling in the back, neck, jaw, or upper belly or in one or both shoulders or arms. Lightheadedness or sudden weakness. A fast or irregular heartbeat. You have symptoms of a stroke. These may include:  Sudden numbness, tingling, weakness, or loss of movement in your face, arm, or leg, especially on only one side of your body. Sudden vision changes. Sudden trouble speaking. Sudden confusion or trouble understanding simple statements. Sudden problems with walking or balance. A sudden, severe headache that is different from past headaches. You have severe back or belly pain. Do not wait until your blood pressure comes down on its own. Get help right away. Call your doctor now or seek immediate care if:    Your blood pressure is much higher than normal (such as 180/120 or higher), but you don't have symptoms. You think high blood pressure is causing symptoms, such as:  Severe headache. Blurry vision. Watch closely for changes in your health, and be sure to contact your doctor if:    Your blood pressure measures higher than your doctor recommends at least 2 times. That means the top number is higher or the bottom number is higher, or both. You think you may be having side effects from your blood pressure medicine. Where can you learn more? Go to http://www.gray.com/  Enter [id number] in the search box to learn more about \"High Blood Pressure: Care Instructions. \"  Current as of: August 31, 2020               Content Version: 12.8  © 2593-0858 [employer]. Care instructions adapted under license by [facility] (which disclaims liability or warranty for this information). If you have questions about a medical condition or this instruction, always ask your healthcare professional. [address] any warranty or liability for your use of this information.

## 2023-01-15 NOTE — PROGRESS NOTES
ICD-10-CM ICD-9-CM    1. Essential hypertension  I10 401.9       2. Type 2 diabetes with nephropathy (HCC)  E11.21 250.40      583.81       3. Hyperlipidemia, unspecified hyperlipidemia type  E78.5 272.4       4. NAFLD (nonalcoholic fatty liver disease)  K76.0 571.8             Assessment and plan  Hypertension chronic good control no change in treatment. Diabetes mellitus type 2 hemoglobin A1c this visit 6.9, good control. Average glucose 151. No change in treatment. There is mild microalbumin to creatinine ratio increase of 35. Follow. Cholesterol in good control. Cholesterol 148 LDL 64.2. Doing well. Fatty liver. Decrease fats in diet. Follow-up. Posterior neck pain treated with Naprosyn for 1 week. Follow-up. Diagnostic test January 6, 2023: Shows a normal CBC. Hemoglobin A1c 6.9 with an average glucose of 151. At target. Cholesterol 149, at target. Lab results and schedule of future lab studies reviewed with patient  Reviewed medications and side effects in detail  Reviewed diet, exercise and weight control  All questions answered and understood. Labs before next visit  Follow-up 4 months. Health Maintenance Due   Topic Date Due    Eye Exam Retinal or Dilated  05/06/2021    COVID-19 Vaccine (3 - Booster for Moderna series) 06/15/2021    Shingles Vaccine (2 of 2) 09/25/2021    Foot Exam Q1  06/09/2022    Flu Vaccine (1) 08/01/2022   Sees Dr. Radha Aleman    Subjective:   Madelin Godwin is a 65 y.o. male has Hyperlipidemia, Morbid obesity ([address] DM type 2 with diabetic peripheral neuropathy ([address] Type 2 diabetes with nephropathy ([address] Primary open angle glaucoma, Chronic idiopathic gout involving toe of right foot without tophus, and Diabetic polyneuropathy associated with type 2 diabetes mellitus ([address] on their problem list.. No chief complaint on file. Previous visit 8/1/2022   The primary encounter diagnosis was Essential hypertension.  Diagnoses of Type 2 diabetes with nephropathy ([address] Hyperlipidemia, unspecified hyperlipidemia type, and NAFLD (nonalcoholic fatty liver disease) were also pertinent to this visit. Seen previously by our practice on 2/2/2022. Labs 4/28/2022: CBC unremarkable. CMP reassuring. Glucose 118. Hemoglobin A1c 7.5. Microalbumin to creatinine ratio 22, within normal limits. LFTs not elevated today. Hepatitis B test negative. Hepatitis C test negative. Cholesterol 163, LDL 66.4, triglycerides slightly elevated at 213. May need Lovaza  Hypertension good control continue present regimen. Cerumen debris left tympanic eardrum Debrox ordered. Unhealthy weight slight weight gain of 2 pounds. Discussed diet and exercise will follow closely. Prescription written for shingles vaccine. The patient is going to check and make sure he has not had it done before. States he has a 10-year follow-up needed for colonoscopy and this was scheduled. Pending  Admits to constipation. Will trial Colace. Consider MiraLAX. Hypokalemia, resolved. No longer on medication. Ultrasound showed fatty liver. Discussed the ramifications. Will initiate treatment with diet. Liver function tests were within normal limits last visit, recheck. Our gentleman had pain in the neck and pain in the right tooth. He is completing a course of Keflex. He also has neck pain. Question if this is related to the toothache or whether or not he has a muscle strain or some viral illness or other illness. We will x-ray the neck. Pending in addition we will add a short course of Celebrex. The patient has hypokalemia. Repleted. History of urinary hesitancy and a PSA was normal in March 2021. We were going to consider using Flomax. The patient was seen in the ED 9/6/2021. He was discharged with a diagnosis of elevated LFTs. Neck pain  Right posterior neck pain. Onset 2 weeks prior. No precipitating episode. Nothing taken for relief.   Aggravated by movement especially flexion. No other associated findings. Hypertension  The patient has no headaches, visual changes, chest pain or pressure,dyspnea, orthopnea, or PND. There is no problem with the medication. Diabetes mellitus  The patient denies polyuria, polydipsia, or polyphagia. The patient denies any other aggravating or relieving factors There has been no problem with the medications. Hyperlipidemia   The patient denies any myalgias or weakness. No abdominal discomfort admitted to. The patient denies any difficulty with or side effects from the medication. 135/64  BP Readings from Last 3 Encounters:   01/24/23 136/79   01/18/23 (!) 159/88   05/02/22 134/66       Lab Results   Component Value Date/Time    Hemoglobin A1c 6.9 (H) 01/06/2023 01:11 PM    Hemoglobin A1c (POC) 12.2 05/30/2019 08:13 AM     Lab Results   Component Value Date/Time    Cholesterol, total 148 01/06/2023 11:50 AM    HDL Cholesterol 54 01/06/2023 11:50 AM    LDL, calculated 64.2 01/06/2023 11:50 AM    VLDL, calculated 29.8 01/06/2023 11:50 AM    Triglyceride 149 01/06/2023 11:50 AM    CHOL/HDL Ratio 2.7 01/06/2023 11:50 AM         Lab Results   Component Value Date/Time    Sodium 139 01/06/2023 11:50 AM    Potassium 3.9 01/06/2023 11:50 AM    Chloride 107 01/06/2023 11:50 AM    CO2 28 01/06/2023 11:50 AM    Anion gap 4 01/06/2023 11:50 AM    Glucose 105 (H) 01/06/2023 11:50 AM    BUN 9 01/06/2023 11:50 AM    Creatinine 0.87 01/06/2023 11:50 AM    BUN/Creatinine ratio 10 (L) 01/06/2023 11:50 AM    GFR est AA >60 04/28/2022 10:57 AM    GFR est non-AA >60 04/28/2022 10:57 AM    Calcium 9.0 01/06/2023 11:50 AM    Bilirubin, total 1.2 (H) 01/06/2023 11:50 AM    Alk. phosphatase 73 01/06/2023 11:50 AM    Protein, total 7.4 01/06/2023 11:50 AM    Albumin 4.1 01/06/2023 11:50 AM    Globulin 3.3 01/06/2023 11:50 AM    A-G Ratio 1.2 01/06/2023 11:50 AM    ALT (SGPT) 35 01/06/2023 11:50 AM     Fatty liver  This is a chronic problem. It is unchanged.   The problem is not in good control. There are no new aggravating or relieving factors. There is no history of any new associated signs, symptoms, or complications. The treatment is unchanged and there are no side effects from it. He is willing to trial diet. Also willing to increase statins if necessary. At one point he had elevated LFTs. We reviewed his hep C and hep B test which were negative. Overweight  The patient states that the weight has increased. Patient's diet has been nonrestricted. The patient denies edema or ascites. Obesity comorbid conditions include diabetes, high cholesterol and high blood pressure   body mass index is unknown because there is no height or weight on file. Wt Readings from Last 3 Encounters:   01/24/23 233 lb (105.7 kg)   01/11/23 240 lb (108.9 kg)   08/17/22 236 lb (107 kg)       Review of Systems   Constitutional:  Negative for chills, fever, malaise/fatigue and weight loss (Has not checked recently). HENT:  Negative for congestion and sore throat. Eyes:  Negative for blurred vision (Ophthalmologist added new eyedrop) and redness. Respiratory:  Negative for cough, shortness of breath and wheezing. Cardiovascular:  Negative for chest pain and leg swelling. PND: .  Gastrointestinal:  Negative for abdominal pain, blood in stool, constipation, diarrhea and heartburn. Genitourinary:  Negative for dysuria, frequency and urgency. History of unprotected sex. Musculoskeletal:  Positive for neck pain. Negative for back pain. Joint pain: Left hand. Neurological:  Negative for dizziness, sensory change, speech change, focal weakness and headaches. Endo/Heme/Allergies:  Does not bruise/bleed easily. Psychiatric/Behavioral:  The patient is not nervous/anxious. Current Outpatient Medications   Medication Sig    empagliflozin (Jardiance) 25 mg tablet Take 1 Tablet by mouth daily.     diclofenac (VOLTAREN) 1 % gel Apply 2 g to affected area four (4) times daily. (Patient not taking: Reported on 1/18/2023)    glucose blood VI test strips (ASCENSIA AUTODISC VI, ONE TOUCH ULTRA TEST VI) strip Use to check blood glucose daily    Rhopressa 0.02 % drop Administer 1 Drop to both eyes nightly. Azopt 1 % ophthalmic suspension Administer 1 Drop to both eyes two (2) times a day. glucose blood VI test strips (TRUE METRIX GLUCOSE TEST STRIP) strip Use as directed to test BS 3 times a day    Blood-Glucose Meter monitoring kit by Does Not Apply route daily. Lancets misc Use as directed 3 times a day    glucose blood VI test strips (TRUETEST TEST STRIPS) strip by Does Not Apply route three (3) times daily. timolol (TIMOPTIC-XE) 0.5 % ophthalmic gel-forming instill 1 drop into both eyes once daily (Patient not taking: Reported on 1/18/2023)     No current facility-administered medications for this visit. No Known Allergies  has Hyperlipidemia, Morbid obesity ([address] DM type 2 with diabetic peripheral neuropathy ([address] Type 2 diabetes with nephropathy ([address] Primary open angle glaucoma, Chronic idiopathic gout involving toe of right foot without tophus, and Diabetic polyneuropathy associated with type 2 diabetes mellitus ([address] on their problem list.    Past Surgical History:   Procedure Laterality Date    COLONOSCOPY N/A 1/18/2023    COLONOSCOPY w/ polypectomy performed by Salomon Suárez MD at [facility] ENDOSCOPY    HX COLONOSCOPY  2012    normal, 10 year recall    HX OTHER SURGICAL      pt reports gallstone removal      reports that he has been smoking cigarettes. He has a 7.50 pack-year smoking history. He quit smokeless tobacco use about 7 years ago. He reports that he does not drink alcohol and does not use drugs. family history includes Diabetes in his maternal grandmother, mother, and sister; Sickle Cell Trait in an other family member. There were no vitals taken for this visit. Physical Exam  Nursing note reviewed.    Constitutional:       General: He is not in acute distress. HENT:      Head: Normocephalic. Right Ear: Tympanic membrane and external ear normal.      Left Ear: Tympanic membrane and external ear normal.      Nose: Nose normal.      Mouth/Throat:      Mouth: Mucous membranes are moist.      Pharynx: Oropharynx is clear. Eyes:      General: No scleral icterus. Extraocular Movements: Extraocular movements intact. Pupils: Pupils are equal, round, and reactive to light. Cardiovascular:      Rate and Rhythm: Normal rate and regular rhythm. Heart sounds: Normal heart sounds. Pulmonary:      Effort: Pulmonary effort is normal. No respiratory distress. Breath sounds: No wheezing or rales. Abdominal:      General: Bowel sounds are normal. There is no distension. Palpations: Abdomen is soft. Tenderness: There is no abdominal tenderness. Musculoskeletal:         General: No swelling. Cervical back: No rigidity. Right lower leg: No edema. Left lower leg: No edema. Comments: Some crepitus of the joints of the left hand. There is thickening of the skin over the MCPs of the index and middle fingers. (Patient remembers pushing off to stand up using his fists frequently. ). Skin:     Coloration: Skin is not jaundiced. Findings: No erythema. Neurological:      Mental Status: He is alert and oriented to person, place, and time. Coordination: Coordination normal.      Gait: Gait normal.   Psychiatric:         Mood and Affect: Mood normal.         Behavior: Behavior normal.         Thought Content:  Thought content normal.      Diabetic foot exam:     Left Foot:   Visual Exam: normal    Pulse DP: 2+ (normal)   Filament test: normal sensation    Vibratory sensation: normal      Right Foot:   Visual Exam: normal    Pulse DP: 2+ (normal)   Filament test: normal sensation    Vibratory sensation: normal    We discussed the expected course, resolution and complications of the diagnosis(es) in detail. Medication risks, benefits, costs, interactions, and alternatives were discussed as indicated. I advised him to contact the office if his condition worsens, changes or fails to improve as anticipated. He expressed understanding with the diagnosis(es) and plan. This note was done with the assistance of dragon speech software.   Some inadvertent errors or omissions may be present

## 2023-01-17 ENCOUNTER — ANESTHESIA EVENT (OUTPATIENT)
Dept: ENDOSCOPY | Age: 63
End: 2023-01-17
Payer: MEDICAID

## 2023-01-18 ENCOUNTER — ANESTHESIA (OUTPATIENT)
Dept: ENDOSCOPY | Age: 63
End: 2023-01-18
Payer: MEDICAID

## 2023-01-18 ENCOUNTER — HOSPITAL ENCOUNTER (OUTPATIENT)
Age: 63
Setting detail: OUTPATIENT SURGERY
Discharge: HOME | End: 2023-01-18
Attending: COLON & RECTAL SURGERY | Admitting: COLON & RECTAL SURGERY
Payer: MEDICAID

## 2023-01-18 VITALS
HEIGHT: 62 IN | RESPIRATION RATE: 15 BRPM | OXYGEN SATURATION: 100 % | BODY MASS INDEX: 44.16 KG/M2 | HEART RATE: 52 BPM | DIASTOLIC BLOOD PRESSURE: 88 MMHG | WEIGHT: 240 LBS | TEMPERATURE: 97.7 F | SYSTOLIC BLOOD PRESSURE: 159 MMHG

## 2023-01-18 LAB
COLONOSCOPY, EXTERNAL: NORMAL
GLUCOSE BLD STRIP.AUTO-MCNC: 105 MG/DL (ref 70–110)
GLUCOSE BLD STRIP.AUTO-MCNC: 90 MG/DL (ref 70–110)

## 2023-01-18 PROCEDURE — 76060000032 HC ANESTHESIA 0.5 TO 1 HR: Performed by: COLON & RECTAL SURGERY

## 2023-01-18 PROCEDURE — C1729 CATH, DRAINAGE: HCPCS | Performed by: COLON & RECTAL SURGERY

## 2023-01-18 PROCEDURE — 74011250636 HC RX REV CODE- 250/636: Performed by: NURSE ANESTHETIST, CERTIFIED REGISTERED

## 2023-01-18 PROCEDURE — 88305 TISSUE EXAM BY PATHOLOGIST: CPT

## 2023-01-18 PROCEDURE — 77030008565 HC TBNG SUC IRR ERBE -B: Performed by: COLON & RECTAL SURGERY

## 2023-01-18 PROCEDURE — 82962 GLUCOSE BLOOD TEST: CPT

## 2023-01-18 PROCEDURE — 74011250637 HC RX REV CODE- 250/637: Performed by: NURSE ANESTHETIST, CERTIFIED REGISTERED

## 2023-01-18 PROCEDURE — 77030013992 HC SNR POLYP ENDOSC BSC -B: Performed by: COLON & RECTAL SURGERY

## 2023-01-18 PROCEDURE — 74011000250 HC RX REV CODE- 250: Performed by: NURSE ANESTHETIST, CERTIFIED REGISTERED

## 2023-01-18 PROCEDURE — 2709999900 HC NON-CHARGEABLE SUPPLY: Performed by: COLON & RECTAL SURGERY

## 2023-01-18 PROCEDURE — 76040000007: Performed by: COLON & RECTAL SURGERY

## 2023-01-18 RX ORDER — IBUPROFEN 200 MG
4 TABLET ORAL AS NEEDED
Status: CANCELLED | OUTPATIENT
Start: 2023-01-18

## 2023-01-18 RX ORDER — DEXTROSE MONOHYDRATE 100 MG/ML
0-250 INJECTION, SOLUTION INTRAVENOUS AS NEEDED
Status: CANCELLED | OUTPATIENT
Start: 2023-01-18

## 2023-01-18 RX ORDER — LIDOCAINE HYDROCHLORIDE 10 MG/ML
0.1 INJECTION, SOLUTION EPIDURAL; INFILTRATION; INTRACAUDAL; PERINEURAL AS NEEDED
Status: DISCONTINUED | OUTPATIENT
Start: 2023-01-18 | End: 2023-01-18 | Stop reason: HOSPADM

## 2023-01-18 RX ORDER — INSULIN LISPRO 100 [IU]/ML
INJECTION, SOLUTION INTRAVENOUS; SUBCUTANEOUS ONCE
Status: CANCELLED | OUTPATIENT
Start: 2023-01-18 | End: 2023-01-19

## 2023-01-18 RX ORDER — PROPOFOL 10 MG/ML
INJECTION, EMULSION INTRAVENOUS AS NEEDED
Status: DISCONTINUED | OUTPATIENT
Start: 2023-01-18 | End: 2023-01-18 | Stop reason: HOSPADM

## 2023-01-18 RX ORDER — ONDANSETRON 2 MG/ML
4 INJECTION INTRAMUSCULAR; INTRAVENOUS ONCE
Status: CANCELLED | OUTPATIENT
Start: 2023-01-18 | End: 2023-01-18

## 2023-01-18 RX ORDER — FAMOTIDINE 20 MG/1
20 TABLET, FILM COATED ORAL ONCE
Status: COMPLETED | OUTPATIENT
Start: 2023-01-18 | End: 2023-01-18

## 2023-01-18 RX ORDER — SODIUM CHLORIDE, SODIUM LACTATE, POTASSIUM CHLORIDE, CALCIUM CHLORIDE 600; 310; 30; 20 MG/100ML; MG/100ML; MG/100ML; MG/100ML
25 INJECTION, SOLUTION INTRAVENOUS CONTINUOUS
Status: CANCELLED | OUTPATIENT
Start: 2023-01-18

## 2023-01-18 RX ORDER — INSULIN LISPRO 100 [IU]/ML
INJECTION, SOLUTION INTRAVENOUS; SUBCUTANEOUS ONCE
Status: DISCONTINUED | OUTPATIENT
Start: 2023-01-18 | End: 2023-01-18 | Stop reason: HOSPADM

## 2023-01-18 RX ORDER — LIDOCAINE HYDROCHLORIDE 20 MG/ML
INJECTION, SOLUTION EPIDURAL; INFILTRATION; INTRACAUDAL; PERINEURAL AS NEEDED
Status: DISCONTINUED | OUTPATIENT
Start: 2023-01-18 | End: 2023-01-18 | Stop reason: HOSPADM

## 2023-01-18 RX ORDER — SODIUM CHLORIDE, SODIUM LACTATE, POTASSIUM CHLORIDE, CALCIUM CHLORIDE 600; 310; 30; 20 MG/100ML; MG/100ML; MG/100ML; MG/100ML
50 INJECTION, SOLUTION INTRAVENOUS CONTINUOUS
Status: DISCONTINUED | OUTPATIENT
Start: 2023-01-18 | End: 2023-01-18 | Stop reason: HOSPADM

## 2023-01-18 RX ADMIN — SODIUM CHLORIDE, POTASSIUM CHLORIDE, SODIUM LACTATE AND CALCIUM CHLORIDE 50 ML/HR: 600; 310; 30; 20 INJECTION, SOLUTION INTRAVENOUS at 11:50

## 2023-01-18 RX ADMIN — PROPOFOL 50 MG: 10 INJECTION, EMULSION INTRAVENOUS at 13:55

## 2023-01-18 RX ADMIN — PROPOFOL 50 MG: 10 INJECTION, EMULSION INTRAVENOUS at 14:03

## 2023-01-18 RX ADMIN — PROPOFOL 100 MG: 10 INJECTION, EMULSION INTRAVENOUS at 13:42

## 2023-01-18 RX ADMIN — PROPOFOL 50 MG: 10 INJECTION, EMULSION INTRAVENOUS at 14:00

## 2023-01-18 RX ADMIN — PROPOFOL 50 MG: 10 INJECTION, EMULSION INTRAVENOUS at 13:45

## 2023-01-18 RX ADMIN — PROPOFOL 50 MG: 10 INJECTION, EMULSION INTRAVENOUS at 13:49

## 2023-01-18 RX ADMIN — FAMOTIDINE 20 MG: 20 TABLET, FILM COATED ORAL at 11:50

## 2023-01-18 RX ADMIN — PROPOFOL 50 MG: 10 INJECTION, EMULSION INTRAVENOUS at 13:46

## 2023-01-18 RX ADMIN — LIDOCAINE HYDROCHLORIDE 20 MG: 20 INJECTION, SOLUTION EPIDURAL; INFILTRATION; INTRACAUDAL; PERINEURAL at 13:42

## 2023-01-18 NOTE — ANESTHESIA PREPROCEDURE EVALUATION
Relevant Problems   No relevant active problems       Anesthetic History   No history of anesthetic complications            Review of Systems / Medical History  Patient summary reviewed, nursing notes reviewed and pertinent labs reviewed    Pulmonary  Within defined limits                 Neuro/Psych   Within defined limits           Cardiovascular  Within defined limits                     GI/Hepatic/Renal  Within defined limits              Endo/Other    Diabetes: well controlled, type 2    Morbid obesity and arthritis     Other Findings              Physical Exam    Airway  Mallampati: II  TM Distance: 4 - 6 cm  Neck ROM: normal range of motion   Mouth opening: Normal     Cardiovascular  Regular rate and rhythm,  S1 and S2 normal,  no murmur, click, rub, or gallop  Rhythm: regular  Rate: normal         Dental    Dentition: Poor dentition     Pulmonary  Breath sounds clear to auscultation               Abdominal  GI exam deferred       Other Findings            Anesthetic Plan    ASA: 3  Anesthesia type: MAC          Induction: Intravenous  Anesthetic plan and risks discussed with: Patient

## 2023-01-18 NOTE — DISCHARGE INSTRUCTIONS
No aspirin or ibuprofen (e.g. Aleve, Motrin, Advil) for 5 days. Repeat colonoscopy in 3 year(s). Colonoscopy: What to Expect at [facility]  After a colonoscopy, you'll stay at the clinic until you wake up. Then you can go home. But you'll need to arrange for a ride. Your doctor will tell you when you can eat and do your other usual activities. Your doctor will talk to you about when you'll need your next colonoscopy. Your doctor can help you decide how often you need to be checked. This will depend on the results of your test and your risk for colorectal cancer. After the test, you may be bloated or have gas pains. You may need to pass gas. If a biopsy was done or a polyp was removed, you may have streaks of blood in your stool (feces) for a few days. Problems such as heavy rectal bleeding may not occur until several weeks after the test. This isn't common. But it can happen after polyps are removed. This care sheet gives you a general idea about how long it will take for you to recover. But each person recovers at a different pace. Follow the steps below to get better as quickly as possible. How can you care for yourself at home? Activity    Rest when you feel tired. You can do your normal activities when it feels okay to do so. Diet    Follow your doctor's directions for eating. Unless your doctor has told you not to, drink plenty of fluids. This helps to replace the fluids that were lost during the colon prep. Do not drink alcohol. Medicines    Your doctor will tell you if and when you can restart your medicines. You will also be given instructions about taking any new medicines. If you take aspirin or some other blood thinner, ask your doctor if and when to start taking it again. Make sure that you understand exactly what your doctor wants you to do.      If polyps were removed or a biopsy was done during the test, your doctor may tell you not to take aspirin or other anti-inflammatory medicines for a few days. These include ibuprofen (Advil, Motrin) and naproxen (Aleve). Other instructions    For your safety, do not drive or operate machinery until the medicine wears off and you can think clearly. Your doctor may tell you not to drive or operate machinery until the day after your test.     Do not sign legal documents or make major decisions until the medicine wears off and you can think clearly. The anesthesia can make it hard for you to fully understand what you are agreeing to. Follow-up care is a key part of your treatment and safety. Be sure to make and go to all appointments, and call your doctor if you are having problems. It's also a good idea to know your test results and keep a list of the medicines you take. When should you call for help? Call 911 anytime you think you may need emergency care. For example, call if:    You passed out (lost consciousness). You pass maroon or bloody stools. You have trouble breathing. Call your doctor now or seek immediate medical care if:    You have pain that does not get better after you take pain medicine. You are sick to your stomach or cannot drink fluids. You have new or worse belly pain. You have blood in your stools. You have a fever. You cannot pass stools or gas. Watch closely for changes in your health, and be sure to contact your doctor if you have any problems. Where can you learn more? Go to http://shekhar-pk.info/  Enter E264 in the search box to learn more about \"Colonoscopy: What to Expect at Home. \"  Current as of: September 8, 2021               Content Version: 13.2  © 6663-9066 [employer]. Care instructions adapted under license by [employer] (which disclaims liability or warranty for this information).  If you have questions about a medical condition or this instruction, always ask your healthcare professional. [employer] disclaims any warranty or liability for your use of this information. DISCHARGE SUMMARY from Nurse     POST-PROCEDURE INSTRUCTIONS:    Call your Physician if you:  Observe any excess bleeding. Develop a temperature over 100.5o F. Experience abdominal, shoulder or chest pain. Notice any signs of decreased circulation or nerve impairment to an extremity such as a change in color, persistent numbness, tingling, coldness or increase in pain. Vomit blood or you have nausea and vomiting lasting longer than 4 hours. Are unable to take medications. Are unable to urinate within 8 hours after discharge following general anesthesia or intravenous sedation. For the next 24 hours after receiving general anesthesia or intravenous sedation, or while taking prescription Narcotics, limit your activities:  Do NOT drive a motor vehicle, operate hazard machinery or power tools, or perform tasks that require coordination. The medication you received during your procedure may have some effect on your mental awareness. Do NOT make important personal or business decisions. The medication you received during your procedure may have some effect on your mental awareness. Do NOT drink alcoholic beverages. These drinks do not mix well with the medications that have been given to you. Upon discharge from the hospital, you must be accompanied by a responsible adult. Resume your diet as directed by your physician. Resume medications as your physician has prescribed. Please give a list of your current medications to your Primary Care Provider. Please update this list whenever your medications are discontinued, doses are changed, or new medications (including over-the-counter products) are added. Please carry medication information at all times in case of emergency situations.           These are general instructions for a healthy lifestyle:    No smoking/ No tobacco products/ Avoid exposure to second hand smoke.  Surgeon General's Warning:  Quitting smoking now greatly reduces serious risk to your health. Obesity, smoking, and a sedentary lifestyle greatly increase your risk for illness. A healthy diet, regular physical exercise & weight monitoring are important for maintaining a healthy lifestyle  You may be retaining fluid if you have a history of heart failure or if you experience any of the following symptoms:  Weight gain of 3 pounds or more overnight or 5 pounds in a week, increased swelling in our hands or feet or shortness of breath while lying flat in bed. Please call your doctor as soon as you notice any of these symptoms; do not wait until your next office visit. Recognize signs and symptoms of STROKE:  F  -  Face looks uneven  A  -  Arms unable to move or move unevenly  S  -  Speech slurred or non-existent  T  -  Time to call 911 - as soon as signs and symptoms begin - DO NOT go back to bed or wait to see If you get better - TIME IS BRAIN. Colorectal Screening  Colorectal cancer almost always develops from precancerous polyps (abnormal growths) in the colon or rectum. Screening tests can find precancerous polyps, so that they can be removed before they turn into cancer. Screening tests can also find colorectal cancer early, when treatment works best.  Speak with your physician about when you should begin screening and how often you should be tested. Truckily Activation    Thank you for requesting access to [employer]. Please follow the instructions below to securely access and download your online medical record. Truckily allows you to send messages to your doctor, view your test results, renew your prescriptions, schedule appointments, and more. How Do I Sign Up? In your internet browser, go to [email or web address]. Click on the First Time User? Click Here link in the Sign In box. You will see the New Member Sign Up page.   Enter your [employer] Access Code exactly as it appears below. You will not need to use this code after youve completed the sign-up process. If you do not sign up before the expiration date, you must request a new code. Amplitude Access Code: Activation code not generated  Current Amplitude Status: Active (This is the date your [employer] access code will )    Enter the last four digits of your Social Security Number (xxxx) and Date of Birth (mm/dd/yyyy) as indicated and click Submit. You will be taken to the next sign-up page. Create a [employer] ID. This will be your Amplitude login ID and cannot be changed, so think of one that is secure and easy to remember. Create a Amplitude password. You can change your password at any time. Enter your Password Reset Question and Answer. This can be used at a later time if you forget your password. Enter your e-mail address. You will receive e-mail notification when new information is available in [address] Click Sign Up. You can now view and download portions of your medical record. Click the [employer] link to download a portable copy of your medical information. Additional Information    If you have questions, please call [phone]. Remember, Amplitude is NOT to be used for urgent needs. For medical emergencies, dial 911. Educational references and/or instructions provided during this visit included:    See Attached      APPOINTMENTS:    Per MD Instruction    Discharge information has been reviewed with the patient. The patient verbalized understanding.

## 2023-01-18 NOTE — ANESTHESIA POSTPROCEDURE EVALUATION
Procedure(s):  COLONOSCOPY w/ polypectomy. MAC    Anesthesia Post Evaluation      Multimodal analgesia: multimodal analgesia used between 6 hours prior to anesthesia start to PACU discharge  Patient location during evaluation: PACU  Patient participation: complete - patient participated  Level of consciousness: sleepy but conscious  Pain management: adequate  Airway patency: patent  Anesthetic complications: no  Cardiovascular status: acceptable  Respiratory status: acceptable  Hydration status: acceptable  Post anesthesia nausea and vomiting:  controlled  Final Post Anesthesia Temperature Assessment:  Normothermia (36.0-37.5 degrees C)      INITIAL Post-op Vital signs:   Vitals Value Taken Time   BP     Temp     Pulse 69 01/18/23 1416   Resp 19 01/18/23 1416   SpO2 99 % 01/18/23 1416   Vitals shown include unvalidated device data.

## 2023-01-18 NOTE — H&P
HPI: Marcellus Puckett is a 65 y.o. male presenting with chief complain of colon screen    Past Medical History:   Diagnosis Date    Diabetes ([facility] [address]     DM type 2 with diabetic peripheral neuropathy ([facility] [address] 2018    Dr Luke Reyna    Hypertensive retinopathy     Knee pain, left     Morbid obesity ([facility] [address]     Pneumonia     Primary open angle glaucoma     Sickle cell trait ([facility] [address]        Past Surgical History:   Procedure Laterality Date    HX COLONOSCOPY  2012    normal, 10 year recall    HX OTHER SURGICAL      pt reports gallstone removal       Family History   Problem Relation Age of Onset    Diabetes Mother     Diabetes Maternal Grandmother     Sickle Cell Trait Other     Diabetes Sister        Social History     Socioeconomic History    Marital status: SINGLE   Tobacco Use    Smoking status: Some Days     Packs/day: 0.25     Years: 30.00     Pack years: 7.50     Types: Cigarettes    Smokeless tobacco: Former     Quit date: 2016    Tobacco comments:     per pt stopped last year    Substance and Sexual Activity    Alcohol use: No    Drug use: No    Sexual activity: Yes     Partners: Female     Birth control/protection: Condom       Outpatient Medications Marked as Taking for the 1/18/23 encounter [facility] Encounter)   Medication Sig Dispense Refill    empagliflozin (Jardiance) 25 mg tablet Take 1 Tablet by mouth daily. 90 Tablet 3    diclofenac (VOLTAREN) 1 % gel Apply 2 g to affected area four (4) times daily. 100 g 3    glucose blood VI test strips (ASCENSIA AUTODISC VI, ONE TOUCH ULTRA TEST VI) strip Use to check blood glucose daily 100 Strip 11    Rhopressa 0.02 % drop Administer 1 Drop to both eyes nightly. Azopt 1 % ophthalmic suspension Administer 1 Drop to both eyes two (2) times a day. glucose blood VI test strips (TRUE METRIX GLUCOSE TEST STRIP) strip Use as directed to test BS 3 times a day 100 Strip 11    Blood-Glucose Meter monitoring kit by Does Not Apply route daily.  1 Kit 0    Lancets misc Use as directed 3 times a day 90 Each 11    glucose blood VI test strips (TRUETEST TEST STRIPS) strip by Does Not Apply route three (3) times daily. 90 Strip 11    timolol (TIMOPTIC-XE) 0.5 % ophthalmic gel-forming instill 1 drop into both eyes once daily  0       No Known Allergies    Vitals:    01/11/23 1002   Weight: 108.9 kg (240 lb)   Height: 5' 2\" (1.575 m)       Physical Exam  Constitutional:       Appearance: He is well-developed. HENT:      Head: Normocephalic and atraumatic. Eyes:      Conjunctiva/sclera: Conjunctivae normal.   Abdominal:      General: There is no distension. Palpations: Abdomen is soft. There is no mass. Tenderness: There is no abdominal tenderness. There is no guarding. Musculoskeletal:         General: Normal range of motion. Lymphadenopathy:      Cervical: No cervical adenopathy. Skin:     General: Skin is warm and dry. Neurological:      Sensory: No sensory deficit. Psychiatric:         Speech: Speech normal.       Assessment / Plan    colonoscopy    The diagnoses and plan were discussed with the patient. All questions answered. Plan of care agreed to by all concerned.

## 2023-01-18 NOTE — PROCEDURES
[facility]  [address], [address]   [place], [address]  [phone]                    Colonoscopy Procedure Note      Juan Skinner  1960  [id number]                Date of Procedure: 1/18/2023    Preoperative diagnosis: Colon cancer screening [Z12.11]    Postoperative diagnosis: hepatic flexure polyp X1    :  Cecelia Bermudez MD    Assistant(s): Endoscopy Technician-1: Denise Gilman; Jeffrey Kennedy  Endoscopy RN-1: Tere Mathews, RN; Batool Lay, YNE    Sedation: MAC    Complications: None    Implants: None    Procedure Details:  Prior to the procedure, a history and physical were performed. The patients medications, allergies and sensitivities were reviewed and all questions were answered. After informed consent was obtained for the procedure, with all risks and benefits of procedure explained. The patient was taken to the endoscopy suite and placed in the left lateral decubitus position. Patient identification and proposed procedure were verified prior to the procedure by the nurse and I. After sequential anesthesia administered by anesthesiologist, a digital rectal exam was performed and was normal.  The Olympus video colonoscope was introduced through the anus and advanced to cecum, which was identified by the ileocecal valve and appendiceal orifice. The quality of preparation was good. The colonoscope was slowly withdrawn and the mucosa examined for any abnormalities. Cecal withdrawal time was greater than 6 minutes. The patient tolerated the procedure well. There were no complications. Findings/Interventions:   Polyps - #1, 10 mm in size, located in the hepatic flexure, removed by snare cautery and retrieved for pathology    EBL: none    Recommendations: -Repeat colonoscopy in 3 years.    NO aspirin for 5 days     Discharge Disposition:  Margherita Runner, MD  1/18/2023  2:16 PM

## 2023-01-24 ENCOUNTER — OFFICE VISIT (OUTPATIENT)
Dept: FAMILY MEDICINE CLINIC | Age: 63
End: 2023-01-24
Payer: MEDICAID

## 2023-01-24 VITALS
HEART RATE: 64 BPM | WEIGHT: 233 LBS | OXYGEN SATURATION: 95 % | RESPIRATION RATE: 16 BRPM | BODY MASS INDEX: 42.88 KG/M2 | SYSTOLIC BLOOD PRESSURE: 136 MMHG | DIASTOLIC BLOOD PRESSURE: 79 MMHG | HEIGHT: 62 IN

## 2023-01-24 DIAGNOSIS — K76.0 NAFLD (NONALCOHOLIC FATTY LIVER DISEASE): ICD-10-CM

## 2023-01-24 DIAGNOSIS — E78.5 HYPERLIPIDEMIA, UNSPECIFIED HYPERLIPIDEMIA TYPE: ICD-10-CM

## 2023-01-24 DIAGNOSIS — E11.21 TYPE 2 DIABETES WITH NEPHROPATHY (HCC): ICD-10-CM

## 2023-01-24 DIAGNOSIS — I10 ESSENTIAL HYPERTENSION: Primary | ICD-10-CM

## 2023-01-24 DIAGNOSIS — M54.2 NECK PAIN: ICD-10-CM

## 2023-01-24 DIAGNOSIS — Z12.5 SCREENING FOR PROSTATE CANCER: ICD-10-CM

## 2023-01-24 PROCEDURE — 3044F HG A1C LEVEL LT 7.0%: CPT | Performed by: EMERGENCY MEDICINE

## 2023-01-24 PROCEDURE — 3078F DIAST BP <80 MM HG: CPT | Performed by: EMERGENCY MEDICINE

## 2023-01-24 PROCEDURE — 3075F SYST BP GE 130 - 139MM HG: CPT | Performed by: EMERGENCY MEDICINE

## 2023-01-24 PROCEDURE — 99214 OFFICE O/P EST MOD 30 MIN: CPT | Performed by: EMERGENCY MEDICINE

## 2023-01-24 RX ORDER — NAPROXEN 500 MG/1
500 TABLET ORAL 2 TIMES DAILY WITH MEALS
Qty: 20 TABLET | Refills: 0 | Status: SHIPPED | OUTPATIENT
Start: 2023-01-24 | End: 2023-02-03

## 2023-01-24 NOTE — PROGRESS NOTES
1. \"Have you been to the ER, urgent care clinic since your last visit? Hospitalized since your last visit? \" No    2. \"Have you seen or consulted any other health care providers outside of the [address] [place] [address] since your last visit? \" No     3. For patients aged 39-70: Has the patient had a colonoscopy / FIT/ Cologuard? Yes - no Care Gap present      If the patient is female:    4. For patients aged 41-77: Has the patient had a mammogram within the past 2 years? NA - based on age or sex      11. For patients aged 21-65: Has the patient had a pap smear?  NA - based on age or sex

## 2023-06-29 RX ORDER — EMPAGLIFLOZIN 25 MG/1
TABLET, FILM COATED ORAL
Qty: 90 TABLET | Refills: 3 | Status: SHIPPED | OUTPATIENT
Start: 2023-06-29

## 2023-06-29 NOTE — TELEPHONE ENCOUNTER
Requested Prescriptions     Pending Prescriptions Disp Refills    JARDIANCE 25 MG tablet [Pharmacy Med Name: Corinna Fiddler 25 MG TABLET] 30 tablet 23     Sig: TAKE 1 TABLET BY MOUTH EVERY DAY

## 2023-07-17 ENCOUNTER — OFFICE VISIT (OUTPATIENT)
Facility: CLINIC | Age: 63
End: 2023-07-17
Payer: COMMERCIAL

## 2023-07-17 VITALS
OXYGEN SATURATION: 94 % | HEIGHT: 62 IN | WEIGHT: 225.8 LBS | BODY MASS INDEX: 41.55 KG/M2 | TEMPERATURE: 97 F | RESPIRATION RATE: 16 BRPM | DIASTOLIC BLOOD PRESSURE: 72 MMHG | SYSTOLIC BLOOD PRESSURE: 135 MMHG | HEART RATE: 59 BPM

## 2023-07-17 DIAGNOSIS — Z23 NEED FOR PROPHYLACTIC VACCINATION AND INOCULATION AGAINST VARICELLA: ICD-10-CM

## 2023-07-17 DIAGNOSIS — K76.0 FATTY (CHANGE OF) LIVER, NOT ELSEWHERE CLASSIFIED: ICD-10-CM

## 2023-07-17 DIAGNOSIS — E11.21 TYPE 2 DIABETES MELLITUS WITH DIABETIC NEPHROPATHY, WITHOUT LONG-TERM CURRENT USE OF INSULIN (HCC): ICD-10-CM

## 2023-07-17 DIAGNOSIS — E78.00 PURE HYPERCHOLESTEROLEMIA: Chronic | ICD-10-CM

## 2023-07-17 DIAGNOSIS — I10 ESSENTIAL (PRIMARY) HYPERTENSION: Primary | ICD-10-CM

## 2023-07-17 DIAGNOSIS — M54.2 CERVICALGIA: Primary | ICD-10-CM

## 2023-07-17 PROCEDURE — 99214 OFFICE O/P EST MOD 30 MIN: CPT | Performed by: EMERGENCY MEDICINE

## 2023-07-17 PROCEDURE — 3075F SYST BP GE 130 - 139MM HG: CPT | Performed by: EMERGENCY MEDICINE

## 2023-07-17 PROCEDURE — 3078F DIAST BP <80 MM HG: CPT | Performed by: EMERGENCY MEDICINE

## 2023-07-17 PROCEDURE — 3044F HG A1C LEVEL LT 7.0%: CPT | Performed by: EMERGENCY MEDICINE

## 2023-07-17 RX ORDER — IBUPROFEN 800 MG/1
TABLET ORAL
Qty: 120 TABLET | Refills: 3 | Status: SHIPPED | OUTPATIENT
Start: 2023-07-17

## 2023-07-17 SDOH — ECONOMIC STABILITY: HOUSING INSECURITY
IN THE LAST 12 MONTHS, WAS THERE A TIME WHEN YOU DID NOT HAVE A STEADY PLACE TO SLEEP OR SLEPT IN A SHELTER (INCLUDING NOW)?: NO

## 2023-07-17 SDOH — ECONOMIC STABILITY: INCOME INSECURITY: HOW HARD IS IT FOR YOU TO PAY FOR THE VERY BASICS LIKE FOOD, HOUSING, MEDICAL CARE, AND HEATING?: NOT HARD AT ALL

## 2023-07-17 SDOH — ECONOMIC STABILITY: FOOD INSECURITY: WITHIN THE PAST 12 MONTHS, YOU WORRIED THAT YOUR FOOD WOULD RUN OUT BEFORE YOU GOT MONEY TO BUY MORE.: NEVER TRUE

## 2023-07-17 SDOH — ECONOMIC STABILITY: FOOD INSECURITY: WITHIN THE PAST 12 MONTHS, THE FOOD YOU BOUGHT JUST DIDN'T LAST AND YOU DIDN'T HAVE MONEY TO GET MORE.: NEVER TRUE

## 2023-07-17 ASSESSMENT — PATIENT HEALTH QUESTIONNAIRE - PHQ9
1. LITTLE INTEREST OR PLEASURE IN DOING THINGS: 0
2. FEELING DOWN, DEPRESSED OR HOPELESS: 0
SUM OF ALL RESPONSES TO PHQ QUESTIONS 1-9: 0
SUM OF ALL RESPONSES TO PHQ9 QUESTIONS 1 & 2: 0

## 2023-07-24 ENCOUNTER — HOSPITAL ENCOUNTER (OUTPATIENT)
Facility: HOSPITAL | Age: 63
Setting detail: SPECIMEN
Discharge: HOME | End: 2023-07-27
Payer: COMMERCIAL

## 2023-07-24 DIAGNOSIS — E11.21 TYPE 2 DIABETES MELLITUS WITH DIABETIC NEPHROPATHY, WITHOUT LONG-TERM CURRENT USE OF INSULIN (HCC): ICD-10-CM

## 2023-07-24 DIAGNOSIS — I10 ESSENTIAL (PRIMARY) HYPERTENSION: ICD-10-CM

## 2023-07-24 DIAGNOSIS — K76.0 FATTY (CHANGE OF) LIVER, NOT ELSEWHERE CLASSIFIED: ICD-10-CM

## 2023-07-24 DIAGNOSIS — E78.00 PURE HYPERCHOLESTEROLEMIA: Chronic | ICD-10-CM

## 2023-07-24 LAB
ALBUMIN SERPL-MCNC: 4.2 G/DL (ref 3.4–5)
ALBUMIN/GLOB SERPL: 1.1 (ref 0.8–1.7)
ALP SERPL-CCNC: 74 U/L (ref 45–117)
ALT SERPL-CCNC: 34 U/L (ref 16–61)
ANION GAP SERPL CALC-SCNC: 5 MMOL/L (ref 3–18)
AST SERPL-CCNC: 26 U/L (ref 10–38)
BASOPHILS # BLD: 0.1 K/UL (ref 0–0.1)
BASOPHILS NFR BLD: 1 % (ref 0–2)
BILIRUB SERPL-MCNC: 1.3 MG/DL (ref 0.2–1)
BUN SERPL-MCNC: 14 MG/DL (ref 7–18)
BUN/CREAT SERPL: 16 (ref 12–20)
CALCIUM SERPL-MCNC: 9 MG/DL (ref 8.5–10.1)
CHLORIDE SERPL-SCNC: 107 MMOL/L (ref 100–111)
CHOLEST SERPL-MCNC: 169 MG/DL
CO2 SERPL-SCNC: 24 MMOL/L (ref 21–32)
CREAT SERPL-MCNC: 0.88 MG/DL (ref 0.6–1.3)
CREAT UR-MCNC: 131 MG/DL (ref 30–125)
DIFFERENTIAL METHOD BLD: ABNORMAL
EOSINOPHIL # BLD: 0.1 K/UL (ref 0–0.4)
EOSINOPHIL NFR BLD: 2 % (ref 0–5)
ERYTHROCYTE [DISTWIDTH] IN BLOOD BY AUTOMATED COUNT: 14.3 % (ref 11.6–14.5)
EST. AVERAGE GLUCOSE BLD GHB EST-MCNC: 105 MG/DL
GLOBULIN SER CALC-MCNC: 3.8 G/DL (ref 2–4)
GLUCOSE SERPL-MCNC: 148 MG/DL (ref 74–99)
HBA1C MFR BLD: 5.3 % (ref 4.2–5.6)
HCT VFR BLD AUTO: 43.7 % (ref 36–48)
HDLC SERPL-MCNC: 61 MG/DL (ref 40–60)
HDLC SERPL: 2.8 (ref 0–5)
HGB BLD-MCNC: 15.1 G/DL (ref 13–16)
IMM GRANULOCYTES # BLD AUTO: 0 K/UL (ref 0–0.04)
IMM GRANULOCYTES NFR BLD AUTO: 0 % (ref 0–0.5)
LDLC SERPL CALC-MCNC: 79 MG/DL (ref 0–100)
LIPID PANEL: ABNORMAL
LYMPHOCYTES # BLD: 3.3 K/UL (ref 0.9–3.6)
LYMPHOCYTES NFR BLD: 52 % (ref 21–52)
MCH RBC QN AUTO: 30.5 PG (ref 24–34)
MCHC RBC AUTO-ENTMCNC: 34.6 G/DL (ref 31–37)
MCV RBC AUTO: 88.3 FL (ref 78–100)
MICROALBUMIN UR-MCNC: 2.46 MG/DL (ref 0–3)
MICROALBUMIN/CREAT UR-RTO: 19 MG/G (ref 0–30)
MONOCYTES # BLD: 0.4 K/UL (ref 0.05–1.2)
MONOCYTES NFR BLD: 7 % (ref 3–10)
NEUTS SEG # BLD: 2.4 K/UL (ref 1.8–8)
NEUTS SEG NFR BLD: 38 % (ref 40–73)
NRBC # BLD: 0 K/UL (ref 0–0.01)
NRBC BLD-RTO: 0 PER 100 WBC
PLATELET # BLD AUTO: 199 K/UL (ref 135–420)
PMV BLD AUTO: 11.8 FL (ref 9.2–11.8)
POTASSIUM SERPL-SCNC: 3.9 MMOL/L (ref 3.5–5.5)
PROT SERPL-MCNC: 8 G/DL (ref 6.4–8.2)
RBC # BLD AUTO: 4.95 M/UL (ref 4.35–5.65)
SODIUM SERPL-SCNC: 136 MMOL/L (ref 136–145)
TRIGL SERPL-MCNC: 145 MG/DL
VLDLC SERPL CALC-MCNC: 29 MG/DL
WBC # BLD AUTO: 6.2 K/UL (ref 4.6–13.2)

## 2023-07-24 PROCEDURE — 82043 UR ALBUMIN QUANTITATIVE: CPT

## 2023-07-24 PROCEDURE — 83036 HEMOGLOBIN GLYCOSYLATED A1C: CPT

## 2023-07-24 PROCEDURE — 82570 ASSAY OF URINE CREATININE: CPT

## 2023-07-24 PROCEDURE — 80061 LIPID PANEL: CPT

## 2023-07-24 PROCEDURE — 85025 COMPLETE CBC W/AUTO DIFF WBC: CPT

## 2023-07-24 PROCEDURE — 36415 COLL VENOUS BLD VENIPUNCTURE: CPT

## 2023-07-24 PROCEDURE — 80053 COMPREHEN METABOLIC PANEL: CPT

## 2023-11-23 NOTE — PATIENT INSTRUCTIONS
doctor prescribes. Check your blood sugar as often as your doctor recommends. It is important to keep track of any symptoms you have, such as low blood sugar. Also tell your doctor if you have any changes in your activities, diet, or insulin use. Talk to your doctor before you start taking aspirin every day. Aspirin can help certain people lower their risk of a heart attack or stroke. But taking aspirin isn't right for everyone, because it can cause serious bleeding. Do not smoke. If you need help quitting, talk to your doctor about stop-smoking programs and medicines. These can increase your chances of quitting for good. Keep your cholesterol and blood pressure at normal levels. You may need to take one or more medicines to reach your goals. Take them exactly as directed. Do not stop or change a medicine without talking to your doctor first.  When should you call for help? Call 911 anytime you think you may need emergency care. For example, call if:    You passed out (lost consciousness), or you suddenly become very sleepy or confused. (You may have very low blood sugar.)   Call your doctor now or seek immediate medical care if:    Your blood sugar is 300 mg/dL or is higher than the level your doctor has set for you. You have symptoms of low blood sugar, such as:  Sweating. Feeling nervous, shaky, and weak. Extreme hunger and slight nausea. Dizziness and headache. Blurred vision. Confusion. Watch closely for changes in your health, and be sure to contact your doctor if:    You often have problems controlling your blood sugar. You have symptoms of long-term diabetes problems, such as:  New vision changes. New pain, numbness, or tingling in your hands or feet. Skin problems. Where can you learn more? Go to http://www.gray.com/  Enter C553 in the search box to learn more about \"Type 2 Diabetes: Care Instructions. \"  Current as of: August 31, 2020               Content

## 2023-11-23 NOTE — PROGRESS NOTES
Cervical: No cervical adenopathy. Skin:     Coloration: Skin is not jaundiced. Neurological:      Mental Status: He is alert. Cranial Nerves: No cranial nerve deficit. Motor: No weakness. Psychiatric:         Mood and Affect: Mood normal.         Behavior: Behavior normal.              We discussed the expected course, resolution and complications of the diagnosis(es) in detail. Medication risks, benefits, costs, interactions, and alternatives were discussed as indicated. I advised him to contact the office if his condition worsens, changes or fails to improve as anticipated. He expressed understanding with the diagnosis(es) and plan. This note was done with the assistance of dragon speech software.   Some inadvertent errors or omissions may be present

## 2023-11-28 ENCOUNTER — HOSPITAL ENCOUNTER (OUTPATIENT)
Facility: HOSPITAL | Age: 63
Setting detail: SPECIMEN
Discharge: HOME | End: 2023-12-01
Payer: COMMERCIAL

## 2023-11-28 ENCOUNTER — OFFICE VISIT (OUTPATIENT)
Facility: CLINIC | Age: 63
End: 2023-11-28
Payer: COMMERCIAL

## 2023-11-28 VITALS
BODY MASS INDEX: 43.61 KG/M2 | HEIGHT: 62 IN | OXYGEN SATURATION: 95 % | SYSTOLIC BLOOD PRESSURE: 171 MMHG | WEIGHT: 237 LBS | RESPIRATION RATE: 16 BRPM | HEART RATE: 55 BPM | DIASTOLIC BLOOD PRESSURE: 85 MMHG

## 2023-11-28 DIAGNOSIS — E11.21 TYPE 2 DIABETES MELLITUS WITH DIABETIC NEPHROPATHY, WITHOUT LONG-TERM CURRENT USE OF INSULIN (HCC): ICD-10-CM

## 2023-11-28 DIAGNOSIS — Z23 ENCOUNTER FOR IMMUNIZATION: ICD-10-CM

## 2023-11-28 DIAGNOSIS — E78.00 PURE HYPERCHOLESTEROLEMIA: Chronic | ICD-10-CM

## 2023-11-28 DIAGNOSIS — R06.2 WHEEZING: ICD-10-CM

## 2023-11-28 DIAGNOSIS — E66.01 MORBID (SEVERE) OBESITY DUE TO EXCESS CALORIES (HCC): ICD-10-CM

## 2023-11-28 DIAGNOSIS — K76.0 FATTY (CHANGE OF) LIVER, NOT ELSEWHERE CLASSIFIED: ICD-10-CM

## 2023-11-28 DIAGNOSIS — E78.00 PURE HYPERCHOLESTEROLEMIA: ICD-10-CM

## 2023-11-28 DIAGNOSIS — I10 ESSENTIAL (PRIMARY) HYPERTENSION: Primary | ICD-10-CM

## 2023-11-28 DIAGNOSIS — I10 ESSENTIAL (PRIMARY) HYPERTENSION: ICD-10-CM

## 2023-11-28 DIAGNOSIS — Z23 NEED FOR PROPHYLACTIC VACCINATION AGAINST STREPTOCOCCUS PNEUMONIAE (PNEUMOCOCCUS): ICD-10-CM

## 2023-11-28 LAB
ALBUMIN SERPL-MCNC: 3.9 G/DL (ref 3.4–5)
ALBUMIN/GLOB SERPL: 1.3 (ref 0.8–1.7)
ALP SERPL-CCNC: 79 U/L (ref 45–117)
ALT SERPL-CCNC: 32 U/L (ref 16–61)
ANION GAP SERPL CALC-SCNC: 4 MMOL/L (ref 3–18)
AST SERPL-CCNC: 23 U/L (ref 10–38)
BASOPHILS # BLD: 0 K/UL (ref 0–0.1)
BASOPHILS NFR BLD: 1 % (ref 0–2)
BILIRUB SERPL-MCNC: 0.9 MG/DL (ref 0.2–1)
BUN SERPL-MCNC: 7 MG/DL (ref 7–18)
BUN/CREAT SERPL: 9 (ref 12–20)
CALCIUM SERPL-MCNC: 8.7 MG/DL (ref 8.5–10.1)
CHLORIDE SERPL-SCNC: 108 MMOL/L (ref 100–111)
CHOLEST SERPL-MCNC: 176 MG/DL
CO2 SERPL-SCNC: 28 MMOL/L (ref 21–32)
CREAT SERPL-MCNC: 0.82 MG/DL (ref 0.6–1.3)
CREAT UR-MCNC: 106 MG/DL (ref 30–125)
DIFFERENTIAL METHOD BLD: ABNORMAL
EOSINOPHIL # BLD: 0.1 K/UL (ref 0–0.4)
EOSINOPHIL NFR BLD: 2 % (ref 0–5)
ERYTHROCYTE [DISTWIDTH] IN BLOOD BY AUTOMATED COUNT: 13.3 % (ref 11.6–14.5)
EST. AVERAGE GLUCOSE BLD GHB EST-MCNC: 140 MG/DL
GLOBULIN SER CALC-MCNC: 3.1 G/DL (ref 2–4)
GLUCOSE SERPL-MCNC: 156 MG/DL (ref 74–99)
HBA1C MFR BLD: 6.5 % (ref 4.2–5.6)
HCT VFR BLD AUTO: 39.3 % (ref 36–48)
HDLC SERPL-MCNC: 54 MG/DL (ref 40–60)
HDLC SERPL: 3.3 (ref 0–5)
HGB BLD-MCNC: 13.1 G/DL (ref 13–16)
IMM GRANULOCYTES # BLD AUTO: 0 K/UL (ref 0–0.04)
IMM GRANULOCYTES NFR BLD AUTO: 0 % (ref 0–0.5)
LDLC SERPL CALC-MCNC: 73.2 MG/DL (ref 0–100)
LIPID PANEL: ABNORMAL
LYMPHOCYTES # BLD: 3 K/UL (ref 0.9–3.6)
LYMPHOCYTES NFR BLD: 51 % (ref 21–52)
MCH RBC QN AUTO: 30.4 PG (ref 24–34)
MCHC RBC AUTO-ENTMCNC: 33.3 G/DL (ref 31–37)
MCV RBC AUTO: 91.2 FL (ref 78–100)
MICROALBUMIN UR-MCNC: 17.8 MG/DL (ref 0–3)
MICROALBUMIN/CREAT UR-RTO: 168 MG/G (ref 0–30)
MONOCYTES # BLD: 0.4 K/UL (ref 0.05–1.2)
MONOCYTES NFR BLD: 8 % (ref 3–10)
NEUTS SEG # BLD: 2.3 K/UL (ref 1.8–8)
NEUTS SEG NFR BLD: 39 % (ref 40–73)
NRBC # BLD: 0 K/UL (ref 0–0.01)
NRBC BLD-RTO: 0 PER 100 WBC
PLATELET # BLD AUTO: 187 K/UL (ref 135–420)
PMV BLD AUTO: 12 FL (ref 9.2–11.8)
POTASSIUM SERPL-SCNC: 3.7 MMOL/L (ref 3.5–5.5)
PROT SERPL-MCNC: 7 G/DL (ref 6.4–8.2)
RBC # BLD AUTO: 4.31 M/UL (ref 4.35–5.65)
SODIUM SERPL-SCNC: 140 MMOL/L (ref 136–145)
TRIGL SERPL-MCNC: 244 MG/DL
VLDLC SERPL CALC-MCNC: 48.8 MG/DL
WBC # BLD AUTO: 5.9 K/UL (ref 4.6–13.2)

## 2023-11-28 PROCEDURE — 90472 IMMUNIZATION ADMIN EACH ADD: CPT | Performed by: EMERGENCY MEDICINE

## 2023-11-28 PROCEDURE — 80053 COMPREHEN METABOLIC PANEL: CPT

## 2023-11-28 PROCEDURE — 3079F DIAST BP 80-89 MM HG: CPT | Performed by: EMERGENCY MEDICINE

## 2023-11-28 PROCEDURE — 82043 UR ALBUMIN QUANTITATIVE: CPT

## 2023-11-28 PROCEDURE — 90471 IMMUNIZATION ADMIN: CPT | Performed by: EMERGENCY MEDICINE

## 2023-11-28 PROCEDURE — 90474 IMMUNE ADMIN ORAL/NASAL ADDL: CPT | Performed by: EMERGENCY MEDICINE

## 2023-11-28 PROCEDURE — 80061 LIPID PANEL: CPT

## 2023-11-28 PROCEDURE — 85025 COMPLETE CBC W/AUTO DIFF WBC: CPT

## 2023-11-28 PROCEDURE — 90674 CCIIV4 VAC NO PRSV 0.5 ML IM: CPT | Performed by: EMERGENCY MEDICINE

## 2023-11-28 PROCEDURE — 82570 ASSAY OF URINE CREATININE: CPT

## 2023-11-28 PROCEDURE — 3077F SYST BP >= 140 MM HG: CPT | Performed by: EMERGENCY MEDICINE

## 2023-11-28 PROCEDURE — 3044F HG A1C LEVEL LT 7.0%: CPT | Performed by: EMERGENCY MEDICINE

## 2023-11-28 PROCEDURE — 36415 COLL VENOUS BLD VENIPUNCTURE: CPT

## 2023-11-28 PROCEDURE — 99214 OFFICE O/P EST MOD 30 MIN: CPT | Performed by: EMERGENCY MEDICINE

## 2023-11-28 PROCEDURE — 90677 PCV20 VACCINE IM: CPT | Performed by: EMERGENCY MEDICINE

## 2023-11-28 PROCEDURE — 83036 HEMOGLOBIN GLYCOSYLATED A1C: CPT

## 2023-11-28 RX ORDER — ALBUTEROL SULFATE 90 UG/1
2 AEROSOL, METERED RESPIRATORY (INHALATION) 4 TIMES DAILY PRN
Qty: 54 G | Refills: 1 | Status: SHIPPED | OUTPATIENT
Start: 2023-11-28

## 2023-11-28 SDOH — ECONOMIC STABILITY: INCOME INSECURITY: HOW HARD IS IT FOR YOU TO PAY FOR THE VERY BASICS LIKE FOOD, HOUSING, MEDICAL CARE, AND HEATING?: SOMEWHAT HARD

## 2023-11-28 SDOH — ECONOMIC STABILITY: FOOD INSECURITY: WITHIN THE PAST 12 MONTHS, YOU WORRIED THAT YOUR FOOD WOULD RUN OUT BEFORE YOU GOT MONEY TO BUY MORE.: NEVER TRUE

## 2023-11-28 SDOH — ECONOMIC STABILITY: FOOD INSECURITY: WITHIN THE PAST 12 MONTHS, THE FOOD YOU BOUGHT JUST DIDN'T LAST AND YOU DIDN'T HAVE MONEY TO GET MORE.: NEVER TRUE

## 2023-11-28 ASSESSMENT — ANXIETY QUESTIONNAIRES
7. FEELING AFRAID AS IF SOMETHING AWFUL MIGHT HAPPEN: 0
1. FEELING NERVOUS, ANXIOUS, OR ON EDGE: 0
IF YOU CHECKED OFF ANY PROBLEMS ON THIS QUESTIONNAIRE, HOW DIFFICULT HAVE THESE PROBLEMS MADE IT FOR YOU TO DO YOUR WORK, TAKE CARE OF THINGS AT HOME, OR GET ALONG WITH OTHER PEOPLE: NOT DIFFICULT AT ALL
4. TROUBLE RELAXING: 0
6. BECOMING EASILY ANNOYED OR IRRITABLE: 0
3. WORRYING TOO MUCH ABOUT DIFFERENT THINGS: 0
5. BEING SO RESTLESS THAT IT IS HARD TO SIT STILL: 0
2. NOT BEING ABLE TO STOP OR CONTROL WORRYING: 0
GAD7 TOTAL SCORE: 0

## 2023-11-28 ASSESSMENT — PATIENT HEALTH QUESTIONNAIRE - PHQ9
SUM OF ALL RESPONSES TO PHQ QUESTIONS 1-9: 0
SUM OF ALL RESPONSES TO PHQ QUESTIONS 1-9: 0
1. LITTLE INTEREST OR PLEASURE IN DOING THINGS: 0
SUM OF ALL RESPONSES TO PHQ QUESTIONS 1-9: 0
SUM OF ALL RESPONSES TO PHQ9 QUESTIONS 1 & 2: 0
2. FEELING DOWN, DEPRESSED OR HOPELESS: 0
SUM OF ALL RESPONSES TO PHQ QUESTIONS 1-9: 0

## 2023-12-02 DIAGNOSIS — I10 ESSENTIAL (PRIMARY) HYPERTENSION: Primary | ICD-10-CM

## 2023-12-02 DIAGNOSIS — R80.9 MICROALBUMINURIA: ICD-10-CM

## 2023-12-02 DIAGNOSIS — E78.00 PURE HYPERCHOLESTEROLEMIA: ICD-10-CM

## 2023-12-02 DIAGNOSIS — E66.01 MORBID (SEVERE) OBESITY DUE TO EXCESS CALORIES (HCC): ICD-10-CM

## 2023-12-02 DIAGNOSIS — E78.2 MIXED HYPERLIPIDEMIA: ICD-10-CM

## 2023-12-02 DIAGNOSIS — E11.21 TYPE 2 DIABETES MELLITUS WITH DIABETIC NEPHROPATHY, WITHOUT LONG-TERM CURRENT USE OF INSULIN (HCC): ICD-10-CM

## 2023-12-02 NOTE — RESULT ENCOUNTER NOTE
Please call. Hemoglobin A1c 6.5 less than 7, at target. There is a slight increase in protein spillage. If elevated on next visit would add additional medication to help decrease progression of kidney damage  Complete blood count unremarkable essentially  The cholesterol is in good range but the triglycerides are up slightly again. He had it down before. Just change diet and lets see if it will drop down to normal range if not we will add medication.   Liver tests are normal.  Recheck labs just before next visit

## 2023-12-26 ENCOUNTER — TELEPHONE (OUTPATIENT)
Facility: CLINIC | Age: 63
End: 2023-12-26

## 2023-12-27 DIAGNOSIS — M54.2 CERVICALGIA: ICD-10-CM

## 2023-12-27 RX ORDER — IBUPROFEN 800 MG/1
TABLET ORAL
Qty: 120 TABLET | Refills: 3 | Status: SHIPPED | OUTPATIENT
Start: 2023-12-27

## 2024-04-17 ENCOUNTER — APPOINTMENT (OUTPATIENT)
Facility: HOSPITAL | Age: 64
End: 2024-04-17
Payer: COMMERCIAL

## 2024-04-17 ENCOUNTER — HOSPITAL ENCOUNTER (INPATIENT)
Facility: HOSPITAL | Age: 64
LOS: 9 days | Discharge: HOME | End: 2024-04-26
Attending: EMERGENCY MEDICINE | Admitting: HOSPITALIST
Payer: COMMERCIAL

## 2024-04-17 DIAGNOSIS — K35.211 ACUTE APPENDICITIS WITH PERFORATION, GENERALIZED PERITONITIS, AND ABSCESS, WITHOUT GANGRENE: Primary | ICD-10-CM

## 2024-04-17 DIAGNOSIS — K35.33 APPENDICITIS WITH PERITONEAL ABSCESS: ICD-10-CM

## 2024-04-17 LAB
ALBUMIN SERPL-MCNC: 3.5 G/DL (ref 3.4–5)
ALBUMIN/GLOB SERPL: 0.9 (ref 0.8–1.7)
ALP SERPL-CCNC: 93 U/L (ref 45–117)
ALT SERPL-CCNC: 30 U/L (ref 16–61)
ANION GAP SERPL CALC-SCNC: 9 MMOL/L (ref 3–18)
APPEARANCE UR: CLEAR
AST SERPL-CCNC: 28 U/L (ref 10–38)
BACTERIA URNS QL MICRO: NEGATIVE /HPF
BASOPHILS # BLD: 0 K/UL (ref 0–0.1)
BASOPHILS NFR BLD: 0 % (ref 0–2)
BILIRUB SERPL-MCNC: 0.7 MG/DL (ref 0.2–1)
BILIRUB UR QL: NEGATIVE
BUN SERPL-MCNC: 6 MG/DL (ref 7–18)
BUN/CREAT SERPL: 9 (ref 12–20)
CALCIUM SERPL-MCNC: 8.3 MG/DL (ref 8.5–10.1)
CHLORIDE SERPL-SCNC: 105 MMOL/L (ref 100–111)
CO2 SERPL-SCNC: 25 MMOL/L (ref 21–32)
COLOR UR: YELLOW
CREAT SERPL-MCNC: 0.65 MG/DL (ref 0.6–1.3)
DIFFERENTIAL METHOD BLD: ABNORMAL
EKG ATRIAL RATE: 62 BPM
EKG DIAGNOSIS: NORMAL
EKG P AXIS: 50 DEGREES
EKG P-R INTERVAL: 192 MS
EKG Q-T INTERVAL: 400 MS
EKG QRS DURATION: 88 MS
EKG QTC CALCULATION (BAZETT): 406 MS
EKG R AXIS: 23 DEGREES
EKG T AXIS: 15 DEGREES
EKG VENTRICULAR RATE: 62 BPM
EOSINOPHIL # BLD: 0.1 K/UL (ref 0–0.4)
EOSINOPHIL NFR BLD: 1 % (ref 0–5)
EPITH CASTS URNS QL MICRO: NEGATIVE /LPF (ref 0–5)
ERYTHROCYTE [DISTWIDTH] IN BLOOD BY AUTOMATED COUNT: 13.1 % (ref 11.6–14.5)
GLOBULIN SER CALC-MCNC: 3.7 G/DL (ref 2–4)
GLUCOSE BLD STRIP.AUTO-MCNC: 112 MG/DL (ref 70–110)
GLUCOSE BLD STRIP.AUTO-MCNC: 145 MG/DL (ref 70–110)
GLUCOSE BLD STRIP.AUTO-MCNC: 220 MG/DL (ref 74–106)
GLUCOSE SERPL-MCNC: 220 MG/DL (ref 74–99)
GLUCOSE UR STRIP.AUTO-MCNC: >1000 MG/DL
HCT VFR BLD AUTO: 35.4 % (ref 36–48)
HGB BLD-MCNC: 12.6 G/DL (ref 13–16)
HGB UR QL STRIP: NEGATIVE
IMM GRANULOCYTES # BLD AUTO: 0.1 K/UL (ref 0–0.04)
IMM GRANULOCYTES NFR BLD AUTO: 1 % (ref 0–0.5)
KETONES UR QL STRIP.AUTO: NEGATIVE MG/DL
LACTATE BLD-SCNC: 0.73 MMOL/L (ref 0.4–2)
LEUKOCYTE ESTERASE UR QL STRIP.AUTO: NEGATIVE
LIPASE SERPL-CCNC: 19 U/L (ref 13–75)
LYMPHOCYTES # BLD: 2.1 K/UL (ref 0.9–3.6)
LYMPHOCYTES NFR BLD: 24 % (ref 21–52)
MCH RBC QN AUTO: 30.1 PG (ref 24–34)
MCHC RBC AUTO-ENTMCNC: 35.6 G/DL (ref 31–37)
MCV RBC AUTO: 84.5 FL (ref 78–100)
MONOCYTES # BLD: 0.5 K/UL (ref 0.05–1.2)
MONOCYTES NFR BLD: 6 % (ref 3–10)
NEUTS SEG # BLD: 5.9 K/UL (ref 1.8–8)
NEUTS SEG NFR BLD: 68 % (ref 40–73)
NITRITE UR QL STRIP.AUTO: NEGATIVE
NRBC # BLD: 0 K/UL (ref 0–0.01)
NRBC BLD-RTO: 0 PER 100 WBC
PH UR STRIP: 6 (ref 5–8)
PLATELET # BLD AUTO: 246 K/UL (ref 135–420)
PMV BLD AUTO: 10.2 FL (ref 9.2–11.8)
POTASSIUM SERPL-SCNC: 3.5 MMOL/L (ref 3.5–5.5)
PROT SERPL-MCNC: 7.2 G/DL (ref 6.4–8.2)
PROT UR STRIP-MCNC: ABNORMAL MG/DL
RBC # BLD AUTO: 4.19 M/UL (ref 4.35–5.65)
RBC #/AREA URNS HPF: NORMAL /HPF (ref 0–5)
SERVICE CMNT-IMP: ABNORMAL
SODIUM SERPL-SCNC: 139 MMOL/L (ref 136–145)
SP GR UR REFRACTOMETRY: 1.02 (ref 1–1.03)
UROBILINOGEN UR QL STRIP.AUTO: 1 EU/DL (ref 0.2–1)
WBC # BLD AUTO: 8.7 K/UL (ref 4.6–13.2)
WBC URNS QL MICRO: NORMAL /HPF (ref 0–4)

## 2024-04-17 PROCEDURE — 93005 ELECTROCARDIOGRAM TRACING: CPT | Performed by: PHYSICIAN ASSISTANT

## 2024-04-17 PROCEDURE — 2500000003 HC RX 250 WO HCPCS: Performed by: HOSPITALIST

## 2024-04-17 PROCEDURE — 74176 CT ABD & PELVIS W/O CONTRAST: CPT

## 2024-04-17 PROCEDURE — 83605 ASSAY OF LACTIC ACID: CPT

## 2024-04-17 PROCEDURE — 2580000003 HC RX 258: Performed by: HOSPITALIST

## 2024-04-17 PROCEDURE — 6360000002 HC RX W HCPCS: Performed by: PHYSICIAN ASSISTANT

## 2024-04-17 PROCEDURE — 99222 1ST HOSP IP/OBS MODERATE 55: CPT | Performed by: HOSPITALIST

## 2024-04-17 PROCEDURE — 82962 GLUCOSE BLOOD TEST: CPT

## 2024-04-17 PROCEDURE — 2580000003 HC RX 258: Performed by: PHYSICIAN ASSISTANT

## 2024-04-17 PROCEDURE — 83690 ASSAY OF LIPASE: CPT

## 2024-04-17 PROCEDURE — 99222 1ST HOSP IP/OBS MODERATE 55: CPT | Performed by: SURGERY

## 2024-04-17 PROCEDURE — 1100000003 HC PRIVATE W/ TELEMETRY

## 2024-04-17 PROCEDURE — 96365 THER/PROPH/DIAG IV INF INIT: CPT

## 2024-04-17 PROCEDURE — 99285 EMERGENCY DEPT VISIT HI MDM: CPT

## 2024-04-17 PROCEDURE — 93010 ELECTROCARDIOGRAM REPORT: CPT | Performed by: INTERNAL MEDICINE

## 2024-04-17 PROCEDURE — 94761 N-INVAS EAR/PLS OXIMETRY MLT: CPT

## 2024-04-17 PROCEDURE — 96361 HYDRATE IV INFUSION ADD-ON: CPT

## 2024-04-17 PROCEDURE — 81001 URINALYSIS AUTO W/SCOPE: CPT

## 2024-04-17 PROCEDURE — 80053 COMPREHEN METABOLIC PANEL: CPT

## 2024-04-17 PROCEDURE — 6360000002 HC RX W HCPCS: Performed by: HOSPITALIST

## 2024-04-17 PROCEDURE — A4216 STERILE WATER/SALINE, 10 ML: HCPCS | Performed by: HOSPITALIST

## 2024-04-17 PROCEDURE — 85025 COMPLETE CBC W/AUTO DIFF WBC: CPT

## 2024-04-17 RX ORDER — GLUCAGON 1 MG
1 KIT INJECTION PRN
Status: DISCONTINUED | OUTPATIENT
Start: 2024-04-17 | End: 2024-04-26 | Stop reason: HOSPADM

## 2024-04-17 RX ORDER — DEXTROSE MONOHYDRATE 100 MG/ML
INJECTION, SOLUTION INTRAVENOUS CONTINUOUS PRN
Status: DISCONTINUED | OUTPATIENT
Start: 2024-04-17 | End: 2024-04-26 | Stop reason: HOSPADM

## 2024-04-17 RX ORDER — SODIUM CHLORIDE 0.9 % (FLUSH) 0.9 %
5-40 SYRINGE (ML) INJECTION PRN
Status: DISCONTINUED | OUTPATIENT
Start: 2024-04-17 | End: 2024-04-26 | Stop reason: HOSPADM

## 2024-04-17 RX ORDER — ONDANSETRON 4 MG/1
4 TABLET, ORALLY DISINTEGRATING ORAL EVERY 8 HOURS PRN
Status: DISCONTINUED | OUTPATIENT
Start: 2024-04-17 | End: 2024-04-26 | Stop reason: HOSPADM

## 2024-04-17 RX ORDER — ENOXAPARIN SODIUM 100 MG/ML
30 INJECTION SUBCUTANEOUS 2 TIMES DAILY
Status: DISCONTINUED | OUTPATIENT
Start: 2024-04-17 | End: 2024-04-26 | Stop reason: HOSPADM

## 2024-04-17 RX ORDER — INSULIN LISPRO 100 [IU]/ML
0-4 INJECTION, SOLUTION INTRAVENOUS; SUBCUTANEOUS
Status: DISCONTINUED | OUTPATIENT
Start: 2024-04-17 | End: 2024-04-26 | Stop reason: HOSPADM

## 2024-04-17 RX ORDER — ONDANSETRON 2 MG/ML
4 INJECTION INTRAMUSCULAR; INTRAVENOUS EVERY 6 HOURS PRN
Status: DISCONTINUED | OUTPATIENT
Start: 2024-04-17 | End: 2024-04-26 | Stop reason: HOSPADM

## 2024-04-17 RX ORDER — MORPHINE SULFATE 2 MG/ML
2 INJECTION, SOLUTION INTRAMUSCULAR; INTRAVENOUS EVERY 4 HOURS PRN
Status: DISCONTINUED | OUTPATIENT
Start: 2024-04-17 | End: 2024-04-25 | Stop reason: ALTCHOICE

## 2024-04-17 RX ORDER — MAGNESIUM SULFATE IN WATER 40 MG/ML
2000 INJECTION, SOLUTION INTRAVENOUS PRN
Status: DISCONTINUED | OUTPATIENT
Start: 2024-04-17 | End: 2024-04-26 | Stop reason: HOSPADM

## 2024-04-17 RX ORDER — ACETAMINOPHEN 650 MG/1
650 SUPPOSITORY RECTAL EVERY 6 HOURS PRN
Status: DISCONTINUED | OUTPATIENT
Start: 2024-04-17 | End: 2024-04-26 | Stop reason: HOSPADM

## 2024-04-17 RX ORDER — SODIUM CHLORIDE 0.9 % (FLUSH) 0.9 %
5-40 SYRINGE (ML) INJECTION EVERY 12 HOURS SCHEDULED
Status: DISCONTINUED | OUTPATIENT
Start: 2024-04-17 | End: 2024-04-26 | Stop reason: HOSPADM

## 2024-04-17 RX ORDER — SODIUM CHLORIDE 9 MG/ML
INJECTION, SOLUTION INTRAVENOUS CONTINUOUS
Status: DISCONTINUED | OUTPATIENT
Start: 2024-04-17 | End: 2024-04-23

## 2024-04-17 RX ORDER — ACETAMINOPHEN 325 MG/1
650 TABLET ORAL EVERY 6 HOURS PRN
Status: DISCONTINUED | OUTPATIENT
Start: 2024-04-17 | End: 2024-04-26 | Stop reason: HOSPADM

## 2024-04-17 RX ORDER — 0.9 % SODIUM CHLORIDE 0.9 %
1000 INTRAVENOUS SOLUTION INTRAVENOUS ONCE
Status: COMPLETED | OUTPATIENT
Start: 2024-04-17 | End: 2024-04-17

## 2024-04-17 RX ORDER — POLYETHYLENE GLYCOL 3350 17 G/17G
17 POWDER, FOR SOLUTION ORAL DAILY PRN
Status: DISCONTINUED | OUTPATIENT
Start: 2024-04-17 | End: 2024-04-24

## 2024-04-17 RX ORDER — INSULIN LISPRO 100 [IU]/ML
0-4 INJECTION, SOLUTION INTRAVENOUS; SUBCUTANEOUS NIGHTLY
Status: DISCONTINUED | OUTPATIENT
Start: 2024-04-17 | End: 2024-04-26 | Stop reason: HOSPADM

## 2024-04-17 RX ORDER — SODIUM CHLORIDE 9 MG/ML
INJECTION, SOLUTION INTRAVENOUS PRN
Status: DISCONTINUED | OUTPATIENT
Start: 2024-04-17 | End: 2024-04-26 | Stop reason: HOSPADM

## 2024-04-17 RX ORDER — POTASSIUM CHLORIDE 7.45 MG/ML
10 INJECTION INTRAVENOUS PRN
Status: DISCONTINUED | OUTPATIENT
Start: 2024-04-17 | End: 2024-04-26 | Stop reason: HOSPADM

## 2024-04-17 RX ADMIN — ENOXAPARIN SODIUM 30 MG: 100 INJECTION SUBCUTANEOUS at 21:21

## 2024-04-17 RX ADMIN — PIPERACILLIN SODIUM AND TAZOBACTAM SODIUM 3375 MG: 3; .375 INJECTION, POWDER, LYOPHILIZED, FOR SOLUTION INTRAVENOUS at 21:27

## 2024-04-17 RX ADMIN — PIPERACILLIN SODIUM AND TAZOBACTAM SODIUM 3375 MG: 3; .375 INJECTION, POWDER, LYOPHILIZED, FOR SOLUTION INTRAVENOUS at 13:01

## 2024-04-17 RX ADMIN — MORPHINE SULFATE 2 MG: 2 INJECTION, SOLUTION INTRAMUSCULAR; INTRAVENOUS at 22:37

## 2024-04-17 RX ADMIN — SODIUM CHLORIDE 1000 ML: 9 INJECTION, SOLUTION INTRAVENOUS at 12:13

## 2024-04-17 RX ADMIN — FAMOTIDINE 20 MG: 10 INJECTION, SOLUTION INTRAVENOUS at 21:24

## 2024-04-17 RX ADMIN — MORPHINE SULFATE 2 MG: 2 INJECTION, SOLUTION INTRAMUSCULAR; INTRAVENOUS at 18:43

## 2024-04-17 RX ADMIN — SODIUM CHLORIDE, PRESERVATIVE FREE 10 ML: 5 INJECTION INTRAVENOUS at 21:34

## 2024-04-17 RX ADMIN — SODIUM CHLORIDE: 9 INJECTION, SOLUTION INTRAVENOUS at 18:30

## 2024-04-17 ASSESSMENT — PAIN DESCRIPTION - LOCATION
LOCATION: ABDOMEN;FLANK
LOCATION: FLANK

## 2024-04-17 ASSESSMENT — PAIN DESCRIPTION - ONSET: ONSET: ON-GOING

## 2024-04-17 ASSESSMENT — PAIN - FUNCTIONAL ASSESSMENT
PAIN_FUNCTIONAL_ASSESSMENT: ACTIVITIES ARE NOT PREVENTED
PAIN_FUNCTIONAL_ASSESSMENT: 0-10

## 2024-04-17 ASSESSMENT — PAIN DESCRIPTION - DESCRIPTORS
DESCRIPTORS: ACHING;THROBBING
DESCRIPTORS: STABBING;PENETRATING

## 2024-04-17 ASSESSMENT — PAIN DESCRIPTION - PAIN TYPE
TYPE: ACUTE PAIN
TYPE: ACUTE PAIN

## 2024-04-17 ASSESSMENT — LIFESTYLE VARIABLES
HOW OFTEN DO YOU HAVE A DRINK CONTAINING ALCOHOL: NEVER
HOW MANY STANDARD DRINKS CONTAINING ALCOHOL DO YOU HAVE ON A TYPICAL DAY: PATIENT DOES NOT DRINK

## 2024-04-17 ASSESSMENT — PAIN SCALES - GENERAL
PAINLEVEL_OUTOF10: 0
PAINLEVEL_OUTOF10: 10
PAINLEVEL_OUTOF10: 7
PAINLEVEL_OUTOF10: 9
PAINLEVEL_OUTOF10: 5

## 2024-04-17 ASSESSMENT — PAIN DESCRIPTION - ORIENTATION
ORIENTATION: RIGHT

## 2024-04-17 ASSESSMENT — PAIN DESCRIPTION - FREQUENCY: FREQUENCY: INTERMITTENT

## 2024-04-17 NOTE — H&P
History & Physical    Patient: Vlad King MRN: [id number]  CSN: [id number]    YOB: 1960  Age: 63 y.o.  Sex: male             DOA: 4/17/2024    Chief Complaint:   Chief Complaint   Patient presents with    Side Pain    Flank Pain          HPI:    Vlad King is a 63 y.o.  male with a past medical history of diabetes mellitus type 2, hypertensive retinopathy, obesity and sickle cell trait comes to the emergency room complaining of right flank/side pain started Saturday.  Patient was moving some boxes for his cousin and he thought due to lifting boxes he is having pain so he did not seek medical attention.  Pain continued to worsen and today his pain was 8/10 severity scale.  Sharp pain worse especially and movement.  He also noticed swelling in the nighttime but denies any fevers or chills.  No diarrhea, no nausea vomiting, no chest pain or chest tightness.    In the emergency room patient had a CT abdomen pelvis which showed contained appendicular perforation with abscess.  Case was discussed with general surgeon who recommended hospitalization, IV antibiotics and IR for drainage.    Patient denies any alcohol abuse, quit smoking 2 months back.    Past Medical History:   Diagnosis Date    Diabetes (HCC)     DM type 2 with diabetic peripheral neuropathy (HCC) 2018    Dr Freitas    Hypertensive retinopathy     Knee pain, left     Morbid obesity (HCC)     Pneumonia     Primary open angle glaucoma     Sickle cell trait (HCC)        Past Surgical History:   Procedure Laterality Date    COLONOSCOPY  2012    normal, 10 year recall    COLONOSCOPY N/A 1/18/2023    COLONOSCOPY w/ polypectomy performed by German Almendarez MD at [facility] ENDOSCOPY    OTHER SURGICAL HISTORY      pt reports gallstone removal       Family History   Problem Relation Age of Onset    Sickle Cell Trait Other     Diabetes Maternal Grandmother     Diabetes Sister   peritoneal abscess  Resolved Problems:    * No resolved hospital problems. *     Appendicitis with contained abscess  Diabetes mellitus type 2  Obesity  History of sickle cell trait  History of open-angle glaucoma  Cirrhotic liver per CT finding    Plan  Will admit this patient to telemetry bed  Will continue empiric antibiotics with Zosyn  Follow-up cultures  General surgery consulted from ED  IR to be consulted in the morning, they will be noted on treatment team  Will keep n.p.o., pain management  IV fluids  Start SSI and monitor blood sugars  Further plan based on hospital course  DVT/GI Prophylaxis: Lovenox    Discussed with patient at bedside about hospital admission and my plan care, he understood and agree with my plan care.  Discussed about advance directives, patient wants to be full code.    I spent 60 minutes with the patient in face-to-face consultation, of which greater than 50% was spent in counseling and coordination of care as described above      Linda Coto MD  4/17/2024 5:40 PM    Disclaimer: Sections of this note are dictated using utilizing voice recognition software. Minor typographical errors may be present. If questions arise, please do not hesitate to contact me or call our department.

## 2024-04-17 NOTE — ED NOTES
Report called to Aj Plata RN.  Pt transferred to [facility] .  Pt transferred with room air and full cardiac monitor.  Pt transported by via ambulance ([facility] ETA within the hour) Pt updated and verbalize understanding.

## 2024-04-17 NOTE — ED NOTES
CT results communicated to pt with provider at bedside. Pt verbalized understanding and denies any questions at this time

## 2024-04-17 NOTE — ED PROVIDER NOTES
[facility] EMERGENCY DEPT  EMERGENCY DEPARTMENT ENCOUNTER      Pt Name: Vlad King  Pt Age: 63 y.o.  Sex: male   MRN: [id number]  CSN: [id number]   Birthdate 1960  Date of evaluation: 4/17/2024  Provider: DAVID GALLARDO  Room: HER01/01  Time Dictated: 2:43 PM    Chief Complaint   Chief Complaint   Patient presents with    Side Pain    Flank Pain       History of Present Illness   The history is provided by the patient.       11:30 AM  63 y.o. male presents to the ED C/O RT flank/side pain that radiates to his RLQ, RT groin and up to his epigastric region.  Patient reports that he helped his cousin move furniture days ago which is when it started and has since been getting worse. The pain worsens when he lays down and will take his breath away at times. He has been getting chills and sweats also. Patient has not taken anything for the symptoms. No fever, chills, current SOB, difficulty breathing, wheezing, N/V/D/C, dysuria, frequency, urgency, hematuria, back pain, penile pain, rectal pain, penile discharge, scrotal pain, or testicular pain.      Nursing Note reviewed    REVIEW OF SYSTEMS    (2-9 systems for level 4, 10 or more for level 5)   Review of Systems   Constitutional:  Positive for chills. Negative for fever.        Sweats   HENT:  Negative for congestion, rhinorrhea and sore throat.    Eyes:  Negative for discharge and redness.   Respiratory:  Negative for cough and shortness of breath.    Cardiovascular:  Negative for chest pain and leg swelling.   Gastrointestinal:  Positive for abdominal pain. Negative for abdominal distention, anal bleeding, blood in stool, constipation, diarrhea, nausea, rectal pain and vomiting.   Genitourinary:  Positive for flank pain. Negative for decreased urine volume, difficulty urinating, dysuria, enuresis, frequency, genital sores, hematuria, penile discharge, penile pain, penile swelling, scrotal swelling, testicular pain and urgency.   Musculoskeletal:  Negative for  known as: AZOPT     carbamide peroxide 6.5 % otic solution  Commonly known as: DEBROX     diclofenac sodium 1 % Gel  Commonly known as: VOLTAREN     empagliflozin 25 MG tablet  Commonly known as: Jardiance  Take 1 tablet by mouth daily     ibuprofen 800 MG tablet  Commonly known as: ADVIL;MOTRIN  TAKE 1 TABLET BY MOUTH EVERY EIGHT HOURS AS NEEDED FOR PAIN.     ophthalmic solution Rhopressa 0.02 % Soln  Generic drug: Netarsudil Dimesylate     timolol 0.5 % ophthalmic gel-forming  Commonly known as: TIMOPTIC-XE               PATIENT REFERRED TO:  No follow-up provider specified.    DISCHARGE MEDICATIONS:  New Prescriptions    No medications on file     Controlled Substances Monitoring:          No data to display                Nursing notes have been reviewed by the physician/advanced practice    Clinician.    Caron Martínez PA-C  April 17, 2024         Caron Martínez PA  04/17/24 1329       Caron Martínez PA  04/17/24 1340       Caron Martínez PA  04/17/24 [id number]

## 2024-04-17 NOTE — ED TRIAGE NOTES
Patient reports right side pain that started Saturday after he helped his cousin moving furniture.

## 2024-04-17 NOTE — PROGRESS NOTES
Report received from IRENE Lowry on patient coming from [facility] ED.

## 2024-04-17 NOTE — CONSULTS
History & Physical    Date: 4/17/2024    Referring Physician: ER    Assessment:    Full code    Principal Problem:    Acute appendicitis with perforation, localized peritonitis, and abscess, without gangrene  Active Problems:    Appendicitis with peritoneal abscess  Resolved Problems:    * No resolved hospital problems. *      Plan:   I have discussed the above findings with Mr. Vlad King and personally reviewed his CT scan of the abdomen and pelvis demonstrating abscess from acute appendicitis.  He has been experiencing pain since Saturday. I recommended n.p.o., IV antibiotics and IR drainage of abscess.  I discussed with the patient interval appendectomy as an outpatient in several weeks.  Should he not clinically improve with the drainage we would consider surgery prior to this time. The risks and benefits of surgery were reviewed with the patient including infection, bleeding, need for repeat procedure and injury to surrounding structures.  We will manage his drain outpatient if IR is able to drain this.  He agrees with this plan.    History of Present Illness:  Mr. King is a 63 y.o. year old male who presented with right lower quadrant pain since Saturday.  He denies any similar episodes of pain in the past.  He states he initially thought this was lifting related but then pain did not improve and he has been noting chills at home.  Pain can be sharp in nature.  No changes to bowel or bladder habits.  No nausea or vomiting.    History:  Past Medical History:   Diagnosis Date    Diabetes (HCC)     DM type 2 with diabetic peripheral neuropathy (HCC) 2018    Dr Freitas    Hypertensive retinopathy     Knee pain, left     Morbid obesity (HCC)     Pneumonia     Primary open angle glaucoma     Sickle cell trait (HCC)      Past Surgical History:   Procedure Laterality Date    COLONOSCOPY  2012    normal, 10 year recall    COLONOSCOPY N/A 1/18/2023    COLONOSCOPY w/ polypectomy performed by eGrman Almendarez MD

## 2024-04-18 LAB
BASOPHILS # BLD: 0.1 K/UL (ref 0–0.1)
BASOPHILS NFR BLD: 1 % (ref 0–2)
DIFFERENTIAL METHOD BLD: ABNORMAL
EOSINOPHIL # BLD: 0.1 K/UL (ref 0–0.4)
EOSINOPHIL NFR BLD: 2 % (ref 0–5)
ERYTHROCYTE [DISTWIDTH] IN BLOOD BY AUTOMATED COUNT: 13.2 % (ref 11.6–14.5)
EST. AVERAGE GLUCOSE BLD GHB EST-MCNC: 180 MG/DL
GLUCOSE BLD STRIP.AUTO-MCNC: 113 MG/DL (ref 70–110)
GLUCOSE BLD STRIP.AUTO-MCNC: 128 MG/DL (ref 70–110)
GLUCOSE BLD STRIP.AUTO-MCNC: 90 MG/DL (ref 70–110)
GLUCOSE BLD STRIP.AUTO-MCNC: 98 MG/DL (ref 70–110)
HBA1C MFR BLD: 7.9 % (ref 4.2–5.6)
HCT VFR BLD AUTO: 32.7 % (ref 36–48)
HGB BLD-MCNC: 11.2 G/DL (ref 13–16)
IMM GRANULOCYTES # BLD AUTO: 0 K/UL (ref 0–0.04)
IMM GRANULOCYTES NFR BLD AUTO: 0 % (ref 0–0.5)
LYMPHOCYTES # BLD: 2.5 K/UL (ref 0.9–3.6)
LYMPHOCYTES NFR BLD: 29 % (ref 21–52)
MCH RBC QN AUTO: 29.7 PG (ref 24–34)
MCHC RBC AUTO-ENTMCNC: 34.3 G/DL (ref 31–37)
MCV RBC AUTO: 86.7 FL (ref 78–100)
MONOCYTES # BLD: 0.5 K/UL (ref 0.05–1.2)
MONOCYTES NFR BLD: 6 % (ref 3–10)
NEUTS SEG # BLD: 5.4 K/UL (ref 1.8–8)
NEUTS SEG NFR BLD: 63 % (ref 40–73)
NRBC # BLD: 0 K/UL (ref 0–0.01)
NRBC BLD-RTO: 0 PER 100 WBC
PLATELET # BLD AUTO: 209 K/UL (ref 135–420)
PMV BLD AUTO: 10.7 FL (ref 9.2–11.8)
RBC # BLD AUTO: 3.77 M/UL (ref 4.35–5.65)
WBC # BLD AUTO: 8.6 K/UL (ref 4.6–13.2)

## 2024-04-18 PROCEDURE — 2580000003 HC RX 258: Performed by: HOSPITALIST

## 2024-04-18 PROCEDURE — A4216 STERILE WATER/SALINE, 10 ML: HCPCS | Performed by: HOSPITALIST

## 2024-04-18 PROCEDURE — 85025 COMPLETE CBC W/AUTO DIFF WBC: CPT

## 2024-04-18 PROCEDURE — 99232 SBSQ HOSP IP/OBS MODERATE 35: CPT | Performed by: HOSPITALIST

## 2024-04-18 PROCEDURE — 1100000003 HC PRIVATE W/ TELEMETRY

## 2024-04-18 PROCEDURE — 82962 GLUCOSE BLOOD TEST: CPT

## 2024-04-18 PROCEDURE — 83036 HEMOGLOBIN GLYCOSYLATED A1C: CPT

## 2024-04-18 PROCEDURE — 6360000002 HC RX W HCPCS: Performed by: HOSPITALIST

## 2024-04-18 PROCEDURE — 36415 COLL VENOUS BLD VENIPUNCTURE: CPT

## 2024-04-18 PROCEDURE — 2500000003 HC RX 250 WO HCPCS: Performed by: HOSPITALIST

## 2024-04-18 RX ADMIN — SODIUM CHLORIDE, PRESERVATIVE FREE 10 ML: 5 INJECTION INTRAVENOUS at 08:36

## 2024-04-18 RX ADMIN — PIPERACILLIN SODIUM AND TAZOBACTAM SODIUM 3375 MG: 3; .375 INJECTION, POWDER, LYOPHILIZED, FOR SOLUTION INTRAVENOUS at 20:33

## 2024-04-18 RX ADMIN — PIPERACILLIN SODIUM AND TAZOBACTAM SODIUM 3375 MG: 3; .375 INJECTION, POWDER, LYOPHILIZED, FOR SOLUTION INTRAVENOUS at 12:09

## 2024-04-18 RX ADMIN — MORPHINE SULFATE 2 MG: 2 INJECTION, SOLUTION INTRAMUSCULAR; INTRAVENOUS at 02:19

## 2024-04-18 RX ADMIN — MORPHINE SULFATE 2 MG: 2 INJECTION, SOLUTION INTRAMUSCULAR; INTRAVENOUS at 12:08

## 2024-04-18 RX ADMIN — FAMOTIDINE 20 MG: 10 INJECTION, SOLUTION INTRAVENOUS at 20:26

## 2024-04-18 RX ADMIN — MORPHINE SULFATE 2 MG: 2 INJECTION, SOLUTION INTRAMUSCULAR; INTRAVENOUS at 20:19

## 2024-04-18 RX ADMIN — PIPERACILLIN SODIUM AND TAZOBACTAM SODIUM 3375 MG: 3; .375 INJECTION, POWDER, LYOPHILIZED, FOR SOLUTION INTRAVENOUS at 05:20

## 2024-04-18 RX ADMIN — SODIUM CHLORIDE: 9 INJECTION, SOLUTION INTRAVENOUS at 05:20

## 2024-04-18 RX ADMIN — MORPHINE SULFATE 2 MG: 2 INJECTION, SOLUTION INTRAMUSCULAR; INTRAVENOUS at 06:33

## 2024-04-18 RX ADMIN — FAMOTIDINE 20 MG: 10 INJECTION, SOLUTION INTRAVENOUS at 08:35

## 2024-04-18 RX ADMIN — SODIUM CHLORIDE, PRESERVATIVE FREE 10 ML: 5 INJECTION INTRAVENOUS at 20:28

## 2024-04-18 ASSESSMENT — PAIN SCALES - GENERAL
PAINLEVEL_OUTOF10: 8
PAINLEVEL_OUTOF10: 5
PAINLEVEL_OUTOF10: 8
PAINLEVEL_OUTOF10: 0
PAINLEVEL_OUTOF10: 8
PAINLEVEL_OUTOF10: 7
PAINLEVEL_OUTOF10: 5
PAINLEVEL_OUTOF10: 6
PAINLEVEL_OUTOF10: 4

## 2024-04-18 ASSESSMENT — PAIN DESCRIPTION - ORIENTATION
ORIENTATION: RIGHT

## 2024-04-18 ASSESSMENT — PAIN DESCRIPTION - DESCRIPTORS
DESCRIPTORS: ACHING;NAGGING
DESCRIPTORS: ACHING
DESCRIPTORS: ACHING;SHARP
DESCRIPTORS: ACHING;SHARP

## 2024-04-18 ASSESSMENT — PAIN DESCRIPTION - DIRECTION
RADIATING_TOWARDS: BACK
RADIATING_TOWARDS: BACK

## 2024-04-18 ASSESSMENT — PAIN DESCRIPTION - FREQUENCY
FREQUENCY: CONTINUOUS
FREQUENCY: CONTINUOUS

## 2024-04-18 ASSESSMENT — PAIN DESCRIPTION - ONSET
ONSET: ON-GOING
ONSET: ON-GOING

## 2024-04-18 ASSESSMENT — PAIN DESCRIPTION - LOCATION
LOCATION: FLANK
LOCATION: FLANK
LOCATION: ABDOMEN
LOCATION: FLANK
LOCATION: FLANK;ABDOMEN

## 2024-04-18 ASSESSMENT — PAIN - FUNCTIONAL ASSESSMENT
PAIN_FUNCTIONAL_ASSESSMENT: ACTIVITIES ARE NOT PREVENTED
PAIN_FUNCTIONAL_ASSESSMENT: ACTIVITIES ARE NOT PREVENTED

## 2024-04-18 ASSESSMENT — PAIN DESCRIPTION - PAIN TYPE
TYPE: ACUTE PAIN
TYPE: ACUTE PAIN

## 2024-04-18 NOTE — PROGRESS NOTES
Advance Care Planning   Healthcare Decision Maker:    Primary Decision Maker: Sharad Mckenna - Other - [phone]    Secondary Decision Maker: Vlad Mathew - Child - [phone]    Click here to complete Healthcare Decision Makers including selection of the Healthcare Decision Maker Relationship (ie \"Primary\").  Today we discussed Healthcare Decision Makers. The patient is considering options.   Sharad Mckenna is a \"friend\" of  the patient whom he considers his significant other at the time this Advance Medical Directive is completed.     conducted an initial consultation and Spiritual Assessment for Vlad King, who is a 63 y.o.,male. Patient’s Primary Language is: English.   According to the patient’s EMR Voodoo Affiliation is: Muslim.     The reason the Patient came to the hospital is:   Patient Active Problem List    Diagnosis Date Noted    Acute appendicitis with perforation, localized peritonitis, and abscess, without gangrene 04/17/2024    Appendicitis with peritoneal abscess 04/17/2024    Diabetic polyneuropathy associated with type 2 diabetes mellitus (HCC) 06/04/2018    Chronic idiopathic gout involving toe of right foot without tophus 06/04/2018    Primary open angle glaucoma     Type 2 diabetes with nephropathy (HCC) 03/05/2018    DM type 2 with diabetic peripheral neuropathy (HCC) 01/01/2018    Hyperlipidemia 06/07/2017    Morbid obesity (HCC) 06/07/2017        The  provided the following Interventions:  Initiated a relationship of care and support. Ms Mckenna (friend) was present in the room at the time of visit.  Explored issues of navya, belief, spirituality and Caodaism/ritual needs while hospitalized.  Listened empathically.  Provided chaplaincy education.  Provided information about Spiritual Care Services.  Offered prayer and assurance of continued prayers on patient's behalf.   Chart reviewed.    The following outcomes where achieved:  Patient shared limited

## 2024-04-18 NOTE — CONSULTS
Infectious Disease Consultation Note        Reason: Perforated appendicitis, peritonitis/abscess    Current abx Prior abx   Piperacillin/tazobactam since 4/17      Lines:       Assessment :  63-year-old male with past medical history significant for type 2 diabetes, status postcholecystectomy admitted to [facility] on 4/17/2024 with increasing right lower abdominal pain.    Clinical presentation consistent with localized peritonitis, abdominal abscess likely secondary to perforated appendicitis    Surgery follow-up appreciated.  Plans for IR guided drainage noted    Recommendations:    Agree with piperacillin/tazobactam  Follow-up IR/surgery recommendations  Follow-up abscess fluid cultures.  Modify antibiotics accordingly  Monitor CBC, temperature, clinically    Thank you for consultation request. Above plan was discussed in details with patient, family and primary team. Please call me if any further questions or concerns. Will continue to participate in the care of this patient.  HPI:    63-year-old male with past medical history significant for type 2 diabetes, status postcholecystectomy admitted to [facility] on 4/17/2024 with increasing right lower abdominal pain.    Patient states that his pain started about 5 days ago.  Initially felt that this may be a muscle pull.  He took some over-the-counter medicines.  However he started having worsening right lower abdominal pain along with some chills.  Hence he came to emergency room yesterday for further evaluation.  CT scan in the emergency room revealed evidence of appendicitis with thick-walled collection of fluid 4.6 x 4.1 x 3.3 cm extending to the appendix.  Patient was initiated on broad-spectrum antibiotics.  Surgery consulted.  Plans for IR guided drainage.  I have been consulted for further recommendations.    Patient states that his pain is currently controlled with pain medication.  Denies any fever or chills at this time.  Denies prior history of  appendicitis.      Past Medical History:   Diagnosis Date    Diabetes (HCC)     DM type 2 with diabetic peripheral neuropathy (HCC) 2018    Dr Freitas    Hypertensive retinopathy     Knee pain, left     Morbid obesity (HCC)     Pneumonia     Primary open angle glaucoma     Sickle cell trait (HCC)        Past Surgical History:   Procedure Laterality Date    COLONOSCOPY  2012    normal, 10 year recall    COLONOSCOPY N/A 1/18/2023    COLONOSCOPY w/ polypectomy performed by German Almendarez MD at [facility] ENDOSCOPY    OTHER SURGICAL HISTORY      pt reports gallstone removal       @[email or web address]    Current Facility-Administered Medications   Medication Dose Route Frequency    sodium chloride flush 0.9 % injection 5-40 mL  5-40 mL IntraVENous 2 times per day    sodium chloride flush 0.9 % injection 5-40 mL  5-40 mL IntraVENous PRN    0.9 % sodium chloride infusion   IntraVENous PRN    potassium chloride 10 mEq/100 mL IVPB (Peripheral Line)  10 mEq IntraVENous PRN    magnesium sulfate 2000 mg in 50 mL IVPB premix  2,000 mg IntraVENous PRN    ondansetron (ZOFRAN-ODT) disintegrating tablet 4 mg  4 mg Oral Q8H PRN    Or    ondansetron (ZOFRAN) injection 4 mg  4 mg IntraVENous Q6H PRN    polyethylene glycol (GLYCOLAX) packet 17 g  17 g Oral Daily PRN    acetaminophen (TYLENOL) tablet 650 mg  650 mg Oral Q6H PRN    Or    acetaminophen (TYLENOL) suppository 650 mg  650 mg Rectal Q6H PRN    insulin lispro (HUMALOG) injection vial 0-4 Units  0-4 Units SubCUTAneous TID WC    insulin lispro (HUMALOG) injection vial 0-4 Units  0-4 Units SubCUTAneous Nightly    glucose chewable tablet 16 g  4 tablet Oral PRN    dextrose bolus 10% 125 mL  125 mL IntraVENous PRN    Or    dextrose bolus 10% 250 mL  250 mL IntraVENous PRN    Glucagon Emergency SOLR 1 mg  1 mg SubCUTAneous PRN    dextrose 10 % infusion   IntraVENous Continuous PRN    famotidine (PEPCID) 20 mg in sodium chloride (PF) 0.9 % 10 mL injection  20 mg IntraVENous BID    enoxaparin

## 2024-04-18 NOTE — CONSULTS
Interventional Radiology    Consulted for patient with perforated appendicitis with abdominal fluid collection    Planning for drain placement under CT guidance 4/19/24  NPO at MN for moderate sedation  Hold anticoagulation 4/18 and 4/19 for procedure  Specimens will be sent  Full consult to follow      Thank you,  DAVID Boo  [phone]

## 2024-04-18 NOTE — PROGRESS NOTES
[facility] Hospitalist Group  Progress Note    Patient: Vlad King Age: 63 y.o. : 1960 MR#: [id number] SSN: [id number]  Date/Time: 2024     Subjective:     Patient seen evaluated, lying in bed, no acute distress.    63-year-old male with a past medical history of type 2 diabetes, hypertensive retinopathy, obesity, sickle cell trait presents to the emergency room with complaints of right flank pain.  ER evaluation CT abdomen pelvis showed appendicular perforation with abscess.  General surgery was consulted, recommended hospitalization with IV antibiotics and IR guided drain placement.        Assessment/Plan:     Acute appendicitis with perforation with abscess, general surgery consulted, recommended conservative management with IR guided drain placement and IV antibiotics, ID consulted, continue to monitor.  History of type 2 diabetes-continue insulin sliding scale, monitor blood sugars.  Morbid obesity-Patient counseled on weight loss.  History of sickle cell trait  History of open-angle glaucoma.  DVT prophylaxis-Lovenox  Full code                Zan Dasilva MD  24      Case discussed with:  [x]Patient  []Family  []Nursing  []Case Management  DVT Prophylaxis:  [x]Lovenox  []Hep SQ  []SCDs  []Coumadin   []On Heparin gtt    Objective:   VS:   Vitals:    24 1146   BP: (!) 145/85   Pulse: 61   Resp: 16   Temp: 98.1 °F (36.7 °C)   SpO2: 98%        Intake/Output Summary (Last 24 hours) at 2024 1308  Last data filed at 2024 0545  Gross per 24 hour   Intake 2375.07 ml   Output 600 ml   Net 1775.07 ml       General:  Alert, cooperative, no acute distress    Pulmonary:  CTA Bilaterally. No Wheezing/Rhonchi/Rales.  Cardiovascular: Regular rate and Rhythm.  GI:  Soft, Non distended, tenderness right lower quadrant  Extremities:  No edema, cyanosis, clubbing. No calf tenderness.   Neurologic: Alert and oriented X 3. No acute neuro  mg/dL    BUN 6 (L) 7.0 - 18 MG/DL    Creatinine 0.65 0.6 - 1.3 MG/DL    Bun/Cre Ratio 9 (L) 12 - 20      Est, Glom Filt Rate >90 >60 ml/min/1.73m2    Calcium 8.3 (L) 8.5 - 10.1 MG/DL    Total Bilirubin 0.7 0.2 - 1.0 MG/DL    ALT 30 16 - 61 U/L    AST 28 10 - 38 U/L    Alk Phosphatase 93 45 - 117 U/L    Total Protein 7.2 6.4 - 8.2 g/dL    Albumin 3.5 3.4 - 5.0 g/dL    Globulin 3.7 2.0 - 4.0 g/dL    Albumin/Globulin Ratio 0.9 0.8 - 1.7     Lipase    Collection Time: 04/17/24 11:50 AM   Result Value Ref Range    Lipase 19 13 - 75 U/L   POCT Glucose    Collection Time: 04/17/24 12:11 PM   Result Value Ref Range    POC Glucose 220 (H) 74 - 106 MG/DL    Performed by: Evin Simmons    EKG 12 Lead    Collection Time: 04/17/24  1:09 PM   Result Value Ref Range    Ventricular Rate 62 BPM    Atrial Rate 62 BPM    P-R Interval 192 ms    QRS Duration 88 ms    Q-T Interval 400 ms    QTc Calculation (Bazett) 406 ms    P Axis 50 degrees    R Axis 23 degrees    T Axis 15 degrees    Diagnosis       Normal sinus rhythm  Normal ECG  When compared with ECG of 06-JAN-2023 11:56,  Criteria for Septal infarct are no longer present  Confirmed by Andrew Alains MD ([id number]) on 4/17/2024 6:26:09 PM     POCT lactic acid (lactate)    Collection Time: 04/17/24  1:37 PM   Result Value Ref Range    POC Lactic Acid 0.73 0.40 - 2.00 mmol/L   POCT Glucose    Collection Time: 04/17/24  5:28 PM   Result Value Ref Range    POC Glucose 145 (H) 70 - 110 mg/dL   POCT Glucose    Collection Time: 04/17/24  9:23 PM   Result Value Ref Range    POC Glucose 112 (H) 70 - 110 mg/dL   CBC with Auto Differential    Collection Time: 04/18/24  6:23 AM   Result Value Ref Range    WBC 8.6 4.6 - 13.2 K/uL    RBC 3.77 (L) 4.35 - 5.65 M/uL    Hemoglobin 11.2 (L) 13.0 - 16.0 g/dL    Hematocrit 32.7 (L) 36.0 - 48.0 %    MCV 86.7 78.0 - 100.0 FL    MCH 29.7 24.0 - 34.0 PG    MCHC 34.3 31.0 - 37.0 g/dL    RDW 13.2 11.6 - 14.5 %    Platelets 209 135 - 420 K/uL    MPV 10.7 9.2 -

## 2024-04-18 NOTE — ACP (ADVANCE CARE PLANNING)
Advance Care Planning     Advance Care Planning Inpatient Note  [facility] Department    Today's Date: 4/18/2024  Unit: [facility] 2S TELEMETRY    Received request from rounding, patient, and family.  Upon review of chart and communication with care team, patient's decision making abilities are not in question.. Patient and Healthcare Decision Maker was/were present in the room during visit.    Goals of ACP Conversation:  Discuss advance care planning documents    Health Care Decision Makers:       Primary Decision Maker: Sharad Mckenna - Other - [phone]    Secondary Decision Maker: QuincyVlad - Child - [phone]  Summary:  Completed New Documents  Verified Healthcare Decision Maker  Documented Next of Kin, per patient report    Advance Care Planning Documents (Patient Wishes):  Healthcare Power of /Advance Directive Appointment of Health Care Agent  Living Will/Advance Directive  Anatomical Gift/Organ Donation     Assessment:  Patient's significant other was present during the completion of this Advance Medical Directive. Ms. Mckenna would step up and take charge type of person. Patient has a son named Vlad Mathew whom he named as the secondary healthcare decision maker. Patient expressed his desire that a consensus between his two agents be made in making healthcare decisions for him.  Interventions:  Provided education on documents for clarity and greater understanding  Discussed and provided education on state decision maker hierarchy  Assisted in the completion of documents according to patient's wishes at this time  Encouraged ongoing ACP conversation with future decision makers and loved ones    Care Preferences Communicated:   No    Outcomes/Plan:  ACP Discussion: Completed  New advance directive completed.  Returned original document(s) to patient, as well as copies for distribution to appointed agents  Copy of advance directive given to staff to scan into medical record.  Routed ACP note to

## 2024-04-19 ENCOUNTER — HOSPITAL ENCOUNTER (INPATIENT)
Facility: HOSPITAL | Age: 64
Discharge: HOME | End: 2024-04-22
Payer: COMMERCIAL

## 2024-04-19 VITALS
SYSTOLIC BLOOD PRESSURE: 145 MMHG | OXYGEN SATURATION: 100 % | HEART RATE: 61 BPM | RESPIRATION RATE: 21 BRPM | DIASTOLIC BLOOD PRESSURE: 63 MMHG

## 2024-04-19 LAB
GLUCOSE BLD STRIP.AUTO-MCNC: 101 MG/DL (ref 70–110)
GLUCOSE BLD STRIP.AUTO-MCNC: 107 MG/DL (ref 70–110)
GLUCOSE BLD STRIP.AUTO-MCNC: 171 MG/DL (ref 70–110)
GLUCOSE BLD STRIP.AUTO-MCNC: 86 MG/DL (ref 70–110)
GLUCOSE BLD STRIP.AUTO-MCNC: 90 MG/DL (ref 70–110)
INR PPP: 1.2 (ref 0.9–1.1)
PROTHROMBIN TIME: 15.6 SEC (ref 11.9–14.7)

## 2024-04-19 PROCEDURE — 87205 SMEAR GRAM STAIN: CPT

## 2024-04-19 PROCEDURE — 87070 CULTURE OTHR SPECIMN AEROBIC: CPT

## 2024-04-19 PROCEDURE — 85610 PROTHROMBIN TIME: CPT

## 2024-04-19 PROCEDURE — 87076 CULTURE ANAEROBE IDENT EACH: CPT

## 2024-04-19 PROCEDURE — 6360000002 HC RX W HCPCS: Performed by: STUDENT IN AN ORGANIZED HEALTH CARE EDUCATION/TRAINING PROGRAM

## 2024-04-19 PROCEDURE — 82962 GLUCOSE BLOOD TEST: CPT

## 2024-04-19 PROCEDURE — 87075 CULTR BACTERIA EXCEPT BLOOD: CPT

## 2024-04-19 PROCEDURE — 99232 SBSQ HOSP IP/OBS MODERATE 35: CPT | Performed by: HOSPITALIST

## 2024-04-19 PROCEDURE — 2580000003 HC RX 258: Performed by: HOSPITALIST

## 2024-04-19 PROCEDURE — 36415 COLL VENOUS BLD VENIPUNCTURE: CPT

## 2024-04-19 PROCEDURE — 6360000002 HC RX W HCPCS: Performed by: HOSPITALIST

## 2024-04-19 PROCEDURE — 2500000003 HC RX 250 WO HCPCS: Performed by: HOSPITALIST

## 2024-04-19 PROCEDURE — 0W9G30Z DRAINAGE OF PERITONEAL CAVITY WITH DRAINAGE DEVICE, PERCUTANEOUS APPROACH: ICD-10-PCS | Performed by: STUDENT IN AN ORGANIZED HEALTH CARE EDUCATION/TRAINING PROGRAM

## 2024-04-19 PROCEDURE — A4216 STERILE WATER/SALINE, 10 ML: HCPCS | Performed by: HOSPITALIST

## 2024-04-19 PROCEDURE — 2709999900 CT ABSCESS DRAINAGE W CATH PLACEMENT S&I

## 2024-04-19 PROCEDURE — 1100000003 HC PRIVATE W/ TELEMETRY

## 2024-04-19 RX ORDER — FENTANYL CITRATE 50 UG/ML
INJECTION, SOLUTION INTRAMUSCULAR; INTRAVENOUS
Status: DISPENSED
Start: 2024-04-19 | End: 2024-04-20

## 2024-04-19 RX ORDER — FENTANYL CITRATE 50 UG/ML
INJECTION, SOLUTION INTRAMUSCULAR; INTRAVENOUS PRN
Status: COMPLETED | OUTPATIENT
Start: 2024-04-19 | End: 2024-04-19

## 2024-04-19 RX ORDER — MIDAZOLAM HYDROCHLORIDE 2 MG/2ML
INJECTION, SOLUTION INTRAMUSCULAR; INTRAVENOUS PRN
Status: COMPLETED | OUTPATIENT
Start: 2024-04-19 | End: 2024-04-19

## 2024-04-19 RX ORDER — MIDAZOLAM HYDROCHLORIDE 1 MG/ML
INJECTION INTRAMUSCULAR; INTRAVENOUS
Status: DISPENSED
Start: 2024-04-19 | End: 2024-04-20

## 2024-04-19 RX ADMIN — SODIUM CHLORIDE: 9 INJECTION, SOLUTION INTRAVENOUS at 17:38

## 2024-04-19 RX ADMIN — FAMOTIDINE 20 MG: 10 INJECTION, SOLUTION INTRAVENOUS at 21:54

## 2024-04-19 RX ADMIN — SODIUM CHLORIDE, PRESERVATIVE FREE 10 ML: 5 INJECTION INTRAVENOUS at 09:36

## 2024-04-19 RX ADMIN — FENTANYL CITRATE 50 MCG: 50 INJECTION INTRAMUSCULAR; INTRAVENOUS at 15:06

## 2024-04-19 RX ADMIN — MORPHINE SULFATE 2 MG: 2 INJECTION, SOLUTION INTRAMUSCULAR; INTRAVENOUS at 21:56

## 2024-04-19 RX ADMIN — PIPERACILLIN SODIUM AND TAZOBACTAM SODIUM 3375 MG: 3; .375 INJECTION, POWDER, LYOPHILIZED, FOR SOLUTION INTRAVENOUS at 12:27

## 2024-04-19 RX ADMIN — SODIUM CHLORIDE: 9 INJECTION, SOLUTION INTRAVENOUS at 03:20

## 2024-04-19 RX ADMIN — MIDAZOLAM HYDROCHLORIDE 1 MG: 1 INJECTION, SOLUTION INTRAMUSCULAR; INTRAVENOUS at 15:01

## 2024-04-19 RX ADMIN — SODIUM CHLORIDE, PRESERVATIVE FREE 10 ML: 5 INJECTION INTRAVENOUS at 21:56

## 2024-04-19 RX ADMIN — FAMOTIDINE 20 MG: 10 INJECTION, SOLUTION INTRAVENOUS at 09:36

## 2024-04-19 RX ADMIN — MORPHINE SULFATE 2 MG: 2 INJECTION, SOLUTION INTRAMUSCULAR; INTRAVENOUS at 02:07

## 2024-04-19 RX ADMIN — PIPERACILLIN SODIUM AND TAZOBACTAM SODIUM 3375 MG: 3; .375 INJECTION, POWDER, LYOPHILIZED, FOR SOLUTION INTRAVENOUS at 21:55

## 2024-04-19 RX ADMIN — MORPHINE SULFATE 2 MG: 2 INJECTION, SOLUTION INTRAMUSCULAR; INTRAVENOUS at 16:18

## 2024-04-19 RX ADMIN — MIDAZOLAM HYDROCHLORIDE 1 MG: 1 INJECTION, SOLUTION INTRAMUSCULAR; INTRAVENOUS at 15:06

## 2024-04-19 RX ADMIN — FENTANYL CITRATE 50 MCG: 50 INJECTION INTRAMUSCULAR; INTRAVENOUS at 15:01

## 2024-04-19 RX ADMIN — ENOXAPARIN SODIUM 30 MG: 100 INJECTION SUBCUTANEOUS at 21:54

## 2024-04-19 RX ADMIN — PIPERACILLIN SODIUM AND TAZOBACTAM SODIUM 3375 MG: 3; .375 INJECTION, POWDER, LYOPHILIZED, FOR SOLUTION INTRAVENOUS at 03:30

## 2024-04-19 ASSESSMENT — PAIN DESCRIPTION - LOCATION
LOCATION: FLANK
LOCATION: ABDOMEN
LOCATION: ABDOMEN
LOCATION: FLANK
LOCATION: FLANK
LOCATION: ABDOMEN

## 2024-04-19 ASSESSMENT — PAIN DESCRIPTION - FREQUENCY
FREQUENCY: CONTINUOUS

## 2024-04-19 ASSESSMENT — PAIN - FUNCTIONAL ASSESSMENT
PAIN_FUNCTIONAL_ASSESSMENT: ACTIVITIES ARE NOT PREVENTED

## 2024-04-19 ASSESSMENT — PAIN SCALES - GENERAL
PAINLEVEL_OUTOF10: 4
PAINLEVEL_OUTOF10: 0
PAINLEVEL_OUTOF10: 9
PAINLEVEL_OUTOF10: 0
PAINLEVEL_OUTOF10: 5
PAINLEVEL_OUTOF10: 3
PAINLEVEL_OUTOF10: 6
PAINLEVEL_OUTOF10: 0
PAINLEVEL_OUTOF10: 9
PAINLEVEL_OUTOF10: 0
PAINLEVEL_OUTOF10: 9

## 2024-04-19 ASSESSMENT — PAIN DESCRIPTION - ORIENTATION
ORIENTATION: RIGHT

## 2024-04-19 ASSESSMENT — PAIN DESCRIPTION - ONSET
ONSET: ON-GOING

## 2024-04-19 ASSESSMENT — PAIN DESCRIPTION - DESCRIPTORS
DESCRIPTORS: ACHING
DESCRIPTORS: ACHING;SHARP
DESCRIPTORS: ACHING
DESCRIPTORS: ACHING;SHARP
DESCRIPTORS: ACHING;SHARP
DESCRIPTORS: SORE;STABBING

## 2024-04-19 ASSESSMENT — PAIN DESCRIPTION - DIRECTION
RADIATING_TOWARDS: BACK

## 2024-04-19 ASSESSMENT — PAIN DESCRIPTION - PAIN TYPE
TYPE: SURGICAL PAIN
TYPE: ACUTE PAIN;SURGICAL PAIN
TYPE: ACUTE PAIN

## 2024-04-19 NOTE — PROGRESS NOTES
Infectious Disease progress Note        Reason: Perforated appendicitis, peritonitis/abscess    Current abx Prior abx   Piperacillin/tazobactam since 4/17      Lines:       Assessment :  63-year-old male with past medical history significant for type 2 diabetes, status postcholecystectomy admitted to [facility] on 4/17/2024 with increasing right lower abdominal pain.    Clinical presentation consistent with localized peritonitis, abdominal abscess likely secondary to perforated appendicitis    Surgery follow-up appreciated.  Plans for IR guided drainage today noted     decreasing abdominal pain/tenderness         Recommendations:    Continue piperacillin/tazobactam  Follow-up IR/surgery recommendations regarding abdominal abscess  Follow-up abscess fluid cultures.  Modify antibiotics accordingly  Monitor CBC, temperature, clinically     Above plan was discussed in details with patient, family and primary team. Please call me if any further questions or concerns. Will continue to participate in the care of this patient.  HPI:    Patient states that his pain is currently controlled with pain medication.  Had some nausea this a.m.  Waiting for drainage procedure    Past Medical History:   Diagnosis Date    Diabetes (HCC)     DM type 2 with diabetic peripheral neuropathy (HCC) 2018    Dr Freitas    Hypertensive retinopathy     Knee pain, left     Morbid obesity (HCC)     Pneumonia     Primary open angle glaucoma     Sickle cell trait (HCC)        Past Surgical History:   Procedure Laterality Date    COLONOSCOPY  2012    normal, 10 year recall    COLONOSCOPY N/A 1/18/2023    COLONOSCOPY w/ polypectomy performed by German Almendarez MD at [facility] ENDOSCOPY    OTHER SURGICAL HISTORY      pt reports gallstone removal       [unfilled]    Current Facility-Administered Medications   Medication Dose Route Frequency    sodium chloride flush 0.9 % injection 5-40 mL  5-40 mL IntraVENous 2 times per day    sodium chloride flush 0.9 %  Current packs/day: 0.25     Types: Cigarettes    Smokeless tobacco: Former     Quit date: 2016   Vaping Use    Vaping Use: Never used   Substance and Sexual Activity    Alcohol use: No    Drug use: No    Sexual activity: Not on file   Other Topics Concern    Not on file   Social History Narrative    Not on file     Social Determinants of Health     Financial Resource Strain: Medium Risk (2023)    Overall Financial Resource Strain (CARDIA)     Difficulty of Paying Living Expenses: Somewhat hard   Food Insecurity: No Food Insecurity (2024)    Hunger Vital Sign     Worried About Running Out of Food in the Last Year: Never true     Ran Out of Food in the Last Year: Never true   Transportation Needs: No Transportation Needs (2024)    PRAPARE - Transportation     Lack of Transportation (Medical): No     Lack of Transportation (Non-Medical): No   Physical Activity: Not on file   Stress: Not on file   Social Connections: Not on file   Intimate Partner Violence: Not on file   Housing Stability: Low Risk  (2024)    Housing Stability Vital Sign     Unable to Pay for Housing in the Last Year: No     Number of Places Lived in the Last Year: 1     Unstable Housing in the Last Year: No     Social History     Tobacco Use   Smoking Status Some Days    Current packs/day: 0.25    Types: Cigarettes   Smokeless Tobacco Former    Quit date: 2016        Temp (24hrs), Av.4 °F (36.9 °C), Min:97.7 °F (36.5 °C), Max:99 °F (37.2 °C)    BP (!) 150/79   Pulse 57   Temp 98.2 °F (36.8 °C) (Oral)   Resp 22   Ht 1.575 m (5' 2\")   Wt 104.3 kg (230 lb)   SpO2 98%   BMI 42.07 kg/m²     ROS: 12 point ROS obtained in details. Pertinent positives as mentioned in HPI,   otherwise negative    Physical Exam:    Constitutional:       Appearance: Normal appearance. He is normal weight.   HENT:      Head: Normocephalic.   Eyes:      Extraocular Movements: Extraocular movements intact.      Conjunctiva/sclera: Conjunctivae

## 2024-04-19 NOTE — PROGRESS NOTES
Eosinophils Absolute 0.1 0.0 - 0.4 K/UL    Basophils Absolute 0.1 0.0 - 0.1 K/UL    Immature Granulocytes Absolute 0.0 0.00 - 0.04 K/UL    Differential Type AUTOMATED     Hemoglobin A1c    Collection Time: 04/18/24  6:23 AM   Result Value Ref Range    Hemoglobin A1C 7.9 (H) 4.2 - 5.6 %    Estimated Avg Glucose 180 mg/dL   POCT Glucose    Collection Time: 04/18/24  8:21 AM   Result Value Ref Range    POC Glucose 128 (H) 70 - 110 mg/dL   POCT Glucose    Collection Time: 04/18/24 11:58 AM   Result Value Ref Range    POC Glucose 113 (H) 70 - 110 mg/dL   POCT Glucose    Collection Time: 04/18/24  5:10 PM   Result Value Ref Range    POC Glucose 90 70 - 110 mg/dL   POCT Glucose    Collection Time: 04/18/24  8:25 PM   Result Value Ref Range    POC Glucose 98 70 - 110 mg/dL   Protime-INR    Collection Time: 04/19/24  6:03 AM   Result Value Ref Range    Protime 15.6 (H) 11.9 - 14.7 sec    INR 1.2 (H) 0.9 - 1.1     POCT Glucose    Collection Time: 04/19/24  8:33 AM   Result Value Ref Range    POC Glucose 101 70 - 110 mg/dL   POCT Glucose    Collection Time: 04/19/24 11:11 AM   Result Value Ref Range    POC Glucose 86 70 - 110 mg/dL       Signed By: Zan Dasilav MD     April 19, 2024      I spent 35 minutes with the patient in face-to-face consultation, of which greater than 50% was spent in counseling and coordination of care as described above    Disclaimer: Sections of this note are dictated using utilizing voice recognition software.  Minor typographical errors may be present. If questions arise, please do not hesitate to contact me or call our department.

## 2024-04-19 NOTE — PROGRESS NOTES
Preprocedure Assessment      Today 4/19/2024     Indication/Symptoms:  Vlad King is a 63 y.o.male with perforated appendicitis and a right lower quadrant abdominal fluid collection here for a RLQ drain placement with moderate sedation.    The H & P and/or progress notes and any available imaging were reviewed.  The risks, indications and possible alternatives to the procedure, including doing nothing, were discussed and informed consent was obtained.    Physical Exam:    Mallampati 2 ASA 3   General: A&O x 4, NAD   Heart:   RRR  Lungs:    Normal work of breathing    The patient is an appropriate candidate to undergo the planned procedure and sedation.    DAVID Fields

## 2024-04-19 NOTE — PROGRESS NOTES
TRANSFER - OUT REPORT:    Verbal report given to Keanu BONILLA on Vlad King  being transferred to  for routine post-op       Report consisted of patient's Situation, Background, Assessment and   Recommendations(SBAR).     Information from the following report(s) Nurse Handoff Report was reviewed with the receiving nurse.           Lines:   Peripheral IV 04/17/24 Right Antecubital (Active)   Site Assessment Clean, dry & intact 04/19/24 0500   Line Status Infusing 04/19/24 0500   Line Care Connections checked and tightened 04/19/24 0500   Phlebitis Assessment No symptoms 04/19/24 0500   Infiltration Assessment 0 04/19/24 0500   Alcohol Cap Used Yes 04/19/24 0500   Dressing Status Clean, dry & intact 04/19/24 0500   Dressing Type Transparent 04/19/24 0500        Opportunity for questions and clarification was provided.      Patient transported with:  Registered Nurse and Tech

## 2024-04-19 NOTE — PROCEDURES
RADIOLOGY POST PROCEDURE NOTE     April 19, 2024       3:51 PM     Preoperative Diagnosis:   Ruptured appendicitis with abscess     Postoperative Diagnosis:  Same.    :  Nathanael Judd MD    Assistant:  None.    Type of Anesthesia: 1% plain lidocaine, moderate sedation    Procedure/Description:  CT guided drainage of right lower quadrant abscess    Findings:  Placement of 10Fr drainage catheter into the right lower quadrant abscess. Approximately 20 mL of purulent fluid was aspirated and sent for culture.    Estimated blood Loss:  Minimal    Specimen Removed:  yes    Blood transfusions:  None.    Implants:  None.    Complications: None    Condition: Stable    Discharge Plan:   Return to nursing unit. 2 hours bedrest.   RLQ drainage catheter connected to BRODIE bulb.   Flush catheter with 10mL sterile saline [id number].     NATHANAEL JUDD MD

## 2024-04-20 LAB
ANION GAP SERPL CALC-SCNC: 4 MMOL/L (ref 3–18)
BUN SERPL-MCNC: 4 MG/DL (ref 7–18)
BUN/CREAT SERPL: 5 (ref 12–20)
CALCIUM SERPL-MCNC: 8.3 MG/DL (ref 8.5–10.1)
CHLORIDE SERPL-SCNC: 107 MMOL/L (ref 100–111)
CO2 SERPL-SCNC: 26 MMOL/L (ref 21–32)
CREAT SERPL-MCNC: 0.75 MG/DL (ref 0.6–1.3)
ERYTHROCYTE [DISTWIDTH] IN BLOOD BY AUTOMATED COUNT: 13.1 % (ref 11.6–14.5)
GLUCOSE BLD STRIP.AUTO-MCNC: 121 MG/DL (ref 70–110)
GLUCOSE BLD STRIP.AUTO-MCNC: 150 MG/DL (ref 70–110)
GLUCOSE BLD STRIP.AUTO-MCNC: 157 MG/DL (ref 70–110)
GLUCOSE BLD STRIP.AUTO-MCNC: 204 MG/DL (ref 70–110)
GLUCOSE SERPL-MCNC: 194 MG/DL (ref 74–99)
HCT VFR BLD AUTO: 32.4 % (ref 36–48)
HGB BLD-MCNC: 11.1 G/DL (ref 13–16)
MCH RBC QN AUTO: 29.8 PG (ref 24–34)
MCHC RBC AUTO-ENTMCNC: 34.3 G/DL (ref 31–37)
MCV RBC AUTO: 86.9 FL (ref 78–100)
NRBC # BLD: 0 K/UL (ref 0–0.01)
NRBC BLD-RTO: 0 PER 100 WBC
PLATELET # BLD AUTO: 206 K/UL (ref 135–420)
PMV BLD AUTO: 10.5 FL (ref 9.2–11.8)
POTASSIUM SERPL-SCNC: 3.2 MMOL/L (ref 3.5–5.5)
RBC # BLD AUTO: 3.73 M/UL (ref 4.35–5.65)
SODIUM SERPL-SCNC: 137 MMOL/L (ref 136–145)
WBC # BLD AUTO: 8.6 K/UL (ref 4.6–13.2)

## 2024-04-20 PROCEDURE — 2500000003 HC RX 250 WO HCPCS: Performed by: HOSPITALIST

## 2024-04-20 PROCEDURE — 99232 SBSQ HOSP IP/OBS MODERATE 35: CPT | Performed by: HOSPITALIST

## 2024-04-20 PROCEDURE — 6370000000 HC RX 637 (ALT 250 FOR IP): Performed by: HOSPITALIST

## 2024-04-20 PROCEDURE — 6360000002 HC RX W HCPCS: Performed by: HOSPITALIST

## 2024-04-20 PROCEDURE — 1100000003 HC PRIVATE W/ TELEMETRY

## 2024-04-20 PROCEDURE — 80048 BASIC METABOLIC PNL TOTAL CA: CPT

## 2024-04-20 PROCEDURE — A4216 STERILE WATER/SALINE, 10 ML: HCPCS | Performed by: HOSPITALIST

## 2024-04-20 PROCEDURE — 85027 COMPLETE CBC AUTOMATED: CPT

## 2024-04-20 PROCEDURE — 82962 GLUCOSE BLOOD TEST: CPT

## 2024-04-20 PROCEDURE — 36415 COLL VENOUS BLD VENIPUNCTURE: CPT

## 2024-04-20 PROCEDURE — 2580000003 HC RX 258: Performed by: HOSPITALIST

## 2024-04-20 RX ORDER — POTASSIUM CHLORIDE 20 MEQ/1
40 TABLET, EXTENDED RELEASE ORAL 2 TIMES DAILY
Status: DISCONTINUED | OUTPATIENT
Start: 2024-04-20 | End: 2024-04-24

## 2024-04-20 RX ORDER — OXYCODONE HYDROCHLORIDE AND ACETAMINOPHEN 5; 325 MG/1; MG/1
1 TABLET ORAL EVERY 4 HOURS PRN
Status: DISCONTINUED | OUTPATIENT
Start: 2024-04-20 | End: 2024-04-23

## 2024-04-20 RX ORDER — LOSARTAN POTASSIUM 50 MG/1
50 TABLET ORAL DAILY
Status: DISCONTINUED | OUTPATIENT
Start: 2024-04-20 | End: 2024-04-26 | Stop reason: HOSPADM

## 2024-04-20 RX ADMIN — FAMOTIDINE 20 MG: 10 INJECTION, SOLUTION INTRAVENOUS at 20:45

## 2024-04-20 RX ADMIN — PIPERACILLIN SODIUM AND TAZOBACTAM SODIUM 3375 MG: 3; .375 INJECTION, POWDER, LYOPHILIZED, FOR SOLUTION INTRAVENOUS at 20:45

## 2024-04-20 RX ADMIN — PIPERACILLIN SODIUM AND TAZOBACTAM SODIUM 3375 MG: 3; .375 INJECTION, POWDER, LYOPHILIZED, FOR SOLUTION INTRAVENOUS at 12:57

## 2024-04-20 RX ADMIN — INSULIN LISPRO 1 UNITS: 100 INJECTION, SOLUTION INTRAVENOUS; SUBCUTANEOUS at 12:57

## 2024-04-20 RX ADMIN — SODIUM CHLORIDE, PRESERVATIVE FREE 10 ML: 5 INJECTION INTRAVENOUS at 09:34

## 2024-04-20 RX ADMIN — POTASSIUM CHLORIDE 40 MEQ: 1500 TABLET, EXTENDED RELEASE ORAL at 18:39

## 2024-04-20 RX ADMIN — MORPHINE SULFATE 2 MG: 2 INJECTION, SOLUTION INTRAMUSCULAR; INTRAVENOUS at 01:48

## 2024-04-20 RX ADMIN — MORPHINE SULFATE 2 MG: 2 INJECTION, SOLUTION INTRAMUSCULAR; INTRAVENOUS at 11:09

## 2024-04-20 RX ADMIN — OXYCODONE AND ACETAMINOPHEN 1 TABLET: 5; 325 TABLET ORAL at 15:23

## 2024-04-20 RX ADMIN — OXYCODONE AND ACETAMINOPHEN 1 TABLET: 5; 325 TABLET ORAL at 20:46

## 2024-04-20 RX ADMIN — FAMOTIDINE 20 MG: 10 INJECTION, SOLUTION INTRAVENOUS at 09:33

## 2024-04-20 RX ADMIN — PIPERACILLIN SODIUM AND TAZOBACTAM SODIUM 3375 MG: 3; .375 INJECTION, POWDER, LYOPHILIZED, FOR SOLUTION INTRAVENOUS at 06:00

## 2024-04-20 RX ADMIN — ENOXAPARIN SODIUM 30 MG: 100 INJECTION SUBCUTANEOUS at 09:34

## 2024-04-20 RX ADMIN — ENOXAPARIN SODIUM 30 MG: 100 INJECTION SUBCUTANEOUS at 20:45

## 2024-04-20 RX ADMIN — LOSARTAN POTASSIUM 50 MG: 50 TABLET, FILM COATED ORAL at 15:23

## 2024-04-20 RX ADMIN — MORPHINE SULFATE 2 MG: 2 INJECTION, SOLUTION INTRAMUSCULAR; INTRAVENOUS at 06:37

## 2024-04-20 RX ADMIN — ACETAMINOPHEN 650 MG: 325 TABLET ORAL at 12:57

## 2024-04-20 ASSESSMENT — PAIN DESCRIPTION - LOCATION
LOCATION: ABDOMEN
LOCATION: FLANK
LOCATION: ABDOMEN;FLANK
LOCATION: ABDOMEN

## 2024-04-20 ASSESSMENT — PAIN SCALES - GENERAL
PAINLEVEL_OUTOF10: 5
PAINLEVEL_OUTOF10: 0
PAINLEVEL_OUTOF10: 8
PAINLEVEL_OUTOF10: 8
PAINLEVEL_OUTOF10: 0
PAINLEVEL_OUTOF10: 8
PAINLEVEL_OUTOF10: 4
PAINLEVEL_OUTOF10: 10
PAINLEVEL_OUTOF10: 5
PAINLEVEL_OUTOF10: 7

## 2024-04-20 ASSESSMENT — PAIN DESCRIPTION - DESCRIPTORS
DESCRIPTORS: ACHING
DESCRIPTORS: ACHING
DESCRIPTORS: ACHING;THROBBING
DESCRIPTORS: ACHING

## 2024-04-20 ASSESSMENT — PAIN DESCRIPTION - ORIENTATION
ORIENTATION: RIGHT

## 2024-04-20 ASSESSMENT — PAIN DESCRIPTION - FREQUENCY: FREQUENCY: INTERMITTENT

## 2024-04-20 ASSESSMENT — PAIN - FUNCTIONAL ASSESSMENT: PAIN_FUNCTIONAL_ASSESSMENT: ACTIVITIES ARE NOT PREVENTED

## 2024-04-20 ASSESSMENT — PAIN DESCRIPTION - PAIN TYPE: TYPE: SURGICAL PAIN;ACUTE PAIN

## 2024-04-20 NOTE — PROGRESS NOTES
[facility] Hospitalist Group  Progress Note    Patient: Vlad King Age: 63 y.o. : 1960 MR#: [id number] SSN: [id number]  Date/Time: 2024     Subjective:     Patient seen evaluated, lying in bed, no acute distress.    63-year-old male with a past medical history of type 2 diabetes, hypertensive retinopathy, obesity, sickle cell trait presents to the emergency room with complaints of right flank pain.  ER evaluation CT abdomen pelvis showed appendicular perforation with abscess.  General surgery was consulted, recommended hospitalization with IV antibiotics and IR guided drain placement.    -patient to undergo IR guided drain placement today.  Will follow postprocedure.  Continue IV antibiotics.    -patient seen evaluated, lying in bed, no acute distress.  Patient patient had IR guided drain placed yesterday.  Patient continues to complain of abdominal pain saying morphine is not controlling it, added Percocet as needed.  Patient also mentions that his sugars are elevated and his blood pressure is high.  Added losartan for blood pressure control.  Continue insulin sliding scale.  Will continue to follow.      Assessment/Plan:     Acute appendicitis with perforation with abscess, general surgery consulted, recommended conservative management with IR guided drain placement and IV antibiotics, ID consulted, continue to monitor. ---IR guided drain placed on , continue to monitor I's and O's from drain.  Continue pain control.  HTN -started losartan, continue to monitor blood pressure  History of type 2 diabetes-continue insulin sliding scale, monitor blood sugars.  Morbid obesity-Patient counseled on weight loss.  History of sickle cell trait  History of open-angle glaucoma.  DVT prophylaxis-Lovenox  Full code    Will follow          Zan Dasilva MD  24      Case discussed with:  [x]Patient  []Family  []Nursing  []Case Management  DVT Prophylaxis:

## 2024-04-21 LAB
ANION GAP SERPL CALC-SCNC: 5 MMOL/L (ref 3–18)
BUN SERPL-MCNC: 5 MG/DL (ref 7–18)
BUN/CREAT SERPL: 7 (ref 12–20)
CALCIUM SERPL-MCNC: 8.1 MG/DL (ref 8.5–10.1)
CHLORIDE SERPL-SCNC: 108 MMOL/L (ref 100–111)
CO2 SERPL-SCNC: 24 MMOL/L (ref 21–32)
CREAT SERPL-MCNC: 0.7 MG/DL (ref 0.6–1.3)
ERYTHROCYTE [DISTWIDTH] IN BLOOD BY AUTOMATED COUNT: 13 % (ref 11.6–14.5)
GLUCOSE BLD STRIP.AUTO-MCNC: 122 MG/DL (ref 70–110)
GLUCOSE BLD STRIP.AUTO-MCNC: 127 MG/DL (ref 70–110)
GLUCOSE BLD STRIP.AUTO-MCNC: 173 MG/DL (ref 70–110)
GLUCOSE BLD STRIP.AUTO-MCNC: 178 MG/DL (ref 70–110)
GLUCOSE SERPL-MCNC: 142 MG/DL (ref 74–99)
HCT VFR BLD AUTO: 32.3 % (ref 36–48)
HGB BLD-MCNC: 11.2 G/DL (ref 13–16)
MCH RBC QN AUTO: 30 PG (ref 24–34)
MCHC RBC AUTO-ENTMCNC: 34.7 G/DL (ref 31–37)
MCV RBC AUTO: 86.6 FL (ref 78–100)
NRBC # BLD: 0 K/UL (ref 0–0.01)
NRBC BLD-RTO: 0 PER 100 WBC
PLATELET # BLD AUTO: 218 K/UL (ref 135–420)
PMV BLD AUTO: 10.2 FL (ref 9.2–11.8)
POTASSIUM SERPL-SCNC: 3.4 MMOL/L (ref 3.5–5.5)
RBC # BLD AUTO: 3.73 M/UL (ref 4.35–5.65)
SODIUM SERPL-SCNC: 137 MMOL/L (ref 136–145)
WBC # BLD AUTO: 7.9 K/UL (ref 4.6–13.2)

## 2024-04-21 PROCEDURE — 2500000003 HC RX 250 WO HCPCS: Performed by: HOSPITALIST

## 2024-04-21 PROCEDURE — 80048 BASIC METABOLIC PNL TOTAL CA: CPT

## 2024-04-21 PROCEDURE — 2580000003 HC RX 258: Performed by: HOSPITALIST

## 2024-04-21 PROCEDURE — 94761 N-INVAS EAR/PLS OXIMETRY MLT: CPT

## 2024-04-21 PROCEDURE — A4216 STERILE WATER/SALINE, 10 ML: HCPCS | Performed by: HOSPITALIST

## 2024-04-21 PROCEDURE — 99232 SBSQ HOSP IP/OBS MODERATE 35: CPT | Performed by: HOSPITALIST

## 2024-04-21 PROCEDURE — 6360000002 HC RX W HCPCS: Performed by: HOSPITALIST

## 2024-04-21 PROCEDURE — 6370000000 HC RX 637 (ALT 250 FOR IP): Performed by: HOSPITALIST

## 2024-04-21 PROCEDURE — 82962 GLUCOSE BLOOD TEST: CPT

## 2024-04-21 PROCEDURE — 1100000003 HC PRIVATE W/ TELEMETRY

## 2024-04-21 PROCEDURE — 85027 COMPLETE CBC AUTOMATED: CPT

## 2024-04-21 PROCEDURE — 36415 COLL VENOUS BLD VENIPUNCTURE: CPT

## 2024-04-21 RX ORDER — DIPHENHYDRAMINE HCL 25 MG
25 CAPSULE ORAL EVERY 6 HOURS PRN
Status: DISCONTINUED | OUTPATIENT
Start: 2024-04-21 | End: 2024-04-26 | Stop reason: HOSPADM

## 2024-04-21 RX ADMIN — OXYCODONE AND ACETAMINOPHEN 1 TABLET: 5; 325 TABLET ORAL at 05:40

## 2024-04-21 RX ADMIN — ENOXAPARIN SODIUM 30 MG: 100 INJECTION SUBCUTANEOUS at 08:47

## 2024-04-21 RX ADMIN — SODIUM CHLORIDE, PRESERVATIVE FREE 10 ML: 5 INJECTION INTRAVENOUS at 00:00

## 2024-04-21 RX ADMIN — LOSARTAN POTASSIUM 50 MG: 50 TABLET, FILM COATED ORAL at 08:48

## 2024-04-21 RX ADMIN — SODIUM CHLORIDE, PRESERVATIVE FREE 10 ML: 5 INJECTION INTRAVENOUS at 22:04

## 2024-04-21 RX ADMIN — MORPHINE SULFATE 2 MG: 2 INJECTION, SOLUTION INTRAMUSCULAR; INTRAVENOUS at 21:43

## 2024-04-21 RX ADMIN — FAMOTIDINE 20 MG: 10 INJECTION, SOLUTION INTRAVENOUS at 21:38

## 2024-04-21 RX ADMIN — ONDANSETRON 4 MG: 2 INJECTION INTRAMUSCULAR; INTRAVENOUS at 16:23

## 2024-04-21 RX ADMIN — DIPHENHYDRAMINE HYDROCHLORIDE 25 MG: 25 CAPSULE ORAL at 12:53

## 2024-04-21 RX ADMIN — SODIUM CHLORIDE, PRESERVATIVE FREE 10 ML: 5 INJECTION INTRAVENOUS at 08:48

## 2024-04-21 RX ADMIN — PIPERACILLIN SODIUM AND TAZOBACTAM SODIUM 3375 MG: 3; .375 INJECTION, POWDER, LYOPHILIZED, FOR SOLUTION INTRAVENOUS at 05:39

## 2024-04-21 RX ADMIN — ENOXAPARIN SODIUM 30 MG: 100 INJECTION SUBCUTANEOUS at 21:37

## 2024-04-21 RX ADMIN — FAMOTIDINE 20 MG: 10 INJECTION, SOLUTION INTRAVENOUS at 08:48

## 2024-04-21 RX ADMIN — POTASSIUM CHLORIDE 40 MEQ: 1500 TABLET, EXTENDED RELEASE ORAL at 21:37

## 2024-04-21 RX ADMIN — PIPERACILLIN SODIUM AND TAZOBACTAM SODIUM 3375 MG: 3; .375 INJECTION, POWDER, LYOPHILIZED, FOR SOLUTION INTRAVENOUS at 12:06

## 2024-04-21 RX ADMIN — PIPERACILLIN SODIUM AND TAZOBACTAM SODIUM 3375 MG: 3; .375 INJECTION, POWDER, LYOPHILIZED, FOR SOLUTION INTRAVENOUS at 21:40

## 2024-04-21 RX ADMIN — POTASSIUM CHLORIDE 40 MEQ: 1500 TABLET, EXTENDED RELEASE ORAL at 08:48

## 2024-04-21 RX ADMIN — OXYCODONE AND ACETAMINOPHEN 1 TABLET: 5; 325 TABLET ORAL at 01:17

## 2024-04-21 RX ADMIN — OXYCODONE AND ACETAMINOPHEN 1 TABLET: 5; 325 TABLET ORAL at 22:47

## 2024-04-21 ASSESSMENT — PAIN DESCRIPTION - DESCRIPTORS
DESCRIPTORS: ACHING
DESCRIPTORS: ACHING

## 2024-04-21 ASSESSMENT — PAIN SCALES - GENERAL
PAINLEVEL_OUTOF10: 10
PAINLEVEL_OUTOF10: 8
PAINLEVEL_OUTOF10: 9
PAINLEVEL_OUTOF10: 2
PAINLEVEL_OUTOF10: 7
PAINLEVEL_OUTOF10: 2
PAINLEVEL_OUTOF10: 2

## 2024-04-21 ASSESSMENT — PAIN DESCRIPTION - LOCATION
LOCATION: ABDOMEN

## 2024-04-21 ASSESSMENT — PAIN DESCRIPTION - FREQUENCY: FREQUENCY: CONTINUOUS

## 2024-04-21 ASSESSMENT — PAIN DESCRIPTION - ONSET: ONSET: ON-GOING

## 2024-04-21 ASSESSMENT — PAIN DESCRIPTION - PAIN TYPE: TYPE: ACUTE PAIN

## 2024-04-21 ASSESSMENT — PAIN - FUNCTIONAL ASSESSMENT: PAIN_FUNCTIONAL_ASSESSMENT: ACTIVITIES ARE NOT PREVENTED

## 2024-04-21 ASSESSMENT — PAIN DESCRIPTION - ORIENTATION
ORIENTATION: RIGHT
ORIENTATION: RIGHT

## 2024-04-21 NOTE — CARE COORDINATION
Star rated choice list provided to patient. Patient selected [facility]. Verbal FOC obtained for [facility].       04/21/24 3363   Service Assessment   Patient Orientation Alert and Oriented;Person;Place;Situation   Cognition Alert   History Provided By Patient   Primary Caregiver Self   Support Systems Spouse/Significant Other;Friends/Neighbors   PCP Verified by CM Yes   Last Visit to PCP Within last 3 months   Prior Functional Level Independent in ADLs/IADLs   Current Functional Level Independent in ADLs/IADLs   Can patient return to prior living arrangement Yes   Ability to make needs known: Good   Financial Resources Medicaid   Social/Functional History   Lives With Alone   Type of Home Apartment   Home Layout One level   Bathroom Toilet Standard   Home Equipment None   ADL Assistance Independent   Homemaking Assistance Independent   Homemaking Responsibilities Yes   Ambulation Assistance Independent   Transfer Assistance Independent   Discharge Planning   Type of Residence Apartment   Living Arrangements Alone   Potential Assistance Needed N/A   DME Ordered? No   Potential Assistance Purchasing Medications No   Type of Home Care Services None   Patient expects to be discharged to: Apartment   Services At/After Discharge   [place] Resource Information Provided? No   Mode of Transport at Discharge Other (see comment)  (family)   Confirm Follow Up Transport Family   Condition of Participation: Discharge Planning   The Plan for Transition of Care is related to the following treatment goals: Home with available resources, open to HH if ordered   The Patient and/or Patient Representative was provided with a Choice of Provider? Patient   Freedom of Choice list was provided with basic dialogue that supports the patient's individualized plan of care/goals, treatment preferences, and shares the quality data associated with the providers?  Yes

## 2024-04-21 NOTE — PROGRESS NOTES
[facility] Hospitalist Group  Progress Note    Patient: Vlad King Age: 63 y.o. : 1960 MR#: [id number] SSN: [id number]  Date/Time: 2024     Subjective:     Patient seen evaluated, lying in bed, no acute distress.    63-year-old male with a past medical history of type 2 diabetes, hypertensive retinopathy, obesity, sickle cell trait presents to the emergency room with complaints of right flank pain.  ER evaluation CT abdomen pelvis showed appendicular perforation with abscess.  General surgery was consulted, recommended hospitalization with IV antibiotics and IR guided drain placement.    -patient to undergo IR guided drain placement today.  Will follow postprocedure.  Continue IV antibiotics.    -patient seen evaluated, lying in bed, no acute distress.  Patient patient had IR guided drain placed yesterday.  Patient continues to complain of abdominal pain saying morphine is not controlling it, added Percocet as needed.  Patient also mentions that his sugars are elevated and his blood pressure is high.  Added losartan for blood pressure control.  Continue insulin sliding scale.  Will continue to follow.    -patient seen evaluated, lying in bed, no acute distress.  Patient mentions pain has improved.  Blood pressure has improved after starting on losartan.  Patient has developed contact dermatitis at the site of drain placement, I have advised nurse to change dressing.  Continue to monitor, will discuss with ID regarding home antibiotic therapy, anticipate discharge home in a.m. if continues to improve.      Assessment/Plan:     Acute appendicitis with perforation with abscess, general surgery consulted, recommended conservative management with IR guided drain placement and IV antibiotics, ID consulted, continue to monitor. ---IR guided drain placed on , continue to monitor I's and O's from drain.  Continue pain control.  HTN -started losartan, continue to

## 2024-04-21 NOTE — PROGRESS NOTES
Patient was complaining of itching at his abdominal drain dressing.  Upon inspection, several fluid filled blisters noted along and under edge of abdominal  tube dressing. Dressing and adhesive removed from blisters.  Blisters are intact.  Patient states it feels better, no itching at this time. Patient instructed not to scratch near the blisters.  0750  Wound care consult order in place.

## 2024-04-22 ENCOUNTER — APPOINTMENT (OUTPATIENT)
Facility: HOSPITAL | Age: 64
End: 2024-04-22
Payer: COMMERCIAL

## 2024-04-22 LAB
ANION GAP SERPL CALC-SCNC: 3 MMOL/L (ref 3–18)
BUN SERPL-MCNC: 5 MG/DL (ref 7–18)
BUN/CREAT SERPL: 7 (ref 12–20)
CALCIUM SERPL-MCNC: 8.5 MG/DL (ref 8.5–10.1)
CHLORIDE SERPL-SCNC: 111 MMOL/L (ref 100–111)
CO2 SERPL-SCNC: 25 MMOL/L (ref 21–32)
CREAT SERPL-MCNC: 0.67 MG/DL (ref 0.6–1.3)
ERYTHROCYTE [DISTWIDTH] IN BLOOD BY AUTOMATED COUNT: 13.2 % (ref 11.6–14.5)
GLUCOSE BLD STRIP.AUTO-MCNC: 130 MG/DL (ref 70–110)
GLUCOSE BLD STRIP.AUTO-MCNC: 130 MG/DL (ref 70–110)
GLUCOSE BLD STRIP.AUTO-MCNC: 168 MG/DL (ref 70–110)
GLUCOSE BLD STRIP.AUTO-MCNC: 174 MG/DL (ref 70–110)
GLUCOSE BLD STRIP.AUTO-MCNC: 201 MG/DL (ref 70–110)
GLUCOSE SERPL-MCNC: 157 MG/DL (ref 74–99)
HCT VFR BLD AUTO: 31.8 % (ref 36–48)
HGB BLD-MCNC: 10.8 G/DL (ref 13–16)
MCH RBC QN AUTO: 29.5 PG (ref 24–34)
MCHC RBC AUTO-ENTMCNC: 34 G/DL (ref 31–37)
MCV RBC AUTO: 86.9 FL (ref 78–100)
NRBC # BLD: 0 K/UL (ref 0–0.01)
NRBC BLD-RTO: 0 PER 100 WBC
PLATELET # BLD AUTO: 214 K/UL (ref 135–420)
PMV BLD AUTO: 10.5 FL (ref 9.2–11.8)
POTASSIUM SERPL-SCNC: 3.8 MMOL/L (ref 3.5–5.5)
RBC # BLD AUTO: 3.66 M/UL (ref 4.35–5.65)
SODIUM SERPL-SCNC: 139 MMOL/L (ref 136–145)
WBC # BLD AUTO: 7.8 K/UL (ref 4.6–13.2)

## 2024-04-22 PROCEDURE — 74177 CT ABD & PELVIS W/CONTRAST: CPT

## 2024-04-22 PROCEDURE — 99232 SBSQ HOSP IP/OBS MODERATE 35: CPT | Performed by: SURGERY

## 2024-04-22 PROCEDURE — 36415 COLL VENOUS BLD VENIPUNCTURE: CPT

## 2024-04-22 PROCEDURE — 82962 GLUCOSE BLOOD TEST: CPT

## 2024-04-22 PROCEDURE — 6360000004 HC RX CONTRAST MEDICATION: Performed by: INTERNAL MEDICINE

## 2024-04-22 PROCEDURE — 6360000002 HC RX W HCPCS: Performed by: HOSPITALIST

## 2024-04-22 PROCEDURE — 2580000003 HC RX 258: Performed by: HOSPITALIST

## 2024-04-22 PROCEDURE — 99232 SBSQ HOSP IP/OBS MODERATE 35: CPT | Performed by: HOSPITALIST

## 2024-04-22 PROCEDURE — 6370000000 HC RX 637 (ALT 250 FOR IP): Performed by: HOSPITALIST

## 2024-04-22 PROCEDURE — 80048 BASIC METABOLIC PNL TOTAL CA: CPT

## 2024-04-22 PROCEDURE — 94761 N-INVAS EAR/PLS OXIMETRY MLT: CPT

## 2024-04-22 PROCEDURE — A4216 STERILE WATER/SALINE, 10 ML: HCPCS | Performed by: HOSPITALIST

## 2024-04-22 PROCEDURE — 85027 COMPLETE CBC AUTOMATED: CPT

## 2024-04-22 PROCEDURE — 2500000003 HC RX 250 WO HCPCS: Performed by: HOSPITALIST

## 2024-04-22 PROCEDURE — 1100000003 HC PRIVATE W/ TELEMETRY

## 2024-04-22 RX ADMIN — PIPERACILLIN SODIUM AND TAZOBACTAM SODIUM 3375 MG: 3; .375 INJECTION, POWDER, LYOPHILIZED, FOR SOLUTION INTRAVENOUS at 05:22

## 2024-04-22 RX ADMIN — IOPAMIDOL 100 ML: 612 INJECTION, SOLUTION INTRAVENOUS at 13:44

## 2024-04-22 RX ADMIN — LOSARTAN POTASSIUM 50 MG: 50 TABLET, FILM COATED ORAL at 09:25

## 2024-04-22 RX ADMIN — SODIUM CHLORIDE, PRESERVATIVE FREE 10 ML: 5 INJECTION INTRAVENOUS at 09:23

## 2024-04-22 RX ADMIN — FAMOTIDINE 20 MG: 10 INJECTION, SOLUTION INTRAVENOUS at 22:52

## 2024-04-22 RX ADMIN — MORPHINE SULFATE 2 MG: 2 INJECTION, SOLUTION INTRAMUSCULAR; INTRAVENOUS at 01:53

## 2024-04-22 RX ADMIN — MORPHINE SULFATE 2 MG: 2 INJECTION, SOLUTION INTRAMUSCULAR; INTRAVENOUS at 22:49

## 2024-04-22 RX ADMIN — DIATRIZOATE MEGLUMINE AND DIATRIZOATE SODIUM 30 ML: 600; 100 SOLUTION ORAL; RECTAL at 11:35

## 2024-04-22 RX ADMIN — MORPHINE SULFATE 2 MG: 2 INJECTION, SOLUTION INTRAMUSCULAR; INTRAVENOUS at 18:38

## 2024-04-22 RX ADMIN — FAMOTIDINE 20 MG: 10 INJECTION, SOLUTION INTRAVENOUS at 09:21

## 2024-04-22 RX ADMIN — MORPHINE SULFATE 2 MG: 2 INJECTION, SOLUTION INTRAMUSCULAR; INTRAVENOUS at 09:20

## 2024-04-22 RX ADMIN — ENOXAPARIN SODIUM 30 MG: 100 INJECTION SUBCUTANEOUS at 22:52

## 2024-04-22 RX ADMIN — OXYCODONE AND ACETAMINOPHEN 1 TABLET: 5; 325 TABLET ORAL at 05:21

## 2024-04-22 RX ADMIN — PIPERACILLIN SODIUM AND TAZOBACTAM SODIUM 3375 MG: 3; .375 INJECTION, POWDER, LYOPHILIZED, FOR SOLUTION INTRAVENOUS at 11:36

## 2024-04-22 RX ADMIN — POTASSIUM CHLORIDE 40 MEQ: 1500 TABLET, EXTENDED RELEASE ORAL at 09:25

## 2024-04-22 RX ADMIN — SODIUM CHLORIDE: 9 INJECTION, SOLUTION INTRAVENOUS at 07:05

## 2024-04-22 RX ADMIN — MORPHINE SULFATE 2 MG: 2 INJECTION, SOLUTION INTRAMUSCULAR; INTRAVENOUS at 14:30

## 2024-04-22 RX ADMIN — PIPERACILLIN SODIUM AND TAZOBACTAM SODIUM 3375 MG: 3; .375 INJECTION, POWDER, LYOPHILIZED, FOR SOLUTION INTRAVENOUS at 22:53

## 2024-04-22 RX ADMIN — SODIUM CHLORIDE, PRESERVATIVE FREE 10 ML: 5 INJECTION INTRAVENOUS at 23:03

## 2024-04-22 RX ADMIN — POTASSIUM CHLORIDE 40 MEQ: 1500 TABLET, EXTENDED RELEASE ORAL at 22:54

## 2024-04-22 RX ADMIN — ENOXAPARIN SODIUM 30 MG: 100 INJECTION SUBCUTANEOUS at 09:25

## 2024-04-22 ASSESSMENT — PAIN DESCRIPTION - LOCATION
LOCATION: ABDOMEN

## 2024-04-22 ASSESSMENT — PAIN DESCRIPTION - ONSET
ONSET: PROGRESSIVE
ONSET: ON-GOING
ONSET: PROGRESSIVE

## 2024-04-22 ASSESSMENT — PAIN DESCRIPTION - PAIN TYPE
TYPE: ACUTE PAIN
TYPE: ACUTE PAIN;SURGICAL PAIN
TYPE: ACUTE PAIN

## 2024-04-22 ASSESSMENT — PAIN DESCRIPTION - DESCRIPTORS
DESCRIPTORS: ACHING
DESCRIPTORS: STABBING;ACHING
DESCRIPTORS: ACHING

## 2024-04-22 ASSESSMENT — PAIN SCALES - GENERAL
PAINLEVEL_OUTOF10: 9
PAINLEVEL_OUTOF10: 10
PAINLEVEL_OUTOF10: 9
PAINLEVEL_OUTOF10: 10
PAINLEVEL_OUTOF10: 9
PAINLEVEL_OUTOF10: 10
PAINLEVEL_OUTOF10: 9
PAINLEVEL_OUTOF10: 9

## 2024-04-22 ASSESSMENT — PAIN DESCRIPTION - FREQUENCY
FREQUENCY: CONTINUOUS

## 2024-04-22 ASSESSMENT — PAIN DESCRIPTION - ORIENTATION
ORIENTATION: RIGHT

## 2024-04-22 ASSESSMENT — PAIN - FUNCTIONAL ASSESSMENT
PAIN_FUNCTIONAL_ASSESSMENT: PREVENTS OR INTERFERES SOME ACTIVE ACTIVITIES AND ADLS
PAIN_FUNCTIONAL_ASSESSMENT: PREVENTS OR INTERFERES WITH MANY ACTIVE NOT PASSIVE ACTIVITIES
PAIN_FUNCTIONAL_ASSESSMENT: PREVENTS OR INTERFERES SOME ACTIVE ACTIVITIES AND ADLS

## 2024-04-22 ASSESSMENT — ENCOUNTER SYMPTOMS
CHEST TIGHTNESS: 0
ABDOMINAL PAIN: 1
SHORTNESS OF BREATH: 0

## 2024-04-22 NOTE — PROGRESS NOTES
Infectious Disease progress Note        Reason: Perforated appendicitis, peritonitis/abscess    Current abx Prior abx   Piperacillin/tazobactam since 4/17      Lines:       Assessment :  63-year-old male with past medical history significant for type 2 diabetes, status postcholecystectomy admitted to [facility] on 4/17/2024 with increasing right lower abdominal pain.    Clinical presentation consistent with localized peritonitis, abdominal abscess likely secondary to perforated appendicitis    Surgery follow-up appreciated.  S/p IR guided drainage 4/19/24  Body fluid cx: no growth at 4 days. D/w micro lab    Persistent abdominal pain,intermittent nausea- rule out recurrent perforation, worsening peritonitis       Blister right lower abdomen: likely contact dermatitis    Recommendations:    Continue piperacillin/tazobactam  Obtain CT abdomen/pelvis with IV/PO  contrast stat  Follow-up IR/surgery recommendations regarding abdominal drain  Follow-up abscess fluid cultures.  Modify antibiotics accordingly  Agree with dressing/tape change right abdomen for contact dermatitis    Addendum: 15:20 pm   Redemonstration of dilated appendix with interval placement of pigtail  catheter at the right lower quadrant within the periappendiceal collection,  which is overall unchanged in size compared to prior and again measures 4.2 x 3.2 cm. surgery follow up appreciated. -catheter was placed in the off position on examination so it was unable to drain.s/p catheter flush per surgery      Above plan was discussed in details with patient, family and dr. Dasilva. All questions answered to their full satisfaction. Spent additional 20 minutes in management and evaluation of this patient. >50% time spent in counselling and coordination of care.       Please call me if any further questions or concerns. Will continue to participate in the care of this patient.  HPI:    Continues to have abdominal pain,intermittent nausea. Afraid to eat much due to  Or    dextrose bolus 10% 250 mL  250 mL IntraVENous PRN    Glucagon Emergency SOLR 1 mg  1 mg SubCUTAneous PRN    dextrose 10 % infusion   IntraVENous Continuous PRN    famotidine (PEPCID) 20 mg in sodium chloride (PF) 0.9 % 10 mL injection  20 mg IntraVENous BID    enoxaparin Sodium (LOVENOX) injection 30 mg  30 mg SubCUTAneous BID    piperacillin-tazobactam (ZOSYN) 3,375 mg in sodium chloride 0.9 % 50 mL IVPB (mini-bag)  3,375 mg IntraVENous Q8H    0.9 % sodium chloride infusion   IntraVENous Continuous    morphine (PF) injection 2 mg  2 mg IntraVENous Q4H PRN       Allergies: Patient has no known allergies.    Family History   Problem Relation Age of Onset    Sickle Cell Trait Other     Diabetes Maternal Grandmother     Diabetes Sister     Diabetes Mother      Social History     Socioeconomic History    Marital status: Single     Spouse name: Not on file    Number of children: Not on file    Years of education: Not on file    Highest education level: Not on file   Occupational History    Not on file   Tobacco Use    Smoking status: Some Days     Current packs/day: 0.25     Types: Cigarettes    Smokeless tobacco: Former     Quit date: 1/1/2016   Vaping Use    Vaping Use: Never used   Substance and Sexual Activity    Alcohol use: No    Drug use: No    Sexual activity: Not on file   Other Topics Concern    Not on file   Social History Narrative    Not on file     Social Determinants of Health     Financial Resource Strain: Medium Risk (11/28/2023)    Overall Financial Resource Strain (CARDIA)     Difficulty of Paying Living Expenses: Somewhat hard   Food Insecurity: No Food Insecurity (4/17/2024)    Hunger Vital Sign     Worried About Running Out of Food in the Last Year: Never true     Ran Out of Food in the Last Year: Never true   Transportation Needs: No Transportation Needs (4/17/2024)    PRAPARE - Transportation     Lack of Transportation (Medical): No     Lack of Transportation (Non-Medical): No   Physical

## 2024-04-22 NOTE — CONSULTS
Room #: 212      DONTA: [id number]      Situation: Wound Care Consult    Background:    PMH:   Active Ambulatory Problems     Diagnosis Date Noted    Type 2 diabetes with nephropathy ([facility]) 03/05/2018    Hyperlipidemia 06/07/2017    Diabetic polyneuropathy associated with type 2 diabetes mellitus ([facility]) 06/04/2018    Chronic idiopathic gout involving toe of right foot without tophus 06/04/2018    Morbid obesity ([facility]) 06/07/2017    Primary open angle glaucoma     DM type 2 with diabetic peripheral neuropathy ([facility]) 01/01/2018     Resolved Ambulatory Problems     Diagnosis Date Noted    No Resolved Ambulatory Problems     Past Medical History:   Diagnosis Date    Diabetes ([facility])     Hypertensive retinopathy     Knee pain, left     Pneumonia     Sickle cell trait ([facility])         Christiano Score: 21/23   BMI: Body mass index is 42.07 kg/m².   Preventive measures in place: limited layers    Assessment:   Patient found sitting.  Patient is Awake and alert, Oriented x person, place, time and situation, Calm and relaxed, and Pleasant and conversant.     Wound(s) Description:           Wound 04/20/24 Abdomen Lateral;Lower;Right Several blisters, linear on 2 sides and under edge of the square dressing. (Active)   Wound Image    04/22/24 1131   Wound Etiology Other 04/22/24 1131   Dressing Status New dressing applied 04/22/24 1131   Wound Cleansed Not Cleansed 04/21/24 0802   Dressing/Treatment Open to air 04/21/24 0802   Wound Assessment Fluid filled blister 04/20/24 2240   Drainage Amount Moderate (25-50%) 04/22/24 1131   Drainage Description Serous 04/22/24 1131   Odor None 04/22/24 1131   Estrella-wound Assessment Intact 04/22/24 1131   Number of days: 1                Recommendations:    Wound care orders as follows: clean around drain tube with saline, then apply Adaptic and cover with dry dressing. Tape minimally.       Supplies Used: drain sponge, Adaptic, saline          Care turned over to nursing staff at this time.    Mary HOLLINGSWORTH  Dilan SHIN, RN, [facility], COCN, CLIN IV  [facility] Wound Care Dept  Office: [phone]  Work Cell: [phone]

## 2024-04-22 NOTE — PROGRESS NOTES
mEq Oral BID    sodium chloride flush 0.9 % injection 5-40 mL  5-40 mL IntraVENous 2 times per day    sodium chloride flush 0.9 % injection 5-40 mL  5-40 mL IntraVENous PRN    0.9 % sodium chloride infusion   IntraVENous PRN    potassium chloride 10 mEq/100 mL IVPB (Peripheral Line)  10 mEq IntraVENous PRN    magnesium sulfate 2000 mg in 50 mL IVPB premix  2,000 mg IntraVENous PRN    ondansetron (ZOFRAN-ODT) disintegrating tablet 4 mg  4 mg Oral Q8H PRN    Or    ondansetron (ZOFRAN) injection 4 mg  4 mg IntraVENous Q6H PRN    polyethylene glycol (GLYCOLAX) packet 17 g  17 g Oral Daily PRN    acetaminophen (TYLENOL) tablet 650 mg  650 mg Oral Q6H PRN    Or    acetaminophen (TYLENOL) suppository 650 mg  650 mg Rectal Q6H PRN    insulin lispro (HUMALOG) injection vial 0-4 Units  0-4 Units SubCUTAneous TID WC    insulin lispro (HUMALOG) injection vial 0-4 Units  0-4 Units SubCUTAneous Nightly    glucose chewable tablet 16 g  4 tablet Oral PRN    dextrose bolus 10% 125 mL  125 mL IntraVENous PRN    Or    dextrose bolus 10% 250 mL  250 mL IntraVENous PRN    Glucagon Emergency SOLR 1 mg  1 mg SubCUTAneous PRN    dextrose 10 % infusion   IntraVENous Continuous PRN    famotidine (PEPCID) 20 mg in sodium chloride (PF) 0.9 % 10 mL injection  20 mg IntraVENous BID    enoxaparin Sodium (LOVENOX) injection 30 mg  30 mg SubCUTAneous BID    piperacillin-tazobactam (ZOSYN) 3,375 mg in sodium chloride 0.9 % 50 mL IVPB (mini-bag)  3,375 mg IntraVENous Q8H    0.9 % sodium chloride infusion   IntraVENous Continuous    morphine (PF) injection 2 mg  2 mg IntraVENous Q4H PRN       Imaging:   CT ABDOMEN PELVIS WO CONTRAST Additional Contrast? Radiologist Recommendation    Result Date: 4/17/2024  Findings are most compatible with acute appendicitis and contained perforation/abscess at the appendiceal tip. Cirrhotic liver. Cholecystectomy.        Labs:    Recent Results (from the past 48 hour(s))   CBC    Collection Time: 04/20/24  1:59 PM    Result Value Ref Range    WBC 8.6 4.6 - 13.2 K/uL    RBC 3.73 (L) 4.35 - 5.65 M/uL    Hemoglobin 11.1 (L) 13.0 - 16.0 g/dL    Hematocrit 32.4 (L) 36.0 - 48.0 %    MCV 86.9 78.0 - 100.0 FL    MCH 29.8 24.0 - 34.0 PG    MCHC 34.3 31.0 - 37.0 g/dL    RDW 13.1 11.6 - 14.5 %    Platelets 206 135 - 420 K/uL    MPV 10.5 9.2 - 11.8 FL    Nucleated RBCs 0.0 0  WBC    nRBC 0.00 0.00 - 0.01 K/uL   Basic Metabolic Panel    Collection Time: 04/20/24  1:59 PM   Result Value Ref Range    Sodium 137 136 - 145 mmol/L    Potassium 3.2 (L) 3.5 - 5.5 mmol/L    Chloride 107 100 - 111 mmol/L    CO2 26 21 - 32 mmol/L    Anion Gap 4 3.0 - 18 mmol/L    Glucose 194 (H) 74 - 99 mg/dL    BUN 4 (L) 7.0 - 18 MG/DL    Creatinine 0.75 0.6 - 1.3 MG/DL    Bun/Cre Ratio 5 (L) 12 - 20      Est, Glom Filt Rate >90 >60 ml/min/1.73m2    Calcium 8.3 (L) 8.5 - 10.1 MG/DL   POCT Glucose    Collection Time: 04/20/24  3:46 PM   Result Value Ref Range    POC Glucose 157 (H) 70 - 110 mg/dL   POCT Glucose    Collection Time: 04/20/24 10:33 PM   Result Value Ref Range    POC Glucose 150 (H) 70 - 110 mg/dL   CBC    Collection Time: 04/21/24  3:40 AM   Result Value Ref Range    WBC 7.9 4.6 - 13.2 K/uL    RBC 3.73 (L) 4.35 - 5.65 M/uL    Hemoglobin 11.2 (L) 13.0 - 16.0 g/dL    Hematocrit 32.3 (L) 36.0 - 48.0 %    MCV 86.6 78.0 - 100.0 FL    MCH 30.0 24.0 - 34.0 PG    MCHC 34.7 31.0 - 37.0 g/dL    RDW 13.0 11.6 - 14.5 %    Platelets 218 135 - 420 K/uL    MPV 10.2 9.2 - 11.8 FL    Nucleated RBCs 0.0 0  WBC    nRBC 0.00 0.00 - 0.01 K/uL   Basic Metabolic Panel    Collection Time: 04/21/24  3:40 AM   Result Value Ref Range    Sodium 137 136 - 145 mmol/L    Potassium 3.4 (L) 3.5 - 5.5 mmol/L    Chloride 108 100 - 111 mmol/L    CO2 24 21 - 32 mmol/L    Anion Gap 5 3.0 - 18 mmol/L    Glucose 142 (H) 74 - 99 mg/dL    BUN 5 (L) 7.0 - 18 MG/DL    Creatinine 0.70 0.6 - 1.3 MG/DL    Bun/Cre Ratio 7 (L) 12 - 20      Est, Glom Filt Rate >90 >60 ml/min/1.73m2

## 2024-04-22 NOTE — PROGRESS NOTES
Conjunctiva/sclera: Conjunctivae normal.      Pupils: Pupils are equal, round, and reactive to light.   Pulmonary:      Effort: Pulmonary effort is normal.   Abdominal:      Comments: Tenderness right lower quadrant without guarding or rebound  Scant drainage in bulb   Neurological:      General: No focal deficit present.      Mental Status: He is alert and oriented to person, place, and time. Mental status is at baseline.   Psychiatric:         Mood and Affect: Mood normal.         Behavior: Behavior normal.         Thought Content: Thought content normal.         Judgment: Judgment normal.               Madhuri Batista DO  Phone: [phone]

## 2024-04-22 NOTE — CARE COORDINATION
04/22/24 0916   /Social Work Whiteboard Notes   /Social Work Whiteboard RED: 04/22/2024 Patient not medically stable. IV abx and patient dispo is home with family to transport. FRANCISCA

## 2024-04-23 LAB
BACTERIA SPEC CULT: ABNORMAL
BACTERIA SPEC CULT: NORMAL
GLUCOSE BLD STRIP.AUTO-MCNC: 130 MG/DL (ref 70–110)
GLUCOSE BLD STRIP.AUTO-MCNC: 166 MG/DL (ref 70–110)
GLUCOSE BLD STRIP.AUTO-MCNC: 175 MG/DL (ref 70–110)
GLUCOSE BLD STRIP.AUTO-MCNC: 202 MG/DL (ref 70–110)
GLUCOSE BLD STRIP.AUTO-MCNC: 226 MG/DL (ref 70–110)
GRAM STN SPEC: NORMAL
GRAM STN SPEC: NORMAL
SERVICE CMNT-IMP: ABNORMAL
SERVICE CMNT-IMP: NORMAL

## 2024-04-23 PROCEDURE — 2580000003 HC RX 258: Performed by: HOSPITALIST

## 2024-04-23 PROCEDURE — 1100000003 HC PRIVATE W/ TELEMETRY

## 2024-04-23 PROCEDURE — 99232 SBSQ HOSP IP/OBS MODERATE 35: CPT | Performed by: HOSPITALIST

## 2024-04-23 PROCEDURE — 6360000002 HC RX W HCPCS: Performed by: HOSPITALIST

## 2024-04-23 PROCEDURE — 2500000003 HC RX 250 WO HCPCS: Performed by: HOSPITALIST

## 2024-04-23 PROCEDURE — 6370000000 HC RX 637 (ALT 250 FOR IP): Performed by: HOSPITALIST

## 2024-04-23 PROCEDURE — A4216 STERILE WATER/SALINE, 10 ML: HCPCS | Performed by: HOSPITALIST

## 2024-04-23 PROCEDURE — 82962 GLUCOSE BLOOD TEST: CPT

## 2024-04-23 RX ORDER — OXYCODONE HYDROCHLORIDE AND ACETAMINOPHEN 5; 325 MG/1; MG/1
2 TABLET ORAL EVERY 4 HOURS PRN
Status: DISCONTINUED | OUTPATIENT
Start: 2024-04-23 | End: 2024-04-26 | Stop reason: HOSPADM

## 2024-04-23 RX ADMIN — OXYCODONE AND ACETAMINOPHEN 2 TABLET: 5; 325 TABLET ORAL at 22:19

## 2024-04-23 RX ADMIN — POTASSIUM CHLORIDE 40 MEQ: 1500 TABLET, EXTENDED RELEASE ORAL at 21:10

## 2024-04-23 RX ADMIN — SODIUM CHLORIDE, PRESERVATIVE FREE 10 ML: 5 INJECTION INTRAVENOUS at 21:25

## 2024-04-23 RX ADMIN — INSULIN LISPRO 1 UNITS: 100 INJECTION, SOLUTION INTRAVENOUS; SUBCUTANEOUS at 13:18

## 2024-04-23 RX ADMIN — MORPHINE SULFATE 2 MG: 2 INJECTION, SOLUTION INTRAMUSCULAR; INTRAVENOUS at 05:58

## 2024-04-23 RX ADMIN — POTASSIUM CHLORIDE 40 MEQ: 1500 TABLET, EXTENDED RELEASE ORAL at 08:47

## 2024-04-23 RX ADMIN — ENOXAPARIN SODIUM 30 MG: 100 INJECTION SUBCUTANEOUS at 21:18

## 2024-04-23 RX ADMIN — PIPERACILLIN SODIUM AND TAZOBACTAM SODIUM 3375 MG: 3; .375 INJECTION, POWDER, LYOPHILIZED, FOR SOLUTION INTRAVENOUS at 06:16

## 2024-04-23 RX ADMIN — ENOXAPARIN SODIUM 30 MG: 100 INJECTION SUBCUTANEOUS at 08:48

## 2024-04-23 RX ADMIN — OXYCODONE AND ACETAMINOPHEN 2 TABLET: 5; 325 TABLET ORAL at 17:29

## 2024-04-23 RX ADMIN — MORPHINE SULFATE 2 MG: 2 INJECTION, SOLUTION INTRAMUSCULAR; INTRAVENOUS at 13:38

## 2024-04-23 RX ADMIN — SODIUM CHLORIDE: 9 INJECTION, SOLUTION INTRAVENOUS at 02:44

## 2024-04-23 RX ADMIN — FAMOTIDINE 20 MG: 10 INJECTION, SOLUTION INTRAVENOUS at 08:47

## 2024-04-23 RX ADMIN — OXYCODONE AND ACETAMINOPHEN 1 TABLET: 5; 325 TABLET ORAL at 08:47

## 2024-04-23 RX ADMIN — PIPERACILLIN SODIUM AND TAZOBACTAM SODIUM 3375 MG: 3; .375 INJECTION, POWDER, LYOPHILIZED, FOR SOLUTION INTRAVENOUS at 13:18

## 2024-04-23 RX ADMIN — FAMOTIDINE 20 MG: 10 INJECTION, SOLUTION INTRAVENOUS at 21:11

## 2024-04-23 RX ADMIN — LOSARTAN POTASSIUM 50 MG: 50 TABLET, FILM COATED ORAL at 08:47

## 2024-04-23 RX ADMIN — PIPERACILLIN SODIUM AND TAZOBACTAM SODIUM 3375 MG: 3; .375 INJECTION, POWDER, LYOPHILIZED, FOR SOLUTION INTRAVENOUS at 21:15

## 2024-04-23 RX ADMIN — SODIUM CHLORIDE, PRESERVATIVE FREE 10 ML: 5 INJECTION INTRAVENOUS at 08:48

## 2024-04-23 ASSESSMENT — PAIN SCALES - GENERAL
PAINLEVEL_OUTOF10: 1
PAINLEVEL_OUTOF10: 5
PAINLEVEL_OUTOF10: 8
PAINLEVEL_OUTOF10: 7
PAINLEVEL_OUTOF10: 9
PAINLEVEL_OUTOF10: 5
PAINLEVEL_OUTOF10: 7
PAINLEVEL_OUTOF10: 4
PAINLEVEL_OUTOF10: 7
PAINLEVEL_OUTOF10: 4
PAINLEVEL_OUTOF10: 10
PAINLEVEL_OUTOF10: 7
PAINLEVEL_OUTOF10: 5

## 2024-04-23 ASSESSMENT — PAIN DESCRIPTION - ONSET
ONSET: ON-GOING
ONSET: GRADUAL

## 2024-04-23 ASSESSMENT — PAIN DESCRIPTION - ORIENTATION
ORIENTATION: RIGHT

## 2024-04-23 ASSESSMENT — PAIN DESCRIPTION - DESCRIPTORS
DESCRIPTORS: ACHING;THROBBING
DESCRIPTORS: ACHING

## 2024-04-23 ASSESSMENT — PAIN DESCRIPTION - LOCATION
LOCATION: FLANK
LOCATION: ABDOMEN
LOCATION: ABDOMEN
LOCATION: FLANK
LOCATION: ABDOMEN;FLANK

## 2024-04-23 ASSESSMENT — PAIN - FUNCTIONAL ASSESSMENT: PAIN_FUNCTIONAL_ASSESSMENT: PREVENTS OR INTERFERES SOME ACTIVE ACTIVITIES AND ADLS

## 2024-04-23 ASSESSMENT — PAIN DESCRIPTION - FREQUENCY
FREQUENCY: CONTINUOUS
FREQUENCY: INTERMITTENT

## 2024-04-23 ASSESSMENT — PAIN DESCRIPTION - PAIN TYPE: TYPE: ACUTE PAIN

## 2024-04-23 NOTE — PROGRESS NOTES
Infectious Disease progress Note        Reason: Perforated appendicitis, peritonitis/abscess    Current abx Prior abx   Piperacillin/tazobactam since 4/17      Lines:       Assessment :  63-year-old male with past medical history significant for type 2 diabetes, status postcholecystectomy admitted to [facility] on 4/17/2024 with increasing right lower abdominal pain.    Clinical presentation consistent with localized peritonitis, abdominal abscess likely secondary to perforated appendicitis    Surgery follow-up appreciated.  S/p IR guided drainage 4/19/24  Body fluid cx: no growth at 4 days.     Persistent abdominal pain,intermittent nausea 4/22- likely due to inadequate drainage.   No change in the size of abdominal abscess noted on repeat CT scan 4/22/2024.  IR follow-up appreciated.  Drain flushed and switched on position with improved drainage.    Improved abdominal tenderness noted on today's exam.  Wife remains concerned for patient's abdominal pain.         Blister right lower abdomen: likely contact dermatitis    Recommendations:    Continue piperacillin/tazobactam  Follow-up IR/surgery recommendations regarding abdominal drain  3.  Will switch to oral antibiotics once cleared for discharge by IR/general surgery       Above plan was discussed in details with patient, family and dr. Blake    Please call me if any further questions or concerns. Will continue to participate in the care of this patient.  HPI:    Feels better.  Improved abdominal pain.  Denies nausea, vomiting    Past Medical History:   Diagnosis Date    Diabetes (HCC)     DM type 2 with diabetic peripheral neuropathy (HCC) 2018    Dr Freitas    Hypertensive retinopathy     Knee pain, left     Morbid obesity (HCC)     Pneumonia     Primary open angle glaucoma     Sickle cell trait (HCC)        Past Surgical History:   Procedure Laterality Date    COLONOSCOPY  2012    normal, 10 year recall    COLONOSCOPY N/A 1/18/2023    COLONOSCOPY w/ polypectomy performed

## 2024-04-23 NOTE — CARE COORDINATION
04/23/24 1613   /Social Work Whiteboard Notes   /Social Work Whiteboard RED 04/23/2024 Patient not medically steady and patient dispo is home and family to transport. FRANCISCA Madrid BSW   Case Management

## 2024-04-23 NOTE — PROGRESS NOTES
Rhythm.  GI:  Soft, Non distended, right lower quadrant tenderness. + Bowel sounds.  Extremities:  No edema. No calf tenderness.   Psych: Good insight. Not anxious or agitated.  Neurologic: Alert and oriented X 3. Moves all ext.  Additional: Right lower quadrant drain and present    Medications:   Current Facility-Administered Medications   Medication Dose Route Frequency    diatrizoate meglumine-sodium (GASTROGRAFIN) 66-10 % solution 30 mL  30 mL Oral ONCE PRN    diphenhydrAMINE (BENADRYL) capsule 25 mg  25 mg Oral Q6H PRN    oxyCODONE-acetaminophen (PERCOCET) 5-325 MG per tablet 1 tablet  1 tablet Oral Q4H PRN    losartan (COZAAR) tablet 50 mg  50 mg Oral Daily    potassium chloride (KLOR-CON M) extended release tablet 40 mEq  40 mEq Oral BID    sodium chloride flush 0.9 % injection 5-40 mL  5-40 mL IntraVENous 2 times per day    sodium chloride flush 0.9 % injection 5-40 mL  5-40 mL IntraVENous PRN    0.9 % sodium chloride infusion   IntraVENous PRN    potassium chloride 10 mEq/100 mL IVPB (Peripheral Line)  10 mEq IntraVENous PRN    magnesium sulfate 2000 mg in 50 mL IVPB premix  2,000 mg IntraVENous PRN    ondansetron (ZOFRAN-ODT) disintegrating tablet 4 mg  4 mg Oral Q8H PRN    Or    ondansetron (ZOFRAN) injection 4 mg  4 mg IntraVENous Q6H PRN    polyethylene glycol (GLYCOLAX) packet 17 g  17 g Oral Daily PRN    acetaminophen (TYLENOL) tablet 650 mg  650 mg Oral Q6H PRN    Or    acetaminophen (TYLENOL) suppository 650 mg  650 mg Rectal Q6H PRN    insulin lispro (HUMALOG) injection vial 0-4 Units  0-4 Units SubCUTAneous TID WC    insulin lispro (HUMALOG) injection vial 0-4 Units  0-4 Units SubCUTAneous Nightly    glucose chewable tablet 16 g  4 tablet Oral PRN    dextrose bolus 10% 125 mL  125 mL IntraVENous PRN    Or    dextrose bolus 10% 250 mL  250 mL IntraVENous PRN    Glucagon Emergency SOLR 1 mg  1 mg SubCUTAneous PRN    dextrose 10 % infusion   IntraVENous Continuous PRN    famotidine (PEPCID) 20 mg in

## 2024-04-23 NOTE — PROGRESS NOTES
Interventional Radiology Progress Note    Patient: Vlad King               Sex: male          DOA: 4/17/2024       YOB: 1960      Age:  63 y.o.        LOS: 6 days       Assessment/Plan     Patient is POD#4 s/p RLQ drain placement by Dr. Judd.     1.  CT from 4/22/24 reviewed - collection appears significantly improved from prior with the drain in good position.     2.  Maintain lateral compression on BRDOIE bulb with flushes Q shift of sterile saline. Please make sure 3 way valve is returned to \"open\" position     3.  IR drain sinogram planned for 4/24/24 to rule out fistula. OK for regular diet, anticoagulation etc as no sedation is needed    Subjective:     The patient endorses discomfort at the procedure site as well as diffuse abdominal discomfort  Endorses dyspnea on exertion as well as nausea     The patient denies vomiting, HA, dizziness, fever, chills, abdominal pain, cough    Objective:      Visit Vitals  BP (!) 147/75   Pulse 55   Temp 98.9 °F (37.2 °C) (Oral)   Resp 19   Ht 1.575 m (5' 2\")   Wt 104.3 kg (230 lb)   SpO2 98%   BMI 42.07 kg/m²     Recent images:  Procedure images are available for review in PACS  CT 4/22/24 reviewed with Dr. Buchanan    Interval history:  No leukocytosis. Contact dermatitis at drain dressing site. Drain found to be in \"off\" position by general surgery yesterday.     Output listed as 35 mL yesterday from <10<45<122 mL    Physical Exam:  Constitutional: NAD. A&Ox4.  Respiratory: Normal respiratory effort. Symmetrical rise and fall of chest.    Cardiovascular: Regular rate.   Gastrointestinal: Soft, NT, ND.  Procedure site: TTP, dressings intact, drain flushes easily with some dark red sediment / serosanguineous output in BROIDE bulb    Thank you,  Kacie Velasquez, PA  [phone]

## 2024-04-24 ENCOUNTER — APPOINTMENT (OUTPATIENT)
Facility: HOSPITAL | Age: 64
End: 2024-04-24
Payer: COMMERCIAL

## 2024-04-24 LAB
ANION GAP SERPL CALC-SCNC: 3 MMOL/L (ref 3–18)
BASOPHILS # BLD: 0 K/UL (ref 0–0.1)
BASOPHILS NFR BLD: 0 % (ref 0–2)
BUN SERPL-MCNC: 6 MG/DL (ref 7–18)
BUN/CREAT SERPL: 9 (ref 12–20)
CALCIUM SERPL-MCNC: 8.9 MG/DL (ref 8.5–10.1)
CHLORIDE SERPL-SCNC: 105 MMOL/L (ref 100–111)
CO2 SERPL-SCNC: 26 MMOL/L (ref 21–32)
CREAT SERPL-MCNC: 0.69 MG/DL (ref 0.6–1.3)
DIFFERENTIAL METHOD BLD: ABNORMAL
EOSINOPHIL # BLD: 0.2 K/UL (ref 0–0.4)
EOSINOPHIL NFR BLD: 3 % (ref 0–5)
ERYTHROCYTE [DISTWIDTH] IN BLOOD BY AUTOMATED COUNT: 13.2 % (ref 11.6–14.5)
GLUCOSE BLD STRIP.AUTO-MCNC: 147 MG/DL (ref 70–110)
GLUCOSE BLD STRIP.AUTO-MCNC: 158 MG/DL (ref 70–110)
GLUCOSE BLD STRIP.AUTO-MCNC: 163 MG/DL (ref 70–110)
GLUCOSE BLD STRIP.AUTO-MCNC: 260 MG/DL (ref 70–110)
GLUCOSE SERPL-MCNC: 169 MG/DL (ref 74–99)
HCT VFR BLD AUTO: 33.9 % (ref 36–48)
HGB BLD-MCNC: 11.4 G/DL (ref 13–16)
IMM GRANULOCYTES # BLD AUTO: 0 K/UL (ref 0–0.04)
IMM GRANULOCYTES NFR BLD AUTO: 0 % (ref 0–0.5)
LYMPHOCYTES # BLD: 2.9 K/UL (ref 0.9–3.6)
LYMPHOCYTES NFR BLD: 42 % (ref 21–52)
MAGNESIUM SERPL-MCNC: 1.8 MG/DL (ref 1.6–2.6)
MCH RBC QN AUTO: 29.2 PG (ref 24–34)
MCHC RBC AUTO-ENTMCNC: 33.6 G/DL (ref 31–37)
MCV RBC AUTO: 86.9 FL (ref 78–100)
MONOCYTES # BLD: 0.5 K/UL (ref 0.05–1.2)
MONOCYTES NFR BLD: 7 % (ref 3–10)
NEUTS SEG # BLD: 3.2 K/UL (ref 1.8–8)
NEUTS SEG NFR BLD: 48 % (ref 40–73)
NRBC # BLD: 0 K/UL (ref 0–0.01)
NRBC BLD-RTO: 0 PER 100 WBC
PLATELET # BLD AUTO: 221 K/UL (ref 135–420)
PMV BLD AUTO: 10.3 FL (ref 9.2–11.8)
POTASSIUM SERPL-SCNC: 4 MMOL/L (ref 3.5–5.5)
RBC # BLD AUTO: 3.9 M/UL (ref 4.35–5.65)
SODIUM SERPL-SCNC: 134 MMOL/L (ref 136–145)
WBC # BLD AUTO: 6.8 K/UL (ref 4.6–13.2)

## 2024-04-24 PROCEDURE — A4216 STERILE WATER/SALINE, 10 ML: HCPCS | Performed by: HOSPITALIST

## 2024-04-24 PROCEDURE — 99232 SBSQ HOSP IP/OBS MODERATE 35: CPT | Performed by: HOSPITALIST

## 2024-04-24 PROCEDURE — 2580000003 HC RX 258: Performed by: HOSPITALIST

## 2024-04-24 PROCEDURE — 85025 COMPLETE CBC W/AUTO DIFF WBC: CPT

## 2024-04-24 PROCEDURE — 1100000003 HC PRIVATE W/ TELEMETRY

## 2024-04-24 PROCEDURE — 2500000003 HC RX 250 WO HCPCS: Performed by: HOSPITALIST

## 2024-04-24 PROCEDURE — 80048 BASIC METABOLIC PNL TOTAL CA: CPT

## 2024-04-24 PROCEDURE — 6370000000 HC RX 637 (ALT 250 FOR IP): Performed by: HOSPITALIST

## 2024-04-24 PROCEDURE — 82962 GLUCOSE BLOOD TEST: CPT

## 2024-04-24 PROCEDURE — 6360000002 HC RX W HCPCS: Performed by: HOSPITALIST

## 2024-04-24 PROCEDURE — 83735 ASSAY OF MAGNESIUM: CPT

## 2024-04-24 PROCEDURE — 36415 COLL VENOUS BLD VENIPUNCTURE: CPT

## 2024-04-24 PROCEDURE — 76000 FLUOROSCOPY <1 HR PHYS/QHP: CPT

## 2024-04-24 PROCEDURE — 94761 N-INVAS EAR/PLS OXIMETRY MLT: CPT

## 2024-04-24 RX ORDER — POLYETHYLENE GLYCOL 3350 17 G/17G
17 POWDER, FOR SOLUTION ORAL DAILY
Status: DISCONTINUED | OUTPATIENT
Start: 2024-04-24 | End: 2024-04-26 | Stop reason: HOSPADM

## 2024-04-24 RX ORDER — BISACODYL 10 MG
10 SUPPOSITORY, RECTAL RECTAL DAILY PRN
Status: DISCONTINUED | OUTPATIENT
Start: 2024-04-24 | End: 2024-04-26 | Stop reason: HOSPADM

## 2024-04-24 RX ORDER — DOCUSATE SODIUM 100 MG/1
100 CAPSULE, LIQUID FILLED ORAL 2 TIMES DAILY PRN
Status: DISCONTINUED | OUTPATIENT
Start: 2024-04-24 | End: 2024-04-26 | Stop reason: HOSPADM

## 2024-04-24 RX ADMIN — ENOXAPARIN SODIUM 30 MG: 100 INJECTION SUBCUTANEOUS at 21:44

## 2024-04-24 RX ADMIN — OXYCODONE AND ACETAMINOPHEN 2 TABLET: 5; 325 TABLET ORAL at 21:59

## 2024-04-24 RX ADMIN — FAMOTIDINE 20 MG: 10 INJECTION, SOLUTION INTRAVENOUS at 08:30

## 2024-04-24 RX ADMIN — POLYETHYLENE GLYCOL 3350 17 G: 17 POWDER, FOR SOLUTION ORAL at 13:09

## 2024-04-24 RX ADMIN — LOSARTAN POTASSIUM 50 MG: 50 TABLET, FILM COATED ORAL at 08:30

## 2024-04-24 RX ADMIN — POTASSIUM CHLORIDE 40 MEQ: 1500 TABLET, EXTENDED RELEASE ORAL at 08:30

## 2024-04-24 RX ADMIN — OXYCODONE AND ACETAMINOPHEN 2 TABLET: 5; 325 TABLET ORAL at 13:09

## 2024-04-24 RX ADMIN — PIPERACILLIN SODIUM AND TAZOBACTAM SODIUM 3375 MG: 3; .375 INJECTION, POWDER, LYOPHILIZED, FOR SOLUTION INTRAVENOUS at 03:22

## 2024-04-24 RX ADMIN — FAMOTIDINE 20 MG: 10 INJECTION, SOLUTION INTRAVENOUS at 21:44

## 2024-04-24 RX ADMIN — SODIUM CHLORIDE, PRESERVATIVE FREE 10 ML: 5 INJECTION INTRAVENOUS at 08:32

## 2024-04-24 RX ADMIN — OXYCODONE AND ACETAMINOPHEN 2 TABLET: 5; 325 TABLET ORAL at 03:19

## 2024-04-24 RX ADMIN — ENOXAPARIN SODIUM 30 MG: 100 INJECTION SUBCUTANEOUS at 08:31

## 2024-04-24 RX ADMIN — MORPHINE SULFATE 2 MG: 2 INJECTION, SOLUTION INTRAMUSCULAR; INTRAVENOUS at 08:30

## 2024-04-24 RX ADMIN — PIPERACILLIN SODIUM AND TAZOBACTAM SODIUM 3375 MG: 3; .375 INJECTION, POWDER, LYOPHILIZED, FOR SOLUTION INTRAVENOUS at 13:14

## 2024-04-24 RX ADMIN — SODIUM CHLORIDE, PRESERVATIVE FREE 10 ML: 5 INJECTION INTRAVENOUS at 22:03

## 2024-04-24 RX ADMIN — OXYCODONE AND ACETAMINOPHEN 2 TABLET: 5; 325 TABLET ORAL at 18:06

## 2024-04-24 RX ADMIN — PIPERACILLIN SODIUM AND TAZOBACTAM SODIUM 3375 MG: 3; .375 INJECTION, POWDER, LYOPHILIZED, FOR SOLUTION INTRAVENOUS at 21:40

## 2024-04-24 RX ADMIN — INSULIN LISPRO 2 UNITS: 100 INJECTION, SOLUTION INTRAVENOUS; SUBCUTANEOUS at 17:27

## 2024-04-24 RX ADMIN — MORPHINE SULFATE 2 MG: 2 INJECTION, SOLUTION INTRAMUSCULAR; INTRAVENOUS at 16:03

## 2024-04-24 ASSESSMENT — PAIN SCALES - GENERAL
PAINLEVEL_OUTOF10: 7
PAINLEVEL_OUTOF10: 7
PAINLEVEL_OUTOF10: 5
PAINLEVEL_OUTOF10: 9
PAINLEVEL_OUTOF10: 7
PAINLEVEL_OUTOF10: 2
PAINLEVEL_OUTOF10: 7
PAINLEVEL_OUTOF10: 5
PAINLEVEL_OUTOF10: 7
PAINLEVEL_OUTOF10: 10
PAINLEVEL_OUTOF10: 5
PAINLEVEL_OUTOF10: 9
PAINLEVEL_OUTOF10: 5
PAINLEVEL_OUTOF10: 0

## 2024-04-24 ASSESSMENT — PAIN DESCRIPTION - DESCRIPTORS
DESCRIPTORS: ACHING
DESCRIPTORS: ACHING;STABBING
DESCRIPTORS: ACHING

## 2024-04-24 ASSESSMENT — PAIN DESCRIPTION - ORIENTATION
ORIENTATION: RIGHT
ORIENTATION: RIGHT;OUTER

## 2024-04-24 ASSESSMENT — PAIN - FUNCTIONAL ASSESSMENT: PAIN_FUNCTIONAL_ASSESSMENT: ACTIVITIES ARE NOT PREVENTED

## 2024-04-24 ASSESSMENT — PAIN DESCRIPTION - LOCATION
LOCATION: ABDOMEN
LOCATION: ABDOMEN;FLANK

## 2024-04-24 ASSESSMENT — PAIN DESCRIPTION - FREQUENCY: FREQUENCY: INTERMITTENT

## 2024-04-24 ASSESSMENT — PAIN DESCRIPTION - PAIN TYPE
TYPE: ACUTE PAIN
TYPE: ACUTE PAIN

## 2024-04-24 ASSESSMENT — PAIN DESCRIPTION - ONSET
ONSET: GRADUAL
ONSET: GRADUAL

## 2024-04-24 NOTE — PROGRESS NOTES
Preprocedure Assessment      Today 4/24/2024     Indication/Symptoms:  Vlad King is a 63 y.o. male with history of perforated appendicitis s/p RLQ drain placement 4/19/24 presents for sinogram to evaluate for fistula    The H & P and/or progress notes and any available imaging were reviewed.  The risks, indications and possible alternatives to the procedure, including doing nothing, were discussed and informed consent was obtained.    Physical Exam:     General: A&O x 4, NAD   Heart:   RRR  Lungs:    Normal work of breathing    The patient is an appropriate candidate to undergo the planned procedure and sedation.    DAVID Winters

## 2024-04-24 NOTE — PROGRESS NOTES
Infectious Disease progress Note        Reason: Perforated appendicitis, peritonitis/abscess    Current abx Prior abx   Piperacillin/tazobactam since 4/17      Lines:       Assessment :  63-year-old male with past medical history significant for type 2 diabetes, status postcholecystectomy admitted to [facility] on 4/17/2024 with increasing right lower abdominal pain.    Clinical presentation consistent with localized peritonitis, abdominal abscess likely secondary to perforated appendicitis    Surgery follow-up appreciated.  S/p IR guided drainage 4/19/24  Body fluid cx: no growth at 4 days.     Persistent abdominal pain,intermittent nausea 4/22- likely due to inadequate drainage.   No change in the size of abdominal abscess noted on repeat CT scan 4/22/2024.  IR follow-up appreciated.  Drain flushed and switched on position with improved drainage.    Improved abdominal tenderness noted on today's exam.  IR follow-up appreciated.  Plans for sinogram today noted         Blister right lower abdomen: likely contact dermatitis    Recommendations:    Continue piperacillin/tazobactam  Follow-up IR/surgery recommendations regarding abdominal drain/sinogram  3.  Will switch to oral antibiotics once cleared for discharge by IR/general surgery       Above plan was discussed in details with patient, family at bedside, primary team    Please call me if any further questions or concerns. Will continue to participate in the care of this patient.  HPI:    Feels better.  Improved abdominal pain.  Denies nausea, vomiting    Past Medical History:   Diagnosis Date    Diabetes (HCC)     DM type 2 with diabetic peripheral neuropathy (HCC) 2018    Dr Freitas    Hypertensive retinopathy     Knee pain, left     Morbid obesity (HCC)     Pneumonia     Primary open angle glaucoma     Sickle cell trait (HCC)        Past Surgical History:   Procedure Laterality Date    COLONOSCOPY  2012    normal, 10 year recall    COLONOSCOPY N/A 1/18/2023    COLONOSCOPY  SEEN        Culture NO GROWTH 4 DAYS       Culture, Anaerobic [[id number]]  (Abnormal) Collected: 04/19/24 1515    Order Status: Completed Specimen: Abdomen Fluid Updated: 04/23/24 0959     Special Requests --        RIGHT  ABDOMEN  DRAINAGE       Culture       FEW FUSOBACTERIUM NUCLEATUM                      RADIOLOGY:    All available imaging studies/reports in [facility] for this admission were reviewed        Disclaimer: Sections of this note are dictated utilizing voice recognition software, which may have resulted in some phonetic based errors in grammar and contents. Even though attempts were made to correct all the mistakes, some may have been missed, and remained in the body of the document. If questions arise, please contact our department.    Dr. Emperatriz Alanis, Infectious Disease Specialist  [phone]  April 24, 2024  2:38 PM

## 2024-04-24 NOTE — PROCEDURES
RADIOLOGY POST PROCEDURE NOTE     April 24, 2024       4:00 PM     Preoperative Diagnosis:   perforated appendicitis with abscess    Postoperative Diagnosis:  Same.    :  DAVID Winters    Assistant:  None.    Type of Anesthesia: not needed    Procedure/Description:  Image-guided RLQ drain sinogram    Findings:   10 Fr drain in RLQ  with anchor suture intact. No surrounding skin changes. Flushes easily. Some saline leaks around tube with flushing. Injection of contrast under fluoroscopy reveals persistent cavity in RLQ. No evidence of fistula.     Estimated blood Loss:  Minimal    Specimen Removed:  none    Blood transfusions:  None.    Implants:  None.    Complications: None    Condition: Stable    Blood thinning medications: OK to resume as clinically indicated    Discharge Plan:  continue present therapy    DAVID Winters

## 2024-04-24 NOTE — CARE COORDINATION
04/24/24 1337   /Social Work Whiteboard Notes   /Social Work Whiteboard RED 04/24/2024 Patient not medically ready. ID to clear. Patient dispo is home and family to transport. FRANCISCA Madrid BSW   Case Management

## 2024-04-24 NOTE — PLAN OF CARE
Comprehensive Nutrition Assessment    Type and Reason for Visit:  Initial (LOS)    Nutrition Recommendations/Plan:   Continue regular; 3 carb choices (45gm/meal)  Monitor weights, labs, PO intake and POC while admitted     Malnutrition Assessment:  Malnutrition Status:  Insufficient data (04/24/24 1930)    Context:  Acute Illness     Findings of the 6 clinical characteristics of malnutrition:  Energy Intake:  Unable to assess  Weight Loss:  No significant weight loss     Body Fat Loss:  Unable to assess     Muscle Mass Loss:  Unable to assess    Fluid Accumulation:  Unable to assess     Strength:  Not Performed    Nutrition Assessment:    Pt admitted for management of acute appendicitis with perforation localized peritonitis, abdominal abscess likely secondary to perforated appendicitis. Nausea and constipation, per pt. Last BM 4/21. Receiving IV antibiotics. PO intake %.    Nutrition Related Findings:    Last BM 4/21. No edema. Drain present in RLQ r/t appendicitis. Labs: glucose 260. Meds reviewed. Wound Type:  (drain placement site)       Current Nutrition Intake & Therapies:    Average Meal Intake: %  Average Supplements Intake: None Ordered  ADULT DIET; Regular; 3 carb choices (45 gm/meal)    Anthropometric Measures:  Height: 157.5 cm (5' 2\")  Ideal Body Weight (IBW): 118 lbs (54 kg)       Current Body Weight: 104.3 kg (230 lb), 194.9 % IBW.    Current BMI (kg/m2): 42.1        Weight Adjustment For: No Adjustment                 BMI Categories: Obese Class 3 (BMI 40.0 or greater)    Estimated Daily Nutrient Needs:  Energy Requirements Based On: Formula  Weight Used for Energy Requirements: Current  Energy (kcal/day): 4831-1451 (MSJ 1.1-1.2)  Weight Used for Protein Requirements: Current  Protein (g/day): 104-125 (1.0-1.2g/kg cbw)  Method Used for Fluid Requirements: 1 ml/kcal  Fluid (ml/day):      Nutrition Diagnosis:   Increased nutrient needs related to increase demand for energy/nutrients as  evidenced by other (comment) (promote positive nitrogen balance; drain present in RLQ)    Nutrition Interventions:   Food and/or Nutrient Delivery: Continue Current Diet  Nutrition Education/Counseling: No recommendation at this time  Coordination of Nutrition Care: Continue to monitor while inpatient       Goals:  Previous Goal Met: Progressing toward Goal(s)  Goals: Meet at least 75% of estimated needs, by next RD assessment       Nutrition Monitoring and Evaluation:   Behavioral-Environmental Outcomes: None Identified  Food/Nutrient Intake Outcomes: Diet Advancement/Tolerance, Food and Nutrient Intake  Physical Signs/Symptoms Outcomes: Biochemical Data, GI Status, Fluid Status or Edema, Meal Time Behavior, Nutrition Focused Physical Findings, Skin, Weight    Discharge Planning:    Too soon to determine     Yuliet Ortez RD  Contact: [phone]

## 2024-04-24 NOTE — PLAN OF CARE
Problem: Discharge Planning  Goal: Discharge to home or other facility with appropriate resources  Outcome: Progressing     Problem: Pain  Goal: Verbalizes/displays adequate comfort level or baseline comfort level  Outcome: Progressing     Problem: Chronic Conditions and Co-morbidities  Goal: Patient's chronic conditions and co-morbidity symptoms are monitored and maintained or improved  Outcome: Progressing     Problem: Safety - Adult  Goal: Free from fall injury  Outcome: Progressing     Problem: Nutrition Deficit:  Goal: Optimize nutritional status  Outcome: Progressing

## 2024-04-24 NOTE — PROGRESS NOTES
Wheezing/Rales.  Cardiovascular: Regular rate and Rhythm.  GI:  Soft, Non distended, right lower quadrant tenderness. + Bowel sounds.  Extremities:  No edema. No calf tenderness.   Psych: Good insight. Not anxious or agitated.  Neurologic: Alert and oriented X 3. Moves all ext.  Additional: Right lower quadrant drain and present    Medications:   Current Facility-Administered Medications   Medication Dose Route Frequency    polyethylene glycol (GLYCOLAX) packet 17 g  17 g Oral Daily    bisacodyl (DULCOLAX) suppository 10 mg  10 mg Rectal Daily PRN    docusate sodium (COLACE) capsule 100 mg  100 mg Oral BID PRN    oxyCODONE-acetaminophen (PERCOCET) 5-325 MG per tablet 2 tablet  2 tablet Oral Q4H PRN    diatrizoate meglumine-sodium (GASTROGRAFIN) 66-10 % solution 30 mL  30 mL Oral ONCE PRN    diphenhydrAMINE (BENADRYL) capsule 25 mg  25 mg Oral Q6H PRN    losartan (COZAAR) tablet 50 mg  50 mg Oral Daily    sodium chloride flush 0.9 % injection 5-40 mL  5-40 mL IntraVENous 2 times per day    sodium chloride flush 0.9 % injection 5-40 mL  5-40 mL IntraVENous PRN    0.9 % sodium chloride infusion   IntraVENous PRN    potassium chloride 10 mEq/100 mL IVPB (Peripheral Line)  10 mEq IntraVENous PRN    magnesium sulfate 2000 mg in 50 mL IVPB premix  2,000 mg IntraVENous PRN    ondansetron (ZOFRAN-ODT) disintegrating tablet 4 mg  4 mg Oral Q8H PRN    Or    ondansetron (ZOFRAN) injection 4 mg  4 mg IntraVENous Q6H PRN    acetaminophen (TYLENOL) tablet 650 mg  650 mg Oral Q6H PRN    Or    acetaminophen (TYLENOL) suppository 650 mg  650 mg Rectal Q6H PRN    insulin lispro (HUMALOG) injection vial 0-4 Units  0-4 Units SubCUTAneous TID WC    insulin lispro (HUMALOG) injection vial 0-4 Units  0-4 Units SubCUTAneous Nightly    glucose chewable tablet 16 g  4 tablet Oral PRN    dextrose bolus 10% 125 mL  125 mL IntraVENous PRN    Or    dextrose bolus 10% 250 mL  250 mL IntraVENous PRN    Glucagon Emergency SOLR 1 mg  1 mg  Absolute 0.2 0.0 - 0.4 K/UL    Basophils Absolute 0.0 0.0 - 0.1 K/UL    Immature Granulocytes Absolute 0.0 0.00 - 0.04 K/UL    Differential Type AUTOMATED     Magnesium    Collection Time: 04/24/24  4:22 AM   Result Value Ref Range    Magnesium 1.8 1.6 - 2.6 mg/dL   POCT Glucose    Collection Time: 04/24/24  7:13 AM   Result Value Ref Range    POC Glucose 147 (H) 70 - 110 mg/dL   POCT Glucose    Collection Time: 04/24/24 11:33 AM   Result Value Ref Range    POC Glucose 158 (H) 70 - 110 mg/dL       Signed By: Linda Coto MD     April 24, 2024      Disclaimer: Sections of this note are dictated using utilizing voice recognition software.  Minor typographical errors may be present. If questions arise, please do not hesitate to contact me or call our department.

## 2024-04-25 PROBLEM — E66.813 CLASS 3 SEVERE OBESITY WITH BODY MASS INDEX (BMI) OF 40.0 TO 44.9 IN ADULT: Status: ACTIVE | Noted: 2017-06-07

## 2024-04-25 PROBLEM — D57.3 SICKLE CELL TRAIT (HCC): Status: ACTIVE | Noted: 2024-04-25

## 2024-04-25 PROBLEM — E11.8 DIABETES MELLITUS TYPE 2 WITH COMPLICATIONS (HCC): Status: ACTIVE | Noted: 2018-01-01

## 2024-04-25 PROBLEM — E66.01 CLASS 3 SEVERE OBESITY WITH BODY MASS INDEX (BMI) OF 40.0 TO 44.9 IN ADULT (HCC): Status: ACTIVE | Noted: 2017-06-07

## 2024-04-25 LAB
ANION GAP SERPL CALC-SCNC: 4 MMOL/L (ref 3–18)
BASOPHILS # BLD: 0 K/UL (ref 0–0.1)
BASOPHILS NFR BLD: 1 % (ref 0–2)
BUN SERPL-MCNC: 7 MG/DL (ref 7–18)
BUN/CREAT SERPL: 10 (ref 12–20)
CALCIUM SERPL-MCNC: 8.9 MG/DL (ref 8.5–10.1)
CHLORIDE SERPL-SCNC: 104 MMOL/L (ref 100–111)
CO2 SERPL-SCNC: 26 MMOL/L (ref 21–32)
CREAT SERPL-MCNC: 0.69 MG/DL (ref 0.6–1.3)
DIFFERENTIAL METHOD BLD: ABNORMAL
EOSINOPHIL # BLD: 0.2 K/UL (ref 0–0.4)
EOSINOPHIL NFR BLD: 3 % (ref 0–5)
ERYTHROCYTE [DISTWIDTH] IN BLOOD BY AUTOMATED COUNT: 13.2 % (ref 11.6–14.5)
GLUCOSE BLD STRIP.AUTO-MCNC: 151 MG/DL (ref 70–110)
GLUCOSE BLD STRIP.AUTO-MCNC: 153 MG/DL (ref 70–110)
GLUCOSE BLD STRIP.AUTO-MCNC: 161 MG/DL (ref 70–110)
GLUCOSE BLD STRIP.AUTO-MCNC: 171 MG/DL (ref 70–110)
GLUCOSE SERPL-MCNC: 185 MG/DL (ref 74–99)
HCT VFR BLD AUTO: 33.7 % (ref 36–48)
HGB BLD-MCNC: 11.5 G/DL (ref 13–16)
IMM GRANULOCYTES # BLD AUTO: 0 K/UL (ref 0–0.04)
IMM GRANULOCYTES NFR BLD AUTO: 1 % (ref 0–0.5)
LYMPHOCYTES # BLD: 2.4 K/UL (ref 0.9–3.6)
LYMPHOCYTES NFR BLD: 36 % (ref 21–52)
MAGNESIUM SERPL-MCNC: 1.7 MG/DL (ref 1.6–2.6)
MCH RBC QN AUTO: 29.3 PG (ref 24–34)
MCHC RBC AUTO-ENTMCNC: 34.1 G/DL (ref 31–37)
MCV RBC AUTO: 85.8 FL (ref 78–100)
MONOCYTES # BLD: 0.5 K/UL (ref 0.05–1.2)
MONOCYTES NFR BLD: 8 % (ref 3–10)
NEUTS SEG # BLD: 3.5 K/UL (ref 1.8–8)
NEUTS SEG NFR BLD: 53 % (ref 40–73)
NRBC # BLD: 0 K/UL (ref 0–0.01)
NRBC BLD-RTO: 0 PER 100 WBC
PLATELET # BLD AUTO: 222 K/UL (ref 135–420)
PMV BLD AUTO: 10.5 FL (ref 9.2–11.8)
POTASSIUM SERPL-SCNC: 3.9 MMOL/L (ref 3.5–5.5)
RBC # BLD AUTO: 3.93 M/UL (ref 4.35–5.65)
SODIUM SERPL-SCNC: 134 MMOL/L (ref 136–145)
WBC # BLD AUTO: 6.6 K/UL (ref 4.6–13.2)

## 2024-04-25 PROCEDURE — 6360000002 HC RX W HCPCS: Performed by: HOSPITALIST

## 2024-04-25 PROCEDURE — 99233 SBSQ HOSP IP/OBS HIGH 50: CPT | Performed by: HOSPITALIST

## 2024-04-25 PROCEDURE — 85025 COMPLETE CBC W/AUTO DIFF WBC: CPT

## 2024-04-25 PROCEDURE — 80048 BASIC METABOLIC PNL TOTAL CA: CPT

## 2024-04-25 PROCEDURE — 2580000003 HC RX 258: Performed by: HOSPITALIST

## 2024-04-25 PROCEDURE — 2500000003 HC RX 250 WO HCPCS: Performed by: HOSPITALIST

## 2024-04-25 PROCEDURE — 36415 COLL VENOUS BLD VENIPUNCTURE: CPT

## 2024-04-25 PROCEDURE — 1100000003 HC PRIVATE W/ TELEMETRY

## 2024-04-25 PROCEDURE — 6370000000 HC RX 637 (ALT 250 FOR IP): Performed by: INTERNAL MEDICINE

## 2024-04-25 PROCEDURE — 6370000000 HC RX 637 (ALT 250 FOR IP): Performed by: HOSPITALIST

## 2024-04-25 PROCEDURE — 83735 ASSAY OF MAGNESIUM: CPT

## 2024-04-25 PROCEDURE — A4216 STERILE WATER/SALINE, 10 ML: HCPCS | Performed by: HOSPITALIST

## 2024-04-25 PROCEDURE — 94761 N-INVAS EAR/PLS OXIMETRY MLT: CPT

## 2024-04-25 PROCEDURE — 82962 GLUCOSE BLOOD TEST: CPT

## 2024-04-25 PROCEDURE — 99231 SBSQ HOSP IP/OBS SF/LOW 25: CPT | Performed by: SURGERY

## 2024-04-25 RX ORDER — CEFDINIR 300 MG/1
300 CAPSULE ORAL EVERY 12 HOURS SCHEDULED
Status: DISCONTINUED | OUTPATIENT
Start: 2024-04-25 | End: 2024-04-26 | Stop reason: HOSPADM

## 2024-04-25 RX ORDER — METRONIDAZOLE 500 MG/1
500 TABLET ORAL EVERY 8 HOURS SCHEDULED
Status: DISCONTINUED | OUTPATIENT
Start: 2024-04-25 | End: 2024-04-26 | Stop reason: HOSPADM

## 2024-04-25 RX ADMIN — METRONIDAZOLE 500 MG: 500 TABLET ORAL at 12:45

## 2024-04-25 RX ADMIN — MORPHINE SULFATE 2 MG: 2 INJECTION, SOLUTION INTRAMUSCULAR; INTRAVENOUS at 09:01

## 2024-04-25 RX ADMIN — ENOXAPARIN SODIUM 30 MG: 100 INJECTION SUBCUTANEOUS at 08:57

## 2024-04-25 RX ADMIN — METRONIDAZOLE 500 MG: 500 TABLET ORAL at 21:30

## 2024-04-25 RX ADMIN — FAMOTIDINE 20 MG: 10 INJECTION, SOLUTION INTRAVENOUS at 08:58

## 2024-04-25 RX ADMIN — ENOXAPARIN SODIUM 30 MG: 100 INJECTION SUBCUTANEOUS at 21:30

## 2024-04-25 RX ADMIN — CEFDINIR 300 MG: 300 CAPSULE ORAL at 12:45

## 2024-04-25 RX ADMIN — OXYCODONE AND ACETAMINOPHEN 2 TABLET: 5; 325 TABLET ORAL at 19:02

## 2024-04-25 RX ADMIN — OXYCODONE AND ACETAMINOPHEN 2 TABLET: 5; 325 TABLET ORAL at 22:40

## 2024-04-25 RX ADMIN — PIPERACILLIN SODIUM AND TAZOBACTAM SODIUM 3375 MG: 3; .375 INJECTION, POWDER, LYOPHILIZED, FOR SOLUTION INTRAVENOUS at 04:20

## 2024-04-25 RX ADMIN — SODIUM CHLORIDE, PRESERVATIVE FREE 10 ML: 5 INJECTION INTRAVENOUS at 08:58

## 2024-04-25 RX ADMIN — LOSARTAN POTASSIUM 50 MG: 50 TABLET, FILM COATED ORAL at 08:58

## 2024-04-25 RX ADMIN — POLYETHYLENE GLYCOL 3350 17 G: 17 POWDER, FOR SOLUTION ORAL at 08:57

## 2024-04-25 RX ADMIN — OXYCODONE AND ACETAMINOPHEN 2 TABLET: 5; 325 TABLET ORAL at 14:24

## 2024-04-25 RX ADMIN — SODIUM CHLORIDE, PRESERVATIVE FREE 10 ML: 5 INJECTION INTRAVENOUS at 21:29

## 2024-04-25 RX ADMIN — FAMOTIDINE 20 MG: 10 INJECTION, SOLUTION INTRAVENOUS at 21:29

## 2024-04-25 RX ADMIN — OXYCODONE AND ACETAMINOPHEN 2 TABLET: 5; 325 TABLET ORAL at 10:39

## 2024-04-25 ASSESSMENT — PAIN DESCRIPTION - FREQUENCY
FREQUENCY: CONTINUOUS
FREQUENCY: INTERMITTENT
FREQUENCY: INTERMITTENT

## 2024-04-25 ASSESSMENT — PAIN SCALES - WONG BAKER
WONGBAKER_NUMERICALRESPONSE: HURTS A LITTLE BIT
WONGBAKER_NUMERICALRESPONSE: HURTS LITTLE MORE
WONGBAKER_NUMERICALRESPONSE: HURTS A LITTLE BIT
WONGBAKER_NUMERICALRESPONSE: HURTS A LITTLE BIT

## 2024-04-25 ASSESSMENT — PAIN DESCRIPTION - DESCRIPTORS
DESCRIPTORS: SHOOTING;SHARP
DESCRIPTORS: ACHING;DISCOMFORT;SHARP
DESCRIPTORS: SHARP;SHOOTING;JABBING
DESCRIPTORS: ACHING;SHARP;SHOOTING;DISCOMFORT
DESCRIPTORS: ACHING;DISCOMFORT;SHARP
DESCRIPTORS: SHARP
DESCRIPTORS: DISCOMFORT;ACHING;SHARP
DESCRIPTORS: ACHING
DESCRIPTORS: DISCOMFORT;ACHING;SHARP;STABBING
DESCRIPTORS: SHARP
DESCRIPTORS: SHARP
DESCRIPTORS: ACHING;DISCOMFORT;SHARP;STABBING

## 2024-04-25 ASSESSMENT — PAIN DESCRIPTION - LOCATION
LOCATION: ABDOMEN
LOCATION: FLANK
LOCATION: ABDOMEN

## 2024-04-25 ASSESSMENT — PAIN SCALES - GENERAL
PAINLEVEL_OUTOF10: 8
PAINLEVEL_OUTOF10: 9
PAINLEVEL_OUTOF10: 9
PAINLEVEL_OUTOF10: 8
PAINLEVEL_OUTOF10: 9
PAINLEVEL_OUTOF10: 8
PAINLEVEL_OUTOF10: 9
PAINLEVEL_OUTOF10: 8
PAINLEVEL_OUTOF10: 9

## 2024-04-25 ASSESSMENT — PAIN DESCRIPTION - PAIN TYPE
TYPE: ACUTE PAIN

## 2024-04-25 ASSESSMENT — PAIN DESCRIPTION - ONSET: ONSET: ON-GOING

## 2024-04-25 ASSESSMENT — PAIN DESCRIPTION - ORIENTATION
ORIENTATION: RIGHT;LOWER
ORIENTATION: RIGHT
ORIENTATION: RIGHT;LOWER
ORIENTATION: RIGHT

## 2024-04-25 ASSESSMENT — ENCOUNTER SYMPTOMS: ABDOMINAL PAIN: 1

## 2024-04-25 NOTE — PROGRESS NOTES
Admit Date: 4/17/2024    Assessment    Vlad King is a 63 y.o. 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     Subjective    Overnight events: No acute events overnight.     Review of Systems   Constitutional:  Negative for fatigue and fever.   Cardiovascular:  Negative for chest pain.   Gastrointestinal:  Positive for abdominal pain.       Objective    Physical Exam:   BP (!) 168/97   Pulse 71   Temp 98.1 °F (36.7 °C) (Oral)   Resp 18   Ht 1.575 m (5' 2\")   Wt 104.3 kg (230 lb)   SpO2 98%   BMI 42.07 kg/m²     Intake/Output Summary (Last 24 hours) at 4/25/2024 1155  Last data filed at 4/24/2024 1732  Gross per 24 hour   Intake --   Output 30 ml   Net -30 ml     Physical Exam  Abdominal:      Comments: Drain serous   Neurological:      General: No focal deficit present.      Mental Status: He is oriented to person, place, and time. Mental status is at baseline.   Psychiatric:         Mood and Affect: Mood normal.         Behavior: Behavior normal.         Thought Content: Thought content normal.         Judgment: Judgment normal.               Madhuri Batista DO  Phone: [phone]

## 2024-04-25 NOTE — PROGRESS NOTES
[facility]  Progress Note    Patient: Vlad King Age: 63 y.o. : 1960 MR#: [id number] SSN: [id number]  Date/Time: 2024     Subjective: Patient sitting in the recliner with his sister and other family member at the bedside.  Patient still states his pain sometimes 8/10 in the right lower quadrant.  Pain medicine improved but he gets pain recurs after movements.  Sister at the bedside had multiple concerns and questions which were answered appropriately at the bedside.     Assessment/Plan:   Appendicitis with contained abscess, post drain placement by IR  Diabetes mellitus type 2  Obesity  History of sickle cell trait  History of open-angle glaucoma  Cirrhotic liver per CT finding  Constipation    Plan  Discussed with ID plan to switch to p.o. antibiotics and monitor  Discussed with general surgery, okay for discharge with outpatient follow-up and drain will be removed in 1 week if drain output improves and repeat CT will be done outpatient.  sinogram results noted, no signs of fistula  Will discontinue IV morphine, continue Percocet  Continue bowel regimen with MiraLAX, Colace and suppository as needed  Continue SSI and monitor blood sugars  Further plan based on hospital course    Discussed with patient, sister and other family member at the bedside and explained about my above plan care.  Discussed in detail about going home with the drain, home health care will be arranged for drain management and also education.  Discussed about follow-up with the surgical team for drain removal once drain output improves and possibly will need a repeat CT.  Explained in detail about disease process, treatment process and pain regimen.  Patient and family complaint understood and agreed with the plan.  Sister was not happy about the pain regimen but patient was good with this pain regimen and wants to see how he does today and agreeable for discharge home tomorrow if  Chloride 104 100 - 111 mmol/L    CO2 26 21 - 32 mmol/L    Anion Gap 4 3.0 - 18 mmol/L    Glucose 185 (H) 74 - 99 mg/dL    BUN 7 7.0 - 18 MG/DL    Creatinine 0.69 0.6 - 1.3 MG/DL    Bun/Cre Ratio 10 (L) 12 - 20      Est, Glom Filt Rate >90 >60 ml/min/1.73m2    Calcium 8.9 8.5 - 10.1 MG/DL   CBC with Auto Differential    Collection Time: 04/25/24  4:14 AM   Result Value Ref Range    WBC 6.6 4.6 - 13.2 K/uL    RBC 3.93 (L) 4.35 - 5.65 M/uL    Hemoglobin 11.5 (L) 13.0 - 16.0 g/dL    Hematocrit 33.7 (L) 36.0 - 48.0 %    MCV 85.8 78.0 - 100.0 FL    MCH 29.3 24.0 - 34.0 PG    MCHC 34.1 31.0 - 37.0 g/dL    RDW 13.2 11.6 - 14.5 %    Platelets 222 135 - 420 K/uL    MPV 10.5 9.2 - 11.8 FL    Nucleated RBCs 0.0 0  WBC    nRBC 0.00 0.00 - 0.01 K/uL    Neutrophils % 53 40 - 73 %    Lymphocytes % 36 21 - 52 %    Monocytes % 8 3 - 10 %    Eosinophils % 3 0 - 5 %    Basophils % 1 0 - 2 %    Immature Granulocytes % 1 (H) 0.0 - 0.5 %    Neutrophils Absolute 3.5 1.8 - 8.0 K/UL    Lymphocytes Absolute 2.4 0.9 - 3.6 K/UL    Monocytes Absolute 0.5 0.05 - 1.2 K/UL    Eosinophils Absolute 0.2 0.0 - 0.4 K/UL    Basophils Absolute 0.0 0.0 - 0.1 K/UL    Immature Granulocytes Absolute 0.0 0.00 - 0.04 K/UL    Differential Type AUTOMATED     Magnesium    Collection Time: 04/25/24  4:14 AM   Result Value Ref Range    Magnesium 1.7 1.6 - 2.6 mg/dL   POCT Glucose    Collection Time: 04/25/24  8:33 AM   Result Value Ref Range    POC Glucose 153 (H) 70 - 110 mg/dL   POCT Glucose    Collection Time: 04/25/24 12:32 PM   Result Value Ref Range    POC Glucose 171 (H) 70 - 110 mg/dL       Signed By: Linda Coto MD     April 25, 2024      Disclaimer: Sections of this note are dictated using utilizing voice recognition software.  Minor typographical errors may be present. If questions arise, please do not hesitate to contact me or call our department.

## 2024-04-25 NOTE — CARE COORDINATION
04/25/24 0943   /Social Work Whiteboard Notes   /Social Work Whiteboard RED 04/25/2024 ID to clear. When medically stable patient dispo is home family to transport. FRANCISCA Madrid BSW   Case Management

## 2024-04-25 NOTE — PROGRESS NOTES
1:00 AM    Patient's significant others verbalize wanting to speak to patient advocate concerning patient's care by the physician. She verbalize dissatisfaction with medical care. Information / number was given to Sophia Montelongo to give them a call in the morning.     1:07 AM    Ms. Montelongo came back to the desk to verbalize to the nurse that she is the only one to place blankets on her friend. Agree and will pass it on in report.

## 2024-04-25 NOTE — CARE COORDINATION
SW spoke with patient at bedside about star rated HH agencies and patient picked [facility]. BS declined because of patient insurance. Patient was placed in careport for other star rated facility.    Patient place in over 28 HH agencies and no acceptance as of yet.  Dr. Blake notified of no accepting facilities.    Padmini Madrid BSW   Case Management

## 2024-04-25 NOTE — PROGRESS NOTES
Dr. Blaek at bedside with patient along with sister and cousin. Dr. Blake discussed the course of his perforate appendix. He went into detail the purpose of the ENA drain. He explained this is the reason for a lot of his pain. Sister is asking questions for clarification. Families' biggest concern is pain management at home and care of the ena drain. Dr. Blake explained possible home health care for education of the ena drain. Patient states he wants to go home. Dr. Blake will reevaluate later in the afternoon

## 2024-04-25 NOTE — PROGRESS NOTES
on file   Social History Narrative    Not on file     Social Determinants of Health     Financial Resource Strain: Medium Risk (2023)    Overall Financial Resource Strain (CARDIA)     Difficulty of Paying Living Expenses: Somewhat hard   Food Insecurity: No Food Insecurity (2024)    Hunger Vital Sign     Worried About Running Out of Food in the Last Year: Never true     Ran Out of Food in the Last Year: Never true   Transportation Needs: No Transportation Needs (2024)    PRAPARE - Transportation     Lack of Transportation (Medical): No     Lack of Transportation (Non-Medical): No   Physical Activity: Not on file   Stress: Not on file   Social Connections: Not on file   Intimate Partner Violence: Not on file   Housing Stability: Low Risk  (2024)    Housing Stability Vital Sign     Unable to Pay for Housing in the Last Year: No     Number of Places Lived in the Last Year: 1     Unstable Housing in the Last Year: No     Social History     Tobacco Use   Smoking Status Some Days    Current packs/day: 0.25    Types: Cigarettes   Smokeless Tobacco Former    Quit date: 2016        Temp (24hrs), Av.9 °F (36.6 °C), Min:97.5 °F (36.4 °C), Max:98.2 °F (36.8 °C)    BP (!) 160/81   Pulse 62   Temp 98.2 °F (36.8 °C) (Oral)   Resp 16   Ht 1.575 m (5' 2\")   Wt 104.3 kg (230 lb)   SpO2 97%   BMI 42.07 kg/m²     ROS: 12 point ROS obtained in details. Pertinent positives as mentioned in HPI,   otherwise negative    Physical Exam:    Constitutional:       Appearance: Normal appearance. He is normal weight.   HENT:      Head: Normocephalic.   Eyes:      Extraocular Movements: Extraocular movements intact.      Conjunctiva/sclera: Conjunctivae normal.    Cardiovascular:      Rate and Rhythm: Normal rate.   Pulmonary:      Effort: Pulmonary effort is normal.   Abdominal:      General: There is no distension.        Drain right abdomen with some tenderness, + blister RLQ at tape site  Neurological:       correct all the mistakes, some may have been missed, and remained in the body of the document. If questions arise, please contact our department.    Dr. Emperatriz Alanis, Infectious Disease Specialist  [phone]  April 25, 2024  8:12 AM

## 2024-04-26 ENCOUNTER — TELEPHONE (OUTPATIENT)
Age: 64
End: 2024-04-26

## 2024-04-26 VITALS
RESPIRATION RATE: 17 BRPM | HEART RATE: 67 BPM | DIASTOLIC BLOOD PRESSURE: 63 MMHG | HEIGHT: 62 IN | SYSTOLIC BLOOD PRESSURE: 121 MMHG | TEMPERATURE: 98 F | BODY MASS INDEX: 42.33 KG/M2 | WEIGHT: 230 LBS | OXYGEN SATURATION: 98 %

## 2024-04-26 LAB
GLUCOSE BLD STRIP.AUTO-MCNC: 145 MG/DL (ref 70–110)
GLUCOSE BLD STRIP.AUTO-MCNC: 193 MG/DL (ref 70–110)
GLUCOSE BLD STRIP.AUTO-MCNC: 215 MG/DL (ref 70–110)

## 2024-04-26 PROCEDURE — 6370000000 HC RX 637 (ALT 250 FOR IP): Performed by: HOSPITALIST

## 2024-04-26 PROCEDURE — 99239 HOSP IP/OBS DSCHRG MGMT >30: CPT | Performed by: HOSPITALIST

## 2024-04-26 PROCEDURE — 2580000003 HC RX 258: Performed by: HOSPITALIST

## 2024-04-26 PROCEDURE — 82962 GLUCOSE BLOOD TEST: CPT

## 2024-04-26 PROCEDURE — 2500000003 HC RX 250 WO HCPCS: Performed by: HOSPITALIST

## 2024-04-26 PROCEDURE — 6360000002 HC RX W HCPCS: Performed by: HOSPITALIST

## 2024-04-26 PROCEDURE — 6370000000 HC RX 637 (ALT 250 FOR IP): Performed by: INTERNAL MEDICINE

## 2024-04-26 PROCEDURE — A4216 STERILE WATER/SALINE, 10 ML: HCPCS | Performed by: HOSPITALIST

## 2024-04-26 PROCEDURE — 94761 N-INVAS EAR/PLS OXIMETRY MLT: CPT

## 2024-04-26 RX ORDER — LOSARTAN POTASSIUM 25 MG/1
25 TABLET ORAL DAILY
Qty: 30 TABLET | Refills: 0 | Status: SHIPPED | OUTPATIENT
Start: 2024-04-27 | End: 2024-05-27

## 2024-04-26 RX ORDER — PSEUDOEPHEDRINE HCL 30 MG
100 TABLET ORAL 2 TIMES DAILY
Qty: 60 CAPSULE | Refills: 0 | Status: SHIPPED | OUTPATIENT
Start: 2024-04-26 | End: 2024-05-26

## 2024-04-26 RX ORDER — METRONIDAZOLE 500 MG/1
500 TABLET ORAL EVERY 8 HOURS SCHEDULED
Qty: 42 TABLET | Refills: 0 | Status: SHIPPED | OUTPATIENT
Start: 2024-04-26 | End: 2024-05-10

## 2024-04-26 RX ORDER — OXYCODONE HYDROCHLORIDE AND ACETAMINOPHEN 5; 325 MG/1; MG/1
2 TABLET ORAL EVERY 6 HOURS PRN
Qty: 24 TABLET | Refills: 0 | Status: SHIPPED | OUTPATIENT
Start: 2024-04-26 | End: 2024-04-29

## 2024-04-26 RX ORDER — CEFDINIR 300 MG/1
300 CAPSULE ORAL EVERY 12 HOURS SCHEDULED
Qty: 28 CAPSULE | Refills: 0 | Status: SHIPPED | OUTPATIENT
Start: 2024-04-26 | End: 2024-05-10

## 2024-04-26 RX ORDER — POLYETHYLENE GLYCOL 3350 17 G/17G
17 POWDER, FOR SOLUTION ORAL DAILY
Qty: 527 G | Refills: 1 | Status: SHIPPED | OUTPATIENT
Start: 2024-04-27 | End: 2024-05-27

## 2024-04-26 RX ORDER — FAMOTIDINE 20 MG/1
20 TABLET, FILM COATED ORAL 2 TIMES DAILY
Qty: 60 TABLET | Refills: 3 | Status: SHIPPED | OUTPATIENT
Start: 2024-04-26

## 2024-04-26 RX ADMIN — FAMOTIDINE 20 MG: 10 INJECTION, SOLUTION INTRAVENOUS at 08:43

## 2024-04-26 RX ADMIN — OXYCODONE AND ACETAMINOPHEN 2 TABLET: 5; 325 TABLET ORAL at 17:32

## 2024-04-26 RX ADMIN — CEFDINIR 300 MG: 300 CAPSULE ORAL at 08:43

## 2024-04-26 RX ADMIN — LOSARTAN POTASSIUM 50 MG: 50 TABLET, FILM COATED ORAL at 08:43

## 2024-04-26 RX ADMIN — OXYCODONE AND ACETAMINOPHEN 2 TABLET: 5; 325 TABLET ORAL at 04:01

## 2024-04-26 RX ADMIN — POLYETHYLENE GLYCOL 3350 17 G: 17 POWDER, FOR SOLUTION ORAL at 08:41

## 2024-04-26 RX ADMIN — ENOXAPARIN SODIUM 30 MG: 100 INJECTION SUBCUTANEOUS at 08:43

## 2024-04-26 RX ADMIN — SODIUM CHLORIDE, PRESERVATIVE FREE 10 ML: 5 INJECTION INTRAVENOUS at 08:44

## 2024-04-26 RX ADMIN — METRONIDAZOLE 500 MG: 500 TABLET ORAL at 14:17

## 2024-04-26 RX ADMIN — METRONIDAZOLE 500 MG: 500 TABLET ORAL at 06:27

## 2024-04-26 RX ADMIN — OXYCODONE AND ACETAMINOPHEN 2 TABLET: 5; 325 TABLET ORAL at 08:42

## 2024-04-26 ASSESSMENT — PAIN DESCRIPTION - DESCRIPTORS
DESCRIPTORS: SHARP;STABBING;BURNING
DESCRIPTORS: ACHING
DESCRIPTORS: SHARP;STABBING;BURNING

## 2024-04-26 ASSESSMENT — PAIN DESCRIPTION - ORIENTATION
ORIENTATION: RIGHT

## 2024-04-26 ASSESSMENT — PAIN SCALES - GENERAL
PAINLEVEL_OUTOF10: 9
PAINLEVEL_OUTOF10: 5
PAINLEVEL_OUTOF10: 10
PAINLEVEL_OUTOF10: 6
PAINLEVEL_OUTOF10: 9
PAINLEVEL_OUTOF10: 9

## 2024-04-26 ASSESSMENT — PAIN DESCRIPTION - LOCATION
LOCATION: ABDOMEN
LOCATION: ABDOMEN
LOCATION: FLANK

## 2024-04-26 ASSESSMENT — PAIN - FUNCTIONAL ASSESSMENT
PAIN_FUNCTIONAL_ASSESSMENT: ACTIVITIES ARE NOT PREVENTED
PAIN_FUNCTIONAL_ASSESSMENT: ACTIVITIES ARE NOT PREVENTED
PAIN_FUNCTIONAL_ASSESSMENT: PREVENTS OR INTERFERES SOME ACTIVE ACTIVITIES AND ADLS

## 2024-04-26 ASSESSMENT — PAIN DESCRIPTION - ONSET: ONSET: ON-GOING

## 2024-04-26 ASSESSMENT — PAIN DESCRIPTION - PAIN TYPE
TYPE: ACUTE PAIN
TYPE: ACUTE PAIN

## 2024-04-26 ASSESSMENT — PAIN DESCRIPTION - FREQUENCY: FREQUENCY: CONTINUOUS

## 2024-04-26 NOTE — DISCHARGE INSTRUCTIONS
Discharge Instructions    Patient: Vlad King MRN: [id number]  [facility]: [id number]    YOB: 1960  Age: 63 y.o.  Sex: male    DOA: 4/17/2024       DIET:  cardiac diet    ACTIVITY: activity as tolerated  Home health care for Skilled care for drain education     ADDITIONAL INFORMATION: If you experience any of the following symptoms but not limited to Fever, chills, nausea, vomiting, diarrhea, change in mentation, falling, bleeding, shortness of breath, chest pain, please call your primary care physician or return to the emergency room if you cannot get hold of your doctor:     FOLLOW UP CARE:      Linda Coto MD  4/26/2024 1:47 PM    DISCHARGE SUMMARY from Nurse    PATIENT INSTRUCTIONS:    After general anesthesia or intravenous sedation, for 24 hours or while taking prescription Narcotics:  Limit your activities  Do not drive and operate hazardous machinery  Do not make important personal or business decisions  Do  not drink alcoholic beverages  If you have not urinated within 8 hours after discharge, please contact your surgeon on call.    Report the following to your surgeon:  Excessive pain, swelling, redness or odor of or around the surgical area  Temperature over 100.5  Nausea and vomiting lasting longer than 4 hours or if unable to take medications  Any signs of decreased circulation or nerve impairment to extremity: change in color, persistent  numbness, tingling, coldness or increase pain  Any questions    What to do at Home:  Recommended activity: activity as tolerated, no sitting in tub baths or water of any type while Abd tube is in place    If you experience any of the following symptoms chest painm, shortness of breath, nausea, vomiting, unrelieved abdominal pain, fevers, bloody or black tarry stools, redness, drainage or change in color of drain secretions, please follow up with PCP, call 911 or come to ER for an emergency.    *  Please give a list of your current

## 2024-04-26 NOTE — DISCHARGE SUMMARY
Discharge Summary    Patient: Vlad King MRN: [id number]  CSN: [id number]    YOB: 1960  Age: 63 y.o.  Sex: male    DOA: 4/17/2024 LOS:  LOS: 9 days        Disposition: Home with [facility]    Discharge Date: 4/26/2024    Admission Diagnosis: Acute appendicitis with perforation, localized peritonitis, and abscess, without gangrene [K35.33]  Acute appendicitis with perforation, generalized peritonitis, and abscess, without gangrene [K35.211]  Appendicitis with peritoneal abscess [K35.33]    Discharge Diagnosis:    Acute Appendicitis with perforation and contained abscess, post drain placement by IR  Diabetes mellitus type 2  Contact dermatitis around the drain site from adhesive tape  Obesity  History of sickle cell trait  History of open-angle glaucoma  Cirrhotic liver per CT finding  Constipation    Discharge Condition: Stable      PHYSICAL EXAM  Visit Vitals  /69   Pulse 65   Temp 98 °F (36.7 °C) (Oral)   Resp 18   Ht 1.575 m (5' 2\")   Wt 104.3 kg (230 lb)   SpO2 100%   BMI 42.07 kg/m²       General:  Alert, cooperative, no acute distress    Pulmonary:  CTA Bilaterally. No Wheezing/Rales.  Cardiovascular: Regular rate and Rhythm.  GI:  Soft, Non distended, right lower quadrant tenderness. + Bowel sounds.  Extremities:  No edema. No calf tenderness.   Psych: Good insight. Not anxious or agitated.  Neurologic: Alert and oriented X 3. Moves all ext.  Additional: Right lower quadrant drain and present, couple of small blisters and the open skin area noted around the dressing around the drain.                                Hospital Course:   Vlad King is a 63 y.o.  male with a past medical history of diabetes mellitus type 2, hypertensive retinopathy, obesity and sickle cell trait comes to the emergency room complaining of right flank/side pain started Saturday.  Patient was moving some boxes for his cousin and he thought due to lifting boxes he is having pain so he did not  seek medical attention.  Pain continued to worsen and today his pain was 8/10 severity scale.  Sharp pain worse especially and movement.  He also noticed swelling in the nighttime but denies any fevers or chills.  No diarrhea, no nausea vomiting, no chest pain or chest tightness.     In the emergency room patient had a CT abdomen pelvis which showed contained appendicular perforation with abscess.  Case was discussed with general surgeon who recommended hospitalization, IV antibiotics and IR for drainage.    Patient was continued on IV antibiotics, IR eventually placed a drain.  ID saw the patient recommended to continue empiric antibiotics.  General surgery saw patient and agreed with drain placement and continue antibiotics.  Patient had a lengthy hospital stay due to his pain control issues and also drain issues.  IR was reconsulted due to decreased urine output, sinogram was done which showed no signs of fistula.  IR recommended continue drain flushes.  ID followed up cultures and recommended p.o. antibiotics.  General surgery follow-up on the patient recommended okay for discharge with outpatient follow-up.  They will see the patient in 1 week and make recommendations about discontinuing drain and also definitive surgery as an outpatient.    Patient and family had multiple questions about the drain management.  I had an extensive discussion with patient and also with significant other Ms. Mckenna at the bedside along with the nurse Divya and explained about drain management, discussed about definitive surgery as an outpatient with general surgery.  After extensive discussion patient and family comfortable going home with home health care.  Per case management home health care has been difficult to be arranged due to patient's insurance issues.  I discussed with the patient and Ms. Mckenna about getting education about drain care and home health to start on Wednesday.  Both patient and significant other Ms. Mckenna

## 2024-04-27 NOTE — PROGRESS NOTES
1200 Patient's pain has remained 8/10 to the right lower quadrant related to his acute appendicitis with perforation diagnosis. There is a BRODIE drain in place.  is aware of his pain level. Morphine IV has been discontinued as part of the plan of care for the patient. Plan is for patient to go home either today or tomorrow. Plan is to treat pain with percocet throughout the day and to reevaluate at a later time if this medication will work  for patient at home. Dr. Blake explained this to the patient and patient verbalizes understanding.     1800 patients pain remained 8/10. He received PO percocet and sat in chair all day. Dr. Blake was made aware of the pain level despite percocet every 4hrs. Please refer to MAR.

## 2024-04-27 NOTE — PROGRESS NOTES
Sister     Diabetes Mother      Social History     Socioeconomic History    Marital status: Single     Spouse name: Not on file    Number of children: Not on file    Years of education: Not on file    Highest education level: Not on file   Occupational History    Not on file   Tobacco Use    Smoking status: Some Days     Current packs/day: 0.25     Types: Cigarettes    Smokeless tobacco: Former     Quit date: 2016   Vaping Use    Vaping Use: Never used   Substance and Sexual Activity    Alcohol use: No    Drug use: No    Sexual activity: Not on file   Other Topics Concern    Not on file   Social History Narrative    Not on file     Social Determinants of Health     Financial Resource Strain: Medium Risk (2023)    Overall Financial Resource Strain (CARDIA)     Difficulty of Paying Living Expenses: Somewhat hard   Food Insecurity: No Food Insecurity (2024)    Hunger Vital Sign     Worried About Running Out of Food in the Last Year: Never true     Ran Out of Food in the Last Year: Never true   Transportation Needs: No Transportation Needs (2024)    PRAPARE - Transportation     Lack of Transportation (Medical): No     Lack of Transportation (Non-Medical): No   Physical Activity: Not on file   Stress: Not on file   Social Connections: Not on file   Intimate Partner Violence: Not on file   Housing Stability: Low Risk  (2024)    Housing Stability Vital Sign     Unable to Pay for Housing in the Last Year: No     Number of Places Lived in the Last Year: 1     Unstable Housing in the Last Year: No     Social History     Tobacco Use   Smoking Status Some Days    Current packs/day: 0.25    Types: Cigarettes   Smokeless Tobacco Former    Quit date: 2016        Temp (24hrs), Av.8 °F (36.6 °C), Min:97 °F (36.1 °C), Max:98.2 °F (36.8 °C)    /63   Pulse 67   Temp 98 °F (36.7 °C) (Oral)   Resp 17   Ht 1.575 m (5' 2\")   Wt 104.3 kg (230 lb)   SpO2 98%   BMI 42.07 kg/m²     ROS: 12 point  ROS obtained in details. Pertinent positives as mentioned in HPI,   otherwise negative    Physical Exam:    Constitutional:       Appearance: Normal appearance. He is normal weight.   HENT:      Head: Normocephalic.   Eyes:      Extraocular Movements: Extraocular movements intact.      Conjunctiva/sclera: Conjunctivae normal.    Cardiovascular:      Rate and Rhythm: Normal rate.   Pulmonary:      Effort: Pulmonary effort is normal.   Abdominal:      General: There is no distension.        Drain right abdomen with some tenderness, + blister RLQ at tape site  Neurological:      General: No focal deficit present.      Mental Status: He is alert and oriented to person, place, and time. Mental status is at baseline.   Psychiatric:         Mood and Affect: Mood normal.         Behavior: Behavior normal.         Thought Content: Thought content normal.         Judgment: Judgment normal.     Labs: Results:   Chemistry Recent Labs     04/24/24 0422 04/25/24 0414   GLUCOSE 169* 185*   * 134*   K 4.0 3.9    104   CO2 26 26   BUN 6* 7   CREATININE 0.69 0.69        CBC w/Diff Recent Labs     04/24/24 0422 04/25/24 0414   WBC 6.8 6.6   RBC 3.90* 3.93*   HGB 11.4* 11.5*   HCT 33.9* 33.7*    222        Microbiology Results       Procedure Component Value Units Date/Time    Culture, Body Fluid [[id number]] Collected: 04/19/24 1515    Order Status: Completed Specimen: Body Fluid from Aspirate Updated: 04/23/24 1001     Special Requests --        RIGHT  ABDOMEN  DRAINAGE       Gram Stain MANY WBCS SEEN         NO ORGANISMS SEEN        Culture NO GROWTH 4 DAYS       Culture, Anaerobic [[id number]]  (Abnormal) Collected: 04/19/24 1515    Order Status: Completed Specimen: Abdomen Fluid Updated: 04/23/24 0959     Special Requests --        RIGHT  ABDOMEN  DRAINAGE       Culture       FEW FUSOBACTERIUM NUCLEATUM                      RADIOLOGY:    All available imaging studies/reports in [facility] for this

## 2024-04-27 NOTE — PROGRESS NOTES
Abd drain flushed with 10 cc of normal saline. Leaking noted coming from around tube during flushing. Dr Blake notified and state he would check with IR in regards to leaking.     1520 received a call back from Dr Blake. Stated that IR said some leaking from around site was normal and that is was ok to continue to flush tube and pt was to be discharged later today.

## 2024-04-27 NOTE — PROGRESS NOTES
Pt and Ms Mckenna, pts SO, educated in regards to drain and wound care. Instructed on how to clean drain insertion site with wound cleanser and gauze pad, how to make sure that tube is not kinked or obstructed with clothing or laying on it, irrigating tube with 10 cc of normal saline  and to make sure the stopcock is on the correct position for flushing and after completion, dressing changes including applying vaseline adaptic to blistered areas and open areas from blisters that are popped and covering it with 4x4's and minimal tape to skin. Educated on expected drainage color and abnormal color of drainage and to report any earline blood or thick purulent drainage to PCP or surgeon folowing pt. Also educated on how to empty the bulb and reactivate it. Ms Bala and patient expressed understanding and all questions answered. Dressing change supplies , NS flushes. Wound cleanser sent home with pt.     1845 Discharge instructions discussed wih Pt and Ms Mckenna and hard copy sent with py. Pt also has filled prescriptions from IP pharmacy at bedside to take home.     1900 discharged home. To exit per w/c without noted distress.

## 2024-04-29 ENCOUNTER — TELEPHONE (OUTPATIENT)
Facility: CLINIC | Age: 64
End: 2024-04-29

## 2024-04-29 ENCOUNTER — CLINICAL DOCUMENTATION (OUTPATIENT)
Age: 64
End: 2024-04-29

## 2024-04-29 NOTE — PROGRESS NOTES
Value:RIGHT  ABDOMEN  DRAINAGE      Culture 04/19/2024 FEW FUSOBACTERIUM NUCLEATUM (A)    Final   Hospital Outpatient Visit on 11/28/2023   Component Date Value Ref Range Status    LIPID PANEL 11/28/2023      Final    Cholesterol, Total 11/28/2023 176  <200 MG/DL Final    Triglycerides 11/28/2023 244 (H)  <150 MG/DL Final    HDL 11/28/2023 54  40 - 60 MG/DL Final    LDL Calculated 11/28/2023 73.2  0 - 100 MG/DL Final    VLDL Cholesterol Calculated 11/28/2023 48.8  MG/DL Final    Chol/HDL Ratio 11/28/2023 3.3  0 - 5.0   Final    Microalbumin, Random Urine 11/28/2023 17.80 (H)  0 - 3.0 MG/DL Final    Creatinine, Ur 11/28/2023 106.00  30 - 125 mg/dL Final    Microalbumin Creatinine Ratio 11/28/2023 168 (H)  0 - 30 mg/g Final    Hemoglobin A1C 11/28/2023 6.5 (H)  4.2 - 5.6 % Final    Estimated Avg Glucose 11/28/2023 140  mg/dL Final    Sodium 11/28/2023 140  136 - 145 mmol/L Final    Potassium 11/28/2023 3.7  3.5 - 5.5 mmol/L Final    Chloride 11/28/2023 108  100 - 111 mmol/L Final    CO2 11/28/2023 28  21 - 32 mmol/L Final    Anion Gap 11/28/2023 4  3.0 - 18 mmol/L Final    Glucose 11/28/2023 156 (H)  74 - 99 mg/dL Final    BUN 11/28/2023 7  7.0 - 18 MG/DL Final    Creatinine 11/28/2023 0.82  0.6 - 1.3 MG/DL Final    Bun/Cre Ratio 11/28/2023 9 (L)  12 - 20   Final    Est, Glom Filt Rate 11/28/2023 >60  >60 ml/min/1.73m2 Final    Calcium 11/28/2023 8.7  8.5 - 10.1 MG/DL Final    Total Bilirubin 11/28/2023 0.9  0.2 - 1.0 MG/DL Final    ALT 11/28/2023 32  16 - 61 U/L Final    AST 11/28/2023 23  10 - 38 U/L Final    Alk Phosphatase 11/28/2023 79  45 - 117 U/L Final    Total Protein 11/28/2023 7.0  6.4 - 8.2 g/dL Final    Albumin 11/28/2023 3.9  3.4 - 5.0 g/dL Final    Globulin 11/28/2023 3.1  2.0 - 4.0 g/dL Final    Albumin/Globulin Ratio 11/28/2023 1.3  0.8 - 1.7   Final    WBC 11/28/2023 5.9  4.6 - 13.2 K/uL Final    RBC 11/28/2023 4.31 (L)  4.35 - 5.65 M/uL Final    Hemoglobin 11/28/2023 13.1  13.0 - 16.0

## 2024-04-29 NOTE — TELEPHONE ENCOUNTER
Care Transitions Initial Follow Up Call    Outreach made within 2 business days of discharge: Yes    Patient: Vlad King Patient : 1960   MRN: [id number]  Reason for Admission: There are no discharge diagnoses documented for the most recent discharge.  Discharge Date: 24       Spoke with: NO answer Left VM     Discharge department/facility:     [facility] Interactive Patient Contact:  Was patient able to fill all prescriptions:   Was patient instructed to bring all medications to the follow-up visit:   Is patient taking all medications as directed in the discharge summary?   Does patient understand their discharge instructions:   Does patient have questions or concerns that need addressed prior to 7-14 day follow up office visit:     Scheduled appointment with PCP within 7-14 days    Follow Up  Future Appointments   Date Time Provider Department Center   2024  1:20 PM Matt Babb MD [facility] BS AMB   2024 12:00 PM Madhuri Batista DO [facility] BS AMB       Caryl Schwarz MA

## 2024-04-29 NOTE — PROGRESS NOTES
PATIENT REQUIRES TCM OUTREACH    MRN: [id number]     PATIENT:  Vlad King    ADMIT DATE:  4/17/24    DISCHARGE DATE:  4/26/24     ADMITTING DX: acute appendicitis w/ perforation and IR drain placement    MEDICATION CHANGES:   Start: Omnicef, Colace, Pepcid, Cozaar, Flagyl, Percocet, Miralax  Stop:   Change:    SPECIALISTS:  Surgery and ID    HOME HEALTH/WOUND CARE/PT/OT:  none; unable to find home health agency that would accept patient's insurance    SCHEDULED FOLLOW UP APPTS:    Future Appointments   Date Time Provider Department Center   4/30/2024  1:20 PM Matt Babb MD [facility] AMB   5/1/2024 12:00 AM Madhuri Batista DO [facility] BS AMB       *Please contact patient within 2 business days and document under .TCMOFFICE.  A scheduled Hospital Follow-up for patient within 7 days is preferred*

## 2024-04-30 ENCOUNTER — HOSPITAL ENCOUNTER (OUTPATIENT)
Facility: HOSPITAL | Age: 64
Setting detail: SPECIMEN
Discharge: HOME | End: 2024-05-03
Payer: COMMERCIAL

## 2024-04-30 ENCOUNTER — OFFICE VISIT (OUTPATIENT)
Facility: CLINIC | Age: 64
End: 2024-04-30
Payer: COMMERCIAL

## 2024-04-30 VITALS
SYSTOLIC BLOOD PRESSURE: 134 MMHG | OXYGEN SATURATION: 95 % | HEART RATE: 59 BPM | HEIGHT: 62 IN | WEIGHT: 240 LBS | DIASTOLIC BLOOD PRESSURE: 71 MMHG | BODY MASS INDEX: 44.16 KG/M2 | RESPIRATION RATE: 16 BRPM

## 2024-04-30 DIAGNOSIS — E11.21 TYPE 2 DIABETES MELLITUS WITH DIABETIC NEPHROPATHY, WITHOUT LONG-TERM CURRENT USE OF INSULIN (HCC): ICD-10-CM

## 2024-04-30 DIAGNOSIS — Z09 HOSPITAL DISCHARGE FOLLOW-UP: ICD-10-CM

## 2024-04-30 DIAGNOSIS — E78.00 PURE HYPERCHOLESTEROLEMIA: ICD-10-CM

## 2024-04-30 DIAGNOSIS — E66.01 MORBID (SEVERE) OBESITY DUE TO EXCESS CALORIES (HCC): ICD-10-CM

## 2024-04-30 DIAGNOSIS — I10 ESSENTIAL (PRIMARY) HYPERTENSION: Primary | ICD-10-CM

## 2024-04-30 DIAGNOSIS — I10 ESSENTIAL (PRIMARY) HYPERTENSION: ICD-10-CM

## 2024-04-30 LAB
ALBUMIN SERPL-MCNC: 3.7 G/DL (ref 3.4–5)
ALBUMIN/GLOB SERPL: 1 (ref 0.8–1.7)
ALP SERPL-CCNC: 123 U/L (ref 45–117)
ALT SERPL-CCNC: 50 U/L (ref 16–61)
ANION GAP SERPL CALC-SCNC: 4 MMOL/L (ref 3–18)
AST SERPL-CCNC: 31 U/L (ref 10–38)
BASOPHILS # BLD: 0.1 K/UL (ref 0–0.1)
BASOPHILS NFR BLD: 1 % (ref 0–2)
BILIRUB SERPL-MCNC: 0.4 MG/DL (ref 0.2–1)
BUN SERPL-MCNC: 11 MG/DL (ref 7–18)
BUN/CREAT SERPL: 11 (ref 12–20)
CALCIUM SERPL-MCNC: 9.3 MG/DL (ref 8.5–10.1)
CHLORIDE SERPL-SCNC: 103 MMOL/L (ref 100–111)
CHOLEST SERPL-MCNC: 138 MG/DL
CO2 SERPL-SCNC: 29 MMOL/L (ref 21–32)
CREAT SERPL-MCNC: 0.96 MG/DL (ref 0.6–1.3)
CREAT UR-MCNC: 36 MG/DL (ref 30–125)
DIFFERENTIAL METHOD BLD: ABNORMAL
EOSINOPHIL # BLD: 0.2 K/UL (ref 0–0.4)
EOSINOPHIL NFR BLD: 2 % (ref 0–5)
ERYTHROCYTE [DISTWIDTH] IN BLOOD BY AUTOMATED COUNT: 13.7 % (ref 11.6–14.5)
EST. AVERAGE GLUCOSE BLD GHB EST-MCNC: 180 MG/DL
GLOBULIN SER CALC-MCNC: 3.7 G/DL (ref 2–4)
GLUCOSE SERPL-MCNC: 302 MG/DL (ref 74–99)
HBA1C MFR BLD: 7.9 % (ref 4.2–5.6)
HCT VFR BLD AUTO: 35.8 % (ref 36–48)
HDLC SERPL-MCNC: 50 MG/DL (ref 40–60)
HDLC SERPL: 2.8 (ref 0–5)
HGB BLD-MCNC: 12.2 G/DL (ref 13–16)
IMM GRANULOCYTES # BLD AUTO: 0 K/UL (ref 0–0.04)
IMM GRANULOCYTES NFR BLD AUTO: 0 % (ref 0–0.5)
LDLC SERPL CALC-MCNC: 48.6 MG/DL (ref 0–100)
LIPID PANEL: ABNORMAL
LYMPHOCYTES # BLD: 2.7 K/UL (ref 0.9–3.6)
LYMPHOCYTES NFR BLD: 33 % (ref 21–52)
MCH RBC QN AUTO: 30.2 PG (ref 24–34)
MCHC RBC AUTO-ENTMCNC: 34.1 G/DL (ref 31–37)
MCV RBC AUTO: 88.6 FL (ref 78–100)
MICROALBUMIN UR-MCNC: 1.43 MG/DL (ref 0–3)
MICROALBUMIN/CREAT UR-RTO: 40 MG/G (ref 0–30)
MONOCYTES # BLD: 0.6 K/UL (ref 0.05–1.2)
MONOCYTES NFR BLD: 7 % (ref 3–10)
NEUTS SEG # BLD: 4.7 K/UL (ref 1.8–8)
NEUTS SEG NFR BLD: 57 % (ref 40–73)
NRBC # BLD: 0 K/UL (ref 0–0.01)
NRBC BLD-RTO: 0 PER 100 WBC
PLATELET # BLD AUTO: 270 K/UL (ref 135–420)
PMV BLD AUTO: 11.6 FL (ref 9.2–11.8)
POTASSIUM SERPL-SCNC: 4.4 MMOL/L (ref 3.5–5.5)
PROT SERPL-MCNC: 7.4 G/DL (ref 6.4–8.2)
RBC # BLD AUTO: 4.04 M/UL (ref 4.35–5.65)
SODIUM SERPL-SCNC: 136 MMOL/L (ref 136–145)
TRIGL SERPL-MCNC: 197 MG/DL
VLDLC SERPL CALC-MCNC: 39.4 MG/DL
WBC # BLD AUTO: 8.2 K/UL (ref 4.6–13.2)

## 2024-04-30 PROCEDURE — 85025 COMPLETE CBC W/AUTO DIFF WBC: CPT

## 2024-04-30 PROCEDURE — 83036 HEMOGLOBIN GLYCOSYLATED A1C: CPT

## 2024-04-30 PROCEDURE — 82043 UR ALBUMIN QUANTITATIVE: CPT

## 2024-04-30 PROCEDURE — 3078F DIAST BP <80 MM HG: CPT | Performed by: EMERGENCY MEDICINE

## 2024-04-30 PROCEDURE — 82570 ASSAY OF URINE CREATININE: CPT

## 2024-04-30 PROCEDURE — 80053 COMPREHEN METABOLIC PANEL: CPT

## 2024-04-30 PROCEDURE — 3075F SYST BP GE 130 - 139MM HG: CPT | Performed by: EMERGENCY MEDICINE

## 2024-04-30 PROCEDURE — 99214 OFFICE O/P EST MOD 30 MIN: CPT | Performed by: EMERGENCY MEDICINE

## 2024-04-30 PROCEDURE — 3051F HG A1C>EQUAL 7.0%<8.0%: CPT | Performed by: EMERGENCY MEDICINE

## 2024-04-30 PROCEDURE — 36415 COLL VENOUS BLD VENIPUNCTURE: CPT

## 2024-04-30 PROCEDURE — 80061 LIPID PANEL: CPT

## 2024-04-30 SDOH — ECONOMIC STABILITY: FOOD INSECURITY: WITHIN THE PAST 12 MONTHS, YOU WORRIED THAT YOUR FOOD WOULD RUN OUT BEFORE YOU GOT MONEY TO BUY MORE.: NEVER TRUE

## 2024-04-30 SDOH — ECONOMIC STABILITY: FOOD INSECURITY: WITHIN THE PAST 12 MONTHS, THE FOOD YOU BOUGHT JUST DIDN'T LAST AND YOU DIDN'T HAVE MONEY TO GET MORE.: NEVER TRUE

## 2024-04-30 SDOH — ECONOMIC STABILITY: INCOME INSECURITY: HOW HARD IS IT FOR YOU TO PAY FOR THE VERY BASICS LIKE FOOD, HOUSING, MEDICAL CARE, AND HEATING?: SOMEWHAT HARD

## 2024-04-30 ASSESSMENT — PATIENT HEALTH QUESTIONNAIRE - PHQ9
SUM OF ALL RESPONSES TO PHQ QUESTIONS 1-9: 0
1. LITTLE INTEREST OR PLEASURE IN DOING THINGS: NOT AT ALL
SUM OF ALL RESPONSES TO PHQ9 QUESTIONS 1 & 2: 0
2. FEELING DOWN, DEPRESSED OR HOPELESS: NOT AT ALL

## 2024-04-30 ASSESSMENT — ANXIETY QUESTIONNAIRES
IF YOU CHECKED OFF ANY PROBLEMS ON THIS QUESTIONNAIRE, HOW DIFFICULT HAVE THESE PROBLEMS MADE IT FOR YOU TO DO YOUR WORK, TAKE CARE OF THINGS AT HOME, OR GET ALONG WITH OTHER PEOPLE: NOT DIFFICULT AT ALL
GAD7 TOTAL SCORE: 0
1. FEELING NERVOUS, ANXIOUS, OR ON EDGE: NOT AT ALL
6. BECOMING EASILY ANNOYED OR IRRITABLE: NOT AT ALL
7. FEELING AFRAID AS IF SOMETHING AWFUL MIGHT HAPPEN: NOT AT ALL
3. WORRYING TOO MUCH ABOUT DIFFERENT THINGS: NOT AT ALL
4. TROUBLE RELAXING: NOT AT ALL
5. BEING SO RESTLESS THAT IT IS HARD TO SIT STILL: NOT AT ALL
2. NOT BEING ABLE TO STOP OR CONTROL WORRYING: NOT AT ALL

## 2024-05-01 ENCOUNTER — TELEPHONE (OUTPATIENT)
Facility: CLINIC | Age: 64
End: 2024-05-01

## 2024-05-01 ENCOUNTER — OFFICE VISIT (OUTPATIENT)
Age: 64
End: 2024-05-01
Payer: COMMERCIAL

## 2024-05-01 VITALS
BODY MASS INDEX: 44.98 KG/M2 | SYSTOLIC BLOOD PRESSURE: 126 MMHG | WEIGHT: 244.4 LBS | OXYGEN SATURATION: 98 % | RESPIRATION RATE: 15 BRPM | DIASTOLIC BLOOD PRESSURE: 84 MMHG | HEART RATE: 77 BPM | HEIGHT: 62 IN | TEMPERATURE: 96.8 F

## 2024-05-01 DIAGNOSIS — K35.32 APPENDICITIS WITH PERFORATION: Primary | ICD-10-CM

## 2024-05-01 PROCEDURE — 99204 OFFICE O/P NEW MOD 45 MIN: CPT | Performed by: SURGERY

## 2024-05-01 ASSESSMENT — ENCOUNTER SYMPTOMS
CONSTIPATION: 0
ABDOMINAL PAIN: 0
SHORTNESS OF BREATH: 0
VOMITING: 0
CHEST TIGHTNESS: 0
NAUSEA: 0
ABDOMINAL DISTENTION: 0
DIARRHEA: 0

## 2024-05-01 NOTE — PROGRESS NOTES
Vlad King is a 63 y.o. male (: 1960)     Chief Complaint   Patient presents with    New Patient     Appendicitis       Medication list and allergies have been reviewed with Vlad King and updated as of today's date.     I have gone over all Medical, Surgical and Social History with Vlad King and updated/added the information accordingly.    
04/30/2024 7  3 - 10 % Final    Eosinophils % 04/30/2024 2  0 - 5 % Final    Basophils % 04/30/2024 1  0 - 2 % Final    Immature Granulocytes % 04/30/2024 0  0.0 - 0.5 % Final    Neutrophils Absolute 04/30/2024 4.7  1.8 - 8.0 K/UL Final    Lymphocytes Absolute 04/30/2024 2.7  0.9 - 3.6 K/UL Final    Monocytes Absolute 04/30/2024 0.6  0.05 - 1.2 K/UL Final    Eosinophils Absolute 04/30/2024 0.2  0.0 - 0.4 K/UL Final    Basophils Absolute 04/30/2024 0.1  0.0 - 0.1 K/UL Final    Immature Granulocytes Absolute 04/30/2024 0.0  0.00 - 0.04 K/UL Final    Differential Type 04/30/2024 AUTOMATED    Final   No results displayed because visit has over 200 results.      Hospital Outpatient Visit on 04/19/2024   Component Date Value Ref Range Status    Special Requests 04/19/2024     Final                    Value:RIGHT  ABDOMEN  DRAINAGE      Gram Stain 04/19/2024 MANY WBCS SEEN    Final    Gram Stain 04/19/2024 NO ORGANISMS SEEN    Final    Culture 04/19/2024 NO GROWTH 4 DAYS    Final    Special Requests 04/19/2024     Final                    Value:RIGHT  ABDOMEN  DRAINAGE      Culture 04/19/2024 FEW FUSOBACTERIUM NUCLEATUM (A)    Final       IR INJ SINOGRAM DIAGNOSTIC  Narrative: PREOPERATIVE DIAGNOSIS: Ruptured appendicitis with abscess    POSTOPERATIVE DIAGNOSIS: Same    INDICATION: Assess for fistula    : Nora Hanson PA-C    ATTENDING: Dr. Raymond Buchanan M.D.    ASSISTANT: Dr. Buchanan present for critical portions of the procedure    PROCEDURES: Drainage catheter evaluation under image guidance    ANESTHESIA: None needed  CONTRAST: 20 cc of Omnipaque.    Fluoroscopic time: 0.9 minutes    COMPLICATIONS: None    DRAIN: 10 [place] all purpose drainage catheter.    EBL: Minimal.    SPECIMEN: None.    TECHNIQUE: The risks, benefits, and alternatives were discussed. Written and  verbal consent obtained. Preoperative antibiotics were given. Patient was  brought to the interventional radiologist we and placed supine on

## 2024-05-01 NOTE — TELEPHONE ENCOUNTER
Care Transitions Initial Follow Up Call    Outreach made within 2 business days of discharge: Yes    Patient: Vlad King Patient : 1960   MRN: [id number]  Reason for Admission: There are no discharge diagnoses documented for the most recent discharge.  Discharge Date: 24       Spoke with: Patient    Discharge department/facility: [facility]    TCM Interactive Patient Contact:  Was patient able to fill all prescriptions: Yes  Was patient instructed to bring all medications to the follow-up visit: Yes  Is patient taking all medications as directed in the discharge summary? Yes  Does patient understand their discharge instructions: Yes bump on back head(no pain  Does patient have questions or concerns that need addressed prior to 7-14 day follow up office visit: yes -     Scheduled appointment with PCP within 7-14 days    Follow Up  Future Appointments   Date Time Provider Department Center   2024 12:00 PM Madhuri Batista DO [facility] BS AMB   10/1/2024  1:00 PM Matt Babb MD [facility] JUAN Crocker MA

## 2024-05-05 DIAGNOSIS — E11.21 TYPE 2 DIABETES MELLITUS WITH DIABETIC NEPHROPATHY, WITHOUT LONG-TERM CURRENT USE OF INSULIN (HCC): ICD-10-CM

## 2024-05-05 DIAGNOSIS — E78.00 PURE HYPERCHOLESTEROLEMIA: ICD-10-CM

## 2024-05-05 DIAGNOSIS — E66.01 MORBID (SEVERE) OBESITY DUE TO EXCESS CALORIES (HCC): ICD-10-CM

## 2024-05-05 DIAGNOSIS — I10 ESSENTIAL (PRIMARY) HYPERTENSION: Primary | ICD-10-CM

## 2024-05-05 RX ORDER — METFORMIN HYDROCHLORIDE 500 MG/1
500 TABLET, EXTENDED RELEASE ORAL
Qty: 90 TABLET | Refills: 1 | Status: SHIPPED | OUTPATIENT
Start: 2024-05-05

## 2024-05-05 RX ORDER — ATORVASTATIN CALCIUM 10 MG/1
10 TABLET, FILM COATED ORAL DAILY
Qty: 30 TABLET | Refills: 3 | Status: SHIPPED | OUTPATIENT
Start: 2024-05-05

## 2024-05-08 DIAGNOSIS — R06.2 WHEEZING: ICD-10-CM

## 2024-05-09 RX ORDER — ALBUTEROL SULFATE 90 UG/1
2 AEROSOL, METERED RESPIRATORY (INHALATION) 4 TIMES DAILY PRN
Qty: 1 EACH | Refills: 3 | Status: SHIPPED | OUTPATIENT
Start: 2024-05-09

## 2024-05-15 ENCOUNTER — HOSPITAL ENCOUNTER (OUTPATIENT)
Facility: HOSPITAL | Age: 64
Discharge: HOME | End: 2024-05-18
Attending: SURGERY
Payer: COMMERCIAL

## 2024-05-15 DIAGNOSIS — K35.32 APPENDICITIS WITH PERFORATION: ICD-10-CM

## 2024-05-15 PROCEDURE — 74177 CT ABD & PELVIS W/CONTRAST: CPT

## 2024-05-15 PROCEDURE — 6360000004 HC RX CONTRAST MEDICATION: Performed by: SURGERY

## 2024-05-15 RX ADMIN — IOPAMIDOL 75 ML: 612 INJECTION, SOLUTION INTRAVENOUS at 09:16

## 2024-05-15 RX ADMIN — DIATRIZOATE MEGLUMINE AND DIATRIZOATE SODIUM 30 ML: 660; 100 LIQUID ORAL; RECTAL at 07:15

## 2024-05-22 ENCOUNTER — TELEPHONE (OUTPATIENT)
Facility: HOSPITAL | Age: 64
End: 2024-05-22

## 2024-05-22 DIAGNOSIS — K35.32 APPENDICITIS WITH PERFORATION: Primary | ICD-10-CM

## 2024-05-22 RX ORDER — AMOXICILLIN AND CLAVULANATE POTASSIUM 875; 125 MG/1; MG/1
1 TABLET, FILM COATED ORAL 2 TIMES DAILY
Qty: 20 TABLET | Refills: 0 | Status: SHIPPED | OUTPATIENT
Start: 2024-05-22 | End: 2024-06-01

## 2024-05-22 NOTE — TELEPHONE ENCOUNTER
Needs surgery. CT scan reviewed with the patient. Additional antibiotics called in    Madhuri Batista, DO

## 2024-05-29 NOTE — PROGRESS NOTES
Instructions for your surgery at [facility]      Today's Date:  5/29/2024      Patient's Name:  Vlad King           Surgery Date:  6/6              Please enter the main entrance of the hospital and check-in at the  located in the lobby. Once checked in at the , you will take the elevators to the second floor, and report to the waiting room on the left. The room will say Procedure Registration.    Do NOT eat or drink anything, including candy, gum, or ice chips after midnight prior to your surgery, unless you have specific instructions from your surgeon or anesthesia provider to do so.  Brush your teeth before coming to the hospital. You may swish with water, but do not swallow.  No smoking/Vaping/E-Cigarettes 24 hours prior to the day of surgery.  No alcohol 24 hours prior to the day of surgery.  No recreational drugs for one week prior to the day of surgery.  Bring Photo ID, Insurance information, and Co-pay if required on day of surgery.  Bring in pertinent legal documents, such as, Medical Power of , DNR, Advance Directive, etc.  Leave all valuables, including money/purse, at home.  Remove all jewelry, including ALL body piercings, nail polish, acrylic nails, and makeup (including mascara); no lotions, powders, deodorant, or perfume/cologne/after shave on the skin.  Follow instruction for Hibiclens washes and CHG wipes from surgeon's office.   Glasses and dentures may be worn to the hospital. They must be removed prior to surgery. Please bring case/container for glasses or dentures.   Contact lenses should not be worn on day of surgery.   Call your doctor's office if symptoms of a cold or illness develop within 24-48 hours prior to your surgery.  Call your doctor's office if you have any questions concerning insurance or co-pays.  15. AN ADULT (relative or friend 18 years or older) MUST DRIVE YOU HOME AFTER YOUR SURGERY.  16. Please make arrangements

## 2024-06-03 ENCOUNTER — TELEPHONE (OUTPATIENT)
Age: 64
End: 2024-06-03

## 2024-06-03 NOTE — TELEPHONE ENCOUNTER
Patients caregiver calling about a jock itch issue in his groin area. She wanted Dr. Batista to be aware of this due to upcoming surgery. Also asking for anti-fungal cream.

## 2024-06-05 ENCOUNTER — ANESTHESIA EVENT (OUTPATIENT)
Facility: HOSPITAL | Age: 64
End: 2024-06-05
Payer: COMMERCIAL

## 2024-06-06 ENCOUNTER — ANESTHESIA (OUTPATIENT)
Facility: HOSPITAL | Age: 64
End: 2024-06-06
Payer: COMMERCIAL

## 2024-06-06 ENCOUNTER — HOSPITAL ENCOUNTER (OUTPATIENT)
Facility: HOSPITAL | Age: 64
Setting detail: OUTPATIENT SURGERY
Discharge: HOME | End: 2024-06-06
Attending: SURGERY | Admitting: SURGERY
Payer: COMMERCIAL

## 2024-06-06 VITALS
DIASTOLIC BLOOD PRESSURE: 55 MMHG | WEIGHT: 239 LBS | HEART RATE: 85 BPM | TEMPERATURE: 98.6 F | BODY MASS INDEX: 43.71 KG/M2 | OXYGEN SATURATION: 95 % | SYSTOLIC BLOOD PRESSURE: 123 MMHG | RESPIRATION RATE: 19 BRPM

## 2024-06-06 DIAGNOSIS — K35.33 APPENDICITIS WITH PERITONEAL ABSCESS: Primary | ICD-10-CM

## 2024-06-06 LAB
GLUCOSE BLD STRIP.AUTO-MCNC: 146 MG/DL (ref 70–110)
GLUCOSE BLD STRIP.AUTO-MCNC: 155 MG/DL (ref 70–110)

## 2024-06-06 PROCEDURE — 82962 GLUCOSE BLOOD TEST: CPT

## 2024-06-06 PROCEDURE — 6360000002 HC RX W HCPCS: Performed by: SURGERY

## 2024-06-06 PROCEDURE — 2580000003 HC RX 258: Performed by: SURGERY

## 2024-06-06 PROCEDURE — 6370000000 HC RX 637 (ALT 250 FOR IP): Performed by: NURSE ANESTHETIST, CERTIFIED REGISTERED

## 2024-06-06 PROCEDURE — 6370000000 HC RX 637 (ALT 250 FOR IP): Performed by: ANESTHESIOLOGY

## 2024-06-06 PROCEDURE — 2709999900 HC NON-CHARGEABLE SUPPLY: Performed by: SURGERY

## 2024-06-06 PROCEDURE — 2500000003 HC RX 250 WO HCPCS: Performed by: SURGERY

## 2024-06-06 PROCEDURE — 6360000002 HC RX W HCPCS: Performed by: NURSE ANESTHETIST, CERTIFIED REGISTERED

## 2024-06-06 PROCEDURE — 7100000000 HC PACU RECOVERY - FIRST 15 MIN: Performed by: SURGERY

## 2024-06-06 PROCEDURE — 2580000003 HC RX 258: Performed by: NURSE ANESTHETIST, CERTIFIED REGISTERED

## 2024-06-06 PROCEDURE — 7100000001 HC PACU RECOVERY - ADDTL 15 MIN: Performed by: SURGERY

## 2024-06-06 PROCEDURE — 2500000003 HC RX 250 WO HCPCS

## 2024-06-06 PROCEDURE — 2500000003 HC RX 250 WO HCPCS: Performed by: NURSE ANESTHETIST, CERTIFIED REGISTERED

## 2024-06-06 PROCEDURE — 7100000010 HC PHASE II RECOVERY - FIRST 15 MIN: Performed by: SURGERY

## 2024-06-06 PROCEDURE — A4216 STERILE WATER/SALINE, 10 ML: HCPCS | Performed by: NURSE ANESTHETIST, CERTIFIED REGISTERED

## 2024-06-06 PROCEDURE — 3600000012 HC SURGERY LEVEL 2 ADDTL 15MIN: Performed by: SURGERY

## 2024-06-06 PROCEDURE — 44970 LAPAROSCOPY APPENDECTOMY: CPT | Performed by: SURGERY

## 2024-06-06 PROCEDURE — 94761 N-INVAS EAR/PLS OXIMETRY MLT: CPT

## 2024-06-06 PROCEDURE — 88304 TISSUE EXAM BY PATHOLOGIST: CPT

## 2024-06-06 PROCEDURE — 3600000002 HC SURGERY LEVEL 2 BASE: Performed by: SURGERY

## 2024-06-06 PROCEDURE — 3700000001 HC ADD 15 MINUTES (ANESTHESIA): Performed by: SURGERY

## 2024-06-06 PROCEDURE — 7100000011 HC PHASE II RECOVERY - ADDTL 15 MIN: Performed by: SURGERY

## 2024-06-06 PROCEDURE — C1781 MESH (IMPLANTABLE): HCPCS | Performed by: SURGERY

## 2024-06-06 PROCEDURE — 2720000010 HC SURG SUPPLY STERILE: Performed by: SURGERY

## 2024-06-06 PROCEDURE — 3700000000 HC ANESTHESIA ATTENDED CARE: Performed by: SURGERY

## 2024-06-06 PROCEDURE — 6360000002 HC RX W HCPCS

## 2024-06-06 DEVICE — PATCH HERN M DIA2.5IN CIR W/ STRP SEPRA TECHNOLOGY ABSRB: Type: IMPLANTABLE DEVICE | Site: ABDOMEN | Status: FUNCTIONAL

## 2024-06-06 RX ORDER — FENTANYL CITRATE 50 UG/ML
INJECTION, SOLUTION INTRAMUSCULAR; INTRAVENOUS PRN
Status: DISCONTINUED | OUTPATIENT
Start: 2024-06-06 | End: 2024-06-06 | Stop reason: SDUPTHER

## 2024-06-06 RX ORDER — ROCURONIUM BROMIDE 10 MG/ML
INJECTION, SOLUTION INTRAVENOUS PRN
Status: DISCONTINUED | OUTPATIENT
Start: 2024-06-06 | End: 2024-06-06 | Stop reason: SDUPTHER

## 2024-06-06 RX ORDER — PROPOFOL 10 MG/ML
INJECTION, EMULSION INTRAVENOUS PRN
Status: DISCONTINUED | OUTPATIENT
Start: 2024-06-06 | End: 2024-06-06 | Stop reason: SDUPTHER

## 2024-06-06 RX ORDER — KETOROLAC TROMETHAMINE 15 MG/ML
INJECTION, SOLUTION INTRAMUSCULAR; INTRAVENOUS PRN
Status: DISCONTINUED | OUTPATIENT
Start: 2024-06-06 | End: 2024-06-06 | Stop reason: SDUPTHER

## 2024-06-06 RX ORDER — NALOXONE HYDROCHLORIDE 0.4 MG/ML
INJECTION, SOLUTION INTRAMUSCULAR; INTRAVENOUS; SUBCUTANEOUS PRN
Status: DISCONTINUED | OUTPATIENT
Start: 2024-06-06 | End: 2024-06-06 | Stop reason: HOSPADM

## 2024-06-06 RX ORDER — SODIUM CHLORIDE 9 MG/ML
INJECTION, SOLUTION INTRAVENOUS PRN
Status: DISCONTINUED | OUTPATIENT
Start: 2024-06-06 | End: 2024-06-06 | Stop reason: HOSPADM

## 2024-06-06 RX ORDER — DEXTROSE MONOHYDRATE 100 MG/ML
INJECTION, SOLUTION INTRAVENOUS CONTINUOUS PRN
Status: DISCONTINUED | OUTPATIENT
Start: 2024-06-06 | End: 2024-06-06 | Stop reason: HOSPADM

## 2024-06-06 RX ORDER — EPHEDRINE SULFATE/0.9% NACL/PF 25 MG/5 ML
SYRINGE (ML) INTRAVENOUS PRN
Status: DISCONTINUED | OUTPATIENT
Start: 2024-06-06 | End: 2024-06-06 | Stop reason: SDUPTHER

## 2024-06-06 RX ORDER — FENTANYL CITRATE 50 UG/ML
25 INJECTION, SOLUTION INTRAMUSCULAR; INTRAVENOUS EVERY 5 MIN PRN
Status: DISCONTINUED | OUTPATIENT
Start: 2024-06-06 | End: 2024-06-06 | Stop reason: HOSPADM

## 2024-06-06 RX ORDER — HYDROCODONE BITARTRATE AND ACETAMINOPHEN 5; 325 MG/1; MG/1
1 TABLET ORAL ONCE
Status: COMPLETED | OUTPATIENT
Start: 2024-06-06 | End: 2024-06-06

## 2024-06-06 RX ORDER — SODIUM CHLORIDE, SODIUM LACTATE, POTASSIUM CHLORIDE, CALCIUM CHLORIDE 600; 310; 30; 20 MG/100ML; MG/100ML; MG/100ML; MG/100ML
INJECTION, SOLUTION INTRAVENOUS CONTINUOUS
Status: DISCONTINUED | OUTPATIENT
Start: 2024-06-06 | End: 2024-06-06 | Stop reason: HOSPADM

## 2024-06-06 RX ORDER — LIDOCAINE HYDROCHLORIDE 20 MG/ML
INJECTION, SOLUTION EPIDURAL; INFILTRATION; INTRACAUDAL; PERINEURAL PRN
Status: DISCONTINUED | OUTPATIENT
Start: 2024-06-06 | End: 2024-06-06 | Stop reason: SDUPTHER

## 2024-06-06 RX ORDER — SODIUM CHLORIDE 0.9 % (FLUSH) 0.9 %
5-40 SYRINGE (ML) INJECTION PRN
Status: DISCONTINUED | OUTPATIENT
Start: 2024-06-06 | End: 2024-06-06 | Stop reason: HOSPADM

## 2024-06-06 RX ORDER — MIDAZOLAM HYDROCHLORIDE 1 MG/ML
INJECTION INTRAMUSCULAR; INTRAVENOUS PRN
Status: DISCONTINUED | OUTPATIENT
Start: 2024-06-06 | End: 2024-06-06 | Stop reason: SDUPTHER

## 2024-06-06 RX ORDER — ONDANSETRON 2 MG/ML
4 INJECTION INTRAMUSCULAR; INTRAVENOUS
Status: DISCONTINUED | OUTPATIENT
Start: 2024-06-06 | End: 2024-06-06 | Stop reason: HOSPADM

## 2024-06-06 RX ORDER — INSULIN LISPRO 100 [IU]/ML
0-15 INJECTION, SOLUTION INTRAVENOUS; SUBCUTANEOUS ONCE
Status: COMPLETED | OUTPATIENT
Start: 2024-06-06 | End: 2024-06-06

## 2024-06-06 RX ORDER — LIDOCAINE HYDROCHLORIDE 10 MG/ML
1 INJECTION, SOLUTION EPIDURAL; INFILTRATION; INTRACAUDAL; PERINEURAL
Status: DISCONTINUED | OUTPATIENT
Start: 2024-06-06 | End: 2024-06-06 | Stop reason: HOSPADM

## 2024-06-06 RX ORDER — PROCHLORPERAZINE EDISYLATE 5 MG/ML
5 INJECTION INTRAMUSCULAR; INTRAVENOUS
Status: DISCONTINUED | OUTPATIENT
Start: 2024-06-06 | End: 2024-06-06 | Stop reason: HOSPADM

## 2024-06-06 RX ORDER — ONDANSETRON 2 MG/ML
INJECTION INTRAMUSCULAR; INTRAVENOUS PRN
Status: DISCONTINUED | OUTPATIENT
Start: 2024-06-06 | End: 2024-06-06 | Stop reason: SDUPTHER

## 2024-06-06 RX ORDER — SODIUM CHLORIDE 0.9 % (FLUSH) 0.9 %
5-40 SYRINGE (ML) INJECTION EVERY 12 HOURS SCHEDULED
Status: DISCONTINUED | OUTPATIENT
Start: 2024-06-06 | End: 2024-06-06 | Stop reason: HOSPADM

## 2024-06-06 RX ORDER — HYDROCODONE BITARTRATE AND ACETAMINOPHEN 5; 325 MG/1; MG/1
1 TABLET ORAL EVERY 4 HOURS PRN
Qty: 18 TABLET | Refills: 0 | Status: SHIPPED | OUTPATIENT
Start: 2024-06-06 | End: 2024-06-09

## 2024-06-06 RX ORDER — GLYCOPYRROLATE 0.2 MG/ML
INJECTION INTRAMUSCULAR; INTRAVENOUS PRN
Status: DISCONTINUED | OUTPATIENT
Start: 2024-06-06 | End: 2024-06-06 | Stop reason: SDUPTHER

## 2024-06-06 RX ORDER — DEXAMETHASONE SODIUM PHOSPHATE 4 MG/ML
INJECTION, SOLUTION INTRA-ARTICULAR; INTRALESIONAL; INTRAMUSCULAR; INTRAVENOUS; SOFT TISSUE PRN
Status: DISCONTINUED | OUTPATIENT
Start: 2024-06-06 | End: 2024-06-06 | Stop reason: SDUPTHER

## 2024-06-06 RX ADMIN — PROPOFOL 170 MG: 10 INJECTION, EMULSION INTRAVENOUS at 15:12

## 2024-06-06 RX ADMIN — LIDOCAINE HYDROCHLORIDE 100 MG: 20 INJECTION, SOLUTION EPIDURAL; INFILTRATION; INTRACAUDAL; PERINEURAL at 15:11

## 2024-06-06 RX ADMIN — ROCURONIUM BROMIDE 50 MG: 50 INJECTION INTRAVENOUS at 15:13

## 2024-06-06 RX ADMIN — HYDROMORPHONE HYDROCHLORIDE 0.5 MG: 1 INJECTION, SOLUTION INTRAMUSCULAR; INTRAVENOUS; SUBCUTANEOUS at 16:43

## 2024-06-06 RX ADMIN — HYDROCODONE BITARTRATE AND ACETAMINOPHEN 1 TABLET: 5; 325 TABLET ORAL at 17:13

## 2024-06-06 RX ADMIN — KETOROLAC TROMETHAMINE 15 MG: 15 INJECTION, SOLUTION INTRAMUSCULAR; INTRAVENOUS at 16:08

## 2024-06-06 RX ADMIN — SODIUM CHLORIDE, SODIUM LACTATE, POTASSIUM CHLORIDE, AND CALCIUM CHLORIDE: 600; 310; 30; 20 INJECTION, SOLUTION INTRAVENOUS at 12:31

## 2024-06-06 RX ADMIN — SODIUM CHLORIDE, SODIUM LACTATE, POTASSIUM CHLORIDE, AND CALCIUM CHLORIDE: 600; 310; 30; 20 INJECTION, SOLUTION INTRAVENOUS at 15:29

## 2024-06-06 RX ADMIN — HYDROMORPHONE HYDROCHLORIDE 0.5 MG: 1 INJECTION, SOLUTION INTRAMUSCULAR; INTRAVENOUS; SUBCUTANEOUS at 16:38

## 2024-06-06 RX ADMIN — FAMOTIDINE 20 MG: 10 INJECTION, SOLUTION INTRAVENOUS at 12:33

## 2024-06-06 RX ADMIN — WATER 2000 MG: 1 INJECTION, SOLUTION INTRAMUSCULAR; INTRAVENOUS; SUBCUTANEOUS at 15:17

## 2024-06-06 RX ADMIN — ONDANSETRON 4 MG: 2 INJECTION INTRAMUSCULAR; INTRAVENOUS at 16:01

## 2024-06-06 RX ADMIN — MIDAZOLAM 2 MG: 1 INJECTION, SOLUTION INTRAMUSCULAR; INTRAVENOUS at 15:06

## 2024-06-06 RX ADMIN — SUGAMMADEX 200 MG: 100 INJECTION, SOLUTION INTRAVENOUS at 16:08

## 2024-06-06 RX ADMIN — EPHEDRINE SULFATE 10 MG: 5 INJECTION INTRAVENOUS at 15:23

## 2024-06-06 RX ADMIN — DEXAMETHASONE SODIUM PHOSPHATE 4 MG: 4 INJECTION INTRA-ARTICULAR; INTRALESIONAL; INTRAMUSCULAR; INTRAVENOUS; SOFT TISSUE at 15:20

## 2024-06-06 RX ADMIN — GLYCOPYRROLATE 0.2 MG: 0.2 INJECTION, SOLUTION INTRAMUSCULAR; INTRAVENOUS at 15:06

## 2024-06-06 RX ADMIN — INSULIN LISPRO 3 UNITS: 100 INJECTION, SOLUTION INTRAVENOUS; SUBCUTANEOUS at 12:37

## 2024-06-06 RX ADMIN — EPHEDRINE SULFATE 10 MG: 5 INJECTION INTRAVENOUS at 15:27

## 2024-06-06 RX ADMIN — FENTANYL CITRATE 50 MCG: 50 INJECTION INTRAMUSCULAR; INTRAVENOUS at 15:09

## 2024-06-06 RX ADMIN — FENTANYL CITRATE 50 MCG: 50 INJECTION INTRAMUSCULAR; INTRAVENOUS at 15:35

## 2024-06-06 RX ADMIN — EPHEDRINE SULFATE 5 MG: 5 INJECTION INTRAVENOUS at 15:59

## 2024-06-06 ASSESSMENT — PAIN SCALES - GENERAL
PAINLEVEL_OUTOF10: 4
PAINLEVEL_OUTOF10: 8
PAINLEVEL_OUTOF10: 9
PAINLEVEL_OUTOF10: 8
PAINLEVEL_OUTOF10: 8
PAINLEVEL_OUTOF10: 9
PAINLEVEL_OUTOF10: 8
PAINLEVEL_OUTOF10: 9

## 2024-06-06 ASSESSMENT — PAIN DESCRIPTION - ORIENTATION
ORIENTATION: MID
ORIENTATION: MID

## 2024-06-06 ASSESSMENT — PAIN DESCRIPTION - LOCATION
LOCATION: ABDOMEN
LOCATION: ABDOMEN

## 2024-06-06 ASSESSMENT — PAIN DESCRIPTION - PAIN TYPE: TYPE: SURGICAL PAIN

## 2024-06-06 ASSESSMENT — PAIN DESCRIPTION - DESCRIPTORS
DESCRIPTORS: SORE
DESCRIPTORS: SORE

## 2024-06-06 ASSESSMENT — PAIN - FUNCTIONAL ASSESSMENT: PAIN_FUNCTIONAL_ASSESSMENT: 0-10

## 2024-06-06 NOTE — ANESTHESIA PRE PROCEDURE
Department of Anesthesiology  Preprocedure Note       Name:  Vlad King   Age:  63 y.o.  :  1960                                          MRN:  [id number]         Date:  2024      Surgeon: Surgeon(s):  Madhuri Batista DO    Procedure: Procedure(s):  LAPAROSCOPIC APPENDECTOMY    Medications prior to admission:   Prior to Admission medications    Medication Sig Start Date End Date Taking? Authorizing Provider   albuterol sulfate HFA (PROVENTIL;VENTOLIN;PROAIR) 108 (90 Base) MCG/ACT inhaler INHALE 2 PUFFS INTO THE LUNGS 4 TIMES DAILY AS NEEDED FOR WHEEZING. 24   Matt Babb MD   metFORMIN (GLUCOPHAGE-XR) 500 MG extended release tablet Take 1 tablet by mouth daily (with breakfast)  Patient not taking: Reported on 2024   Matt Babb MD   atorvastatin (LIPITOR) 10 MG tablet Take 1 tablet by mouth daily 24   Matt Babb MD   losartan (COZAAR) 25 MG tablet Take 1 tablet by mouth daily 24  Linda Coto MD   famotidine (PEPCID) 20 MG tablet Take 1 tablet by mouth 2 times daily 24   Linda Coto MD   ibuprofen (ADVIL;MOTRIN) 800 MG tablet TAKE 1 TABLET BY MOUTH EVERY EIGHT HOURS AS NEEDED FOR PAIN.  Patient not taking: Reported on 23   Matt Babb MD   empagliflozin (JARDIANCE) 25 MG tablet Take 1 tablet by mouth daily 23   Matt Babb MD   Lancets MISC Use as directed 3 times a day 17   Automatic Reconciliation, Ar   brinzolamide (AZOPT) 1 % ophthalmic suspension Apply 1 drop to eye 2 times daily 10/16/20   Automatic Reconciliation, Ar   carbamide peroxide (DEBROX) 6.5 % otic solution Place 5 drops in ear(s) 2 times daily 22   Automatic Reconciliation, Ar   diclofenac sodium (VOLTAREN) 1 % GEL Apply 2 g topically 4 times daily 22   Automatic Reconciliation, Ar   Netarsudil Dimesylate (RHOPRESSA) 0.02 % SOLN Apply 1 drop to eye 20   Automatic Reconciliation, Ar   timolol

## 2024-06-06 NOTE — H&P
New Patient       Appendicitis         Assessment:      ICD-10-CM     1. Appendicitis with perforation  K35.32 CT ABDOMEN PELVIS W IV CONTRAST Additional Contrast? Oral             Plan: The drain was removed today in the clinic without complications.  He is continuing to take his oral antibiotics as instructed with no return of pain and normal bowel movements.  Recommend repeating CT scan of the abdomen pelvis as residual abscess was still present upon discharge from the hospital.  We discussed the risks and benefits of interval appendectomy typically 6 to 8 weeks after the abscess resolved as long as pain continues to not be an issue.  The risks and benefits including infection, bleeding, need for repeat procedure, injury to surrounding structures, open surgery and right colectomy were reviewed.  He is interested in surgery.  I will call him back once he completes CT scan of the abdomen and pelvis to help direct further care.  He agrees with this plan.           HPI:  Vlad King is a 63 y.o. male who presented to the emergency room on 4/17/2024 with right-sided abdominal pain that was present for several days prior to admission.  He initially felt his symptoms were related to moving his friends furniture.  Pain did not improve and became sharp in nature with notable swelling on the right side of the abdomen.  CT scan of the abdomen pelvis did demonstrate an abscess from presumed perforated appendicitis.  He underwent IR drainage of the abscess and is well as IV antibiotics.  Symptoms improved during his hospital admission in terms of pain and leukocytosis.  He was discharged from the hospital with a 14-day course of antibiotics and was instructed on flushing the drain daily and recording the output.  He is here today with his daughter and reports that drainage is typically 5 to 10 cc a day and serous in nature.  He reports no fevers or chills.  He only has pain at the drain site.  He reports normal bowel

## 2024-06-06 NOTE — DISCHARGE INSTRUCTIONS
Post Operative Discharge Instructions    No driving for 24 hours after surgery and off of prescription pain medication.    Avoid activities that bump or cause jarring movements at the surgical site for 10 days.    No lifting more than 10-15 pounds for 6 weeks after surgery or until cleared for activity at your follow up.    Walking is encouraged after surgery.    Stairs are ok to climb.      DIET:    Diet as tolerated. Start with liquids then advance your diet based on how you feel.    No alcoholic beverages for 24 hours after surgery or while on antibiotics or pain medications.    Drink plenty of water.        MEDICATIONS:    Use daily stool softners (over the counter such as Colace or Senekot) while on pain medications.     Resume pre-operative medications. If you are on any blood thinners see special instructions below.    Use prescriptions given or Tylenol, Ibuprofen as needed for pain.    Do not use more than 4000mg of Tylenol (acetaminophen) per day. Be aware this may be in your prescription medication as well.    Be aware narcotic prescriptions are tightly controlled in the [facility]. If requiring more than one refill, a follow up appointment will be required.      WOUND CARE:      You have skin glue on your incisions, you may shower in 24 hours and pat dry. Glue will fall off on it's own.    Do not tub bathe, swim, or soak incisions until cleared to do so at your follow up.    Ice bag to the affected area; 20 minutes on and 20 minutes off if desired.      FOLLOW UP CARE:    You should have an appointment scheduled within 14 days after surgery. If this is not yet scheduled, call the office.  Any forms that you need filled out regarding your medical care can be brought to the office at follow up appointment of faxed to: [phone]        CALL DOCTOR IF:  Temperature is over 101 degrees, a slight fever can be normal 24-48 hour after surgery.  Nausea & vomiting that persists more than 24 hours after  surgery.  Your wound appears very red, hot, painful or swollen.  Excessive bleeding occurs form the incision.      *Between the hours 9-5 Monday-Friday please call the office at [phone].     *If there is a medical emergency please go to the nearest emergency room immediately.

## 2024-06-06 NOTE — PERIOP NOTE
Patient /Family /Designee has been informed that [facility] is not responsible for patient belongings per policy and the signed [facility] Patient Agreement document.  Personal items should be sent home or checked in with security.  Patient /Family /Designee selected the following action:                            [x]  Send personal items home with a family member or friend                                                 []  Check in personal items with security, excluding clothing                            []  Maintain personal items at the bedside, against recommendation                                 by Chandra Lorenzo [facility]                                   ** If patient /family /designee chooses to maintain personal items at the bedside,                                      Complete the patient belongings inventory in the EMR.   Belongings are with girlfriend, Rebecca Mckenna.  Number: [phone]

## 2024-06-07 NOTE — ANESTHESIA POSTPROCEDURE EVALUATION
Department of Anesthesiology  Postprocedure Note    Patient: Vlad King  MRN: [id number]  YOB: 1960  Date of evaluation: 6/7/2024    Procedure Summary       Date: 06/06/24 Room / Location: [facility] MAIN 01 / [facility] MAIN OR    Anesthesia Start: 1506 Anesthesia Stop: 1630    Procedure: LAPAROSCOPIC APPENDECTOMY (Abdomen) Diagnosis:       Appendicitis with perforation      (Appendicitis with perforation [K35.32])    Surgeons: Madhuri Batista DO Responsible Provider: Adrian Valentine MD    Anesthesia Type: General ASA Status: 3            Anesthesia Type: General    Martina Phase I: Martina Score: 10    Martina Phase II: Martina Score: 10    Anesthesia Post Evaluation    Patient location during evaluation: bedside  Patient participation: complete - patient participated  Level of consciousness: awake  Pain score: 4  Airway patency: patent  Nausea & Vomiting: no nausea  Cardiovascular status: hemodynamically stable  Respiratory status: acceptable  Hydration status: euvolemic  Pain management: satisfactory to patient        No notable events documented.

## 2024-06-12 ENCOUNTER — CLINICAL DOCUMENTATION (OUTPATIENT)
Age: 64
End: 2024-06-12

## 2024-06-12 ENCOUNTER — OFFICE VISIT (OUTPATIENT)
Age: 64
End: 2024-06-12

## 2024-06-12 VITALS
DIASTOLIC BLOOD PRESSURE: 70 MMHG | BODY MASS INDEX: 44.13 KG/M2 | SYSTOLIC BLOOD PRESSURE: 110 MMHG | WEIGHT: 239.8 LBS | HEIGHT: 62 IN | OXYGEN SATURATION: 98 % | RESPIRATION RATE: 14 BRPM | HEART RATE: 64 BPM | TEMPERATURE: 97.5 F

## 2024-06-12 DIAGNOSIS — C18.1 CANCER OF APPENDIX (HCC): Primary | ICD-10-CM

## 2024-06-12 PROCEDURE — 99024 POSTOP FOLLOW-UP VISIT: CPT | Performed by: SURGERY

## 2024-06-12 NOTE — PROGRESS NOTES
Vlad King is a 63 y.o. male (: 1960)     Chief Complaint   Patient presents with    Post-Op Check     Appendectomy        Medication list and allergies have been reviewed with Vlad King and updated as of today's date.     I have gone over all Medical, Surgical and Social History with Vlad MINDY Christine and updated/added the information accordingly.      1. Have you been to the ER, urgent care clinic since your last visit?  Hospitalized since your last visit?No    2. Have you seen or consulted any other health care providers outside of the [facility] since your last visit?  Include any pap smears or colon screening. No   
K/UL Final    Differential Type 04/30/2024 AUTOMATED    Final   No results displayed because visit has over 200 results.      Hospital Outpatient Visit on 04/19/2024   Component Date Value Ref Range Status    Special Requests 04/19/2024     Final                    Value:RIGHT  ABDOMEN  DRAINAGE      Gram Stain 04/19/2024 MANY WBCS SEEN    Final    Gram Stain 04/19/2024 NO ORGANISMS SEEN    Final    Culture 04/19/2024 NO GROWTH 4 DAYS    Final    Special Requests 04/19/2024     Final                    Value:RIGHT  ABDOMEN  DRAINAGE      Culture 04/19/2024 FEW FUSOBACTERIUM NUCLEATUM (A)    Final       CT ABDOMEN PELVIS W IV CONTRAST Additional Contrast? Oral  Narrative: EXAM: CT ABDOMEN PELVIS W IV CONTRAST    CLINICAL INDICATION/HISTORY: Acute appendicitis with perforation, localized  peritonitis, and gangrene, with abscess. Status post drainage, known abscess.  Consideration for future appendectomy after abscess resolution.    TECHNIQUE: Contiguous axial images were obtained through the abdomen and pelvis  with intravenous contrast.  From these, sagittal and coronal reconstructions  were generated.     CT scans at this facility are performed using dose optimization technique as  appropriate with performed exam, to include automated exposure control,  adjustment of mA and/or kV according to patient's size (including appropriate  matching for site-specific examinations), or use of iterative reconstruction  technique.    COMPARISON: 4/22/2024    FINDINGS:  Lower chest: Bilateral lung bases are clear. The base of the heart is within  limits.    Liver: No focal lesions identified. Overall cirrhotic morphology, similar to  prior.    Gallbladder/Biliary: Prior cholecystectomy. Common bile duct measures within  normal limits.    Spleen: 13.0 cm in maximum dimension, similar to prior.    Pancreas: Within normal limits for technique.    Kidneys, Urinary Bladder:  Symmetric and nonhydronephrotic. Urinary bladder is  within

## 2024-06-12 NOTE — PROGRESS NOTES
Patient was referred to Dr. FRENCH Lyon @ [facility] for Oncology consultation. Her office will call the patient for an appointment.

## 2024-06-27 ENCOUNTER — PREP FOR PROCEDURE (OUTPATIENT)
Age: 64
End: 2024-06-27

## 2024-06-27 ENCOUNTER — OFFICE VISIT (OUTPATIENT)
Age: 64
End: 2024-06-27
Payer: COMMERCIAL

## 2024-06-27 VITALS
TEMPERATURE: 96.8 F | DIASTOLIC BLOOD PRESSURE: 76 MMHG | WEIGHT: 240 LBS | HEART RATE: 70 BPM | BODY MASS INDEX: 44.16 KG/M2 | OXYGEN SATURATION: 98 % | RESPIRATION RATE: 14 BRPM | SYSTOLIC BLOOD PRESSURE: 132 MMHG | HEIGHT: 62 IN

## 2024-06-27 DIAGNOSIS — C18.1 CANCER OF APPENDIX (HCC): ICD-10-CM

## 2024-06-27 DIAGNOSIS — C18.1 CANCER OF APPENDIX (HCC): Primary | ICD-10-CM

## 2024-06-27 PROCEDURE — 99214 OFFICE O/P EST MOD 30 MIN: CPT | Performed by: COLON & RECTAL SURGERY

## 2024-06-27 RX ORDER — NEOMYCIN SULFATE 500 MG/1
1000 TABLET ORAL 3 TIMES DAILY
Qty: 6 TABLET | Refills: 0 | Status: SHIPPED | OUTPATIENT
Start: 2024-06-27 | End: 2024-06-28

## 2024-06-27 RX ORDER — METRONIDAZOLE 500 MG/1
500 TABLET ORAL 3 TIMES DAILY
Qty: 3 TABLET | Refills: 0 | Status: SHIPPED | OUTPATIENT
Start: 2024-06-27 | End: 2024-06-28

## 2024-06-27 NOTE — PROGRESS NOTES
Subjective: He is seen here in follow-up.  He is tolerating diet moving his bowels.  He has seen the oncologist who is recommending a right colectomy.    Past medical history and ROS were reviewed and unchanged.     Abdomen: Soft, nontender nondistended  Wounds healed, no hernia    Pathology from his appendectomy revealed a T3 adenocarcinoma, all margins were negative  CT of the abdomen pelvis shows no evidence of metastatic disease  There is evidence of significant lymphadenopathy in the right hemiabdomen    Assessment / Plan    Status post laparoscopic appendectomy for appendiceal cancer    Postoperative care  Wounds healing well  Diet as tolerated    T3 perforated adenocarcinoma of the appendix  Pathology reviewed in detail  CT imaging reviewed in detail showing no metastatic disease in the abdomen aside from some possible regional lymph node disease  CT of the chest pending  Robotic right colectomy in the next 4 to 6 weeks  Surgery discussed in detail with the patient, including indication and risks  Chemotherapy per oncology, but given that this was a perforated appendicitis it is likely indicated    30 minutes was spent in patient care.      The diagnoses and plan were discussed with patient.  All questions answered.  Plan of care agreed to by all concerned.

## 2024-06-28 ENCOUNTER — HOSPITAL ENCOUNTER (OUTPATIENT)
Facility: HOSPITAL | Age: 64
Setting detail: SPECIMEN
Discharge: HOME | End: 2024-07-01

## 2024-07-03 ENCOUNTER — HOSPITAL ENCOUNTER (OUTPATIENT)
Facility: HOSPITAL | Age: 64
Discharge: HOME | End: 2024-07-06
Attending: INTERNAL MEDICINE
Payer: COMMERCIAL

## 2024-07-03 DIAGNOSIS — C18.1 CANCER OF APPENDIX (HCC): ICD-10-CM

## 2024-07-03 LAB — CREAT UR-MCNC: 0.9 MG/DL (ref 0.6–1.3)

## 2024-07-03 PROCEDURE — 82565 ASSAY OF CREATININE: CPT

## 2024-07-03 PROCEDURE — 71260 CT THORAX DX C+: CPT

## 2024-07-03 PROCEDURE — 6360000004 HC RX CONTRAST MEDICATION: Performed by: INTERNAL MEDICINE

## 2024-07-03 RX ADMIN — IOPAMIDOL 80 ML: 612 INJECTION, SOLUTION INTRAVENOUS at 08:39

## 2024-07-29 NOTE — PERIOP NOTE
Instructions for your surgery at [facility]      Today's Date:  7/29/2024      Patient's Name:  Vlad King           Surgery Date:  8/6              Please enter the main entrance of the hospital and check-in at the front security desk located in the lobby. They will direct you to the area to report for your surgery.     Do NOT eat or drink anything, including candy, gum, or ice chips after midnight prior to your surgery, unless you have specific instructions from your surgeon or anesthesia provider to do so.  Brush your teeth before coming to the hospital. You may swish with water, but do not swallow.  No smoking/Vaping/E-Cigarettes 24 hours prior to the day of surgery.  No alcohol 24 hours prior to the day of surgery.  No recreational drugs for one week prior to the day of surgery.  Bring Photo ID, Insurance information, and Co-pay if required on day of surgery.  Bring in pertinent legal documents, such as, Medical Power of , DNR, Advance Directive, etc.  Leave all valuables, including money/purse, at home.  Remove all jewelry, including ALL body piercings, nail polish, acrylic nails, and makeup (including mascara); no lotions, powders, deodorant, or perfume/cologne/after shave on the skin.  Follow instruction for Hibiclens washes and CHG wipes from surgeon's office.   Glasses and dentures may be worn to the hospital. They must be removed prior to surgery. Please bring case/container for glasses or dentures.   Contact lenses should not be worn on day of surgery.   Call your doctor's office if symptoms of a cold or illness develop within 24-48 hours prior to your surgery.  Call your doctor's office if you have any questions concerning insurance or co-pays.  15. AN ADULT (relative or friend 18 years or older) MUST DRIVE YOU HOME AFTER YOUR SURGERY.  16. Please make arrangements for a responsible adult (18 years or older) to be with you for 24 hours after your surgery.   17. TWO

## 2024-08-02 ENCOUNTER — HOSPITAL ENCOUNTER (OUTPATIENT)
Facility: HOSPITAL | Age: 64
End: 2024-08-02
Payer: COMMERCIAL

## 2024-08-02 ENCOUNTER — TELEPHONE (OUTPATIENT)
Age: 64
End: 2024-08-02

## 2024-08-02 DIAGNOSIS — C18.1 CANCER OF APPENDIX (HCC): ICD-10-CM

## 2024-08-02 LAB
ABO + RH BLD: NORMAL
ANION GAP SERPL CALC-SCNC: 5 MMOL/L (ref 3–18)
APTT PPP: 30.7 SEC (ref 23–36.4)
BLOOD GROUP ANTIBODIES SERPL: NORMAL
BUN SERPL-MCNC: 12 MG/DL (ref 7–18)
BUN/CREAT SERPL: 13 (ref 12–20)
CALCIUM SERPL-MCNC: 9.3 MG/DL (ref 8.5–10.1)
CHLORIDE SERPL-SCNC: 109 MMOL/L (ref 100–111)
CO2 SERPL-SCNC: 24 MMOL/L (ref 21–32)
CREAT SERPL-MCNC: 0.89 MG/DL (ref 0.6–1.3)
ERYTHROCYTE [DISTWIDTH] IN BLOOD BY AUTOMATED COUNT: 14.5 % (ref 11.6–14.5)
GLUCOSE SERPL-MCNC: 182 MG/DL (ref 74–99)
HCT VFR BLD AUTO: 41.5 % (ref 36–48)
HGB BLD-MCNC: 14 G/DL (ref 13–16)
INR PPP: 1 (ref 0.9–1.1)
MCH RBC QN AUTO: 29 PG (ref 24–34)
MCHC RBC AUTO-ENTMCNC: 33.7 G/DL (ref 31–37)
MCV RBC AUTO: 85.9 FL (ref 78–100)
NRBC # BLD: 0 K/UL (ref 0–0.01)
NRBC BLD-RTO: 0 PER 100 WBC
PLATELET # BLD AUTO: 205 K/UL (ref 135–420)
PMV BLD AUTO: 12.2 FL (ref 9.2–11.8)
POTASSIUM SERPL-SCNC: 4.1 MMOL/L (ref 3.5–5.5)
PROTHROMBIN TIME: 13.2 SEC (ref 11.9–14.9)
RBC # BLD AUTO: 4.83 M/UL (ref 4.35–5.65)
SODIUM SERPL-SCNC: 138 MMOL/L (ref 136–145)
SPECIMEN EXP DATE BLD: NORMAL
WBC # BLD AUTO: 7.7 K/UL (ref 4.6–13.2)

## 2024-08-02 PROCEDURE — 85027 COMPLETE CBC AUTOMATED: CPT

## 2024-08-02 PROCEDURE — 85610 PROTHROMBIN TIME: CPT

## 2024-08-02 PROCEDURE — 86850 RBC ANTIBODY SCREEN: CPT

## 2024-08-02 PROCEDURE — 86901 BLOOD TYPING SEROLOGIC RH(D): CPT

## 2024-08-02 PROCEDURE — 85730 THROMBOPLASTIN TIME PARTIAL: CPT

## 2024-08-02 PROCEDURE — 36415 COLL VENOUS BLD VENIPUNCTURE: CPT

## 2024-08-02 PROCEDURE — 86923 COMPATIBILITY TEST ELECTRIC: CPT

## 2024-08-02 PROCEDURE — 80048 BASIC METABOLIC PNL TOTAL CA: CPT

## 2024-08-02 PROCEDURE — 86900 BLOOD TYPING SEROLOGIC ABO: CPT

## 2024-08-02 NOTE — TELEPHONE ENCOUNTER
I called the pt regarding the lab work that is needed. Pt sounded confused. I asked if he wanted me to call Rebecca Mckenna, he said yes. I called Ms. Mckenna and discussed the needed blood work. She stated that there was blood work done and I advised her that was some for Dr. Alanis but not the ones needed for the surgery on 08/07. I did ask if the pt was given a red wrist band to wear. She said no. I advised her that the blood work for surgery still needs to be done. I advised her to let the staff at [facility] know that the labs needed are the ones from Dr. Almendarez. I also reminded Ms. Mckenna that the red wrist band that will be given after the blood draw will need to be worn until after surgery. Ms. Mckenna verbalized understanding and will take the pt today to have this done. I gave Ms. Mckenna my phone number and asked her to call me if she has any questions.

## 2024-08-05 ENCOUNTER — TELEPHONE (OUTPATIENT)
Age: 64
End: 2024-08-05

## 2024-08-05 ENCOUNTER — ANESTHESIA EVENT (OUTPATIENT)
Facility: HOSPITAL | Age: 64
End: 2024-08-05
Payer: COMMERCIAL

## 2024-08-05 NOTE — TELEPHONE ENCOUNTER
Ms. Mckenna call to see about getting a letter to give to the housing authorities, pt is in public housing, that she is staying with the pt to care for him while he heals from surgery. Per Ms. Mckenna, she has been advised that the insurance does not cover home health care and she wants to make sure that the pt is taken care of properly while he heals. If this is ok, I can write a letter for Ms. Bala to .

## 2024-08-06 ENCOUNTER — ANESTHESIA (OUTPATIENT)
Facility: HOSPITAL | Age: 64
End: 2024-08-06
Payer: COMMERCIAL

## 2024-08-06 ENCOUNTER — HOSPITAL ENCOUNTER (INPATIENT)
Facility: HOSPITAL | Age: 64
LOS: 6 days | Discharge: HOME | DRG: 231 | End: 2024-08-12
Attending: COLON & RECTAL SURGERY | Admitting: COLON & RECTAL SURGERY
Payer: COMMERCIAL

## 2024-08-06 DIAGNOSIS — C18.1 CANCER OF APPENDIX (HCC): ICD-10-CM

## 2024-08-06 DIAGNOSIS — C18.1 APPENDIX CARCINOMA (HCC): Primary | ICD-10-CM

## 2024-08-06 LAB
GLUCOSE BLD STRIP.AUTO-MCNC: 167 MG/DL (ref 70–110)
GLUCOSE BLD STRIP.AUTO-MCNC: 184 MG/DL (ref 70–110)
GLUCOSE BLD STRIP.AUTO-MCNC: 190 MG/DL (ref 70–110)
GLUCOSE BLD STRIP.AUTO-MCNC: 264 MG/DL (ref 70–110)
HISTORY CHECK: NORMAL

## 2024-08-06 PROCEDURE — 6370000000 HC RX 637 (ALT 250 FOR IP): Performed by: COLON & RECTAL SURGERY

## 2024-08-06 PROCEDURE — 6360000002 HC RX W HCPCS: Performed by: NURSE ANESTHETIST, CERTIFIED REGISTERED

## 2024-08-06 PROCEDURE — 88307 TISSUE EXAM BY PATHOLOGIST: CPT

## 2024-08-06 PROCEDURE — 6360000002 HC RX W HCPCS: Performed by: COLON & RECTAL SURGERY

## 2024-08-06 PROCEDURE — 82962 GLUCOSE BLOOD TEST: CPT

## 2024-08-06 PROCEDURE — 3700000001 HC ADD 15 MINUTES (ANESTHESIA): Performed by: COLON & RECTAL SURGERY

## 2024-08-06 PROCEDURE — 2700000000 HC OXYGEN THERAPY PER DAY

## 2024-08-06 PROCEDURE — 1100000000 HC RM PRIVATE

## 2024-08-06 PROCEDURE — 3700000000 HC ANESTHESIA ATTENDED CARE: Performed by: COLON & RECTAL SURGERY

## 2024-08-06 PROCEDURE — 2580000003 HC RX 258: Performed by: NURSE ANESTHETIST, CERTIFIED REGISTERED

## 2024-08-06 PROCEDURE — 2500000003 HC RX 250 WO HCPCS: Performed by: COLON & RECTAL SURGERY

## 2024-08-06 PROCEDURE — 3600000019 HC SURGERY ROBOT ADDTL 15MIN: Performed by: COLON & RECTAL SURGERY

## 2024-08-06 PROCEDURE — 2500000003 HC RX 250 WO HCPCS: Performed by: NURSE ANESTHETIST, CERTIFIED REGISTERED

## 2024-08-06 PROCEDURE — 2709999900 HC NON-CHARGEABLE SUPPLY: Performed by: COLON & RECTAL SURGERY

## 2024-08-06 PROCEDURE — 88305 TISSUE EXAM BY PATHOLOGIST: CPT

## 2024-08-06 PROCEDURE — 88309 TISSUE EXAM BY PATHOLOGIST: CPT

## 2024-08-06 PROCEDURE — 2580000003 HC RX 258: Performed by: COLON & RECTAL SURGERY

## 2024-08-06 PROCEDURE — 3600000009 HC SURGERY ROBOT BASE: Performed by: COLON & RECTAL SURGERY

## 2024-08-06 PROCEDURE — 8E0W4CZ ROBOTIC ASSISTED PROCEDURE OF TRUNK REGION, PERCUTANEOUS ENDOSCOPIC APPROACH: ICD-10-PCS | Performed by: COLON & RECTAL SURGERY

## 2024-08-06 PROCEDURE — S2900 ROBOTIC SURGICAL SYSTEM: HCPCS | Performed by: COLON & RECTAL SURGERY

## 2024-08-06 PROCEDURE — 94761 N-INVAS EAR/PLS OXIMETRY MLT: CPT

## 2024-08-06 PROCEDURE — 7100000001 HC PACU RECOVERY - ADDTL 15 MIN: Performed by: COLON & RECTAL SURGERY

## 2024-08-06 PROCEDURE — 6370000000 HC RX 637 (ALT 250 FOR IP): Performed by: STUDENT IN AN ORGANIZED HEALTH CARE EDUCATION/TRAINING PROGRAM

## 2024-08-06 PROCEDURE — 2720000010 HC SURG SUPPLY STERILE: Performed by: COLON & RECTAL SURGERY

## 2024-08-06 PROCEDURE — 7100000000 HC PACU RECOVERY - FIRST 15 MIN: Performed by: COLON & RECTAL SURGERY

## 2024-08-06 PROCEDURE — 0DTF4ZZ RESECTION OF RIGHT LARGE INTESTINE, PERCUTANEOUS ENDOSCOPIC APPROACH: ICD-10-PCS | Performed by: COLON & RECTAL SURGERY

## 2024-08-06 PROCEDURE — 44205 LAP COLECTOMY PART W/ILEUM: CPT | Performed by: COLON & RECTAL SURGERY

## 2024-08-06 RX ORDER — GLYCOPYRROLATE 0.2 MG/ML
INJECTION INTRAMUSCULAR; INTRAVENOUS PRN
Status: DISCONTINUED | OUTPATIENT
Start: 2024-08-06 | End: 2024-08-06 | Stop reason: SDUPTHER

## 2024-08-06 RX ORDER — SODIUM CHLORIDE 0.9 % (FLUSH) 0.9 %
5-40 SYRINGE (ML) INJECTION PRN
Status: DISCONTINUED | OUTPATIENT
Start: 2024-08-06 | End: 2024-08-06 | Stop reason: HOSPADM

## 2024-08-06 RX ORDER — DEXTROSE MONOHYDRATE 100 MG/ML
INJECTION, SOLUTION INTRAVENOUS CONTINUOUS PRN
Status: DISCONTINUED | OUTPATIENT
Start: 2024-08-06 | End: 2024-08-06 | Stop reason: HOSPADM

## 2024-08-06 RX ORDER — SODIUM CHLORIDE, SODIUM LACTATE, POTASSIUM CHLORIDE, CALCIUM CHLORIDE 600; 310; 30; 20 MG/100ML; MG/100ML; MG/100ML; MG/100ML
INJECTION, SOLUTION INTRAVENOUS CONTINUOUS
Status: DISCONTINUED | OUTPATIENT
Start: 2024-08-06 | End: 2024-08-10

## 2024-08-06 RX ORDER — NALOXONE HYDROCHLORIDE 0.4 MG/ML
INJECTION, SOLUTION INTRAMUSCULAR; INTRAVENOUS; SUBCUTANEOUS PRN
Status: DISCONTINUED | OUTPATIENT
Start: 2024-08-06 | End: 2024-08-06 | Stop reason: HOSPADM

## 2024-08-06 RX ORDER — CEFAZOLIN SODIUM 1 G/3ML
INJECTION, POWDER, FOR SOLUTION INTRAMUSCULAR; INTRAVENOUS PRN
Status: DISCONTINUED | OUTPATIENT
Start: 2024-08-06 | End: 2024-08-06 | Stop reason: SDUPTHER

## 2024-08-06 RX ORDER — BUPIVACAINE HYDROCHLORIDE 2.5 MG/ML
INJECTION, SOLUTION EPIDURAL; INFILTRATION; INTRACAUDAL PRN
Status: DISCONTINUED | OUTPATIENT
Start: 2024-08-06 | End: 2024-08-06 | Stop reason: ALTCHOICE

## 2024-08-06 RX ORDER — METOCLOPRAMIDE HYDROCHLORIDE 5 MG/ML
10 INJECTION INTRAMUSCULAR; INTRAVENOUS
Status: DISCONTINUED | OUTPATIENT
Start: 2024-08-06 | End: 2024-08-06 | Stop reason: HOSPADM

## 2024-08-06 RX ORDER — MORPHINE SULFATE 2 MG/ML
2 INJECTION, SOLUTION INTRAMUSCULAR; INTRAVENOUS
Status: DISCONTINUED | OUTPATIENT
Start: 2024-08-06 | End: 2024-08-12 | Stop reason: HOSPADM

## 2024-08-06 RX ORDER — LIDOCAINE HYDROCHLORIDE 10 MG/ML
1 INJECTION, SOLUTION EPIDURAL; INFILTRATION; INTRACAUDAL; PERINEURAL
Status: DISCONTINUED | OUTPATIENT
Start: 2024-08-06 | End: 2024-08-06 | Stop reason: HOSPADM

## 2024-08-06 RX ORDER — SODIUM CHLORIDE 9 MG/ML
INJECTION, SOLUTION INTRAVENOUS PRN
Status: DISCONTINUED | OUTPATIENT
Start: 2024-08-06 | End: 2024-08-06 | Stop reason: HOSPADM

## 2024-08-06 RX ORDER — INSULIN LISPRO 100 [IU]/ML
0-15 INJECTION, SOLUTION INTRAVENOUS; SUBCUTANEOUS ONCE
Status: COMPLETED | OUTPATIENT
Start: 2024-08-06 | End: 2024-08-06

## 2024-08-06 RX ORDER — DEXTROSE MONOHYDRATE 100 MG/ML
INJECTION, SOLUTION INTRAVENOUS CONTINUOUS PRN
Status: DISCONTINUED | OUTPATIENT
Start: 2024-08-06 | End: 2024-08-12 | Stop reason: HOSPADM

## 2024-08-06 RX ORDER — DEXAMETHASONE SODIUM PHOSPHATE 4 MG/ML
INJECTION, SOLUTION INTRA-ARTICULAR; INTRALESIONAL; INTRAMUSCULAR; INTRAVENOUS; SOFT TISSUE PRN
Status: DISCONTINUED | OUTPATIENT
Start: 2024-08-06 | End: 2024-08-06 | Stop reason: SDUPTHER

## 2024-08-06 RX ORDER — GABAPENTIN 300 MG/1
300 CAPSULE ORAL 3 TIMES DAILY
Status: DISCONTINUED | OUTPATIENT
Start: 2024-08-06 | End: 2024-08-12 | Stop reason: HOSPADM

## 2024-08-06 RX ORDER — MIDAZOLAM HYDROCHLORIDE 1 MG/ML
INJECTION INTRAMUSCULAR; INTRAVENOUS PRN
Status: DISCONTINUED | OUTPATIENT
Start: 2024-08-06 | End: 2024-08-06 | Stop reason: SDUPTHER

## 2024-08-06 RX ORDER — ROCURONIUM BROMIDE 10 MG/ML
INJECTION, SOLUTION INTRAVENOUS PRN
Status: DISCONTINUED | OUTPATIENT
Start: 2024-08-06 | End: 2024-08-06 | Stop reason: SDUPTHER

## 2024-08-06 RX ORDER — ALBUTEROL SULFATE 2.5 MG/3ML
2.5 SOLUTION RESPIRATORY (INHALATION) 4 TIMES DAILY PRN
Status: DISCONTINUED | OUTPATIENT
Start: 2024-08-06 | End: 2024-08-12 | Stop reason: HOSPADM

## 2024-08-06 RX ORDER — ONDANSETRON 2 MG/ML
4 INJECTION INTRAMUSCULAR; INTRAVENOUS EVERY 6 HOURS PRN
Status: DISCONTINUED | OUTPATIENT
Start: 2024-08-06 | End: 2024-08-12 | Stop reason: HOSPADM

## 2024-08-06 RX ORDER — METRONIDAZOLE 500 MG/100ML
500 INJECTION, SOLUTION INTRAVENOUS ONCE
Status: DISCONTINUED | OUTPATIENT
Start: 2024-08-06 | End: 2024-08-06 | Stop reason: HOSPADM

## 2024-08-06 RX ORDER — FENTANYL CITRATE 50 UG/ML
INJECTION, SOLUTION INTRAMUSCULAR; INTRAVENOUS PRN
Status: DISCONTINUED | OUTPATIENT
Start: 2024-08-06 | End: 2024-08-06 | Stop reason: SDUPTHER

## 2024-08-06 RX ORDER — INSULIN LISPRO 100 [IU]/ML
0-8 INJECTION, SOLUTION INTRAVENOUS; SUBCUTANEOUS EVERY 4 HOURS
Status: DISCONTINUED | OUTPATIENT
Start: 2024-08-06 | End: 2024-08-12 | Stop reason: HOSPADM

## 2024-08-06 RX ORDER — SODIUM CHLORIDE, SODIUM LACTATE, POTASSIUM CHLORIDE, CALCIUM CHLORIDE 600; 310; 30; 20 MG/100ML; MG/100ML; MG/100ML; MG/100ML
INJECTION, SOLUTION INTRAVENOUS CONTINUOUS PRN
Status: DISCONTINUED | OUTPATIENT
Start: 2024-08-06 | End: 2024-08-06

## 2024-08-06 RX ORDER — SUCCINYLCHOLINE/SOD CL,ISO/PF 100 MG/5ML
SYRINGE (ML) INTRAVENOUS PRN
Status: DISCONTINUED | OUTPATIENT
Start: 2024-08-06 | End: 2024-08-06 | Stop reason: SDUPTHER

## 2024-08-06 RX ORDER — ACETAMINOPHEN 500 MG
1000 TABLET ORAL ONCE
Status: COMPLETED | OUTPATIENT
Start: 2024-08-06 | End: 2024-08-06

## 2024-08-06 RX ORDER — SODIUM CHLORIDE 9 MG/ML
INJECTION, SOLUTION INTRAVENOUS PRN
Status: DISCONTINUED | OUTPATIENT
Start: 2024-08-06 | End: 2024-08-10

## 2024-08-06 RX ORDER — ONDANSETRON 2 MG/ML
INJECTION INTRAMUSCULAR; INTRAVENOUS PRN
Status: DISCONTINUED | OUTPATIENT
Start: 2024-08-06 | End: 2024-08-06 | Stop reason: SDUPTHER

## 2024-08-06 RX ORDER — HEPARIN SODIUM 5000 [USP'U]/ML
5000 INJECTION, SOLUTION INTRAVENOUS; SUBCUTANEOUS EVERY 8 HOURS SCHEDULED
Status: DISCONTINUED | OUTPATIENT
Start: 2024-08-07 | End: 2024-08-12 | Stop reason: HOSPADM

## 2024-08-06 RX ORDER — ONDANSETRON 2 MG/ML
4 INJECTION INTRAMUSCULAR; INTRAVENOUS
Status: DISCONTINUED | OUTPATIENT
Start: 2024-08-06 | End: 2024-08-06 | Stop reason: HOSPADM

## 2024-08-06 RX ORDER — INSULIN LISPRO 100 [IU]/ML
0-15 INJECTION, SOLUTION INTRAVENOUS; SUBCUTANEOUS ONCE
Status: DISCONTINUED | OUTPATIENT
Start: 2024-08-06 | End: 2024-08-06 | Stop reason: HOSPADM

## 2024-08-06 RX ORDER — ACETAMINOPHEN 500 MG
1000 TABLET ORAL EVERY 6 HOURS SCHEDULED
Status: DISCONTINUED | OUTPATIENT
Start: 2024-08-06 | End: 2024-08-12 | Stop reason: HOSPADM

## 2024-08-06 RX ORDER — GABAPENTIN 300 MG/1
600 CAPSULE ORAL ONCE
Status: COMPLETED | OUTPATIENT
Start: 2024-08-06 | End: 2024-08-06

## 2024-08-06 RX ORDER — SODIUM CHLORIDE, SODIUM LACTATE, POTASSIUM CHLORIDE, CALCIUM CHLORIDE 600; 310; 30; 20 MG/100ML; MG/100ML; MG/100ML; MG/100ML
INJECTION, SOLUTION INTRAVENOUS CONTINUOUS
Status: DISCONTINUED | OUTPATIENT
Start: 2024-08-06 | End: 2024-08-06 | Stop reason: HOSPADM

## 2024-08-06 RX ORDER — PROPOFOL 10 MG/ML
INJECTION, EMULSION INTRAVENOUS PRN
Status: DISCONTINUED | OUTPATIENT
Start: 2024-08-06 | End: 2024-08-06 | Stop reason: SDUPTHER

## 2024-08-06 RX ORDER — EPHEDRINE SULFATE/0.9% NACL/PF 50 MG/5 ML
SYRINGE (ML) INTRAVENOUS PRN
Status: DISCONTINUED | OUTPATIENT
Start: 2024-08-06 | End: 2024-08-06 | Stop reason: SDUPTHER

## 2024-08-06 RX ORDER — HYDROMORPHONE HYDROCHLORIDE 2 MG/ML
INJECTION, SOLUTION INTRAMUSCULAR; INTRAVENOUS; SUBCUTANEOUS PRN
Status: DISCONTINUED | OUTPATIENT
Start: 2024-08-06 | End: 2024-08-06 | Stop reason: SDUPTHER

## 2024-08-06 RX ORDER — DIPHENHYDRAMINE HYDROCHLORIDE 50 MG/ML
25 INJECTION INTRAMUSCULAR; INTRAVENOUS EVERY 6 HOURS PRN
Status: DISCONTINUED | OUTPATIENT
Start: 2024-08-06 | End: 2024-08-12 | Stop reason: HOSPADM

## 2024-08-06 RX ORDER — SODIUM CHLORIDE 0.9 % (FLUSH) 0.9 %
5-40 SYRINGE (ML) INJECTION EVERY 12 HOURS SCHEDULED
Status: DISCONTINUED | OUTPATIENT
Start: 2024-08-06 | End: 2024-08-06 | Stop reason: HOSPADM

## 2024-08-06 RX ORDER — METRONIDAZOLE 500 MG/100ML
500 INJECTION, SOLUTION INTRAVENOUS EVERY 8 HOURS
Status: COMPLETED | OUTPATIENT
Start: 2024-08-06 | End: 2024-08-11

## 2024-08-06 RX ORDER — HYDRALAZINE HYDROCHLORIDE 20 MG/ML
INJECTION INTRAMUSCULAR; INTRAVENOUS PRN
Status: DISCONTINUED | OUTPATIENT
Start: 2024-08-06 | End: 2024-08-06 | Stop reason: SDUPTHER

## 2024-08-06 RX ORDER — GLUCAGON 1 MG
1 KIT INJECTION PRN
Status: DISCONTINUED | OUTPATIENT
Start: 2024-08-06 | End: 2024-08-12 | Stop reason: HOSPADM

## 2024-08-06 RX ORDER — FENTANYL CITRATE 50 UG/ML
25 INJECTION, SOLUTION INTRAMUSCULAR; INTRAVENOUS EVERY 5 MIN PRN
Status: DISCONTINUED | OUTPATIENT
Start: 2024-08-06 | End: 2024-08-06 | Stop reason: HOSPADM

## 2024-08-06 RX ORDER — INDOCYANINE GREEN AND WATER 25 MG
KIT INJECTION PRN
Status: DISCONTINUED | OUTPATIENT
Start: 2024-08-06 | End: 2024-08-06 | Stop reason: SDUPTHER

## 2024-08-06 RX ADMIN — ROCURONIUM BROMIDE 10 MG: 50 INJECTION INTRAVENOUS at 11:45

## 2024-08-06 RX ADMIN — MIDAZOLAM 2 MG: 1 INJECTION, SOLUTION INTRAMUSCULAR; INTRAVENOUS at 08:44

## 2024-08-06 RX ADMIN — ROCURONIUM BROMIDE 30 MG: 50 INJECTION INTRAVENOUS at 09:05

## 2024-08-06 RX ADMIN — DEXAMETHASONE SODIUM PHOSPHATE 4 MG: 4 INJECTION INTRA-ARTICULAR; INTRALESIONAL; INTRAMUSCULAR; INTRAVENOUS; SOFT TISSUE at 09:57

## 2024-08-06 RX ADMIN — HYDROMORPHONE HYDROCHLORIDE 0.5 MG: 2 INJECTION INTRAMUSCULAR; INTRAVENOUS; SUBCUTANEOUS at 10:26

## 2024-08-06 RX ADMIN — HYDROMORPHONE HYDROCHLORIDE 0.4 MG: 2 INJECTION INTRAMUSCULAR; INTRAVENOUS; SUBCUTANEOUS at 14:00

## 2024-08-06 RX ADMIN — PHENYLEPHRINE HYDROCHLORIDE 100 MCG: 10 INJECTION INTRAVENOUS at 12:04

## 2024-08-06 RX ADMIN — LIDOCAINE HYDROCHLORIDE 80 MG: 20 INJECTION, SOLUTION EPIDURAL; INFILTRATION; INTRACAUDAL; PERINEURAL at 08:55

## 2024-08-06 RX ADMIN — ACETAMINOPHEN 1000 MG: 500 TABLET ORAL at 08:13

## 2024-08-06 RX ADMIN — HYDROMORPHONE HYDROCHLORIDE 0.5 MG: 2 INJECTION INTRAMUSCULAR; INTRAVENOUS; SUBCUTANEOUS at 09:30

## 2024-08-06 RX ADMIN — CEFAZOLIN 2 G: 330 INJECTION, POWDER, FOR SOLUTION INTRAMUSCULAR; INTRAVENOUS at 13:00

## 2024-08-06 RX ADMIN — SUGAMMADEX 200 MG: 100 INJECTION, SOLUTION INTRAVENOUS at 13:57

## 2024-08-06 RX ADMIN — ACETAMINOPHEN 1000 MG: 500 TABLET ORAL at 17:50

## 2024-08-06 RX ADMIN — SODIUM CHLORIDE, POTASSIUM CHLORIDE, SODIUM LACTATE AND CALCIUM CHLORIDE: 600; 310; 30; 20 INJECTION, SOLUTION INTRAVENOUS at 17:16

## 2024-08-06 RX ADMIN — FENTANYL CITRATE 100 MCG: 50 INJECTION INTRAMUSCULAR; INTRAVENOUS at 08:54

## 2024-08-06 RX ADMIN — FAMOTIDINE 20 MG: 10 INJECTION, SOLUTION INTRAVENOUS at 21:24

## 2024-08-06 RX ADMIN — ROCURONIUM BROMIDE 10 MG: 50 INJECTION INTRAVENOUS at 12:55

## 2024-08-06 RX ADMIN — INSULIN LISPRO 6 UNITS: 100 INJECTION, SOLUTION INTRAVENOUS; SUBCUTANEOUS at 15:08

## 2024-08-06 RX ADMIN — SODIUM CHLORIDE, SODIUM LACTATE, POTASSIUM CHLORIDE, AND CALCIUM CHLORIDE: 600; 310; 30; 20 INJECTION, SOLUTION INTRAVENOUS at 08:29

## 2024-08-06 RX ADMIN — GABAPENTIN 300 MG: 300 CAPSULE ORAL at 21:24

## 2024-08-06 RX ADMIN — INDOCYANINE GREEN AND WATER 7.5 MG: KIT at 12:29

## 2024-08-06 RX ADMIN — METRONIDAZOLE 500 MG: 500 SOLUTION INTRAVENOUS at 09:05

## 2024-08-06 RX ADMIN — CEFAZOLIN 2 G: 330 INJECTION, POWDER, FOR SOLUTION INTRAMUSCULAR; INTRAVENOUS at 09:00

## 2024-08-06 RX ADMIN — ONDANSETRON 4 MG: 2 INJECTION INTRAMUSCULAR; INTRAVENOUS at 13:20

## 2024-08-06 RX ADMIN — GLYCOPYRROLATE 0.2 MG: 0.2 INJECTION, SOLUTION INTRAMUSCULAR; INTRAVENOUS at 09:15

## 2024-08-06 RX ADMIN — HYDRALAZINE HYDROCHLORIDE 5 MG: 20 INJECTION, SOLUTION INTRAMUSCULAR; INTRAVENOUS at 11:48

## 2024-08-06 RX ADMIN — WATER 2000 MG: 1 INJECTION INTRAMUSCULAR; INTRAVENOUS; SUBCUTANEOUS at 21:23

## 2024-08-06 RX ADMIN — HYDROMORPHONE HYDROCHLORIDE 0.2 MG: 2 INJECTION INTRAMUSCULAR; INTRAVENOUS; SUBCUTANEOUS at 13:25

## 2024-08-06 RX ADMIN — HYDROMORPHONE HYDROCHLORIDE 0.2 MG: 2 INJECTION INTRAMUSCULAR; INTRAVENOUS; SUBCUTANEOUS at 12:55

## 2024-08-06 RX ADMIN — SODIUM CHLORIDE, SODIUM LACTATE, POTASSIUM CHLORIDE, AND CALCIUM CHLORIDE: 600; 310; 30; 20 INJECTION, SOLUTION INTRAVENOUS at 11:55

## 2024-08-06 RX ADMIN — METRONIDAZOLE 500 MG: 500 INJECTION, SOLUTION INTRAVENOUS at 17:49

## 2024-08-06 RX ADMIN — MORPHINE SULFATE 2 MG: 2 INJECTION, SOLUTION INTRAMUSCULAR; INTRAVENOUS at 21:23

## 2024-08-06 RX ADMIN — ROCURONIUM BROMIDE 20 MG: 50 INJECTION INTRAVENOUS at 09:18

## 2024-08-06 RX ADMIN — ROCURONIUM BROMIDE 10 MG: 50 INJECTION INTRAVENOUS at 11:07

## 2024-08-06 RX ADMIN — SODIUM CHLORIDE, PRESERVATIVE FREE 20 MG: 5 INJECTION INTRAVENOUS at 08:31

## 2024-08-06 RX ADMIN — HYDROMORPHONE HYDROCHLORIDE 0.2 MG: 2 INJECTION INTRAMUSCULAR; INTRAVENOUS; SUBCUTANEOUS at 13:50

## 2024-08-06 RX ADMIN — HYDRALAZINE HYDROCHLORIDE 5 MG: 20 INJECTION, SOLUTION INTRAMUSCULAR; INTRAVENOUS at 09:52

## 2024-08-06 RX ADMIN — Medication 10 MG: at 09:20

## 2024-08-06 RX ADMIN — Medication 100 MG: at 08:56

## 2024-08-06 RX ADMIN — ROCURONIUM BROMIDE 10 MG: 50 INJECTION INTRAVENOUS at 10:26

## 2024-08-06 RX ADMIN — GABAPENTIN 600 MG: 300 CAPSULE ORAL at 08:13

## 2024-08-06 RX ADMIN — PROPOFOL 200 MG: 10 INJECTION, EMULSION INTRAVENOUS at 08:55

## 2024-08-06 ASSESSMENT — PAIN - FUNCTIONAL ASSESSMENT
PAIN_FUNCTIONAL_ASSESSMENT: 0-10
PAIN_FUNCTIONAL_ASSESSMENT: ACTIVITIES ARE NOT PREVENTED
PAIN_FUNCTIONAL_ASSESSMENT: ACTIVITIES ARE NOT PREVENTED

## 2024-08-06 ASSESSMENT — PAIN DESCRIPTION - ORIENTATION: ORIENTATION: OTHER (COMMENT)

## 2024-08-06 ASSESSMENT — PAIN DESCRIPTION - PAIN TYPE
TYPE: ACUTE PAIN
TYPE: ACUTE PAIN

## 2024-08-06 ASSESSMENT — PAIN DESCRIPTION - LOCATION
LOCATION: ABDOMEN

## 2024-08-06 ASSESSMENT — PAIN SCALES - GENERAL
PAINLEVEL_OUTOF10: 3
PAINLEVEL_OUTOF10: 6
PAINLEVEL_OUTOF10: 1
PAINLEVEL_OUTOF10: 2
PAINLEVEL_OUTOF10: 0
PAINLEVEL_OUTOF10: 3

## 2024-08-06 ASSESSMENT — PAIN DESCRIPTION - DESCRIPTORS
DESCRIPTORS: ACHING;SORE
DESCRIPTORS: ACHING
DESCRIPTORS: SORE

## 2024-08-06 ASSESSMENT — PAIN DESCRIPTION - ONSET
ONSET: ON-GOING
ONSET: ON-GOING

## 2024-08-06 ASSESSMENT — PAIN DESCRIPTION - FREQUENCY
FREQUENCY: CONTINUOUS
FREQUENCY: CONTINUOUS

## 2024-08-06 NOTE — PERIOP NOTE
TRANSFER - OUT REPORT:    Verbal report given to IRENE Goldsmith on Vlad King  being transferred to [address] [place] [address] for routine progression of patient care       Report consisted of patient's Situation, Background, Assessment and   Recommendations(SBAR).     Information from the following report(s) Nurse Handoff Report, Surgery Report, Intake/Output, MAR, Recent Results, and Cardiac Rhythm sinus tachy  was reviewed with the receiving nurse.           Lines:   Peripheral IV 08/06/24 Posterior;Right Wrist (Active)   Site Assessment Clean, dry & intact 08/06/24 1536   Line Status Infusing 08/06/24 1536   Phlebitis Assessment No symptoms 08/06/24 1536   Infiltration Assessment 0 08/06/24 1536   Dressing Status Clean, dry & intact 08/06/24 1536   Dressing Type Transparent 08/06/24 1536       Peripheral IV 08/06/24 Distal;Left;Posterior Forearm (Active)   Site Assessment Clean, dry & intact 08/06/24 1556   Line Status Capped 08/06/24 1556   Phlebitis Assessment No symptoms 08/06/24 1556   Infiltration Assessment 0 08/06/24 1556   Dressing Status Clean, dry & intact 08/06/24 1556   Dressing Type Transparent 08/06/24 1556        Opportunity for questions and clarification was provided.      Patient transported with:  Tech

## 2024-08-06 NOTE — ANESTHESIA POSTPROCEDURE EVALUATION
Department of Anesthesiology  Postprocedure Note    Patient: Vlad King  MRN: [id number]  YOB: 1960  Date of evaluation: 8/6/2024    Procedure Summary       Date: 08/06/24 Room / Location: [facility] MAIN 04 / [facility] MAIN OR    Anesthesia Start: 0844 Anesthesia Stop: 1427    Procedure: ROBOTIC RIGHT COLECTOMY (Abdomen) Diagnosis:       Cancer of appendix (HCC)      (Cancer of appendix (HCC) [C18.1])    Surgeons: German Almendarez MD Responsible Provider: Nilay Silva MD    Anesthesia Type: General ASA Status: 3            Anesthesia Type: General    Martina Phase I: Martina Score: 10    Martina Phase II:      Anesthesia Post Evaluation    Patient location during evaluation: PACU  Patient participation: complete - patient participated  Level of consciousness: sleepy but conscious  Pain score: 0  Airway patency: patent  Nausea & Vomiting: no nausea and no vomiting  Cardiovascular status: blood pressure returned to baseline  Respiratory status: acceptable  Hydration status: euvolemic  Pain management: adequate    No notable events documented.

## 2024-08-06 NOTE — OP NOTE
Operative Note      Patient: Vlad King  YOB: 1960  MRN: [id number]    Date of Procedure: 8/6/2024    Pre-Op Diagnosis Codes:     * Cancer of appendix (HCC) [C18.1]    Post-Op Diagnosis: Same       Procedure(s):  ROBOTIC RIGHT COLECTOMY    Surgeon(s):  German Almendarez MD    Assistant:   Surgical Assistant: Jorge Horvath    Anesthesia: General    Estimated Blood Loss (mL): less than 100     Complications: None    Specimens:   ID Type Source Tests Collected by Time Destination   A : ileocolic lymph node Tissue Lymph Node SURGICAL PATHOLOGY German Almendarez MD 8/6/2024 1230    B : ileuum, cecum, ascending and portion of transverse Tissue Cecum SURGICAL PATHOLOGY German Almendarez MD 8/6/2024 1318        Implants:  * No implants in log *      Drains:   Urinary Catheter 08/06/24 2 Way;Flores (Active)       Findings:  Infection Present At Time Of Surgery (PATOS) (choose all levels that have infection present):  No infection present  Other Findings: inflammation  Colon Resection  Operation performed with curative intent Yes   Tumor Location (select all that apply) Cecum   Extent of colon and vascular resection (select all that apply) Right hemicolectomy - ileocolic, right colic (if present)        Detailed Description of Procedure:     The patient was properly identified in the holding area and brought to the operating room.  They were laid supine on the operating room table.  General anesthesia was administered.  Flores catheter was placed.  Arms were tucked at their side.  Chest was strapped to the bed.  Abdomen was prepped and draped in the usual sterile fashion.  We made a small stab incision beneath the left subcostal margin, inserted a Veress needle, and insufflated the abdomen to 15 mmHg.  We placed four robotic ports down the patient's left side, the uppermost being a 12 mm port site.  The small bowel was swept to the patient's left and the robot was docked.  The ileocolic pedicle was

## 2024-08-06 NOTE — ANESTHESIA PRE PROCEDURE
Department of Anesthesiology  Preprocedure Note       Name:  Vlad King   Age:  63 y.o.  :  1960                                          MRN:  [id number]         Date:  2024      Surgeon: Surgeon(s):  German Almendarez MD    Procedure: Procedure(s):  ROBOTIC RIGHT COLECTOMY    Medications prior to admission:   Prior to Admission medications    Medication Sig Start Date End Date Taking? Authorizing Provider   albuterol sulfate HFA (PROVENTIL;VENTOLIN;PROAIR) 108 (90 Base) MCG/ACT inhaler INHALE 2 PUFFS INTO THE LUNGS 4 TIMES DAILY AS NEEDED FOR WHEEZING. 24   Matt Babb MD   metFORMIN (GLUCOPHAGE-XR) 500 MG extended release tablet Take 1 tablet by mouth daily (with breakfast)  Patient not taking: Reported on 2024   Matt Babb MD   atorvastatin (LIPITOR) 10 MG tablet Take 1 tablet by mouth daily  Patient not taking: Reported on 2024   Matt Babb MD   losartan (COZAAR) 25 MG tablet Take 1 tablet by mouth daily 24  Linda Coto MD   famotidine (PEPCID) 20 MG tablet Take 1 tablet by mouth 2 times daily  Patient not taking: Reported on 2024   Linda Coot MD   ibuprofen (ADVIL;MOTRIN) 800 MG tablet TAKE 1 TABLET BY MOUTH EVERY EIGHT HOURS AS NEEDED FOR PAIN.  Patient not taking: Reported on 23   Matt Babb MD   empagliflozin (JARDIANCE) 25 MG tablet Take 1 tablet by mouth daily 23   Matt Babb MD   Lancets MISC Use as directed 3 times a day 17   Automatic Reconciliation, Ar   brinzolamide (AZOPT) 1 % ophthalmic suspension Apply 1 drop to eye 2 times daily 10/16/20   Automatic Reconciliation, Ar   carbamide peroxide (DEBROX) 6.5 % otic solution Place 5 drops in ear(s) 2 times daily 22   Automatic Reconciliation, Ar   diclofenac sodium (VOLTAREN) 1 % GEL Apply 2 g topically 4 times daily  Patient not taking: Reported on 2024   Automatic

## 2024-08-06 NOTE — H&P
HPI: Vlad King is a 63 y.o. male presenting with chief complain of appendiceal cancer    Past Medical History:   Diagnosis Date    Diabetes (HCC)     DM type 2 with diabetic peripheral neuropathy (HCC) 2018    Dr Freitas    Hypertension     Hypertensive retinopathy     Knee pain, left     Morbid obesity (HCC)     Pneumonia     Primary open angle glaucoma     Sickle cell trait (HCC)        Past Surgical History:   Procedure Laterality Date    CHOLECYSTECTOMY      COLONOSCOPY  2012    normal, 10 year recall    COLONOSCOPY N/A 1/18/2023    COLONOSCOPY w/ polypectomy performed by German Almendarez MD at [facility] ENDOSCOPY    LAPAROSCOPIC APPENDECTOMY N/A 6/6/2024    LAPAROSCOPIC APPENDECTOMY performed by Madhuri Batista DO at [facility] MAIN OR    OTHER SURGICAL HISTORY      pt reports gallstone removal       Family History   Problem Relation Age of Onset    Sickle Cell Trait Other     Diabetes Maternal Grandmother     Diabetes Sister     Diabetes Mother        Social History     Socioeconomic History    Marital status: Single     Spouse name: None    Number of children: None    Years of education: None    Highest education level: None   Tobacco Use    Smoking status: Former     Current packs/day: 0.25     Types: Cigarettes    Smokeless tobacco: Former     Quit date: 1/1/2016   Vaping Use    Vaping Use: Never used   Substance and Sexual Activity    Alcohol use: No    Drug use: No     Social Determinants of Health     Financial Resource Strain: Medium Risk (4/30/2024)    Overall Financial Resource Strain (CARDIA)     Difficulty of Paying Living Expenses: Somewhat hard   Food Insecurity: No Food Insecurity (4/30/2024)    Hunger Vital Sign     Worried About Running Out of Food in the Last Year: Never true     Ran Out of Food in the Last Year: Never true   Transportation Needs: No Transportation Needs (4/30/2024)    PRAPARE - Transportation     Lack of Transportation (Medical): No     Lack of Transportation (Non-Medical): No     CREATININE 0.89 08/02/2024 01:58 PM    GLUCOSE 182 08/02/2024 01:58 PM    CALCIUM 9.3 08/02/2024 01:58 PM      Lab Results   Component Value Date/Time    MG 1.7 04/25/2024 04:14 AM     Lab Results   Component Value Date    CALCIUM 9.3 08/02/2024       Assessment / Plan    Robotic R    The diagnoses and plan were discussed with the patient. All questions answered.  Plan of care agreed to by all concerned.

## 2024-08-06 NOTE — PERIOP NOTE
Patient /Family /Designee has been informed that [facility] is not responsible for patient belongings per policy and the signed [facility] Patient Agreement document.  Personal items should be sent home or checked in with security.  Patient /Family /Designee selected the following action:                            []  Send personal items home with a family member or friend                                                 []  Check in personal items with security, excluding clothing                            [x]  Maintain personal items at the bedside, against recommendation                                 by Chandra Lorenzo [facility]

## 2024-08-07 LAB
ANION GAP SERPL CALC-SCNC: 6 MMOL/L (ref 3–18)
BUN SERPL-MCNC: 13 MG/DL (ref 7–18)
BUN/CREAT SERPL: 13 (ref 12–20)
CALCIUM SERPL-MCNC: 9.1 MG/DL (ref 8.5–10.1)
CHLORIDE SERPL-SCNC: 111 MMOL/L (ref 100–111)
CO2 SERPL-SCNC: 23 MMOL/L (ref 21–32)
CREAT SERPL-MCNC: 0.99 MG/DL (ref 0.6–1.3)
ERYTHROCYTE [DISTWIDTH] IN BLOOD BY AUTOMATED COUNT: 14.6 % (ref 11.6–14.5)
GLUCOSE BLD STRIP.AUTO-MCNC: 108 MG/DL (ref 70–110)
GLUCOSE BLD STRIP.AUTO-MCNC: 122 MG/DL (ref 70–110)
GLUCOSE BLD STRIP.AUTO-MCNC: 129 MG/DL (ref 70–110)
GLUCOSE BLD STRIP.AUTO-MCNC: 156 MG/DL (ref 70–110)
GLUCOSE SERPL-MCNC: 143 MG/DL (ref 74–99)
HCT VFR BLD AUTO: 40.2 % (ref 36–48)
HGB BLD-MCNC: 13.3 G/DL (ref 13–16)
MAGNESIUM SERPL-MCNC: 2.1 MG/DL (ref 1.6–2.6)
MCH RBC QN AUTO: 28.5 PG (ref 24–34)
MCHC RBC AUTO-ENTMCNC: 33.1 G/DL (ref 31–37)
MCV RBC AUTO: 86.1 FL (ref 78–100)
NRBC # BLD: 0 K/UL (ref 0–0.01)
NRBC BLD-RTO: 0 PER 100 WBC
PHOSPHATE SERPL-MCNC: 3.8 MG/DL (ref 2.5–4.9)
PLATELET # BLD AUTO: 197 K/UL (ref 135–420)
PMV BLD AUTO: 11.6 FL (ref 9.2–11.8)
POTASSIUM SERPL-SCNC: 4.1 MMOL/L (ref 3.5–5.5)
RBC # BLD AUTO: 4.67 M/UL (ref 4.35–5.65)
SODIUM SERPL-SCNC: 140 MMOL/L (ref 136–145)
WBC # BLD AUTO: 13.8 K/UL (ref 4.6–13.2)

## 2024-08-07 PROCEDURE — 83735 ASSAY OF MAGNESIUM: CPT

## 2024-08-07 PROCEDURE — 85027 COMPLETE CBC AUTOMATED: CPT

## 2024-08-07 PROCEDURE — 2500000003 HC RX 250 WO HCPCS: Performed by: COLON & RECTAL SURGERY

## 2024-08-07 PROCEDURE — 80048 BASIC METABOLIC PNL TOTAL CA: CPT

## 2024-08-07 PROCEDURE — 6370000000 HC RX 637 (ALT 250 FOR IP): Performed by: COLON & RECTAL SURGERY

## 2024-08-07 PROCEDURE — 36415 COLL VENOUS BLD VENIPUNCTURE: CPT

## 2024-08-07 PROCEDURE — 2580000003 HC RX 258: Performed by: COLON & RECTAL SURGERY

## 2024-08-07 PROCEDURE — 6360000002 HC RX W HCPCS: Performed by: COLON & RECTAL SURGERY

## 2024-08-07 PROCEDURE — 1100000000 HC RM PRIVATE

## 2024-08-07 PROCEDURE — 94761 N-INVAS EAR/PLS OXIMETRY MLT: CPT

## 2024-08-07 PROCEDURE — 82962 GLUCOSE BLOOD TEST: CPT

## 2024-08-07 PROCEDURE — 84100 ASSAY OF PHOSPHORUS: CPT

## 2024-08-07 RX ADMIN — SODIUM CHLORIDE, POTASSIUM CHLORIDE, SODIUM LACTATE AND CALCIUM CHLORIDE: 600; 310; 30; 20 INJECTION, SOLUTION INTRAVENOUS at 01:09

## 2024-08-07 RX ADMIN — MORPHINE SULFATE 2 MG: 2 INJECTION, SOLUTION INTRAMUSCULAR; INTRAVENOUS at 20:28

## 2024-08-07 RX ADMIN — MORPHINE SULFATE 2 MG: 2 INJECTION, SOLUTION INTRAMUSCULAR; INTRAVENOUS at 14:44

## 2024-08-07 RX ADMIN — HEPARIN SODIUM 5000 UNITS: 5000 INJECTION INTRAVENOUS; SUBCUTANEOUS at 22:25

## 2024-08-07 RX ADMIN — HEPARIN SODIUM 5000 UNITS: 5000 INJECTION INTRAVENOUS; SUBCUTANEOUS at 14:21

## 2024-08-07 RX ADMIN — MORPHINE SULFATE 2 MG: 2 INJECTION, SOLUTION INTRAMUSCULAR; INTRAVENOUS at 23:30

## 2024-08-07 RX ADMIN — METRONIDAZOLE 500 MG: 500 INJECTION, SOLUTION INTRAVENOUS at 11:08

## 2024-08-07 RX ADMIN — ACETAMINOPHEN 1000 MG: 500 TABLET ORAL at 06:25

## 2024-08-07 RX ADMIN — SODIUM CHLORIDE, POTASSIUM CHLORIDE, SODIUM LACTATE AND CALCIUM CHLORIDE: 600; 310; 30; 20 INJECTION, SOLUTION INTRAVENOUS at 23:32

## 2024-08-07 RX ADMIN — WATER 2000 MG: 1 INJECTION INTRAMUSCULAR; INTRAVENOUS; SUBCUTANEOUS at 06:24

## 2024-08-07 RX ADMIN — FAMOTIDINE 20 MG: 10 INJECTION, SOLUTION INTRAVENOUS at 10:59

## 2024-08-07 RX ADMIN — MORPHINE SULFATE 2 MG: 2 INJECTION, SOLUTION INTRAMUSCULAR; INTRAVENOUS at 01:14

## 2024-08-07 RX ADMIN — WATER 2000 MG: 1 INJECTION INTRAMUSCULAR; INTRAVENOUS; SUBCUTANEOUS at 20:27

## 2024-08-07 RX ADMIN — GABAPENTIN 300 MG: 300 CAPSULE ORAL at 11:06

## 2024-08-07 RX ADMIN — WATER 2000 MG: 1 INJECTION INTRAMUSCULAR; INTRAVENOUS; SUBCUTANEOUS at 14:20

## 2024-08-07 RX ADMIN — ACETAMINOPHEN 1000 MG: 500 TABLET ORAL at 14:20

## 2024-08-07 RX ADMIN — FAMOTIDINE 20 MG: 10 INJECTION, SOLUTION INTRAVENOUS at 20:25

## 2024-08-07 RX ADMIN — ACETAMINOPHEN 1000 MG: 500 TABLET ORAL at 01:00

## 2024-08-07 RX ADMIN — METRONIDAZOLE 500 MG: 500 INJECTION, SOLUTION INTRAVENOUS at 17:34

## 2024-08-07 RX ADMIN — METRONIDAZOLE 500 MG: 500 INJECTION, SOLUTION INTRAVENOUS at 01:12

## 2024-08-07 ASSESSMENT — PAIN DESCRIPTION - DESCRIPTORS
DESCRIPTORS: ACHING
DESCRIPTORS: ACHING
DESCRIPTORS: ACHING;THROBBING;TENDER
DESCRIPTORS: ACHING
DESCRIPTORS: ACHING;SORE

## 2024-08-07 ASSESSMENT — PAIN SCALES - GENERAL
PAINLEVEL_OUTOF10: 4
PAINLEVEL_OUTOF10: 10
PAINLEVEL_OUTOF10: 0
PAINLEVEL_OUTOF10: 5
PAINLEVEL_OUTOF10: 6
PAINLEVEL_OUTOF10: 10
PAINLEVEL_OUTOF10: 1
PAINLEVEL_OUTOF10: 10
PAINLEVEL_OUTOF10: 3

## 2024-08-07 ASSESSMENT — PAIN DESCRIPTION - LOCATION
LOCATION: ABDOMEN
LOCATION: INCISION;ABDOMEN
LOCATION: ABDOMEN

## 2024-08-07 ASSESSMENT — PAIN DESCRIPTION - ORIENTATION
ORIENTATION: ANTERIOR
ORIENTATION: LOWER
ORIENTATION: ANTERIOR
ORIENTATION: ANTERIOR
ORIENTATION: LOWER

## 2024-08-07 ASSESSMENT — PAIN DESCRIPTION - FREQUENCY: FREQUENCY: CONTINUOUS

## 2024-08-07 ASSESSMENT — PAIN DESCRIPTION - PAIN TYPE: TYPE: SURGICAL PAIN

## 2024-08-07 ASSESSMENT — PAIN DESCRIPTION - ONSET: ONSET: ON-GOING

## 2024-08-07 NOTE — PROGRESS NOTES
Advance Care Planning     Advance Care Planning Inpatient Note  [facility] Department    Today's Date: 8/7/2024  Unit: [facility] SURGICAL    Received request from patient and family.  Upon review of chart and communication with care team, patient's decision making abilities are not in question.. Patient and Healthcare Decision Maker was/were present in the room during visit.    Goals of ACP Conversation:  Discuss advance care planning documents  Facilitate a discussion related to patient's goals of care as they align with the patient's values and beliefs.    Health Care Decision Makers:       Primary Decision Maker: Rebecca Mckenna - Other - [phone]    Secondary Decision Maker: Vlad Mathew Child - [phone]  Summary:  Completed New Documents  Verified Healthcare Decision Maker  Updated Healthcare Decision Maker      Advance Care Planning Documents (Patient Wishes):  Healthcare Power of /Advance Directive Appointment of Health Care Agent  Living Will/Advance Directive     Assessment: Patient is alert and coping well. No spiritual concerns. Patient's original Advance Medical Directive misspelled the significant other's name. It is Tashawna not Sharad.   Assisted patient with the execution of Advance Medical Directive.  Placed the copy into patient's \"like paper chart.\"  See Flow Sheet for other pastoral interventions.  Chaplains will continue to follow and provide pastoral care on an as needed/requested basis.    Interventions:  Provided education on documents for clarity and greater understanding    Care Preferences Communicated:   No    Outcomes/Plan:  ACP Discussion: Completed  New advance directive completed.  Returned original document(s) to patient, as well as copies for distribution to appointed agents  Copy of advance directive given to staff to scan into medical record.    Electronically signed by JERAD Kitchen on 8/7/2024 at 11:39 AM

## 2024-08-07 NOTE — PLAN OF CARE
Problem: Pain  Goal: Verbalizes/displays adequate comfort level or baseline comfort level  8/7/2024 1319 by Yaz Johnston, RN  Outcome: Progressing  8/7/2024 0030 by Patricio Andrea, RN  Outcome: Progressing

## 2024-08-07 NOTE — PLAN OF CARE
Problem: Chronic Conditions and Co-morbidities  Goal: Patient's chronic conditions and co-morbidity symptoms are monitored and maintained or improved  Outcome: Progressing     Problem: Pain  Goal: Verbalizes/displays adequate comfort level or baseline comfort level  Outcome: Progressing     Problem: Safety - Adult  Goal: Free from fall injury  Outcome: Progressing     Problem: ABCDS Injury Assessment  Goal: Absence of physical injury  Outcome: Progressing     Problem: Discharge Planning  Goal: Discharge to home or other facility with appropriate resources  Outcome: Progressing

## 2024-08-07 NOTE — PROGRESS NOTES
[facility] [address] [facility], [place]  [address] [facility] [address]  [phone]  Colon and Rectal Surgery Progress Note      Patient: Vlad King MRN: [id number]  SSN: [id number]    YOB: 1960  Age: 63 y.o.  Sex: male      Admit Date: 8/6/2024    LOS: 1 day     Subjective:     Tolerating clears.  Passing his bowels.    Objective:     Vitals:    08/07/24 0144 08/07/24 0415 08/07/24 0842 08/07/24 1126   BP:  125/79 129/66 139/77   Pulse:  80 79 73   Resp: 18 18 18 18   Temp:  98.2 °F (36.8 °C) 98.1 °F (36.7 °C) 97.8 °F (36.6 °C)   TempSrc:  Oral Oral Oral   SpO2:  98% 96% 97%   Weight:       Height:            Intake and Output:  Current Shift: No intake/output data recorded.  Last three shifts: 08/05 1901 - 08/07 0700  In: 3476.3 [I.V.:3277.9]  Out: 3450 [Urine:3350]    Physical Exam:     Constitutional: well developed, in NAD  Abdomen: Soft, appropriately tender, nondistended    Lab/Data Review:    Lab Results   Component Value Date    WBC 13.8 (H) 08/07/2024    HGB 13.3 08/07/2024    HCT 40.2 08/07/2024    MCV 86.1 08/07/2024     08/07/2024     Lab Results   Component Value Date/Time     08/07/2024 02:58 AM    K 4.1 08/07/2024 02:58 AM     08/07/2024 02:58 AM    CO2 23 08/07/2024 02:58 AM    BUN 13 08/07/2024 02:58 AM    CREATININE 0.99 08/07/2024 02:58 AM    GLUCOSE 143 08/07/2024 02:58 AM    CALCIUM 9.1 08/07/2024 02:58 AM      Lab Results   Component Value Date/Time    MG 2.1 08/07/2024 02:58 AM     Lab Results   Component Value Date    CALCIUM 9.1 08/07/2024    PHOS 3.8 08/07/2024        Assessment:     Status post robotic right colectomy for appendiceal cancer    Plan:     Clear liquid diet  IKE Flores  Out of bed    Signed By: German Almendarez MD        August 7, 2024           Reina

## 2024-08-08 LAB
ANION GAP SERPL CALC-SCNC: 6 MMOL/L (ref 3–18)
BUN SERPL-MCNC: 10 MG/DL (ref 7–18)
BUN/CREAT SERPL: 11 (ref 12–20)
CALCIUM SERPL-MCNC: 8.6 MG/DL (ref 8.5–10.1)
CHLORIDE SERPL-SCNC: 108 MMOL/L (ref 100–111)
CO2 SERPL-SCNC: 25 MMOL/L (ref 21–32)
CREAT SERPL-MCNC: 0.91 MG/DL (ref 0.6–1.3)
ERYTHROCYTE [DISTWIDTH] IN BLOOD BY AUTOMATED COUNT: 14.9 % (ref 11.6–14.5)
GLUCOSE BLD STRIP.AUTO-MCNC: 105 MG/DL (ref 70–110)
GLUCOSE BLD STRIP.AUTO-MCNC: 110 MG/DL (ref 70–110)
GLUCOSE BLD STRIP.AUTO-MCNC: 114 MG/DL (ref 70–110)
GLUCOSE BLD STRIP.AUTO-MCNC: 86 MG/DL (ref 70–110)
GLUCOSE SERPL-MCNC: 106 MG/DL (ref 74–99)
HCT VFR BLD AUTO: 37 % (ref 36–48)
HGB BLD-MCNC: 12 G/DL (ref 13–16)
MAGNESIUM SERPL-MCNC: 1.8 MG/DL (ref 1.6–2.6)
MCH RBC QN AUTO: 28.4 PG (ref 24–34)
MCHC RBC AUTO-ENTMCNC: 32.4 G/DL (ref 31–37)
MCV RBC AUTO: 87.5 FL (ref 78–100)
NRBC # BLD: 0 K/UL (ref 0–0.01)
NRBC BLD-RTO: 0 PER 100 WBC
PHOSPHATE SERPL-MCNC: 1.3 MG/DL (ref 2.5–4.9)
PLATELET # BLD AUTO: 171 K/UL (ref 135–420)
PMV BLD AUTO: 11.5 FL (ref 9.2–11.8)
POTASSIUM SERPL-SCNC: 3.7 MMOL/L (ref 3.5–5.5)
RBC # BLD AUTO: 4.23 M/UL (ref 4.35–5.65)
SODIUM SERPL-SCNC: 139 MMOL/L (ref 136–145)
WBC # BLD AUTO: 9.9 K/UL (ref 4.6–13.2)

## 2024-08-08 PROCEDURE — 83735 ASSAY OF MAGNESIUM: CPT

## 2024-08-08 PROCEDURE — 94761 N-INVAS EAR/PLS OXIMETRY MLT: CPT

## 2024-08-08 PROCEDURE — 1100000000 HC RM PRIVATE

## 2024-08-08 PROCEDURE — 84100 ASSAY OF PHOSPHORUS: CPT

## 2024-08-08 PROCEDURE — 2580000003 HC RX 258: Performed by: COLON & RECTAL SURGERY

## 2024-08-08 PROCEDURE — 82962 GLUCOSE BLOOD TEST: CPT

## 2024-08-08 PROCEDURE — 6360000002 HC RX W HCPCS: Performed by: COLON & RECTAL SURGERY

## 2024-08-08 PROCEDURE — 80048 BASIC METABOLIC PNL TOTAL CA: CPT

## 2024-08-08 PROCEDURE — 85027 COMPLETE CBC AUTOMATED: CPT

## 2024-08-08 PROCEDURE — 2500000003 HC RX 250 WO HCPCS: Performed by: COLON & RECTAL SURGERY

## 2024-08-08 PROCEDURE — 36415 COLL VENOUS BLD VENIPUNCTURE: CPT

## 2024-08-08 RX ORDER — MORPHINE SULFATE 4 MG/ML
4 INJECTION, SOLUTION INTRAMUSCULAR; INTRAVENOUS
Status: DISCONTINUED | OUTPATIENT
Start: 2024-08-08 | End: 2024-08-10

## 2024-08-08 RX ADMIN — MORPHINE SULFATE 2 MG: 2 INJECTION, SOLUTION INTRAMUSCULAR; INTRAVENOUS at 12:23

## 2024-08-08 RX ADMIN — SODIUM CHLORIDE, POTASSIUM CHLORIDE, SODIUM LACTATE AND CALCIUM CHLORIDE: 600; 310; 30; 20 INJECTION, SOLUTION INTRAVENOUS at 17:27

## 2024-08-08 RX ADMIN — HEPARIN SODIUM 5000 UNITS: 5000 INJECTION INTRAVENOUS; SUBCUTANEOUS at 05:01

## 2024-08-08 RX ADMIN — WATER 2000 MG: 1 INJECTION INTRAMUSCULAR; INTRAVENOUS; SUBCUTANEOUS at 05:02

## 2024-08-08 RX ADMIN — FAMOTIDINE 20 MG: 10 INJECTION, SOLUTION INTRAVENOUS at 09:58

## 2024-08-08 RX ADMIN — FAMOTIDINE 20 MG: 10 INJECTION, SOLUTION INTRAVENOUS at 22:20

## 2024-08-08 RX ADMIN — SODIUM CHLORIDE, POTASSIUM CHLORIDE, SODIUM LACTATE AND CALCIUM CHLORIDE: 600; 310; 30; 20 INJECTION, SOLUTION INTRAVENOUS at 07:46

## 2024-08-08 RX ADMIN — METRONIDAZOLE 500 MG: 500 INJECTION, SOLUTION INTRAVENOUS at 10:02

## 2024-08-08 RX ADMIN — MORPHINE SULFATE 4 MG: 4 INJECTION, SOLUTION INTRAMUSCULAR; INTRAVENOUS at 17:49

## 2024-08-08 RX ADMIN — MORPHINE SULFATE 2 MG: 2 INJECTION, SOLUTION INTRAMUSCULAR; INTRAVENOUS at 02:59

## 2024-08-08 RX ADMIN — METRONIDAZOLE 500 MG: 500 INJECTION, SOLUTION INTRAVENOUS at 17:48

## 2024-08-08 RX ADMIN — MORPHINE SULFATE 2 MG: 2 INJECTION, SOLUTION INTRAMUSCULAR; INTRAVENOUS at 07:05

## 2024-08-08 RX ADMIN — HEPARIN SODIUM 5000 UNITS: 5000 INJECTION INTRAVENOUS; SUBCUTANEOUS at 22:19

## 2024-08-08 RX ADMIN — MORPHINE SULFATE 4 MG: 4 INJECTION, SOLUTION INTRAMUSCULAR; INTRAVENOUS at 22:25

## 2024-08-08 RX ADMIN — METRONIDAZOLE 500 MG: 500 INJECTION, SOLUTION INTRAVENOUS at 01:05

## 2024-08-08 RX ADMIN — HEPARIN SODIUM 5000 UNITS: 5000 INJECTION INTRAVENOUS; SUBCUTANEOUS at 14:44

## 2024-08-08 RX ADMIN — WATER 2000 MG: 1 INJECTION INTRAMUSCULAR; INTRAVENOUS; SUBCUTANEOUS at 12:31

## 2024-08-08 RX ADMIN — WATER 2000 MG: 1 INJECTION INTRAMUSCULAR; INTRAVENOUS; SUBCUTANEOUS at 22:21

## 2024-08-08 ASSESSMENT — PAIN DESCRIPTION - PAIN TYPE
TYPE: SURGICAL PAIN

## 2024-08-08 ASSESSMENT — PAIN SCALES - GENERAL
PAINLEVEL_OUTOF10: 6
PAINLEVEL_OUTOF10: 10
PAINLEVEL_OUTOF10: 10
PAINLEVEL_OUTOF10: 3
PAINLEVEL_OUTOF10: 10
PAINLEVEL_OUTOF10: 10

## 2024-08-08 ASSESSMENT — PAIN DESCRIPTION - DESCRIPTORS
DESCRIPTORS: ACHING
DESCRIPTORS: DISCOMFORT;ACHING
DESCRIPTORS: SORE
DESCRIPTORS: DISCOMFORT;ACHING
DESCRIPTORS: ACHING
DESCRIPTORS: ACHING

## 2024-08-08 ASSESSMENT — PAIN DESCRIPTION - LOCATION
LOCATION: ABDOMEN
LOCATION: ABDOMEN;INCISION
LOCATION: ABDOMEN;INCISION
LOCATION: ABDOMEN
LOCATION: ABDOMEN;INCISION
LOCATION: ABDOMEN
LOCATION: ABDOMEN

## 2024-08-08 ASSESSMENT — PAIN DESCRIPTION - ORIENTATION
ORIENTATION: ANTERIOR

## 2024-08-08 ASSESSMENT — PAIN - FUNCTIONAL ASSESSMENT
PAIN_FUNCTIONAL_ASSESSMENT: ACTIVITIES ARE NOT PREVENTED

## 2024-08-08 ASSESSMENT — PAIN DESCRIPTION - FREQUENCY
FREQUENCY: CONTINUOUS
FREQUENCY: CONTINUOUS

## 2024-08-08 ASSESSMENT — PAIN DESCRIPTION - ONSET: ONSET: ON-GOING

## 2024-08-08 NOTE — PROGRESS NOTES
Progress Note:  Assumed care of patient in bed. Awake and alert. VSS, uneventful night, call bell within reach.

## 2024-08-08 NOTE — PROGRESS NOTES
[address]  [address] [facility] [address]  [phone]  Colon and Rectal Surgery Progress Note      Patient: Vlad King MRN: [id number]  SSN: [id number]    YOB: 1960  Age: 63 y.o.  Sex: male      Admit Date: 8/6/2024    LOS: 2 days     Subjective:     Tolerating clears with bm. R shoulder pain.     Objective:     Vitals:    08/08/24 0253 08/08/24 0847 08/08/24 1213 08/08/24 1517   BP: (!) 156/71 (!) 147/69 (!) 140/71 (!) 146/81   Pulse: 71 71 67 80   Resp: 16 16 16 18   Temp: 97.9 °F (36.6 °C) 98.3 °F (36.8 °C) 98.3 °F (36.8 °C) 98.4 °F (36.9 °C)   TempSrc: Oral Oral Oral Oral   SpO2: 97% 99% 99% 96%   Weight:       Height:            Intake and Output:  Current Shift: No intake/output data recorded.  Last three shifts: 08/06 1901 - 08/08 0700  In: 1487.1 [I.V.:1288.6]  Out: 4225 [Urine:4225]    Physical Exam:     Constitutional: well developed, in NAD  Abdomen: Soft, appropriately tender, nondistended    Lab/Data Review:    Lab Results   Component Value Date    WBC 9.9 08/08/2024    HGB 12.0 (L) 08/08/2024    HCT 37.0 08/08/2024    MCV 87.5 08/08/2024     08/08/2024     Lab Results   Component Value Date/Time     08/08/2024 01:36 AM    K 3.7 08/08/2024 01:36 AM     08/08/2024 01:36 AM    CO2 25 08/08/2024 01:36 AM    BUN 10 08/08/2024 01:36 AM    CREATININE 0.91 08/08/2024 01:36 AM    GLUCOSE 106 08/08/2024 01:36 AM    CALCIUM 8.6 08/08/2024 01:36 AM      Lab Results   Component Value Date/Time    MG 1.8 08/08/2024 01:36 AM     Lab Results   Component Value Date    CALCIUM 8.6 08/08/2024    PHOS 1.3 (L) 08/08/2024        Assessment:     Status post robotic right colectomy for appendiceal cancer    Plan:     Full liquids  Increase morphine dose for pain    Signed By: German Almendarez MD        August 8, 2024

## 2024-08-08 NOTE — PROGRESS NOTES
4 Eyes Skin Assessment     NAME:  Vlad King  YOB: 1960  MEDICAL RECORD NUMBER:  [id number]    The patient is being assessed for  Shift Handoff    I agree that at least one RN has performed a thorough Head to Toe Skin Assessment on the patient. ALL assessment sites listed below have been assessed.      Areas assessed by both nurses:    Head, Face, Ears, Shoulders, Back, Chest, Arms, Elbows, Hands, Sacrum. Buttock, Coccyx, Ischium, Legs. Feet and Heels, and Under Medical Devices         Does the Patient have a Wound? Yes wound(s) were present on assessment. LDA wound assessment was Initiated and completed by RN       Christiano Prevention initiated by RN: Yes  Wound Care Orders initiated by RN: Yes    Pressure Injury (Stage 3,4, Unstageable, DTI, NWPT, and Complex wounds) if present, place Wound referral order by RN under : No    New Ostomies, if present place, Ostomy referral order under : No     Nurse 1 eSignature: Electronically signed by DERIK NARANJO RN on 8/8/24 at 5:20 AM EDT    **SHARE this note so that the co-signing nurse can place an eSignature**    Nurse 2 eSignature: {Esignature:[id number]}

## 2024-08-08 NOTE — PLAN OF CARE
Problem: Chronic Conditions and Co-morbidities  Goal: Patient's chronic conditions and co-morbidity symptoms are monitored and maintained or improved  Outcome: Progressing     Problem: Pain  Goal: Verbalizes/displays adequate comfort level or baseline comfort level  Outcome: Progressing     Problem: ABCDS Injury Assessment  Goal: Absence of physical injury  Recent Flowsheet Documentation  Taken 8/7/2024 2141 by Yvette Madrid RN  Absence of Physical Injury: Implement safety measures based on patient assessment

## 2024-08-09 LAB
ANION GAP SERPL CALC-SCNC: 7 MMOL/L (ref 3–18)
BUN SERPL-MCNC: 6 MG/DL (ref 7–18)
BUN/CREAT SERPL: 9 (ref 12–20)
CALCIUM SERPL-MCNC: 8.6 MG/DL (ref 8.5–10.1)
CHLORIDE SERPL-SCNC: 108 MMOL/L (ref 100–111)
CO2 SERPL-SCNC: 23 MMOL/L (ref 21–32)
CREAT SERPL-MCNC: 0.64 MG/DL (ref 0.6–1.3)
ERYTHROCYTE [DISTWIDTH] IN BLOOD BY AUTOMATED COUNT: 14.4 % (ref 11.6–14.5)
GLUCOSE BLD STRIP.AUTO-MCNC: 147 MG/DL (ref 70–110)
GLUCOSE BLD STRIP.AUTO-MCNC: 150 MG/DL (ref 70–110)
GLUCOSE BLD STRIP.AUTO-MCNC: 151 MG/DL (ref 70–110)
GLUCOSE BLD STRIP.AUTO-MCNC: 155 MG/DL (ref 70–110)
GLUCOSE BLD STRIP.AUTO-MCNC: 90 MG/DL (ref 70–110)
GLUCOSE SERPL-MCNC: 77 MG/DL (ref 74–99)
HCT VFR BLD AUTO: 33.1 % (ref 36–48)
HGB BLD-MCNC: 11.1 G/DL (ref 13–16)
MAGNESIUM SERPL-MCNC: 1.7 MG/DL (ref 1.6–2.6)
MCH RBC QN AUTO: 29.1 PG (ref 24–34)
MCHC RBC AUTO-ENTMCNC: 33.5 G/DL (ref 31–37)
MCV RBC AUTO: 86.6 FL (ref 78–100)
NRBC # BLD: 0 K/UL (ref 0–0.01)
NRBC BLD-RTO: 0 PER 100 WBC
PHOSPHATE SERPL-MCNC: 1.8 MG/DL (ref 2.5–4.9)
PLATELET # BLD AUTO: 156 K/UL (ref 135–420)
PMV BLD AUTO: 11.8 FL (ref 9.2–11.8)
POTASSIUM SERPL-SCNC: 3.6 MMOL/L (ref 3.5–5.5)
RBC # BLD AUTO: 3.82 M/UL (ref 4.35–5.65)
SODIUM SERPL-SCNC: 138 MMOL/L (ref 136–145)
WBC # BLD AUTO: 9 K/UL (ref 4.6–13.2)

## 2024-08-09 PROCEDURE — 83735 ASSAY OF MAGNESIUM: CPT

## 2024-08-09 PROCEDURE — 1100000000 HC RM PRIVATE

## 2024-08-09 PROCEDURE — 2500000003 HC RX 250 WO HCPCS: Performed by: COLON & RECTAL SURGERY

## 2024-08-09 PROCEDURE — 82962 GLUCOSE BLOOD TEST: CPT

## 2024-08-09 PROCEDURE — 36415 COLL VENOUS BLD VENIPUNCTURE: CPT

## 2024-08-09 PROCEDURE — 6370000000 HC RX 637 (ALT 250 FOR IP): Performed by: COLON & RECTAL SURGERY

## 2024-08-09 PROCEDURE — 6360000002 HC RX W HCPCS: Performed by: COLON & RECTAL SURGERY

## 2024-08-09 PROCEDURE — 85027 COMPLETE CBC AUTOMATED: CPT

## 2024-08-09 PROCEDURE — 2580000003 HC RX 258: Performed by: COLON & RECTAL SURGERY

## 2024-08-09 PROCEDURE — 84100 ASSAY OF PHOSPHORUS: CPT

## 2024-08-09 PROCEDURE — 80048 BASIC METABOLIC PNL TOTAL CA: CPT

## 2024-08-09 RX ORDER — OXYCODONE HYDROCHLORIDE AND ACETAMINOPHEN 5; 325 MG/1; MG/1
1 TABLET ORAL EVERY 4 HOURS PRN
Qty: 40 TABLET | Refills: 0 | Status: SHIPPED | OUTPATIENT
Start: 2024-08-09 | End: 2024-08-16

## 2024-08-09 RX ADMIN — WATER 2000 MG: 1 INJECTION INTRAMUSCULAR; INTRAVENOUS; SUBCUTANEOUS at 06:47

## 2024-08-09 RX ADMIN — FAMOTIDINE 20 MG: 10 INJECTION, SOLUTION INTRAVENOUS at 21:27

## 2024-08-09 RX ADMIN — SODIUM CHLORIDE, POTASSIUM CHLORIDE, SODIUM LACTATE AND CALCIUM CHLORIDE: 600; 310; 30; 20 INJECTION, SOLUTION INTRAVENOUS at 21:40

## 2024-08-09 RX ADMIN — MORPHINE SULFATE 4 MG: 4 INJECTION, SOLUTION INTRAMUSCULAR; INTRAVENOUS at 06:46

## 2024-08-09 RX ADMIN — WATER 2000 MG: 1 INJECTION INTRAMUSCULAR; INTRAVENOUS; SUBCUTANEOUS at 13:24

## 2024-08-09 RX ADMIN — MORPHINE SULFATE 4 MG: 4 INJECTION, SOLUTION INTRAMUSCULAR; INTRAVENOUS at 01:34

## 2024-08-09 RX ADMIN — METRONIDAZOLE 500 MG: 500 INJECTION, SOLUTION INTRAVENOUS at 01:38

## 2024-08-09 RX ADMIN — MORPHINE SULFATE 4 MG: 4 INJECTION, SOLUTION INTRAMUSCULAR; INTRAVENOUS at 18:55

## 2024-08-09 RX ADMIN — MORPHINE SULFATE 4 MG: 4 INJECTION, SOLUTION INTRAMUSCULAR; INTRAVENOUS at 23:31

## 2024-08-09 RX ADMIN — FAMOTIDINE 20 MG: 10 INJECTION, SOLUTION INTRAVENOUS at 09:31

## 2024-08-09 RX ADMIN — MORPHINE SULFATE 4 MG: 4 INJECTION, SOLUTION INTRAMUSCULAR; INTRAVENOUS at 13:25

## 2024-08-09 RX ADMIN — HEPARIN SODIUM 5000 UNITS: 5000 INJECTION INTRAVENOUS; SUBCUTANEOUS at 06:45

## 2024-08-09 RX ADMIN — HEPARIN SODIUM 5000 UNITS: 5000 INJECTION INTRAVENOUS; SUBCUTANEOUS at 13:26

## 2024-08-09 RX ADMIN — WATER 2000 MG: 1 INJECTION INTRAMUSCULAR; INTRAVENOUS; SUBCUTANEOUS at 21:27

## 2024-08-09 RX ADMIN — METRONIDAZOLE 500 MG: 500 INJECTION, SOLUTION INTRAVENOUS at 09:39

## 2024-08-09 RX ADMIN — HEPARIN SODIUM 5000 UNITS: 5000 INJECTION INTRAVENOUS; SUBCUTANEOUS at 21:27

## 2024-08-09 RX ADMIN — METRONIDAZOLE 500 MG: 500 INJECTION, SOLUTION INTRAVENOUS at 16:19

## 2024-08-09 ASSESSMENT — PAIN DESCRIPTION - FREQUENCY
FREQUENCY: INTERMITTENT

## 2024-08-09 ASSESSMENT — PAIN SCALES - GENERAL
PAINLEVEL_OUTOF10: 7
PAINLEVEL_OUTOF10: 2
PAINLEVEL_OUTOF10: 3
PAINLEVEL_OUTOF10: 5
PAINLEVEL_OUTOF10: 8
PAINLEVEL_OUTOF10: 7
PAINLEVEL_OUTOF10: 8
PAINLEVEL_OUTOF10: 10
PAINLEVEL_OUTOF10: 3

## 2024-08-09 ASSESSMENT — PAIN DESCRIPTION - LOCATION
LOCATION: ARM;SHOULDER
LOCATION: ARM;SHOULDER;ABDOMEN
LOCATION: SHOULDER
LOCATION: ABDOMEN
LOCATION: ARM
LOCATION: ABDOMEN
LOCATION: ABDOMEN
LOCATION: ARM;ABDOMEN

## 2024-08-09 ASSESSMENT — PAIN DESCRIPTION - DESCRIPTORS
DESCRIPTORS: ACHING;TENDER
DESCRIPTORS: DISCOMFORT
DESCRIPTORS: ACHING
DESCRIPTORS: DISCOMFORT;SORE
DESCRIPTORS: ACHING
DESCRIPTORS: ACHING;DISCOMFORT
DESCRIPTORS: ACHING

## 2024-08-09 ASSESSMENT — PAIN DESCRIPTION - ONSET
ONSET: GRADUAL
ONSET: ON-GOING
ONSET: ON-GOING
ONSET: GRADUAL

## 2024-08-09 ASSESSMENT — PAIN DESCRIPTION - PAIN TYPE
TYPE: SURGICAL PAIN

## 2024-08-09 ASSESSMENT — PAIN - FUNCTIONAL ASSESSMENT
PAIN_FUNCTIONAL_ASSESSMENT: ACTIVITIES ARE NOT PREVENTED

## 2024-08-09 ASSESSMENT — PAIN DESCRIPTION - ORIENTATION
ORIENTATION: RIGHT
ORIENTATION: ANTERIOR
ORIENTATION: RIGHT
ORIENTATION: RIGHT
ORIENTATION: ANTERIOR
ORIENTATION: RIGHT
ORIENTATION: RIGHT

## 2024-08-09 NOTE — CARE COORDINATION
Pt reports being independent with care, lives alone in apartment, pt identified his significant other and sister as available support if needed.      Significant other to transport at discharge.     No  CM needs identified at this time.        08/09/24 1127   Service Assessment   Patient Orientation Alert and Oriented;Person;Place;Situation;Self   Cognition Alert   History Provided By Patient   Primary Caregiver Self   Support Systems Spouse/Significant Other  (Carmine Mckenna)   PCP Verified by CM Yes   Prior Functional Level Independent in ADLs/IADLs   Current Functional Level Independent in ADLs/IADLs   Can patient return to prior living arrangement Yes   Ability to make needs known: Good   Family able to assist with home care needs: Yes   Would you like for me to discuss the discharge plan with any other family members/significant others, and if so, who? No   Financial Resources Medicaid   Social/Functional History   Lives With Alone   Type of Home Apartment   Bathroom Toilet Standard   Bathroom Accessibility Accessible   Home Equipment None   ADL Assistance Independent   Homemaking Responsibilities Yes   Ambulation Assistance Independent   Transfer Assistance Independent   Active  No   Patient's  Info Significant other provides transport   Occupation Retired   Discharge Planning   Type of Residence Apartment   Living Arrangements Alone   Current Services Prior To Admission None   Potential Assistance Needed N/A   DME Ordered? No   Potential Assistance Purchasing Medications No   Type of Home Care Services None   Patient expects to be discharged to: Apartment   Services At/After Discharge   Transition of Care Consult (CM Consult) N/A   Services At/After Discharge None   [place] Resource Information Provided? No   Mode of Transport at Discharge Other (see comment)  (s/o to transport)   Confirm Follow Up Transport Self   Condition of Participation: Discharge Planning   The Plan for Transition of Care is  related to the following treatment goals: Pt anticipating home with home health   The Patient and/or Patient Representative was provided with a Choice of Provider? Patient   The Patient and/Or Patient Representative agree with the Discharge Plan? Yes   Freedom of Choice list was provided with basic dialogue that supports the patient's individualized plan of care/goals, treatment preferences, and shares the quality data associated with the providers?  Yes

## 2024-08-09 NOTE — PROGRESS NOTES
[facility]  [address] [facility] [address]  [phone]  Colon and Rectal Surgery Progress Note      Patient: Vlad King MRN: [id number]  SSN: [id number]    YOB: 1960  Age: 63 y.o.  Sex: male      Admit Date: 8/6/2024    LOS: 3 days     Subjective:     Tolerating full liquids.  Moving his bowels.    Objective:     Vitals:    08/08/24 1932 08/08/24 2326 08/09/24 0344 08/09/24 0752   BP: 136/70 134/61 132/73 126/66   Pulse: 71 85 68 66   Resp: 18 18 18 16   Temp: 98.1 °F (36.7 °C) 98 °F (36.7 °C) 98.1 °F (36.7 °C) 98 °F (36.7 °C)   TempSrc: Oral Oral Oral Oral   SpO2: 98% 100% 96% 98%   Weight:       Height:            Intake and Output:  Current Shift: No intake/output data recorded.  Last three shifts: 08/07 1901 - 08/09 0700  In: 240 [P.O.:240]  Out: 1800 [Urine:1800]    Physical Exam:     Constitutional: well developed, in NAD  Abdomen: Soft, appropriately tender, mildly distended    Lab/Data Review:    Lab Results   Component Value Date    WBC 9.0 08/09/2024    HGB 11.1 (L) 08/09/2024    HCT 33.1 (L) 08/09/2024    MCV 86.6 08/09/2024     08/09/2024     Lab Results   Component Value Date/Time     08/09/2024 01:20 AM    K 3.6 08/09/2024 01:20 AM     08/09/2024 01:20 AM    CO2 23 08/09/2024 01:20 AM    BUN 6 08/09/2024 01:20 AM    CREATININE 0.64 08/09/2024 01:20 AM    GLUCOSE 77 08/09/2024 01:20 AM    CALCIUM 8.6 08/09/2024 01:20 AM      Lab Results   Component Value Date/Time    MG 1.7 08/09/2024 01:20 AM     Lab Results   Component Value Date    CALCIUM 8.6 08/09/2024    PHOS 1.8 (L) 08/09/2024        Assessment:     Status post robotic right colectomy for appendiceal cancer    Plan:     Full liquids  Solids tomorrow  Possible discharge later in the day tomorrow or Sunday if he tolerates    Signed By: German Almendarez MD        August 9, 2024

## 2024-08-09 NOTE — DISCHARGE INSTRUCTIONS
Instructions After Colectomy    No heavy lifting (nothing more than 10-15 lbs)    Low fiber diet - avoid raw fruits and vegetables, but small amounts of cooked vegetables are ok. Eat white bread products instead of wheat bread products. Meats, fish, chicken, noodles are ok.    Showers over your wounds are ok, but avoid submerging wounds in pools or baths. The strips over your wound will fall off on their own or I will remove them in the office    Make an office follow up appointment in 2 weeks

## 2024-08-10 LAB
ABO + RH BLD: NORMAL
ANION GAP SERPL CALC-SCNC: 7 MMOL/L (ref 3–18)
BLD PROD TYP BPU: NORMAL
BLOOD BANK DISPENSE STATUS: NORMAL
BLOOD GROUP ANTIBODIES SERPL: NORMAL
BPU ID: NORMAL
BUN SERPL-MCNC: 4 MG/DL (ref 7–18)
BUN/CREAT SERPL: 8 (ref 12–20)
CALCIUM SERPL-MCNC: 7.9 MG/DL (ref 8.5–10.1)
CHLORIDE SERPL-SCNC: 106 MMOL/L (ref 100–111)
CO2 SERPL-SCNC: 24 MMOL/L (ref 21–32)
CREAT SERPL-MCNC: 0.51 MG/DL (ref 0.6–1.3)
CROSSMATCH RESULT: NORMAL
ERYTHROCYTE [DISTWIDTH] IN BLOOD BY AUTOMATED COUNT: 14.1 % (ref 11.6–14.5)
GLUCOSE BLD STRIP.AUTO-MCNC: 113 MG/DL (ref 70–110)
GLUCOSE BLD STRIP.AUTO-MCNC: 127 MG/DL (ref 70–110)
GLUCOSE BLD STRIP.AUTO-MCNC: 129 MG/DL (ref 70–110)
GLUCOSE BLD STRIP.AUTO-MCNC: 136 MG/DL (ref 70–110)
GLUCOSE BLD STRIP.AUTO-MCNC: 193 MG/DL (ref 70–110)
GLUCOSE SERPL-MCNC: 106 MG/DL (ref 74–99)
HCT VFR BLD AUTO: 30.6 % (ref 36–48)
HGB BLD-MCNC: 10.3 G/DL (ref 13–16)
MAGNESIUM SERPL-MCNC: 1.6 MG/DL (ref 1.6–2.6)
MCH RBC QN AUTO: 28.7 PG (ref 24–34)
MCHC RBC AUTO-ENTMCNC: 33.7 G/DL (ref 31–37)
MCV RBC AUTO: 85.2 FL (ref 78–100)
NRBC # BLD: 0 K/UL (ref 0–0.01)
NRBC BLD-RTO: 0 PER 100 WBC
PHOSPHATE SERPL-MCNC: 2.2 MG/DL (ref 2.5–4.9)
PLATELET # BLD AUTO: 142 K/UL (ref 135–420)
PMV BLD AUTO: 11.2 FL (ref 9.2–11.8)
POTASSIUM SERPL-SCNC: 3.1 MMOL/L (ref 3.5–5.5)
RBC # BLD AUTO: 3.59 M/UL (ref 4.35–5.65)
SODIUM SERPL-SCNC: 137 MMOL/L (ref 136–145)
SPECIMEN EXP DATE BLD: NORMAL
UNIT DIVISION: 0
WBC # BLD AUTO: 6.9 K/UL (ref 4.6–13.2)

## 2024-08-10 PROCEDURE — 83735 ASSAY OF MAGNESIUM: CPT

## 2024-08-10 PROCEDURE — 82962 GLUCOSE BLOOD TEST: CPT

## 2024-08-10 PROCEDURE — 6370000000 HC RX 637 (ALT 250 FOR IP): Performed by: COLON & RECTAL SURGERY

## 2024-08-10 PROCEDURE — 1100000000 HC RM PRIVATE

## 2024-08-10 PROCEDURE — 2580000003 HC RX 258: Performed by: COLON & RECTAL SURGERY

## 2024-08-10 PROCEDURE — 80048 BASIC METABOLIC PNL TOTAL CA: CPT

## 2024-08-10 PROCEDURE — 84100 ASSAY OF PHOSPHORUS: CPT

## 2024-08-10 PROCEDURE — 2500000003 HC RX 250 WO HCPCS: Performed by: COLON & RECTAL SURGERY

## 2024-08-10 PROCEDURE — 36415 COLL VENOUS BLD VENIPUNCTURE: CPT

## 2024-08-10 PROCEDURE — 85027 COMPLETE CBC AUTOMATED: CPT

## 2024-08-10 PROCEDURE — 6370000000 HC RX 637 (ALT 250 FOR IP): Performed by: SURGERY

## 2024-08-10 PROCEDURE — 6360000002 HC RX W HCPCS: Performed by: COLON & RECTAL SURGERY

## 2024-08-10 PROCEDURE — 2500000003 HC RX 250 WO HCPCS: Performed by: SURGERY

## 2024-08-10 RX ORDER — OXYCODONE HYDROCHLORIDE AND ACETAMINOPHEN 5; 325 MG/1; MG/1
2 TABLET ORAL EVERY 4 HOURS PRN
Status: DISCONTINUED | OUTPATIENT
Start: 2024-08-10 | End: 2024-08-12 | Stop reason: HOSPADM

## 2024-08-10 RX ORDER — DOCUSATE SODIUM 100 MG/1
100 CAPSULE, LIQUID FILLED ORAL 2 TIMES DAILY
Status: DISCONTINUED | OUTPATIENT
Start: 2024-08-10 | End: 2024-08-12 | Stop reason: HOSPADM

## 2024-08-10 RX ORDER — SODIUM CHLORIDE 0.9 % (FLUSH) 0.9 %
5-40 SYRINGE (ML) INJECTION PRN
Status: DISCONTINUED | OUTPATIENT
Start: 2024-08-10 | End: 2024-08-12 | Stop reason: HOSPADM

## 2024-08-10 RX ORDER — SODIUM CHLORIDE 9 MG/ML
INJECTION, SOLUTION INTRAVENOUS CONTINUOUS
Status: DISCONTINUED | OUTPATIENT
Start: 2024-08-10 | End: 2024-08-12 | Stop reason: HOSPADM

## 2024-08-10 RX ORDER — POTASSIUM CHLORIDE 20 MEQ/1
40 TABLET, EXTENDED RELEASE ORAL ONCE
Status: COMPLETED | OUTPATIENT
Start: 2024-08-10 | End: 2024-08-10

## 2024-08-10 RX ORDER — OXYCODONE HYDROCHLORIDE AND ACETAMINOPHEN 5; 325 MG/1; MG/1
1 TABLET ORAL EVERY 4 HOURS PRN
Status: DISCONTINUED | OUTPATIENT
Start: 2024-08-10 | End: 2024-08-12 | Stop reason: HOSPADM

## 2024-08-10 RX ORDER — MAGNESIUM SULFATE HEPTAHYDRATE 40 MG/ML
2000 INJECTION, SOLUTION INTRAVENOUS ONCE
Status: COMPLETED | OUTPATIENT
Start: 2024-08-10 | End: 2024-08-10

## 2024-08-10 RX ADMIN — OXYCODONE HYDROCHLORIDE AND ACETAMINOPHEN 1 TABLET: 5; 325 TABLET ORAL at 11:36

## 2024-08-10 RX ADMIN — FAMOTIDINE 20 MG: 10 INJECTION, SOLUTION INTRAVENOUS at 08:30

## 2024-08-10 RX ADMIN — MAGNESIUM SULFATE HEPTAHYDRATE 2000 MG: 40 INJECTION, SOLUTION INTRAVENOUS at 14:13

## 2024-08-10 RX ADMIN — METRONIDAZOLE 500 MG: 500 INJECTION, SOLUTION INTRAVENOUS at 00:50

## 2024-08-10 RX ADMIN — METRONIDAZOLE 500 MG: 500 INJECTION, SOLUTION INTRAVENOUS at 18:02

## 2024-08-10 RX ADMIN — POTASSIUM CHLORIDE 40 MEQ: 1500 TABLET, EXTENDED RELEASE ORAL at 09:30

## 2024-08-10 RX ADMIN — OXYCODONE HYDROCHLORIDE AND ACETAMINOPHEN 2 TABLET: 5; 325 TABLET ORAL at 20:24

## 2024-08-10 RX ADMIN — FAMOTIDINE 20 MG: 10 INJECTION, SOLUTION INTRAVENOUS at 20:24

## 2024-08-10 RX ADMIN — ACETAMINOPHEN 1000 MG: 500 TABLET ORAL at 18:00

## 2024-08-10 RX ADMIN — HEPARIN SODIUM 5000 UNITS: 5000 INJECTION INTRAVENOUS; SUBCUTANEOUS at 20:24

## 2024-08-10 RX ADMIN — DIBASIC SODIUM PHOSPHATE, MONOBASIC POTASSIUM PHOSPHATE AND MONOBASIC SODIUM PHOSPHATE 2 TABLET: 852; 155; 130 TABLET ORAL at 09:30

## 2024-08-10 RX ADMIN — WATER 2000 MG: 1 INJECTION INTRAMUSCULAR; INTRAVENOUS; SUBCUTANEOUS at 06:06

## 2024-08-10 RX ADMIN — ACETAMINOPHEN 1000 MG: 500 TABLET ORAL at 06:06

## 2024-08-10 RX ADMIN — HEPARIN SODIUM 5000 UNITS: 5000 INJECTION INTRAVENOUS; SUBCUTANEOUS at 14:17

## 2024-08-10 RX ADMIN — ACETAMINOPHEN 1000 MG: 500 TABLET ORAL at 14:00

## 2024-08-10 RX ADMIN — METRONIDAZOLE 500 MG: 500 INJECTION, SOLUTION INTRAVENOUS at 08:50

## 2024-08-10 RX ADMIN — HEPARIN SODIUM 5000 UNITS: 5000 INJECTION INTRAVENOUS; SUBCUTANEOUS at 06:06

## 2024-08-10 RX ADMIN — GABAPENTIN 300 MG: 300 CAPSULE ORAL at 20:25

## 2024-08-10 RX ADMIN — ACETAMINOPHEN 1000 MG: 500 TABLET ORAL at 00:45

## 2024-08-10 RX ADMIN — MORPHINE SULFATE 2 MG: 2 INJECTION, SOLUTION INTRAMUSCULAR; INTRAVENOUS at 08:31

## 2024-08-10 RX ADMIN — DOCUSATE SODIUM 100 MG: 100 CAPSULE, LIQUID FILLED ORAL at 09:30

## 2024-08-10 RX ADMIN — MORPHINE SULFATE 4 MG: 4 INJECTION, SOLUTION INTRAMUSCULAR; INTRAVENOUS at 03:14

## 2024-08-10 RX ADMIN — DOCUSATE SODIUM 100 MG: 100 CAPSULE, LIQUID FILLED ORAL at 20:25

## 2024-08-10 RX ADMIN — WATER 2000 MG: 1 INJECTION INTRAMUSCULAR; INTRAVENOUS; SUBCUTANEOUS at 13:30

## 2024-08-10 RX ADMIN — WATER 2000 MG: 1 INJECTION INTRAMUSCULAR; INTRAVENOUS; SUBCUTANEOUS at 20:24

## 2024-08-10 ASSESSMENT — PAIN DESCRIPTION - PAIN TYPE
TYPE: ACUTE PAIN
TYPE: SURGICAL PAIN
TYPE: ACUTE PAIN;SURGICAL PAIN

## 2024-08-10 ASSESSMENT — PAIN DESCRIPTION - LOCATION
LOCATION: SHOULDER
LOCATION: ARM;SHOULDER
LOCATION: SHOULDER
LOCATION: SHOULDER;NECK
LOCATION: SHOULDER
LOCATION: ARM;NECK

## 2024-08-10 ASSESSMENT — PAIN DESCRIPTION - FREQUENCY
FREQUENCY: INTERMITTENT

## 2024-08-10 ASSESSMENT — PAIN DESCRIPTION - ONSET
ONSET: ON-GOING

## 2024-08-10 ASSESSMENT — PAIN DESCRIPTION - DESCRIPTORS
DESCRIPTORS: STABBING
DESCRIPTORS: ACHING
DESCRIPTORS: DISCOMFORT
DESCRIPTORS: ACHING
DESCRIPTORS: ACHING
DESCRIPTORS: THROBBING
DESCRIPTORS: ACHING
DESCRIPTORS: ACHING

## 2024-08-10 ASSESSMENT — PAIN DESCRIPTION - ORIENTATION
ORIENTATION: RIGHT

## 2024-08-10 ASSESSMENT — PAIN - FUNCTIONAL ASSESSMENT
PAIN_FUNCTIONAL_ASSESSMENT: ACTIVITIES ARE NOT PREVENTED

## 2024-08-10 ASSESSMENT — PAIN SCALES - GENERAL
PAINLEVEL_OUTOF10: 6
PAINLEVEL_OUTOF10: 3
PAINLEVEL_OUTOF10: 10
PAINLEVEL_OUTOF10: 0
PAINLEVEL_OUTOF10: 3
PAINLEVEL_OUTOF10: 8
PAINLEVEL_OUTOF10: 3
PAINLEVEL_OUTOF10: 5
PAINLEVEL_OUTOF10: 10

## 2024-08-10 NOTE — PLAN OF CARE
Problem: Pain  Goal: Verbalizes/displays adequate comfort level or baseline comfort level  8/9/2024 2143 by Yanira Baumann RN  Outcome: Progressing  Flowsheets (Taken 8/9/2024 2115)  Verbalizes/displays adequate comfort level or baseline comfort level: Encourage patient to monitor pain and request assistance  8/9/2024 1343 by Radha Jamil RN  Outcome: Progressing  Flowsheets (Taken 8/9/2024 1343)  Verbalizes/displays adequate comfort level or baseline comfort level: Encourage patient to monitor pain and request assistance     Problem: Safety - Adult  Goal: Free from fall injury  8/9/2024 2143 by Yanira Baumann RN  Outcome: Progressing  8/9/2024 1343 by Radha Jamil RN  Outcome: Progressing     Problem: ABCDS Injury Assessment  Goal: Absence of physical injury  8/9/2024 2143 by Yanira Baumann RN  Outcome: Progressing  8/9/2024 1343 by Radha Jamil RN  Outcome: Progressing     Problem: Chronic Conditions and Co-morbidities  Goal: Patient's chronic conditions and co-morbidity symptoms are monitored and maintained or improved  8/9/2024 2143 by Yanira Baumann RN  Outcome: Progressing  Flowsheets (Taken 8/9/2024 2115)  Care Plan - Patient's Chronic Conditions and Co-Morbidity Symptoms are Monitored and Maintained or Improved: Monitor and assess patient's chronic conditions and comorbid symptoms for stability, deterioration, or improvement  8/9/2024 1343 by Radha Jamil, RN  Outcome: Progressing

## 2024-08-10 NOTE — PROGRESS NOTES
POD# 4    Admit Date: 8/6/2024    Assessment    Vlad King is a 63 y.o. 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 tophus    Class 3 severe obesity with body mass index (BMI) of 40.0 to 44.9 in adult (HCC)    Primary open angle glaucoma    Diabetes mellitus type 2 with complications (HCC)    Acute appendicitis with perforation, localized peritonitis, and abscess, without gangrene    Appendicitis with peritoneal abscess    Sickle cell trait (HCC)    Cancer of appendix (HCC)    Appendix carcinoma (HCC)       Plan  -requesting percocet for pain as this worked for him well in the past- this has been ordered  -electrolytes replaced  -likely discharge tomorrow    Subjective    Overnight events: No acute events overnight. He reports deep pain in the abdomen and has been ambulating with no relief    Review of Systems   Gastrointestinal:  Positive for abdominal pain. Negative for nausea.       Objective    Physical Exam:  @TMAX (24)@ BP (!) 170/72   Pulse 69   Temp 98.2 °F (36.8 °C) (Oral)   Resp 18   Ht 1.575 m (5' 2\")   Wt 108.6 kg (239 lb 6.4 oz)   SpO2 94%   BMI 43.79 kg/m²     Intake/Output Summary (Last 24 hours) at 8/10/2024 0854  Last data filed at 8/10/2024 0606  Gross per 24 hour   Intake 1435.89 ml   Output 1495 ml   Net -59.11 ml     Physical Exam  Abdominal:      Comments: Incisions clean, dry, intact               Madhuri Batista DO  Phone: [phone]

## 2024-08-10 NOTE — PROGRESS NOTES
4 Eyes Skin Assessment     NAME:  Vlad King  YOB: 1960  MEDICAL RECORD NUMBER:  [id number]    The patient is being assessed for  Shift Handoff    I agree that at least one RN has performed a thorough Head to Toe Skin Assessment on the patient. ALL assessment sites listed below have been assessed.      Areas assessed by both nurses:    Head, Face, Ears, Shoulders, Back, Chest, Arms, Elbows, Hands, Sacrum. Buttock, Coccyx, Ischium, Legs. Feet and Heels, and Under Medical Devices         Does the Patient have a Wound? Yes wound(s) were present on assessment. LDA wound assessment was Initiated and completed by RN       Christiano Prevention initiated by RN: Yes  Wound Care Orders initiated by RN: No    Pressure Injury (Stage 3,4, Unstageable, DTI, NWPT, and Complex wounds) if present, place Wound referral order by RN under : No    New Ostomies, if present place, Ostomy referral order under : No     Nurse 1 eSignature: Electronically signed by Britt Triana RN on 8/10/24 at 6:57 PM EDT    **SHARE this note so that the co-signing nurse can place an eSignature**    Nurse 2 eSignature: Electronically signed by Yanira Baumann RN on 8/10/24 at 7:58 PM EDT

## 2024-08-10 NOTE — PROGRESS NOTES
Ambulation note:     Patient out of bed for ambulation in the hallway around unit several times this shift with significant other.      He tolerated ambulation well with no increased c/o pain or discomfort. Non-Skid socks in place.      Call bell, ice chips, and bedside table within reach.      Will continue care.

## 2024-08-10 NOTE — PROGRESS NOTES
4 Eyes Skin Assessment     NAME:  Vlad King  YOB: 1960  MEDICAL RECORD NUMBER:  [id number]    The patient is being assessed for  Shift Handoff    I agree that at least one RN has performed a thorough Head to Toe Skin Assessment on the patient. ALL assessment sites listed below have been assessed.      Areas assessed by both nurses:    Head, Face, Ears, Shoulders, Back, Chest, Arms, Elbows, Hands, Sacrum. Buttock, Coccyx, Ischium, Legs. Feet and Heels, and Under Medical Devices         Does the Patient have a Wound? No noted wound(s)       Christiano Prevention initiated by RN: No  Wound Care Orders initiated by RN: No    Pressure Injury (Stage 3,4, Unstageable, DTI, NWPT, and Complex wounds) if present, place Wound referral order by RN under : No    New Ostomies, if present place, Ostomy referral order under : No     Nurse 1 eSignature: Electronically signed by Yanira Baumann RN on 8/10/24 at 7:20 AM EDT    **SHARE this note so that the co-signing nurse can place an eSignature**    Nurse 2 eSignature: Electronically signed by Britt Triana RN on 8/10/24 at 7:29 AM EDT

## 2024-08-11 LAB
ANION GAP SERPL CALC-SCNC: 5 MMOL/L (ref 3–18)
BUN SERPL-MCNC: 2 MG/DL (ref 7–18)
BUN/CREAT SERPL: 3 (ref 12–20)
CALCIUM SERPL-MCNC: 8.6 MG/DL (ref 8.5–10.1)
CHLORIDE SERPL-SCNC: 107 MMOL/L (ref 100–111)
CO2 SERPL-SCNC: 25 MMOL/L (ref 21–32)
CREAT SERPL-MCNC: 0.58 MG/DL (ref 0.6–1.3)
ERYTHROCYTE [DISTWIDTH] IN BLOOD BY AUTOMATED COUNT: 14.2 % (ref 11.6–14.5)
GLUCOSE BLD STRIP.AUTO-MCNC: 112 MG/DL (ref 70–110)
GLUCOSE BLD STRIP.AUTO-MCNC: 131 MG/DL (ref 70–110)
GLUCOSE BLD STRIP.AUTO-MCNC: 138 MG/DL (ref 70–110)
GLUCOSE BLD STRIP.AUTO-MCNC: 149 MG/DL (ref 70–110)
GLUCOSE BLD STRIP.AUTO-MCNC: 161 MG/DL (ref 70–110)
GLUCOSE BLD STRIP.AUTO-MCNC: 240 MG/DL (ref 70–110)
GLUCOSE SERPL-MCNC: 118 MG/DL (ref 74–99)
HCT VFR BLD AUTO: 31.7 % (ref 36–48)
HGB BLD-MCNC: 10.8 G/DL (ref 13–16)
MAGNESIUM SERPL-MCNC: 1.9 MG/DL (ref 1.6–2.6)
MCH RBC QN AUTO: 28.6 PG (ref 24–34)
MCHC RBC AUTO-ENTMCNC: 34.1 G/DL (ref 31–37)
MCV RBC AUTO: 84.1 FL (ref 78–100)
NRBC # BLD: 0 K/UL (ref 0–0.01)
NRBC BLD-RTO: 0 PER 100 WBC
PHOSPHATE SERPL-MCNC: 2 MG/DL (ref 2.5–4.9)
PLATELET # BLD AUTO: 155 K/UL (ref 135–420)
PMV BLD AUTO: 10.6 FL (ref 9.2–11.8)
POTASSIUM SERPL-SCNC: 3.2 MMOL/L (ref 3.5–5.5)
RBC # BLD AUTO: 3.77 M/UL (ref 4.35–5.65)
SODIUM SERPL-SCNC: 137 MMOL/L (ref 136–145)
WBC # BLD AUTO: 5.5 K/UL (ref 4.6–13.2)

## 2024-08-11 PROCEDURE — 1100000000 HC RM PRIVATE

## 2024-08-11 PROCEDURE — 85027 COMPLETE CBC AUTOMATED: CPT

## 2024-08-11 PROCEDURE — 6370000000 HC RX 637 (ALT 250 FOR IP): Performed by: COLON & RECTAL SURGERY

## 2024-08-11 PROCEDURE — 80048 BASIC METABOLIC PNL TOTAL CA: CPT

## 2024-08-11 PROCEDURE — 36415 COLL VENOUS BLD VENIPUNCTURE: CPT

## 2024-08-11 PROCEDURE — 2500000003 HC RX 250 WO HCPCS: Performed by: COLON & RECTAL SURGERY

## 2024-08-11 PROCEDURE — 82962 GLUCOSE BLOOD TEST: CPT

## 2024-08-11 PROCEDURE — 94761 N-INVAS EAR/PLS OXIMETRY MLT: CPT

## 2024-08-11 PROCEDURE — 84100 ASSAY OF PHOSPHORUS: CPT

## 2024-08-11 PROCEDURE — 6370000000 HC RX 637 (ALT 250 FOR IP): Performed by: SURGERY

## 2024-08-11 PROCEDURE — 83735 ASSAY OF MAGNESIUM: CPT

## 2024-08-11 PROCEDURE — 6360000002 HC RX W HCPCS: Performed by: COLON & RECTAL SURGERY

## 2024-08-11 PROCEDURE — 2580000003 HC RX 258: Performed by: COLON & RECTAL SURGERY

## 2024-08-11 RX ORDER — POTASSIUM CHLORIDE 20 MEQ/1
20 TABLET, EXTENDED RELEASE ORAL ONCE
Status: COMPLETED | OUTPATIENT
Start: 2024-08-11 | End: 2024-08-11

## 2024-08-11 RX ADMIN — ONDANSETRON 4 MG: 2 INJECTION INTRAMUSCULAR; INTRAVENOUS at 20:27

## 2024-08-11 RX ADMIN — FAMOTIDINE 20 MG: 10 INJECTION, SOLUTION INTRAVENOUS at 20:27

## 2024-08-11 RX ADMIN — ACETAMINOPHEN 1000 MG: 500 TABLET ORAL at 01:06

## 2024-08-11 RX ADMIN — WATER 2000 MG: 1 INJECTION INTRAMUSCULAR; INTRAVENOUS; SUBCUTANEOUS at 13:30

## 2024-08-11 RX ADMIN — ACETAMINOPHEN 1000 MG: 500 TABLET ORAL at 23:37

## 2024-08-11 RX ADMIN — GABAPENTIN 300 MG: 300 CAPSULE ORAL at 20:28

## 2024-08-11 RX ADMIN — DOCUSATE SODIUM 100 MG: 100 CAPSULE, LIQUID FILLED ORAL at 08:46

## 2024-08-11 RX ADMIN — ACETAMINOPHEN 1000 MG: 500 TABLET ORAL at 18:59

## 2024-08-11 RX ADMIN — FAMOTIDINE 20 MG: 10 INJECTION, SOLUTION INTRAVENOUS at 08:46

## 2024-08-11 RX ADMIN — ACETAMINOPHEN 1000 MG: 500 TABLET ORAL at 13:45

## 2024-08-11 RX ADMIN — WATER 2000 MG: 1 INJECTION INTRAMUSCULAR; INTRAVENOUS; SUBCUTANEOUS at 05:00

## 2024-08-11 RX ADMIN — DOCUSATE SODIUM 100 MG: 100 CAPSULE, LIQUID FILLED ORAL at 20:28

## 2024-08-11 RX ADMIN — HEPARIN SODIUM 5000 UNITS: 5000 INJECTION INTRAVENOUS; SUBCUTANEOUS at 05:00

## 2024-08-11 RX ADMIN — OXYCODONE HYDROCHLORIDE AND ACETAMINOPHEN 1 TABLET: 5; 325 TABLET ORAL at 15:23

## 2024-08-11 RX ADMIN — ACETAMINOPHEN 1000 MG: 500 TABLET ORAL at 05:00

## 2024-08-11 RX ADMIN — HEPARIN SODIUM 5000 UNITS: 5000 INJECTION INTRAVENOUS; SUBCUTANEOUS at 21:23

## 2024-08-11 RX ADMIN — DIBASIC SODIUM PHOSPHATE, MONOBASIC POTASSIUM PHOSPHATE AND MONOBASIC SODIUM PHOSPHATE 2 TABLET: 852; 155; 130 TABLET ORAL at 08:46

## 2024-08-11 RX ADMIN — HEPARIN SODIUM 5000 UNITS: 5000 INJECTION INTRAVENOUS; SUBCUTANEOUS at 14:30

## 2024-08-11 RX ADMIN — METRONIDAZOLE 500 MG: 500 INJECTION, SOLUTION INTRAVENOUS at 01:04

## 2024-08-11 RX ADMIN — METRONIDAZOLE 500 MG: 500 INJECTION, SOLUTION INTRAVENOUS at 08:57

## 2024-08-11 RX ADMIN — OXYCODONE HYDROCHLORIDE AND ACETAMINOPHEN 2 TABLET: 5; 325 TABLET ORAL at 20:27

## 2024-08-11 RX ADMIN — OXYCODONE HYDROCHLORIDE AND ACETAMINOPHEN 2 TABLET: 5; 325 TABLET ORAL at 08:46

## 2024-08-11 RX ADMIN — GABAPENTIN 300 MG: 300 CAPSULE ORAL at 14:30

## 2024-08-11 RX ADMIN — POTASSIUM CHLORIDE 20 MEQ: 1500 TABLET, EXTENDED RELEASE ORAL at 08:46

## 2024-08-11 ASSESSMENT — PAIN DESCRIPTION - FREQUENCY
FREQUENCY: INTERMITTENT

## 2024-08-11 ASSESSMENT — PAIN - FUNCTIONAL ASSESSMENT
PAIN_FUNCTIONAL_ASSESSMENT: ACTIVITIES ARE NOT PREVENTED

## 2024-08-11 ASSESSMENT — PAIN DESCRIPTION - PAIN TYPE
TYPE: ACUTE PAIN;SURGICAL PAIN

## 2024-08-11 ASSESSMENT — PAIN SCALES - GENERAL
PAINLEVEL_OUTOF10: 6
PAINLEVEL_OUTOF10: 6
PAINLEVEL_OUTOF10: 8
PAINLEVEL_OUTOF10: 8
PAINLEVEL_OUTOF10: 4
PAINLEVEL_OUTOF10: 0

## 2024-08-11 ASSESSMENT — PAIN DESCRIPTION - DESCRIPTORS
DESCRIPTORS: ACHING
DESCRIPTORS: ACHING
DESCRIPTORS: ACHING;DISCOMFORT;THROBBING
DESCRIPTORS: ACHING
DESCRIPTORS: ACHING

## 2024-08-11 ASSESSMENT — PAIN DESCRIPTION - LOCATION
LOCATION: ABDOMEN

## 2024-08-11 ASSESSMENT — PAIN DESCRIPTION - ORIENTATION
ORIENTATION: ANTERIOR
ORIENTATION: RIGHT;MID
ORIENTATION: ANTERIOR

## 2024-08-11 ASSESSMENT — PAIN DESCRIPTION - ONSET
ONSET: ON-GOING

## 2024-08-11 NOTE — PROGRESS NOTES
4 Eyes Skin Assessment     NAME:  Vlad King  YOB: 1960  MEDICAL RECORD NUMBER:  [id number]    The patient is being assessed for  Shift Handoff    I agree that at least one RN has performed a thorough Head to Toe Skin Assessment on the patient. ALL assessment sites listed below have been assessed.      Areas assessed by both nurses:    Head, Face, Ears, Shoulders, Back, Chest, Arms, Elbows, Hands, Sacrum. Buttock, Coccyx, Ischium, Legs. Feet and Heels, and Under Medical Devices         Does the Patient have a Wound? Yes wound(s) were present on assessment. LDA wound assessment was Initiated and completed by RN       Christiano Prevention initiated by RN: Yes  Wound Care Orders initiated by RN: No    Pressure Injury (Stage 3,4, Unstageable, DTI, NWPT, and Complex wounds) if present, place Wound referral order by RN under : No    New Ostomies, if present place, Ostomy referral order under : No     Nurse 1 eSignature: Electronically signed by Britt Triana RN on 8/11/24 at 6:57 PM EDT    **SHARE this note so that the co-signing nurse can place an eSignature**    Nurse 2 eSignature: Electronically signed by Yanira Baumann RN on 8/11/24 at 7:27 PM EDT

## 2024-08-11 NOTE — PROGRESS NOTES
POD# 5    Admit Date: 8/6/2024    Assessment  POD 5 s/p robotic right colectomy   Vlad King is a 63 y.o. male     Patient Active Problem List   Diagnosis    Type 2 diabetes with nephropathy (HCC)    Hyperlipidemia    Diabetic polyneuropathy associated with type 2 diabetes mellitus (HCC)    Chronic idiopathic gout involving toe of right foot without tophus    Class 3 severe obesity with body mass index (BMI) of 40.0 to 44.9 in adult (HCC)    Primary open angle glaucoma    Diabetes mellitus type 2 with complications (HCC)    Acute appendicitis with perforation, localized peritonitis, and abscess, without gangrene    Appendicitis with peritoneal abscess    Sickle cell trait (HCC)    Cancer of appendix (HCC)    Appendix carcinoma (HCC)       Plan  -regular diet  -percocet for pain  -wants to discharge tomorrow    Subjective    Overnight events: No acute events overnight. Pain is better controlled with percocet. No nausea or vomiting.         Objective    Physical Exam:   BP (!) 152/87   Pulse 60   Temp 98.2 °F (36.8 °C) (Oral)   Resp 18   Ht 1.575 m (5' 2\")   Wt 108.6 kg (239 lb 6.4 oz)   SpO2 97%   BMI 43.79 kg/m²     Intake/Output Summary (Last 24 hours) at 8/11/2024 0732  Last data filed at 8/11/2024 0500  Gross per 24 hour   Intake 1589.44 ml   Output 600 ml   Net 989.44 ml     Physical Exam  Abdominal:      Comments: Incisions clean, dry, intact  Appropriately tender               Madhuri Batista DO  Phone: [phone]

## 2024-08-11 NOTE — PLAN OF CARE
Problem: Pain  Goal: Verbalizes/displays adequate comfort level or baseline comfort level  Outcome: Progressing     Problem: Safety - Adult  Goal: Free from fall injury  Outcome: Progressing     Problem: ABCDS Injury Assessment  Goal: Absence of physical injury  Outcome: Progressing     Problem: Chronic Conditions and Co-morbidities  Goal: Patient's chronic conditions and co-morbidity symptoms are monitored and maintained or improved  Outcome: Progressing  Flowsheets (Taken 8/10/2024 2015)  Care Plan - Patient's Chronic Conditions and Co-Morbidity Symptoms are Monitored and Maintained or Improved: Update acute care plan with appropriate goals if chronic or comorbid symptoms are exacerbated and prevent overall improvement and discharge

## 2024-08-11 NOTE — PLAN OF CARE
Problem: Chronic Conditions and Co-morbidities  Goal: Patient's chronic conditions and co-morbidity symptoms are monitored and maintained or improved  Outcome: Progressing     Problem: Pain  Goal: Verbalizes/displays adequate comfort level or baseline comfort level  Outcome: Progressing  Flowsheets  Taken 8/11/2024 0500 by Yanira Baumann RN  Verbalizes/displays adequate comfort level or baseline comfort level: Encourage patient to monitor pain and request assistance  Taken 8/11/2024 0045 by Yanira Baumann RN  Verbalizes/displays adequate comfort level or baseline comfort level: Encourage patient to monitor pain and request assistance     Problem: Safety - Adult  Goal: Free from fall injury  Outcome: Progressing     Problem: ABCDS Injury Assessment  Goal: Absence of physical injury  Outcome: Progressing     Problem: Discharge Planning  Goal: Discharge to home or other facility with appropriate resources  Outcome: Progressing

## 2024-08-12 VITALS
TEMPERATURE: 98.1 F | DIASTOLIC BLOOD PRESSURE: 84 MMHG | BODY MASS INDEX: 44.05 KG/M2 | WEIGHT: 239.4 LBS | SYSTOLIC BLOOD PRESSURE: 161 MMHG | HEIGHT: 62 IN | HEART RATE: 58 BPM | RESPIRATION RATE: 16 BRPM | OXYGEN SATURATION: 96 %

## 2024-08-12 LAB
ANION GAP SERPL CALC-SCNC: 4 MMOL/L (ref 3–18)
BUN SERPL-MCNC: 2 MG/DL (ref 7–18)
BUN/CREAT SERPL: 3 (ref 12–20)
CALCIUM SERPL-MCNC: 8.8 MG/DL (ref 8.5–10.1)
CHLORIDE SERPL-SCNC: 107 MMOL/L (ref 100–111)
CO2 SERPL-SCNC: 28 MMOL/L (ref 21–32)
CREAT SERPL-MCNC: 0.64 MG/DL (ref 0.6–1.3)
ERYTHROCYTE [DISTWIDTH] IN BLOOD BY AUTOMATED COUNT: 14.2 % (ref 11.6–14.5)
GLUCOSE BLD STRIP.AUTO-MCNC: 136 MG/DL (ref 70–110)
GLUCOSE BLD STRIP.AUTO-MCNC: 150 MG/DL (ref 70–110)
GLUCOSE SERPL-MCNC: 150 MG/DL (ref 74–99)
HCT VFR BLD AUTO: 33.6 % (ref 36–48)
HGB BLD-MCNC: 11.4 G/DL (ref 13–16)
MAGNESIUM SERPL-MCNC: 1.5 MG/DL (ref 1.6–2.6)
MCH RBC QN AUTO: 29 PG (ref 24–34)
MCHC RBC AUTO-ENTMCNC: 33.9 G/DL (ref 31–37)
MCV RBC AUTO: 85.5 FL (ref 78–100)
NRBC # BLD: 0 K/UL (ref 0–0.01)
NRBC BLD-RTO: 0 PER 100 WBC
PHOSPHATE SERPL-MCNC: 2.3 MG/DL (ref 2.5–4.9)
PLATELET # BLD AUTO: 179 K/UL (ref 135–420)
PMV BLD AUTO: 11.4 FL (ref 9.2–11.8)
POTASSIUM SERPL-SCNC: 3.4 MMOL/L (ref 3.5–5.5)
RBC # BLD AUTO: 3.93 M/UL (ref 4.35–5.65)
SODIUM SERPL-SCNC: 139 MMOL/L (ref 136–145)
WBC # BLD AUTO: 6.1 K/UL (ref 4.6–13.2)

## 2024-08-12 PROCEDURE — 36415 COLL VENOUS BLD VENIPUNCTURE: CPT

## 2024-08-12 PROCEDURE — 82962 GLUCOSE BLOOD TEST: CPT

## 2024-08-12 PROCEDURE — 2500000003 HC RX 250 WO HCPCS: Performed by: COLON & RECTAL SURGERY

## 2024-08-12 PROCEDURE — 2580000003 HC RX 258: Performed by: COLON & RECTAL SURGERY

## 2024-08-12 PROCEDURE — 6370000000 HC RX 637 (ALT 250 FOR IP): Performed by: SURGERY

## 2024-08-12 PROCEDURE — 6360000002 HC RX W HCPCS: Performed by: COLON & RECTAL SURGERY

## 2024-08-12 PROCEDURE — 84100 ASSAY OF PHOSPHORUS: CPT

## 2024-08-12 PROCEDURE — 6370000000 HC RX 637 (ALT 250 FOR IP): Performed by: COLON & RECTAL SURGERY

## 2024-08-12 PROCEDURE — 83735 ASSAY OF MAGNESIUM: CPT

## 2024-08-12 PROCEDURE — 85027 COMPLETE CBC AUTOMATED: CPT

## 2024-08-12 PROCEDURE — 80048 BASIC METABOLIC PNL TOTAL CA: CPT

## 2024-08-12 RX ORDER — OXYCODONE HYDROCHLORIDE AND ACETAMINOPHEN 5; 325 MG/1; MG/1
1 TABLET ORAL EVERY 6 HOURS PRN
Qty: 28 TABLET | Refills: 0 | Status: SHIPPED | OUTPATIENT
Start: 2024-08-12 | End: 2024-08-19

## 2024-08-12 RX ADMIN — GABAPENTIN 300 MG: 300 CAPSULE ORAL at 08:25

## 2024-08-12 RX ADMIN — HEPARIN SODIUM 5000 UNITS: 5000 INJECTION INTRAVENOUS; SUBCUTANEOUS at 06:04

## 2024-08-12 RX ADMIN — ACETAMINOPHEN 1000 MG: 500 TABLET ORAL at 06:04

## 2024-08-12 RX ADMIN — DOCUSATE SODIUM 100 MG: 100 CAPSULE, LIQUID FILLED ORAL at 08:25

## 2024-08-12 RX ADMIN — FAMOTIDINE 20 MG: 10 INJECTION, SOLUTION INTRAVENOUS at 08:25

## 2024-08-12 ASSESSMENT — PAIN SCALES - GENERAL
PAINLEVEL_OUTOF10: 0

## 2024-08-12 NOTE — PLAN OF CARE
Problem: Pain  Goal: Verbalizes/displays adequate comfort level or baseline comfort level  8/11/2024 2020 by Yanira Baumann RN  Outcome: Progressing  8/11/2024 1311 by Britt Triana RN  Outcome: Progressing  Flowsheets  Taken 8/11/2024 0500 by Yanira Baumann RN  Verbalizes/displays adequate comfort level or baseline comfort level: Encourage patient to monitor pain and request assistance  Taken 8/11/2024 0045 by Yanira Baumann RN  Verbalizes/displays adequate comfort level or baseline comfort level: Encourage patient to monitor pain and request assistance     Problem: Safety - Adult  Goal: Free from fall injury  8/11/2024 2020 by Yanira Baumann RN  Outcome: Progressing  8/11/2024 1311 by Britt Triana RN  Outcome: Progressing     Problem: ABCDS Injury Assessment  Goal: Absence of physical injury  8/11/2024 2020 by Yanira Baumann RN  Outcome: Progressing  8/11/2024 1311 by Britt Triana RN  Outcome: Progressing     Problem: Discharge Planning  Goal: Discharge to home or other facility with appropriate resources  8/11/2024 2020 by Yanira Baumann RN  Outcome: Progressing  Flowsheets (Taken 8/11/2024 2000)  Discharge to home or other facility with appropriate resources: Identify barriers to discharge with patient and caregiver  8/11/2024 1311 by Britt Triana RN  Outcome: Progressing

## 2024-08-12 NOTE — PROGRESS NOTES
POD# 6    Admit Date: 8/6/2024    Assessment    Vlad King is a 63 y.o. male POD 6 s/p robotic right colectomy    Patient Active Problem List   Diagnosis    Type 2 diabetes with nephropathy (HCC)    Hyperlipidemia    Diabetic polyneuropathy associated with type 2 diabetes mellitus (HCC)    Chronic idiopathic gout involving toe of right foot without tophus    Class 3 severe obesity with body mass index (BMI) of 40.0 to 44.9 in adult (HCC)    Primary open angle glaucoma    Diabetes mellitus type 2 with complications (HCC)    Acute appendicitis with perforation, localized peritonitis, and abscess, without gangrene    Appendicitis with peritoneal abscess    Sickle cell trait (HCC)    Cancer of appendix (HCC)    Appendix carcinoma (HCC)       Plan  -tolerating regular diet and having BM  -discharge instructions reviewed with the patient     Subjective    Overnight events: No acute events overnight. Pain is controlled.         Objective    Physical Exam:  /66   Pulse 56   Temp 98.2 °F (36.8 °C) (Oral)   Resp 18   Ht 1.575 m (5' 2\")   Wt 108.6 kg (239 lb 6.4 oz)   SpO2 97%   BMI 43.79 kg/m²     Intake/Output Summary (Last 24 hours) at 8/12/2024 0708  Last data filed at 8/11/2024 2330  Gross per 24 hour   Intake 480 ml   Output --   Net 480 ml     Physical Exam  Abdominal:      General: There is no distension.      Tenderness: There is no abdominal tenderness.      Comments: Incisions clean, dry, intact               Madhuri Batista DO  Phone: [phone]

## 2024-08-12 NOTE — PROGRESS NOTES
4 Eyes Skin Assessment     NAME:  Vlad King  YOB: 1960  MEDICAL RECORD NUMBER:  [id number]    The patient is being assessed for  Shift Handoff    I agree that at least one RN has performed a thorough Head to Toe Skin Assessment on the patient. ALL assessment sites listed below have been assessed.      Areas assessed by both nurses:    Head, Face, Ears, Shoulders, Back, Chest, Arms, Elbows, Hands, Sacrum. Buttock, Coccyx, Ischium, Legs. Feet and Heels, and Under Medical Devices         Does the Patient have a Wound? No noted wound(s)       Christiano Prevention initiated by RN: No  Wound Care Orders initiated by RN: No    Pressure Injury (Stage 3,4, Unstageable, DTI, NWPT, and Complex wounds) if present, place Wound referral order by RN under : No    New Ostomies, if present place, Ostomy referral order under : No     Nurse 1 eSignature: Electronically signed by Yanira Baumann RN on 8/12/24 at 7:32 AM EDT    **SHARE this note so that the co-signing nurse can place an eSignature**    Nurse 2 eSignature: {Esignature:[id number]}

## 2024-08-19 NOTE — DISCHARGE SUMMARY
Colon and Rectal Surgery Discharge Summary     Patient: Vlad King MRN: [id number]  SSN: [id number]    YOB: 1960  Age: 63 y.o.  Sex: male       Admit Date: 8/6/2024    Discharge Date: 8/11/2024    Admission Diagnoses: Cancer of appendix (HCC) [C18.1]  Appendix carcinoma (HCC) [C18.1]    Discharge Diagnoses: Same    Procedure:  Robotic right colectomy    Discharge Condition: Good    Hospital Course: 2 OR for above.  Diet advanced.  Remained afebrile.  Passing bowels prior to discharge.    Consults: None    Significant Diagnostic Studies: None    Disposition: home    Discharge Medications:   Discharge Medication List as of 8/12/2024 10:35 AM        START taking these medications    Details   !! oxyCODONE-acetaminophen (PERCOCET) 5-325 MG per tablet Take 1 tablet by mouth every 6 hours as needed for Pain for up to 7 days. Intended supply: 7 days. Take lowest dose possible to manage pain Max Daily Amount: 4 tablets, Disp-28 tablet, R-0Normal      !! oxyCODONE-acetaminophen (PERCOCET) 5-325 MG per tablet Take 1 tablet by mouth every 4 hours as needed for Pain for up to 7 days. Max Daily Amount: 6 tablets, Disp-40 tablet, R-0Normal       !! - Potential duplicate medications found. Please discuss with provider.        CONTINUE these medications which have NOT CHANGED    Details   albuterol sulfate HFA (PROVENTIL;VENTOLIN;PROAIR) 108 (90 Base) MCG/ACT inhaler INHALE 2 PUFFS INTO THE LUNGS 4 TIMES DAILY AS NEEDED FOR WHEEZING., Disp-1 each, R-3Normal      metFORMIN (GLUCOPHAGE-XR) 500 MG extended release tablet Take 1 tablet by mouth daily (with breakfast), Disp-90 tablet, R-1Normal      atorvastatin (LIPITOR) 10 MG tablet Take 1 tablet by mouth daily, Disp-30 tablet, R-3Normal      losartan (COZAAR) 25 MG tablet Take 1 tablet by mouth daily, Disp-30 tablet, R-0Normal      famotidine (PEPCID) 20 MG tablet Take 1 tablet by mouth 2 times daily, Disp-60 tablet, R-3Normal      ibuprofen (ADVIL;MOTRIN) 800

## 2024-08-26 ENCOUNTER — TELEPHONE (OUTPATIENT)
Age: 64
End: 2024-08-26

## 2024-08-26 NOTE — TELEPHONE ENCOUNTER
Contacted Rebecca Mckenna to schedule a post op appointment for Mr. King. Left a voicemail for her to return the call.

## 2024-09-05 ENCOUNTER — TELEPHONE (OUTPATIENT)
Facility: HOSPITAL | Age: 64
End: 2024-09-05

## 2024-09-06 ENCOUNTER — TELEPHONE (OUTPATIENT)
Facility: HOSPITAL | Age: 64
End: 2024-09-06

## 2024-09-06 NOTE — TELEPHONE ENCOUNTER
Attempted to contact patient regarding mediport placement scheduled for this morning. Left VM requesting call back.

## 2024-09-09 ENCOUNTER — HOSPITAL ENCOUNTER (OUTPATIENT)
Facility: HOSPITAL | Age: 64
Discharge: HOME | End: 2024-09-12
Payer: COMMERCIAL

## 2024-09-09 ENCOUNTER — OFFICE VISIT (OUTPATIENT)
Age: 64
End: 2024-09-09

## 2024-09-09 VITALS
HEART RATE: 62 BPM | WEIGHT: 244 LBS | TEMPERATURE: 97.5 F | RESPIRATION RATE: 17 BRPM | DIASTOLIC BLOOD PRESSURE: 68 MMHG | SYSTOLIC BLOOD PRESSURE: 116 MMHG | HEIGHT: 62 IN | OXYGEN SATURATION: 97 % | BODY MASS INDEX: 44.9 KG/M2

## 2024-09-09 DIAGNOSIS — C18.1 CANCER OF APPENDIX (HCC): Primary | ICD-10-CM

## 2024-09-09 LAB
ANION GAP SERPL CALC-SCNC: 5 MMOL/L (ref 3–18)
BUN SERPL-MCNC: 14 MG/DL (ref 7–18)
BUN/CREAT SERPL: 14 (ref 12–20)
CALCIUM SERPL-MCNC: 10.1 MG/DL (ref 8.5–10.1)
CHLORIDE SERPL-SCNC: 105 MMOL/L (ref 100–111)
CO2 SERPL-SCNC: 27 MMOL/L (ref 21–32)
CREAT SERPL-MCNC: 1.03 MG/DL (ref 0.6–1.3)
GLUCOSE SERPL-MCNC: 190 MG/DL (ref 74–99)
POTASSIUM SERPL-SCNC: 4.9 MMOL/L (ref 3.5–5.5)
SODIUM SERPL-SCNC: 137 MMOL/L (ref 136–145)

## 2024-09-09 PROCEDURE — 80048 BASIC METABOLIC PNL TOTAL CA: CPT

## 2024-09-09 PROCEDURE — 36415 COLL VENOUS BLD VENIPUNCTURE: CPT

## 2024-09-09 PROCEDURE — 99024 POSTOP FOLLOW-UP VISIT: CPT | Performed by: COLON & RECTAL SURGERY

## 2024-09-11 ENCOUNTER — PREP FOR PROCEDURE (OUTPATIENT)
Age: 64
End: 2024-09-11

## 2024-09-12 ENCOUNTER — TELEPHONE (OUTPATIENT)
Age: 64
End: 2024-09-12

## 2024-09-12 NOTE — TELEPHONE ENCOUNTER
Per Homer, ref# [id number], no auth or precert required for CPT 97066. Website stated no action required but had a message to call insurance to verify eligibility for coverage.

## 2024-09-17 ENCOUNTER — TELEPHONE (OUTPATIENT)
Facility: HOSPITAL | Age: 64
End: 2024-09-17

## 2024-09-18 ENCOUNTER — HOSPITAL ENCOUNTER (OUTPATIENT)
Facility: HOSPITAL | Age: 64
Discharge: HOME | End: 2024-09-21
Attending: INTERNAL MEDICINE
Payer: COMMERCIAL

## 2024-09-18 VITALS
OXYGEN SATURATION: 99 % | WEIGHT: 240 LBS | BODY MASS INDEX: 44.16 KG/M2 | TEMPERATURE: 97.7 F | RESPIRATION RATE: 16 BRPM | SYSTOLIC BLOOD PRESSURE: 157 MMHG | HEIGHT: 62 IN | HEART RATE: 58 BPM | DIASTOLIC BLOOD PRESSURE: 82 MMHG

## 2024-09-18 DIAGNOSIS — C18.1 MALIGNANT NEOPLASM OF APPENDIX (HCC): ICD-10-CM

## 2024-09-18 LAB
ANION GAP SERPL CALC-SCNC: 7 MMOL/L (ref 3–18)
APTT PPP: 23.2 SEC (ref 23–36.4)
BASOPHILS # BLD: 0 K/UL (ref 0–0.1)
BASOPHILS NFR BLD: 1 % (ref 0–2)
BUN SERPL-MCNC: 6 MG/DL (ref 7–18)
BUN/CREAT SERPL: 7 (ref 12–20)
CALCIUM SERPL-MCNC: 9.1 MG/DL (ref 8.5–10.1)
CHLORIDE SERPL-SCNC: 109 MMOL/L (ref 100–111)
CO2 SERPL-SCNC: 21 MMOL/L (ref 21–32)
CREAT SERPL-MCNC: 0.81 MG/DL (ref 0.6–1.3)
DIFFERENTIAL METHOD BLD: ABNORMAL
EOSINOPHIL # BLD: 0.2 K/UL (ref 0–0.4)
EOSINOPHIL NFR BLD: 3 % (ref 0–5)
ERYTHROCYTE [DISTWIDTH] IN BLOOD BY AUTOMATED COUNT: 14.4 % (ref 11.6–14.5)
GLUCOSE SERPL-MCNC: 161 MG/DL (ref 74–99)
HCT VFR BLD AUTO: 38.3 % (ref 36–48)
HGB BLD-MCNC: 12.9 G/DL (ref 13–16)
IMM GRANULOCYTES # BLD AUTO: 0 K/UL (ref 0–0.04)
IMM GRANULOCYTES NFR BLD AUTO: 1 % (ref 0–0.5)
INR PPP: 1 (ref 0.9–1.1)
LYMPHOCYTES # BLD: 2.8 K/UL (ref 0.9–3.6)
LYMPHOCYTES NFR BLD: 44 % (ref 21–52)
MCH RBC QN AUTO: 28.5 PG (ref 24–34)
MCHC RBC AUTO-ENTMCNC: 33.7 G/DL (ref 31–37)
MCV RBC AUTO: 84.7 FL (ref 78–100)
MONOCYTES # BLD: 0.4 K/UL (ref 0.05–1.2)
MONOCYTES NFR BLD: 6 % (ref 3–10)
NEUTS SEG # BLD: 2.9 K/UL (ref 1.8–8)
NEUTS SEG NFR BLD: 46 % (ref 40–73)
NRBC # BLD: 0 K/UL (ref 0–0.01)
NRBC BLD-RTO: 0 PER 100 WBC
PLATELET # BLD AUTO: 175 K/UL (ref 135–420)
PMV BLD AUTO: 13 FL (ref 9.2–11.8)
POTASSIUM SERPL-SCNC: 3.9 MMOL/L (ref 3.5–5.5)
PROTHROMBIN TIME: 12.9 SEC (ref 11.9–14.9)
RBC # BLD AUTO: 4.52 M/UL (ref 4.35–5.65)
SODIUM SERPL-SCNC: 137 MMOL/L (ref 136–145)
WBC # BLD AUTO: 6.4 K/UL (ref 4.6–13.2)

## 2024-09-18 PROCEDURE — 2580000003 HC RX 258: Performed by: PHYSICIAN ASSISTANT

## 2024-09-18 PROCEDURE — 85025 COMPLETE CBC W/AUTO DIFF WBC: CPT

## 2024-09-18 PROCEDURE — C1769 GUIDE WIRE: HCPCS

## 2024-09-18 PROCEDURE — 85730 THROMBOPLASTIN TIME PARTIAL: CPT

## 2024-09-18 PROCEDURE — 2500000003 HC RX 250 WO HCPCS: Performed by: PHYSICIAN ASSISTANT

## 2024-09-18 PROCEDURE — 85610 PROTHROMBIN TIME: CPT

## 2024-09-18 PROCEDURE — 6360000002 HC RX W HCPCS: Performed by: PHYSICIAN ASSISTANT

## 2024-09-18 PROCEDURE — 80048 BASIC METABOLIC PNL TOTAL CA: CPT

## 2024-09-18 RX ORDER — HEPARIN SODIUM (PORCINE) LOCK FLUSH IV SOLN 100 UNIT/ML 100 UNIT/ML
SOLUTION INTRAVENOUS PRN
Status: COMPLETED | OUTPATIENT
Start: 2024-09-18 | End: 2024-09-18

## 2024-09-18 RX ORDER — MIDAZOLAM HYDROCHLORIDE 1 MG/ML
INJECTION INTRAMUSCULAR; INTRAVENOUS PRN
Status: COMPLETED | OUTPATIENT
Start: 2024-09-18 | End: 2024-09-18

## 2024-09-18 RX ORDER — LIDOCAINE HYDROCHLORIDE AND EPINEPHRINE BITARTRATE 20; .01 MG/ML; MG/ML
INJECTION, SOLUTION SUBCUTANEOUS PRN
Status: COMPLETED | OUTPATIENT
Start: 2024-09-18 | End: 2024-09-18

## 2024-09-18 RX ORDER — FENTANYL CITRATE 50 UG/ML
INJECTION, SOLUTION INTRAMUSCULAR; INTRAVENOUS PRN
Status: COMPLETED | OUTPATIENT
Start: 2024-09-18 | End: 2024-09-18

## 2024-09-18 RX ORDER — SODIUM CHLORIDE 9 MG/ML
INJECTION, SOLUTION INTRAVENOUS CONTINUOUS
Status: DISCONTINUED | OUTPATIENT
Start: 2024-09-18 | End: 2024-09-22 | Stop reason: HOSPADM

## 2024-09-18 RX ADMIN — HEPARIN SODIUM (PORCINE) LOCK FLUSH IV SOLN 100 UNIT/ML 500 UNITS: 100 SOLUTION at 09:58

## 2024-09-18 RX ADMIN — MIDAZOLAM 1 MG: 1 INJECTION INTRAMUSCULAR; INTRAVENOUS at 09:39

## 2024-09-18 RX ADMIN — FENTANYL CITRATE 50 MCG: 50 INJECTION INTRAMUSCULAR; INTRAVENOUS at 09:38

## 2024-09-18 RX ADMIN — LIDOCAINE HYDROCHLORIDE,EPINEPHRINE BITARTRATE 7 ML: 20; .01 INJECTION, SOLUTION INFILTRATION; PERINEURAL at 09:41

## 2024-09-18 RX ADMIN — WATER 2000 MG: 1 INJECTION, SOLUTION INTRAMUSCULAR; INTRAVENOUS; SUBCUTANEOUS at 09:51

## 2024-09-18 RX ADMIN — MIDAZOLAM 1 MG: 1 INJECTION INTRAMUSCULAR; INTRAVENOUS at 09:49

## 2024-09-18 RX ADMIN — LIDOCAINE HYDROCHLORIDE,EPINEPHRINE BITARTRATE 10 ML: 20; .01 INJECTION, SOLUTION INFILTRATION; PERINEURAL at 09:47

## 2024-09-18 RX ADMIN — FENTANYL CITRATE 50 MCG: 50 INJECTION INTRAMUSCULAR; INTRAVENOUS at 09:49

## 2024-09-19 ENCOUNTER — TELEPHONE (OUTPATIENT)
Age: 64
End: 2024-09-19

## 2024-09-19 NOTE — TELEPHONE ENCOUNTER
FYI:    Called pt to confirm surgery tomorrow, 09/20/24. Pt stated that he had the mediport placed yesterday, 09/18/24. I was able to confirm this was done by [facility], IR.    Pt has been removed from the schedule.

## 2024-09-25 ENCOUNTER — HOSPITAL ENCOUNTER (OUTPATIENT)
Facility: HOSPITAL | Age: 64
Discharge: HOME | End: 2024-09-28

## 2024-09-25 DIAGNOSIS — C18.1 MALIGNANT NEOPLASM OF APPENDIX (HCC): ICD-10-CM

## 2024-09-26 ENCOUNTER — TELEPHONE (OUTPATIENT)
Facility: CLINIC | Age: 64
End: 2024-09-26

## 2024-10-01 ENCOUNTER — OFFICE VISIT (OUTPATIENT)
Facility: CLINIC | Age: 64
End: 2024-10-01
Payer: COMMERCIAL

## 2024-10-01 VITALS
DIASTOLIC BLOOD PRESSURE: 72 MMHG | WEIGHT: 251 LBS | OXYGEN SATURATION: 96 % | BODY MASS INDEX: 46.19 KG/M2 | SYSTOLIC BLOOD PRESSURE: 134 MMHG | HEIGHT: 62 IN | HEART RATE: 70 BPM | TEMPERATURE: 98 F | RESPIRATION RATE: 18 BRPM

## 2024-10-01 DIAGNOSIS — E78.00 PURE HYPERCHOLESTEROLEMIA: ICD-10-CM

## 2024-10-01 DIAGNOSIS — E11.21 TYPE 2 DIABETES MELLITUS WITH DIABETIC NEPHROPATHY, WITHOUT LONG-TERM CURRENT USE OF INSULIN (HCC): ICD-10-CM

## 2024-10-01 DIAGNOSIS — D64.9 NORMOCYTIC ANEMIA: ICD-10-CM

## 2024-10-01 DIAGNOSIS — I10 ESSENTIAL (PRIMARY) HYPERTENSION: Primary | ICD-10-CM

## 2024-10-01 DIAGNOSIS — C18.1 CANCER OF APPENDIX (HCC): ICD-10-CM

## 2024-10-01 DIAGNOSIS — E66.01 MORBID (SEVERE) OBESITY DUE TO EXCESS CALORIES: ICD-10-CM

## 2024-10-01 DIAGNOSIS — M54.2 NECK PAIN: ICD-10-CM

## 2024-10-01 PROCEDURE — 3075F SYST BP GE 130 - 139MM HG: CPT | Performed by: EMERGENCY MEDICINE

## 2024-10-01 PROCEDURE — 3078F DIAST BP <80 MM HG: CPT | Performed by: EMERGENCY MEDICINE

## 2024-10-01 PROCEDURE — 3051F HG A1C>EQUAL 7.0%<8.0%: CPT | Performed by: EMERGENCY MEDICINE

## 2024-10-01 PROCEDURE — 99214 OFFICE O/P EST MOD 30 MIN: CPT | Performed by: EMERGENCY MEDICINE

## 2024-10-01 RX ORDER — ATORVASTATIN CALCIUM 10 MG/1
10 TABLET, FILM COATED ORAL DAILY
Qty: 30 TABLET | Refills: 3 | Status: SHIPPED | OUTPATIENT
Start: 2024-10-01

## 2024-10-01 SDOH — ECONOMIC STABILITY: INCOME INSECURITY: HOW HARD IS IT FOR YOU TO PAY FOR THE VERY BASICS LIKE FOOD, HOUSING, MEDICAL CARE, AND HEATING?: SOMEWHAT HARD

## 2024-10-01 SDOH — ECONOMIC STABILITY: FOOD INSECURITY: WITHIN THE PAST 12 MONTHS, YOU WORRIED THAT YOUR FOOD WOULD RUN OUT BEFORE YOU GOT MONEY TO BUY MORE.: NEVER TRUE

## 2024-10-01 SDOH — ECONOMIC STABILITY: FOOD INSECURITY: WITHIN THE PAST 12 MONTHS, THE FOOD YOU BOUGHT JUST DIDN'T LAST AND YOU DIDN'T HAVE MONEY TO GET MORE.: NEVER TRUE

## 2024-10-01 ASSESSMENT — ENCOUNTER SYMPTOMS
EYE REDNESS: 0
ABDOMINAL PAIN: 0
SORE THROAT: 0
EYE DISCHARGE: 0
BLOOD IN STOOL: 0
CONSTIPATION: 0
PHOTOPHOBIA: 0
TROUBLE SWALLOWING: 0
SHORTNESS OF BREATH: 0
COUGH: 0
COLOR CHANGE: 0
WHEEZING: 0
APNEA: 0
DIARRHEA: 0
EYE ITCHING: 0
ABDOMINAL DISTENTION: 0

## 2024-10-01 ASSESSMENT — ANXIETY QUESTIONNAIRES
1. FEELING NERVOUS, ANXIOUS, OR ON EDGE: NOT AT ALL
7. FEELING AFRAID AS IF SOMETHING AWFUL MIGHT HAPPEN: NOT AT ALL
3. WORRYING TOO MUCH ABOUT DIFFERENT THINGS: NOT AT ALL
GAD7 TOTAL SCORE: 0
IF YOU CHECKED OFF ANY PROBLEMS ON THIS QUESTIONNAIRE, HOW DIFFICULT HAVE THESE PROBLEMS MADE IT FOR YOU TO DO YOUR WORK, TAKE CARE OF THINGS AT HOME, OR GET ALONG WITH OTHER PEOPLE: NOT DIFFICULT AT ALL
2. NOT BEING ABLE TO STOP OR CONTROL WORRYING: NOT AT ALL
5. BEING SO RESTLESS THAT IT IS HARD TO SIT STILL: NOT AT ALL
6. BECOMING EASILY ANNOYED OR IRRITABLE: NOT AT ALL
4. TROUBLE RELAXING: NOT AT ALL

## 2024-10-01 ASSESSMENT — PATIENT HEALTH QUESTIONNAIRE - PHQ9
SUM OF ALL RESPONSES TO PHQ QUESTIONS 1-9: 0
2. FEELING DOWN, DEPRESSED OR HOPELESS: NOT AT ALL
1. LITTLE INTEREST OR PLEASURE IN DOING THINGS: NOT AT ALL
SUM OF ALL RESPONSES TO PHQ QUESTIONS 1-9: 0
SUM OF ALL RESPONSES TO PHQ9 QUESTIONS 1 & 2: 0

## 2024-10-01 NOTE — ASSESSMENT & PLAN NOTE
Last evaluation April 2024 showed triglycerides 197 otherwise within normal range.  Follow-up this visit.

## 2024-10-01 NOTE — PROGRESS NOTES
\"Have you been to the ER, urgent care clinic since your last visit?  Hospitalized since your last visit?\"    YES - When: approximately 2 months ago.  Where and Why: [facility]     “Have you seen or consulted any other health care providers outside our system since your last visit?”    NO      “Have you had a diabetic eye exam?”    NO     Date of last diabetic eye exam: 5/6/2019

## 2024-10-01 NOTE — PATIENT INSTRUCTIONS
attack or stroke. But taking aspirin isn't right for everyone, because it can cause serious bleeding.  See your doctor regularly. You may need to see the doctor more often at first or until your blood pressure comes down.  If you are taking blood pressure medicine, talk to your doctor before you take decongestants or anti-inflammatory medicine, such as ibuprofen. Some of these medicines can raise blood pressure.  Learn how to check your blood pressure at home.  Lifestyle changes  Stay at a healthy weight. This is especially important if you put on weight around the waist. Losing even 10 pounds can help you lower your blood pressure.  If your doctor recommends it, get more exercise. Walking is a good choice. Bit by bit, increase the amount you walk every day. Try for at least 30 minutes on most days of the week. You also may want to swim, bike, or do other activities.  Avoid or limit alcohol. Talk to your doctor about whether you can drink any alcohol.  Try to limit how much sodium you eat to less than 2,300 milligrams (mg) a day. Your doctor may ask you to try to eat less than 1,500 mg a day.  Eat plenty of fruits (such as bananas and oranges), vegetables, legumes, whole grains, and low-fat dairy products.  Lower the amount of saturated fat in your diet. Saturated fat is found in animal products such as milk, cheese, and meat. Limiting these foods may help you lose weight and also lower your risk for heart disease.  Do not smoke. Smoking increases your risk for heart attack and stroke. If you need help quitting, talk to your doctor about stop-smoking programs and medicines. These can increase your chances of quitting for good.  When should you call for help?   Call  911 anytime you think you may need emergency care. This may mean having symptoms that suggest that your blood pressure is causing a serious heart or blood vessel problem. Your blood pressure may be over 180/120.  For example, call 911 if:    You have

## 2024-10-01 NOTE — PROGRESS NOTES
Assessment & Plan  Essential (primary) hypertension   Chronic, at goal (stable), continue current treatment plan         Pure hypercholesterolemia    Last evaluation April 2024 showed triglycerides 197 otherwise within normal range.  Follow-up this visit.         Type 2 diabetes mellitus with diabetic nephropathy, without long-term current use of insulin (HCC)    Previously uncontrolled.  Will follow-up this visit and adjust meds as indicated.  Hemoglobin A1C   Date Value Ref Range Status   04/30/2024 7.9 (H) 4.2 - 5.6 % Final     Comment:     (NOTE)  HbA1C Interpretive Ranges  <5.7              Normal  5.7 - 6.4         Consider Prediabetes  >6.5              Consider Diabetes       Hemoglobin A1C, POC   Date Value Ref Range Status   05/30/2019 12.2 % Final           Morbid (severe) obesity due to excess calories   Chronic, not at goal (unstable), we went over diet and exercise.  Download my fitness pal.  Walk 150 minutes a week.  Continue present regimen and consider medication change if weight gain continues to increase.  Would start this after his treatment for cancer of the appendix.  Wt Readings from Last 3 Encounters:   10/01/24 113.9 kg (251 lb)   09/18/24 108.9 kg (240 lb)   09/09/24 110.7 kg (244 lb)            Normocytic anemia  Etiology to be determined.  Question blood loss versus decreased manufacture versus destruction.  Anemia panel ordered.  Treat or refer as indicated.  Anemia panel ordered.  Last colonoscopy January 18, 2023 1 polyp removed with 3-year follow-up recommended.         Cancer of appendix (HCC)   Monitored by specialist- no acute findings meriting change in the plan                     Wt Readings from Last 3 Encounters:   09/18/24 108.9 kg (240 lb)   09/09/24 110.7 kg (244 lb)   08/06/24 108.6 kg (239 lb 6.4 oz)     reviewed diet, exercise and weight control  Advised on the risks associated with NSAIDs including , bleeding,ulcers and renal damage.Take with food and avoid alcohol.  Do

## 2024-10-01 NOTE — ASSESSMENT & PLAN NOTE
Insert uncertain.  Prior triglycerides elevated 197.  Continue treatment.    Orders:    atorvastatin (LIPITOR) 10 MG tablet; Take 1 tablet by mouth daily

## 2024-10-01 NOTE — PROGRESS NOTES
Assessment & Plan  Essential (primary) hypertension   Chronic, at goal (stable), changes made today: None.  Continue present regimen and follow.         Pure hypercholesterolemia    Insert uncertain.  Prior triglycerides elevated 197.  Continue treatment.    Orders:    atorvastatin (LIPITOR) 10 MG tablet; Take 1 tablet by mouth daily    Type 2 diabetes mellitus with diabetic nephropathy, without long-term current use of insulin (HCC)   Chronic, not at goal (unstable), previous level 7.984 2024.  Recheck this visit and follow.  May need medication adjustment.         Morbid (severe) obesity due to excess calories   Chronic, not at goal (unstable), weight increasing.  Had been losing.  S/p surgery.  We went over diet and exercise.  Download my fitness pal.  Walk 150 minutes a week will discuss medication on next visit.         Normocytic anemia   Question blood loss versus decreased manufacture versus destruction.  Anemia panel ordered.  Treat or refer as indicated.    Orders:    Ferritin; Future    Vitamin B12 & Folate; Future    Iron and TIBC; Future    Reticulocytes; Future    Cancer of appendix (HCC)    Found post appendectomy.  CT chest abdomen and pelvis negative thus far.  Patient question whether or not a PET scan would be indicated.  He is due to follow-up with hematology oncology and may address that question with them.  At this time he is on treatment.         Neck pain    New finding.  Question cervical radiculopathy.  Neck exercises given.  X-ray of the neck ordered.             Wt Readings from Last 3 Encounters:   10/01/24 113.9 kg (251 lb)   09/18/24 108.9 kg (240 lb)   09/09/24 110.7 kg (244 lb)     reviewed diet, exercise and weight control  Advised on the risks associated with NSAIDs including , bleeding,ulcers and renal damage.Take with food and avoid alcohol.  Do not mix with other NSAIDs.     Lab results, diagnostic and radiologic results and schedule of future  studies reviewed with  the

## 2024-10-01 NOTE — ASSESSMENT & PLAN NOTE
Found post appendectomy.  CT chest abdomen and pelvis negative thus far.  Patient question whether or not a PET scan would be indicated.  He is due to follow-up with hematology oncology and may address that question with them.  At this time he is on treatment.

## 2024-10-02 ENCOUNTER — TELEPHONE (OUTPATIENT)
Facility: CLINIC | Age: 64
End: 2024-10-02

## 2024-10-02 ENCOUNTER — HOSPITAL ENCOUNTER (OUTPATIENT)
Facility: HOSPITAL | Age: 64
Discharge: HOME | End: 2024-10-05
Payer: COMMERCIAL

## 2024-10-02 DIAGNOSIS — D64.9 NORMOCYTIC ANEMIA: ICD-10-CM

## 2024-10-02 LAB
FERRITIN SERPL-MCNC: 104 NG/ML (ref 8–388)
FOLATE SERPL-MCNC: >20 NG/ML (ref 3.1–17.5)
IRON SATN MFR SERPL: 21 % (ref 20–50)
IRON SERPL-MCNC: 65 UG/DL (ref 50–175)
RETICS/RBC NFR AUTO: 1.4 % (ref 0.5–2.5)
TIBC SERPL-MCNC: 313 UG/DL (ref 250–450)
VIT B12 SERPL-MCNC: 253 PG/ML (ref 211–911)

## 2024-10-02 PROCEDURE — 82607 VITAMIN B-12: CPT

## 2024-10-02 PROCEDURE — 83550 IRON BINDING TEST: CPT

## 2024-10-02 PROCEDURE — 36415 COLL VENOUS BLD VENIPUNCTURE: CPT

## 2024-10-02 PROCEDURE — 85045 AUTOMATED RETICULOCYTE COUNT: CPT

## 2024-10-02 PROCEDURE — 82746 ASSAY OF FOLIC ACID SERUM: CPT

## 2024-10-02 PROCEDURE — 82728 ASSAY OF FERRITIN: CPT

## 2024-10-02 PROCEDURE — 83540 ASSAY OF IRON: CPT

## 2024-10-04 DIAGNOSIS — M25.511 ACUTE PAIN OF RIGHT SHOULDER: ICD-10-CM

## 2024-10-04 DIAGNOSIS — M54.2 NECK PAIN: Primary | ICD-10-CM

## 2024-10-12 ENCOUNTER — APPOINTMENT (OUTPATIENT)
Facility: HOSPITAL | Age: 64
End: 2024-10-12
Payer: COMMERCIAL

## 2024-10-12 ENCOUNTER — HOSPITAL ENCOUNTER (EMERGENCY)
Facility: HOSPITAL | Age: 64
Discharge: HOME | End: 2024-10-12
Attending: EMERGENCY MEDICINE
Payer: COMMERCIAL

## 2024-10-12 VITALS
HEIGHT: 62 IN | RESPIRATION RATE: 16 BRPM | BODY MASS INDEX: 46.01 KG/M2 | WEIGHT: 250 LBS | OXYGEN SATURATION: 99 % | HEART RATE: 63 BPM | DIASTOLIC BLOOD PRESSURE: 76 MMHG | SYSTOLIC BLOOD PRESSURE: 173 MMHG

## 2024-10-12 DIAGNOSIS — R10.32 ABDOMINAL PAIN, LEFT LOWER QUADRANT: ICD-10-CM

## 2024-10-12 DIAGNOSIS — G89.3 CANCER ASSOCIATED PAIN: ICD-10-CM

## 2024-10-12 DIAGNOSIS — C18.1 CANCER OF APPENDIX METASTATIC TO INTRA-ABDOMINAL LYMPH NODE (HCC): ICD-10-CM

## 2024-10-12 DIAGNOSIS — C77.2 CANCER OF APPENDIX METASTATIC TO INTRA-ABDOMINAL LYMPH NODE (HCC): ICD-10-CM

## 2024-10-12 DIAGNOSIS — R10.9 LEFT FLANK PAIN: Primary | ICD-10-CM

## 2024-10-12 LAB
ALBUMIN SERPL-MCNC: 3.8 G/DL (ref 3.4–5)
ALBUMIN/GLOB SERPL: 1 (ref 0.8–1.7)
ALP SERPL-CCNC: 120 U/L (ref 45–117)
ALT SERPL-CCNC: 51 U/L (ref 16–61)
ANION GAP SERPL CALC-SCNC: 8 MMOL/L (ref 3–18)
APPEARANCE UR: CLEAR
AST SERPL-CCNC: 46 U/L (ref 10–38)
BACTERIA URNS QL MICRO: NEGATIVE /HPF
BASOPHILS # BLD: 0 K/UL (ref 0–0.1)
BASOPHILS NFR BLD: 1 % (ref 0–2)
BILIRUB SERPL-MCNC: 0.9 MG/DL (ref 0.2–1)
BILIRUB UR QL: NEGATIVE
BUN SERPL-MCNC: 8 MG/DL (ref 7–18)
BUN/CREAT SERPL: 11 (ref 12–20)
CALCIUM SERPL-MCNC: 9.1 MG/DL (ref 8.5–10.1)
CHLORIDE SERPL-SCNC: 102 MMOL/L (ref 100–111)
CO2 SERPL-SCNC: 25 MMOL/L (ref 21–32)
COLOR UR: YELLOW
CREAT SERPL-MCNC: 0.75 MG/DL (ref 0.6–1.3)
DIFFERENTIAL METHOD BLD: ABNORMAL
EOSINOPHIL # BLD: 0.1 K/UL (ref 0–0.4)
EOSINOPHIL NFR BLD: 2 % (ref 0–5)
EPITH CASTS URNS QL MICRO: NORMAL /LPF (ref 0–5)
ERYTHROCYTE [DISTWIDTH] IN BLOOD BY AUTOMATED COUNT: 14.3 % (ref 11.6–14.5)
GLOBULIN SER CALC-MCNC: 3.8 G/DL (ref 2–4)
GLUCOSE SERPL-MCNC: 231 MG/DL (ref 74–99)
GLUCOSE UR STRIP.AUTO-MCNC: 250 MG/DL
HCT VFR BLD AUTO: 36.5 % (ref 36–48)
HGB BLD-MCNC: 12.9 G/DL (ref 13–16)
HGB UR QL STRIP: NEGATIVE
IMM GRANULOCYTES # BLD AUTO: 0 K/UL (ref 0–0.04)
IMM GRANULOCYTES NFR BLD AUTO: 0 % (ref 0–0.5)
KETONES UR QL STRIP.AUTO: NEGATIVE MG/DL
LEUKOCYTE ESTERASE UR QL STRIP.AUTO: NEGATIVE
LIPASE SERPL-CCNC: 79 U/L (ref 13–75)
LYMPHOCYTES # BLD: 2.4 K/UL (ref 0.9–3.6)
LYMPHOCYTES NFR BLD: 41 % (ref 21–52)
MAGNESIUM SERPL-MCNC: 1.7 MG/DL (ref 1.6–2.6)
MCH RBC QN AUTO: 29.1 PG (ref 24–34)
MCHC RBC AUTO-ENTMCNC: 35.3 G/DL (ref 31–37)
MCV RBC AUTO: 82.4 FL (ref 78–100)
MONOCYTES # BLD: 0.3 K/UL (ref 0.05–1.2)
MONOCYTES NFR BLD: 5 % (ref 3–10)
NEUTS SEG # BLD: 2.9 K/UL (ref 1.8–8)
NEUTS SEG NFR BLD: 51 % (ref 40–73)
NITRITE UR QL STRIP.AUTO: NEGATIVE
NRBC # BLD: 0 K/UL (ref 0–0.01)
NRBC BLD-RTO: 0 PER 100 WBC
PH UR STRIP: 6 (ref 5–8)
PLATELET # BLD AUTO: 184 K/UL (ref 135–420)
PMV BLD AUTO: 11.5 FL (ref 9.2–11.8)
POTASSIUM SERPL-SCNC: 4.8 MMOL/L (ref 3.5–5.5)
PROT SERPL-MCNC: 7.6 G/DL (ref 6.4–8.2)
PROT UR STRIP-MCNC: ABNORMAL MG/DL
RBC # BLD AUTO: 4.43 M/UL (ref 4.35–5.65)
RBC #/AREA URNS HPF: NEGATIVE /HPF (ref 0–5)
SODIUM SERPL-SCNC: 135 MMOL/L (ref 136–145)
SP GR UR REFRACTOMETRY: 1 (ref 1–1.03)
UROBILINOGEN UR QL STRIP.AUTO: 0.2 EU/DL (ref 0.2–1)
WBC # BLD AUTO: 5.7 K/UL (ref 4.6–13.2)
WBC URNS QL MICRO: NORMAL /HPF (ref 0–4)

## 2024-10-12 PROCEDURE — 80053 COMPREHEN METABOLIC PANEL: CPT

## 2024-10-12 PROCEDURE — 85025 COMPLETE CBC W/AUTO DIFF WBC: CPT

## 2024-10-12 PROCEDURE — 6370000000 HC RX 637 (ALT 250 FOR IP): Performed by: EMERGENCY MEDICINE

## 2024-10-12 PROCEDURE — 74176 CT ABD & PELVIS W/O CONTRAST: CPT

## 2024-10-12 PROCEDURE — 83735 ASSAY OF MAGNESIUM: CPT

## 2024-10-12 PROCEDURE — 96375 TX/PRO/DX INJ NEW DRUG ADDON: CPT

## 2024-10-12 PROCEDURE — 6360000002 HC RX W HCPCS: Performed by: EMERGENCY MEDICINE

## 2024-10-12 PROCEDURE — 99284 EMERGENCY DEPT VISIT MOD MDM: CPT

## 2024-10-12 PROCEDURE — 83690 ASSAY OF LIPASE: CPT

## 2024-10-12 PROCEDURE — 81001 URINALYSIS AUTO W/SCOPE: CPT

## 2024-10-12 PROCEDURE — 96374 THER/PROPH/DIAG INJ IV PUSH: CPT

## 2024-10-12 RX ORDER — MORPHINE SULFATE 4 MG/ML
4 INJECTION, SOLUTION INTRAMUSCULAR; INTRAVENOUS
Status: COMPLETED | OUTPATIENT
Start: 2024-10-12 | End: 2024-10-12

## 2024-10-12 RX ORDER — OXYCODONE AND ACETAMINOPHEN 5; 325 MG/1; MG/1
1 TABLET ORAL EVERY 6 HOURS PRN
Qty: 20 TABLET | Refills: 0 | Status: SHIPPED | OUTPATIENT
Start: 2024-10-12 | End: 2024-10-17

## 2024-10-12 RX ORDER — ONDANSETRON 4 MG/1
4 TABLET, ORALLY DISINTEGRATING ORAL 3 TIMES DAILY PRN
Qty: 21 TABLET | Refills: 0 | Status: SHIPPED | OUTPATIENT
Start: 2024-10-12

## 2024-10-12 RX ORDER — ONDANSETRON 2 MG/ML
4 INJECTION INTRAMUSCULAR; INTRAVENOUS ONCE
Status: COMPLETED | OUTPATIENT
Start: 2024-10-12 | End: 2024-10-12

## 2024-10-12 RX ORDER — DOCUSATE SODIUM 100 MG/1
100 CAPSULE, LIQUID FILLED ORAL 2 TIMES DAILY
Qty: 60 CAPSULE | Refills: 0 | Status: SHIPPED | OUTPATIENT
Start: 2024-10-12

## 2024-10-12 RX ORDER — OXYCODONE AND ACETAMINOPHEN 5; 325 MG/1; MG/1
2 TABLET ORAL
Status: COMPLETED | OUTPATIENT
Start: 2024-10-12 | End: 2024-10-12

## 2024-10-12 RX ORDER — POLYETHYLENE GLYCOL 3350 17 G/17G
17 POWDER, FOR SOLUTION ORAL DAILY
Qty: 510 G | Refills: 0 | Status: SHIPPED | OUTPATIENT
Start: 2024-10-12 | End: 2024-11-11

## 2024-10-12 RX ADMIN — OXYCODONE HYDROCHLORIDE AND ACETAMINOPHEN 2 TABLET: 5; 325 TABLET ORAL at 13:37

## 2024-10-12 RX ADMIN — MORPHINE SULFATE 4 MG: 4 INJECTION, SOLUTION INTRAMUSCULAR; INTRAVENOUS at 11:40

## 2024-10-12 RX ADMIN — ONDANSETRON 4 MG: 2 INJECTION INTRAMUSCULAR; INTRAVENOUS at 11:40

## 2024-10-12 ASSESSMENT — PAIN DESCRIPTION - LOCATION
LOCATION: FLANK
LOCATION: FLANK

## 2024-10-12 ASSESSMENT — PAIN SCALES - GENERAL
PAINLEVEL_OUTOF10: 10
PAINLEVEL_OUTOF10: 5
PAINLEVEL_OUTOF10: 5

## 2024-10-12 ASSESSMENT — LIFESTYLE VARIABLES
HOW MANY STANDARD DRINKS CONTAINING ALCOHOL DO YOU HAVE ON A TYPICAL DAY: PATIENT DOES NOT DRINK
HOW OFTEN DO YOU HAVE A DRINK CONTAINING ALCOHOL: NEVER

## 2024-10-12 ASSESSMENT — PAIN - FUNCTIONAL ASSESSMENT: PAIN_FUNCTIONAL_ASSESSMENT: 0-10

## 2024-10-12 NOTE — ED PROVIDER NOTES
Electronically signed by Park Arrington          Procedures     Procedures    ED Course and Medical Decision Making     12:40 PM EDT I (Wang Alvarez DO) am the first provider for this patient. Initial assessment performed. I reviewed the vital signs, available nursing notes, past medical history, past surgical history, family history and social history. The patients presenting problems have been discussed, and they are in agreement with the care plan formulated and outlined with them. I have encouraged them to ask questions as they arise throughout their visit.     My differential diagnosis included but was not limited to ureteral colic, pyelonephritis, advanced metastatic disease      Provider Notes (Medical Decision Making):     MDM  64-year-old male with history of appendiceal carcinoma presents with left flank pain.  Labs including CBC, CMP, and urinalysis ordered and results noted above.  CT scan of the abdomen pelvis without contrast was also performed and revealed several scattered soft tissue masses concerning for worsening malignancy.  Patient and his girlfriend state that they cannot remember the full name of his oncologist.  Dr. Ant Alanis on-call for [facility] was paged.  Dr. Alanis reports that he manages the patient and the patient recently received adjuvant chemotherapy on October 9, 2024.  The patient has been scheduled for PET scan.  He will further evaluate the patient new soft tissue masses for worsening malignancy.  He agrees with plans for discharge home with prescriptions for Zofran and Percocet as needed for severe pain.  Patient will also be placed on a bowel regiment with MiraLAX and Colace     Patient was given the following medications:  Medications   morphine sulfate (PF) injection 4 mg (4 mg IntraVENous Given 10/12/24 1140)   ondansetron (ZOFRAN) injection 4 mg (4 mg IntraVENous Given 10/12/24 1140)   oxyCODONE-acetaminophen (PERCOCET) 5-325 MG per tablet 2

## 2024-10-12 NOTE — ED TRIAGE NOTES
Pt states that he is on chemo. After a treatment 4 days ago he drank cold water which he has been told not to do,. Since then he has had muscle spasms in his limbs and left flank pain. Pt denies dysuria, change in urine or BM and any other complaints.

## 2024-10-24 ENCOUNTER — HOSPITAL ENCOUNTER (OUTPATIENT)
Facility: HOSPITAL | Age: 64
Discharge: HOME | End: 2024-10-27
Payer: COMMERCIAL

## 2024-10-24 DIAGNOSIS — M54.2 NECK PAIN: ICD-10-CM

## 2024-10-24 DIAGNOSIS — M25.511 ACUTE PAIN OF RIGHT SHOULDER: ICD-10-CM

## 2024-10-24 PROCEDURE — 71046 X-RAY EXAM CHEST 2 VIEWS: CPT

## 2024-10-24 PROCEDURE — 73030 X-RAY EXAM OF SHOULDER: CPT

## 2024-12-05 ENCOUNTER — HOSPITAL ENCOUNTER (OUTPATIENT)
Facility: HOSPITAL | Age: 64
Discharge: HOME | End: 2024-12-08
Attending: INTERNAL MEDICINE

## 2024-12-05 DIAGNOSIS — C18.1 MALIGNANT NEOPLASM OF APPENDIX (HCC): ICD-10-CM

## 2024-12-20 ENCOUNTER — HOSPITAL ENCOUNTER (OUTPATIENT)
Facility: HOSPITAL | Age: 64
Discharge: HOME | End: 2024-12-23
Attending: INTERNAL MEDICINE
Payer: COMMERCIAL

## 2024-12-20 PROCEDURE — 3430000000 HC RX DIAGNOSTIC RADIOPHARMACEUTICAL: Performed by: INTERNAL MEDICINE

## 2024-12-20 PROCEDURE — A9609 HC RX DIAGNOSTIC RADIOPHARMACEUTICAL: HCPCS | Performed by: INTERNAL MEDICINE

## 2024-12-20 PROCEDURE — 2500000003 HC RX 250 WO HCPCS: Performed by: INTERNAL MEDICINE

## 2024-12-20 PROCEDURE — 78815 PET IMAGE W/CT SKULL-THIGH: CPT

## 2024-12-20 RX ORDER — FLUDEOXYGLUCOSE F-18 500 MCI/ML
12.06 INJECTION INTRAVENOUS
Status: COMPLETED | OUTPATIENT
Start: 2024-12-20 | End: 2024-12-20

## 2024-12-20 RX ADMIN — BARIUM SULFATE 450 ML: 20 SUSPENSION ORAL at 07:53

## 2024-12-20 RX ADMIN — FLUDEOXYGLUCOSE F-18 12.06 MILLICURIE: 500 INJECTION INTRAVENOUS at 07:53

## 2025-01-13 ENCOUNTER — TELEPHONE (OUTPATIENT)
Facility: HOSPITAL | Age: 65
End: 2025-01-13

## 2025-01-13 NOTE — TELEPHONE ENCOUNTER
Pt called back and confirmed appt for 1/17/25 arrival time @ 7am main entrance of [facility]. No blood thinners. SBright 1/13/25

## 2025-01-13 NOTE — TELEPHONE ENCOUNTER
Left  for Mr. King to call me back confirming her his appt for 1/17/2025. Received 1/10/25 from Edwige. Nicoht 1/13/25

## 2025-01-15 ENCOUNTER — TELEPHONE (OUTPATIENT)
Facility: HOSPITAL | Age: 65
End: 2025-01-15

## 2025-01-17 ENCOUNTER — HOSPITAL ENCOUNTER (OUTPATIENT)
Facility: HOSPITAL | Age: 65
Discharge: HOME | End: 2025-01-20
Attending: INTERNAL MEDICINE
Payer: COMMERCIAL

## 2025-01-17 VITALS
OXYGEN SATURATION: 100 % | RESPIRATION RATE: 16 BRPM | HEART RATE: 63 BPM | BODY MASS INDEX: 43.86 KG/M2 | TEMPERATURE: 97.7 F | SYSTOLIC BLOOD PRESSURE: 158 MMHG | DIASTOLIC BLOOD PRESSURE: 80 MMHG | WEIGHT: 239.8 LBS

## 2025-01-17 DIAGNOSIS — C18.1 CANCER OF APPENDIX (HCC): ICD-10-CM

## 2025-01-17 LAB
ANION GAP SERPL CALC-SCNC: 8 MMOL/L (ref 3–18)
APTT PPP: 29.1 SEC (ref 23–36.4)
BASOPHILS # BLD: 0.05 K/UL (ref 0–0.1)
BASOPHILS NFR BLD: 0.6 % (ref 0–2)
BUN SERPL-MCNC: 10 MG/DL (ref 7–18)
BUN/CREAT SERPL: 10 (ref 12–20)
CALCIUM SERPL-MCNC: 9.1 MG/DL (ref 8.5–10.1)
CHLORIDE SERPL-SCNC: 106 MMOL/L (ref 100–111)
CO2 SERPL-SCNC: 24 MMOL/L (ref 21–32)
CREAT SERPL-MCNC: 1.03 MG/DL (ref 0.6–1.3)
DIFFERENTIAL METHOD BLD: ABNORMAL
EOSINOPHIL # BLD: 0.14 K/UL (ref 0–0.4)
EOSINOPHIL NFR BLD: 1.8 % (ref 0–5)
ERYTHROCYTE [DISTWIDTH] IN BLOOD BY AUTOMATED COUNT: 16.6 % (ref 11.6–14.5)
GLUCOSE SERPL-MCNC: 289 MG/DL (ref 74–99)
HCT VFR BLD AUTO: 39.3 % (ref 36–48)
HGB BLD-MCNC: 13.3 G/DL (ref 13–16)
IMM GRANULOCYTES # BLD AUTO: 0.06 K/UL (ref 0–0.04)
IMM GRANULOCYTES NFR BLD AUTO: 0.8 % (ref 0–0.5)
INR PPP: 1 (ref 0.9–1.1)
LYMPHOCYTES # BLD: 2.94 K/UL (ref 0.9–3.6)
LYMPHOCYTES NFR BLD: 38.2 % (ref 21–52)
MCH RBC QN AUTO: 29.4 PG (ref 24–34)
MCHC RBC AUTO-ENTMCNC: 33.8 G/DL (ref 31–37)
MCV RBC AUTO: 86.8 FL (ref 78–100)
MONOCYTES # BLD: 0.63 K/UL (ref 0.05–1.2)
MONOCYTES NFR BLD: 8.2 % (ref 3–10)
NEUTS SEG # BLD: 3.88 K/UL (ref 1.8–8)
NEUTS SEG NFR BLD: 50.4 % (ref 40–73)
NRBC # BLD: 0 K/UL (ref 0–0.01)
NRBC BLD-RTO: 0 PER 100 WBC
PLATELET # BLD AUTO: 179 K/UL (ref 135–420)
PMV BLD AUTO: 11.5 FL (ref 9.2–11.8)
POTASSIUM SERPL-SCNC: 3.8 MMOL/L (ref 3.5–5.5)
PROTHROMBIN TIME: 13.3 SEC (ref 11.9–14.9)
RBC # BLD AUTO: 4.53 M/UL (ref 4.35–5.65)
SODIUM SERPL-SCNC: 138 MMOL/L (ref 136–145)
WBC # BLD AUTO: 7.7 K/UL (ref 4.6–13.2)

## 2025-01-17 PROCEDURE — 85025 COMPLETE CBC W/AUTO DIFF WBC: CPT

## 2025-01-17 PROCEDURE — 85610 PROTHROMBIN TIME: CPT

## 2025-01-17 PROCEDURE — 6360000002 HC RX W HCPCS: Performed by: RADIOLOGY

## 2025-01-17 PROCEDURE — 88334 PATH CONSLTJ SURG CYTO XM EA: CPT

## 2025-01-17 PROCEDURE — 77012 CT SCAN FOR NEEDLE BIOPSY: CPT

## 2025-01-17 PROCEDURE — 7100000011 HC PHASE II RECOVERY - ADDTL 15 MIN

## 2025-01-17 PROCEDURE — 88333 PATH CONSLTJ SURG CYTO XM 1: CPT

## 2025-01-17 PROCEDURE — 7100000010 HC PHASE II RECOVERY - FIRST 15 MIN

## 2025-01-17 PROCEDURE — 2580000003 HC RX 258: Performed by: RADIOLOGY

## 2025-01-17 PROCEDURE — 88305 TISSUE EXAM BY PATHOLOGIST: CPT

## 2025-01-17 PROCEDURE — 85730 THROMBOPLASTIN TIME PARTIAL: CPT

## 2025-01-17 PROCEDURE — 80048 BASIC METABOLIC PNL TOTAL CA: CPT

## 2025-01-17 RX ORDER — FENTANYL CITRATE 50 UG/ML
INJECTION, SOLUTION INTRAMUSCULAR; INTRAVENOUS PRN
Status: COMPLETED | OUTPATIENT
Start: 2025-01-17 | End: 2025-01-17

## 2025-01-17 RX ORDER — FENTANYL CITRATE 50 UG/ML
INJECTION, SOLUTION INTRAMUSCULAR; INTRAVENOUS
Status: DISPENSED
Start: 2025-01-17 | End: 2025-01-17

## 2025-01-17 RX ORDER — SODIUM CHLORIDE 9 MG/ML
INJECTION, SOLUTION INTRAVENOUS CONTINUOUS
Status: DISCONTINUED | OUTPATIENT
Start: 2025-01-17 | End: 2025-01-21 | Stop reason: HOSPADM

## 2025-01-17 RX ORDER — OXYCODONE AND ACETAMINOPHEN 5; 325 MG/1; MG/1
1 TABLET ORAL EVERY 4 HOURS PRN
Status: DISCONTINUED | OUTPATIENT
Start: 2025-01-17 | End: 2025-01-21 | Stop reason: HOSPADM

## 2025-01-17 RX ORDER — MIDAZOLAM HYDROCHLORIDE 2 MG/2ML
INJECTION, SOLUTION INTRAMUSCULAR; INTRAVENOUS PRN
Status: COMPLETED | OUTPATIENT
Start: 2025-01-17 | End: 2025-01-17

## 2025-01-17 RX ORDER — MIDAZOLAM HYDROCHLORIDE 1 MG/ML
INJECTION, SOLUTION INTRAMUSCULAR; INTRAVENOUS
Status: DISPENSED
Start: 2025-01-17 | End: 2025-01-17

## 2025-01-17 RX ADMIN — MIDAZOLAM HYDROCHLORIDE 1 MG: 1 INJECTION, SOLUTION INTRAMUSCULAR; INTRAVENOUS at 10:22

## 2025-01-17 RX ADMIN — FENTANYL CITRATE 50 MCG: 50 INJECTION INTRAMUSCULAR; INTRAVENOUS at 10:13

## 2025-01-17 RX ADMIN — SODIUM CHLORIDE: 9 INJECTION, SOLUTION INTRAVENOUS at 08:01

## 2025-01-17 RX ADMIN — MIDAZOLAM HYDROCHLORIDE 1 MG: 1 INJECTION, SOLUTION INTRAMUSCULAR; INTRAVENOUS at 10:14

## 2025-01-17 RX ADMIN — FENTANYL CITRATE 50 MCG: 50 INJECTION INTRAMUSCULAR; INTRAVENOUS at 10:22

## 2025-01-17 ASSESSMENT — PAIN SCALES - GENERAL: PAINLEVEL_OUTOF10: 0

## 2025-01-17 ASSESSMENT — PAIN - FUNCTIONAL ASSESSMENT: PAIN_FUNCTIONAL_ASSESSMENT: 0-10

## 2025-01-17 NOTE — PROGRESS NOTES
TRANSFER - OUT REPORT:    Verbal report given to Lukasz BONILLA on Vlad King  being transferred to Phase 2 for routine post-op       Report consisted of patient's Situation, Background, Assessment and   Recommendations(SBAR).     Information from the following report(s) Nurse Handoff Report was reviewed with the receiving nurse.           Lines:   Peripheral IV 01/17/25 Distal;Left;Posterior Forearm (Active)        Opportunity for questions and clarification was provided.      Patient transported with:  Registered Nurse

## 2025-01-17 NOTE — H&P
History and Physical    Patient: Vlad King           Sex: male       DOA: 1/17/2025  YOB: 1960      Age:  64 y.o.     LOS:  LOS: 0 days        HPI:     Vlad King is a 64 y.o. male with history of appendiceal cancer here for an abdominal lesion biopsy radiologist choice with moderate sedation.    Past Medical History:   Diagnosis Date    Diabetes (HCC)     DM type 2 with diabetic peripheral neuropathy (HCC) 2018    Dr Freitas    Hypertension     Hypertensive retinopathy     Knee pain, left     Morbid obesity     Pneumonia     Primary open angle glaucoma     Sickle cell trait (HCC)        Past Surgical History:   Procedure Laterality Date    CHOLECYSTECTOMY      COLECTOMY N/A 8/6/2024    ROBOTIC RIGHT COLECTOMY performed by German Almendarez MD at [facility] MAIN OR    COLONOSCOPY  2012    normal, 10 year recall    COLONOSCOPY N/A 1/18/2023    COLONOSCOPY w/ polypectomy performed by German Almendarez MD at [facility] ENDOSCOPY    IR PORT PLACEMENT > 5 YEARS  9/18/2024    IR PORT PLACEMENT EQUAL OR GREATER THAN 5 YEARS 9/18/2024 Nora Hanson, PA [facility] RAD ANGIO IR    LAPAROSCOPIC APPENDECTOMY N/A 6/6/2024    LAPAROSCOPIC APPENDECTOMY performed by Madhuri Batista DO at [facility] MAIN OR    OTHER SURGICAL HISTORY      pt reports gallstone removal       Family History   Problem Relation Age of Onset    Sickle Cell Trait Other     Diabetes Maternal Grandmother     Diabetes Sister     Diabetes Mother        Social History     Socioeconomic History    Marital status: Single   Tobacco Use    Smoking status: Former     Current packs/day: 0.25     Types: Cigarettes    Smokeless tobacco: Former     Quit date: 1/1/2016   Vaping Use    Vaping status: Never Used   Substance and Sexual Activity    Alcohol use: No    Drug use: No     Social Determinants of Health     Financial Resource Strain: Medium Risk (10/1/2024)    Overall Financial Resource Strain (CARDIA)     Difficulty of Paying Living Expenses: Somewhat hard    Food Insecurity: No Food Insecurity (10/1/2024)    Hunger Vital Sign     Worried About Running Out of Food in the Last Year: Never true     Ran Out of Food in the Last Year: Never true   Transportation Needs: No Transportation Needs (10/1/2024)    PRAPARE - Transportation     Lack of Transportation (Medical): No     Lack of Transportation (Non-Medical): No   Housing Stability: Unknown (10/1/2024)    Housing Stability Vital Sign     Unable to Pay for Housing in the Last Year: No     Homeless in the Last Year: No       Prior to Admission medications    Medication Sig Start Date End Date Taking? Authorizing Provider   prochlorperazine (COMPAZINE) 10 MG tablet TAKE 1 TABLET ORALLY EVERY 6 HOURS AS NEEDED FOR NAUSEA AND VOMITING. 8/28/24  Yes Adrián Rebolledo MD   palonosetron (ALOXI) 0.25 MG/5ML injection Infuse 5 mLs intravenously 9/16/24  Yes Adrián Rebolledo MD   leucovorin calcium (WELLCOVORIN) 50 MG injection Infuse 2.3 mLs intravenously 9/16/24  Yes ProviderAdrián MD   metFORMIN (GLUCOPHAGE-XR) 500 MG extended release tablet Take 1 tablet by mouth daily (with breakfast) 5/5/24  Yes Matt Babb MD   losartan (COZAAR) 25 MG tablet Take 1 tablet by mouth daily 4/27/24 1/17/25 Yes Linda Coto MD   ibuprofen (ADVIL;MOTRIN) 800 MG tablet TAKE 1 TABLET BY MOUTH EVERY EIGHT HOURS AS NEEDED FOR PAIN. 12/27/23  Yes Matt Babb MD   empagliflozin (JARDIANCE) 25 MG tablet Take 1 tablet by mouth daily 11/28/23  Yes aMtt Bbab MD   brinzolamide (AZOPT) 1 % ophthalmic suspension Apply 1 drop to eye 2 times daily 10/16/20  Yes Automatic Reconciliation, Ar   carbamide peroxide (DEBROX) 6.5 % otic solution Place 5 drops in ear(s) 2 times daily 2/2/22  Yes Automatic Reconciliation, Ar   Netarsudil Dimesylate (RHOPRESSA) 0.02 % SOLN Apply 1 drop to eye 11/2/20  Yes Automatic Reconciliation, Ar   ondansetron (ZOFRAN-ODT) 4 MG disintegrating tablet Take 1 tablet by mouth 3 times

## 2025-01-17 NOTE — DISCHARGE INSTRUCTIONS
Core Needle Biopsy: About This Test  What is a core needle biopsy?     A core needle biopsy is a test to take samples of tissue using a needle with a special tip. The samples are looked at under a microscope to check for changes in the cells.  Why is this test done?  A core needle biopsy is done to check for cancer or other problems. It is often done to check a lump found during an exam. It's also done when an area of concern is found on a mammogram or other imaging. Sometimes the biopsy results help plan treatment.  How do you prepare for the test?  If you take aspirin or some other blood thinner, ask your doctor if you should stop taking it before your test. Make sure that you understand exactly what your doctor wants you to do. In some cases, you may be asked to not eat or drink before your procedure.  How is it done?  Depending on what part of your body is being tested, you may sit in a chair or lie on a table. After you are positioned, the doctor or nurse will clean and numb the area where the biopsy will be done. Or you may have intravenous (I.V.) sedation or general anesthesia, which will make you sleep.  When the area where the doctor will do the biopsy is numb, a small cut (incision) may be made in the skin.  The doctor may use ultrasound, an X-ray, a CT scan, or an MRI to help guide the placement of the biopsy needle.  The doctor will take several samples. This may be done with the needle or with a probe that uses a gentle vacuum to remove the samples.  A marker may be placed in the biopsy site. You won't be able to feel or see this marker after it is placed. But the marker will be visible in future imaging tests, such as mammograms, ultrasounds, and MRIs. This will help the doctor find the area later.  The needle is removed, and pressure may be put on the needle site to stop any bleeding.  A bandage may be put on the needle site.  How does it feel?  You may feel only a quick sting from the needle if  as:  Increased pain, swelling, warmth or redness.  Red streaks leading from the puncture site.  Pus draining from the puncture site.  A fever.   Watch closely for changes in your health, and be sure to contact your doctor if you have any problems.  Follow-up care is a key part of your treatment and safety. Be sure to make and go to all appointments, and call your doctor if you are having problems. Ask your doctor when you can expect to have your test results.  Current as of: April 30, 2024  Content Version: 14.3  © 2024 [employer].   Care instructions adapted under license by [employer]. If you have questions about a medical condition or this instruction, always ask your healthcare professional. [employer] disclaims any warranty or liability for your use of this information.

## 2025-01-17 NOTE — PROCEDURES
RADIOLOGY POST PROCEDURE NOTE     January 17, 2025       10:45 AM     Preoperative Diagnosis:  Abdominal mass. Appendiceal CA.    Postoperative Diagnosis:  Same.    :  Dr. Buchanan    Assistant:  None.    Type of Anesthesia: 1% plain lidocaine and IV moderate sedation with Versed and Fentanyl.    Procedure/Description:  Image guided paraduodenal mesenteric mass core needle Bx.    Findings:   No bleeding.    Estimated blood Loss:  Minimal    Specimen Removed:  Yes    Blood transfusions:  None.    Implants:  None.    Complications: None    Condition: Stable    Discharge Plan:  discharge home  if stable and no bleeding. Resume blood thinners if any in 48 hours    Raymond Buchanan MD

## 2025-02-01 PROBLEM — M54.2 NECK PAIN: Status: ACTIVE | Noted: 2025-02-01

## 2025-02-01 PROBLEM — D64.9 NORMOCYTIC ANEMIA: Status: ACTIVE | Noted: 2025-02-01

## 2025-02-01 PROBLEM — E66.01 MORBID (SEVERE) OBESITY DUE TO EXCESS CALORIES: Status: ACTIVE | Noted: 2025-02-01

## 2025-02-01 PROBLEM — R06.2 WHEEZING: Status: ACTIVE | Noted: 2025-02-01

## 2025-02-01 PROBLEM — I10 ESSENTIAL (PRIMARY) HYPERTENSION: Status: ACTIVE | Noted: 2025-02-01

## 2025-02-01 PROBLEM — E11.21 TYPE 2 DIABETES MELLITUS WITH DIABETIC NEPHROPATHY, WITHOUT LONG-TERM CURRENT USE OF INSULIN (HCC): Status: ACTIVE | Noted: 2018-03-05

## 2025-02-01 NOTE — ASSESSMENT & PLAN NOTE
Chronic, not at goal (unstable), previous A1c elevated.  Recheck this visit and adjust meds as indicated.  Discussed diet and exercise.  Lab Results   Component Value Date    LABA1C 7.9 (H) 04/30/2024    LABA1C 7.9 (H) 04/18/2024    LABA1C 6.5 (H) 11/28/2023     Lab Results   Component Value Date    CREATININE 1.03 01/17/2025       Orders:    empagliflozin (JARDIANCE) 25 MG tablet; Take 1 tablet by mouth daily

## 2025-02-01 NOTE — ASSESSMENT & PLAN NOTE
Previously ordered x-ray of the neck.  Pending.  A previous x-ray of the right shoulder showed degenerative changes with no acute bony abnormality.  Orders:    XR CERVICAL SPINE (2-3 VIEWS); Future    XR CERVICAL SPINE (2-3 VIEWS)

## 2025-02-01 NOTE — ASSESSMENT & PLAN NOTE
Chronic, not at goal (unstable), stressed diet and exercise.  Discussed medication use.  Wt Readings from Last 3 Encounters:   02/04/25 111.1 kg (245 lb)   01/17/25 108.8 kg (239 lb 12.8 oz)   10/12/24 113.4 kg (250 lb)

## 2025-02-01 NOTE — ASSESSMENT & PLAN NOTE
Not controlled.  Switch to Diovan 80 mg daily follow-up next visit.  BP Readings from Last 3 Encounters:   02/04/25 (!) 143/70   01/17/25 (!) 158/80   10/12/24 (!) 173/76

## 2025-02-01 NOTE — PROGRESS NOTES
Tenderness: There is no abdominal tenderness. There is no guarding.   Musculoskeletal:         General: No swelling, tenderness or deformity.      Cervical back: Normal range of motion and neck supple.      Comments: Some crepitus of the joints of the left hand.  There is thickening of the skin over the MCPs of the index and middle fingers.  (Patient remembers pushing off to stand up using his fists frequently.).      Lymphadenopathy:      Cervical: No cervical adenopathy.   Skin:     Coloration: Skin is not jaundiced.   Neurological:      Mental Status: He is alert.      Cranial Nerves: No cranial nerve deficit.      Motor: No weakness.   Psychiatric:         Mood and Affect: Mood normal.         Behavior: Behavior normal.         Dragon Disclaimer  Please note that this dictation was completed with [employer], the computer voice recognition software.  Quite often unanticipated grammatical, syntax, homophones, and other interpretive errors are inadvertently transcribed by the computer software.  Please disregard these errors.  Please excuse any errors that have escaped final proofreading.

## 2025-02-01 NOTE — ASSESSMENT & PLAN NOTE
Previous levels showed only an elevated triglyceride.  Discussed diet and exercise.  No change in medication.  Recheck this visit  Lab Results   Component Value Date    CHOL 138 04/30/2024    TRIG 197 (H) 04/30/2024    HDL 50 04/30/2024    LDL 48.6 04/30/2024    VLDL 39.4 04/30/2024    CHOLHDLRATIO 2.8 04/30/2024

## 2025-02-01 NOTE — PATIENT INSTRUCTIONS
are.  A BMI lower than 18.5 is considered underweight.  A BMI between 18.5 and 24.9 is considered healthy.  A BMI between 25 and 29.9 is considered overweight. A BMI of 30 or higher is considered obese.  If your BMI is in the normal range, it means that you have a lower risk for weight-related health problems. If your BMI is in the overweight or obese range, you may be at increased risk for weight-related health problems, such as high blood pressure, heart disease, stroke, arthritis or joint pain, and diabetes. If your BMI is in the underweight range, you may be at increased risk for health problems such as fatigue, lower protection (immunity) against illness, muscle loss, bone loss, hair loss, and hormone problems.  BMI is just one measure of your risk for weight-related health problems. You may be at higher risk for health problems if you are not active, you eat an unhealthy diet, or you drink too much alcohol or use tobacco products.  Follow-up care is a key part of your treatment and safety. Be sure to make and go to all appointments, and call your doctor if you are having problems. It's also a good idea to know your test results and keep a list of the medicines you take.  How can you care for yourself at home?  Practice healthy eating habits. This includes eating plenty of fruits, vegetables, whole grains, lean protein, and low-fat dairy.  If your doctor recommends it, get more exercise. Walking is a good choice. Bit by bit, increase the amount you walk every day. Try for at least 30 minutes on most days of the week.  Do not smoke. Smoking can increase your risk for health problems. If you need help quitting, talk to your doctor about stop-smoking programs and medicines. These can increase your chances of quitting for good.  Limit alcohol to 2 drinks a day for men and 1 drink a day for women. Too much alcohol can cause health problems.  If you have a BMI higher than 25  Your doctor may do other tests to check

## 2025-02-01 NOTE — ASSESSMENT & PLAN NOTE
We will follow this along with the specialist that the patient sees for this problem.  No change in medication or treatment on our part at this time.  CT-guided biopsy abdominal lesion results pending.  Addendum taken off chemo 2 weeks prior, in anticipation of operation  There is a note on the phone where the patient was contacted directly.  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  He had been offered Keytruda for 3 to 4 weeks but it has been stopped now and the plan is for removal of the lesion.  He will remain off medication.

## 2025-02-01 NOTE — ASSESSMENT & PLAN NOTE
Chronic problem.  Presently resolved.  Continue to follow periodically.  Lab Results   Component Value Date    WBC 7.7 01/17/2025    HGB 13.3 01/17/2025    HCT 39.3 01/17/2025    MCV 86.8 01/17/2025     01/17/2025     .lastretic  No results found for: \"RETICCTPCT\"    Lab Results   Component Value Date    IRON 65 10/02/2024    TIBC 313 10/02/2024    FERRITIN 104 10/02/2024     .lastferritinsttransferrin  Lab Results   Component Value Date    [id number] 253 10/02/2024     .lastfolate  Lab Results   Component Value Date    FOLATE >20.0 (H) 10/02/2024

## 2025-02-04 ENCOUNTER — OFFICE VISIT (OUTPATIENT)
Facility: CLINIC | Age: 65
End: 2025-02-04
Payer: COMMERCIAL

## 2025-02-04 VITALS
HEIGHT: 62 IN | DIASTOLIC BLOOD PRESSURE: 70 MMHG | TEMPERATURE: 97.8 F | WEIGHT: 245 LBS | HEART RATE: 65 BPM | BODY MASS INDEX: 45.08 KG/M2 | RESPIRATION RATE: 18 BRPM | SYSTOLIC BLOOD PRESSURE: 143 MMHG | OXYGEN SATURATION: 90 %

## 2025-02-04 DIAGNOSIS — E11.21 TYPE 2 DIABETES MELLITUS WITH DIABETIC NEPHROPATHY, WITHOUT LONG-TERM CURRENT USE OF INSULIN (HCC): Primary | ICD-10-CM

## 2025-02-04 DIAGNOSIS — T56.0X1A: ICD-10-CM

## 2025-02-04 DIAGNOSIS — C18.1 CANCER OF APPENDIX (HCC): ICD-10-CM

## 2025-02-04 DIAGNOSIS — D64.9 NORMOCYTIC ANEMIA: ICD-10-CM

## 2025-02-04 DIAGNOSIS — M10.121: ICD-10-CM

## 2025-02-04 DIAGNOSIS — E78.00 PURE HYPERCHOLESTEROLEMIA: ICD-10-CM

## 2025-02-04 DIAGNOSIS — E66.01 MORBID (SEVERE) OBESITY DUE TO EXCESS CALORIES: ICD-10-CM

## 2025-02-04 DIAGNOSIS — E11.21 TYPE 2 DIABETES MELLITUS WITH DIABETIC NEPHROPATHY, WITHOUT LONG-TERM CURRENT USE OF INSULIN (HCC): ICD-10-CM

## 2025-02-04 DIAGNOSIS — M54.2 NECK PAIN: ICD-10-CM

## 2025-02-04 DIAGNOSIS — R06.2 WHEEZING: ICD-10-CM

## 2025-02-04 DIAGNOSIS — R07.9 CHEST PAIN, UNSPECIFIED TYPE: ICD-10-CM

## 2025-02-04 DIAGNOSIS — I10 ESSENTIAL (PRIMARY) HYPERTENSION: ICD-10-CM

## 2025-02-04 PROCEDURE — 3077F SYST BP >= 140 MM HG: CPT | Performed by: EMERGENCY MEDICINE

## 2025-02-04 PROCEDURE — 93000 ELECTROCARDIOGRAM COMPLETE: CPT | Performed by: EMERGENCY MEDICINE

## 2025-02-04 PROCEDURE — 99214 OFFICE O/P EST MOD 30 MIN: CPT | Performed by: EMERGENCY MEDICINE

## 2025-02-04 PROCEDURE — 3078F DIAST BP <80 MM HG: CPT | Performed by: EMERGENCY MEDICINE

## 2025-02-04 RX ORDER — VALSARTAN 80 MG/1
80 TABLET ORAL DAILY
Qty: 30 TABLET | Refills: 3 | Status: SHIPPED | OUTPATIENT
Start: 2025-02-04

## 2025-02-04 RX ORDER — COLCHICINE 0.6 MG/1
0.6 TABLET ORAL 2 TIMES DAILY
Qty: 60 TABLET | Refills: 3 | Status: SHIPPED | OUTPATIENT
Start: 2025-02-04

## 2025-02-04 SDOH — ECONOMIC STABILITY: FOOD INSECURITY: WITHIN THE PAST 12 MONTHS, YOU WORRIED THAT YOUR FOOD WOULD RUN OUT BEFORE YOU GOT MONEY TO BUY MORE.: NEVER TRUE

## 2025-02-04 SDOH — ECONOMIC STABILITY: FOOD INSECURITY: WITHIN THE PAST 12 MONTHS, THE FOOD YOU BOUGHT JUST DIDN'T LAST AND YOU DIDN'T HAVE MONEY TO GET MORE.: NEVER TRUE

## 2025-02-04 ASSESSMENT — PATIENT HEALTH QUESTIONNAIRE - PHQ9
SUM OF ALL RESPONSES TO PHQ9 QUESTIONS 1 & 2: 0
SUM OF ALL RESPONSES TO PHQ QUESTIONS 1-9: 0
SUM OF ALL RESPONSES TO PHQ QUESTIONS 1-9: 0
2. FEELING DOWN, DEPRESSED OR HOPELESS: NOT AT ALL
1. LITTLE INTEREST OR PLEASURE IN DOING THINGS: NOT AT ALL
SUM OF ALL RESPONSES TO PHQ QUESTIONS 1-9: 0
SUM OF ALL RESPONSES TO PHQ QUESTIONS 1-9: 0

## 2025-02-04 ASSESSMENT — ANXIETY QUESTIONNAIRES
1. FEELING NERVOUS, ANXIOUS, OR ON EDGE: NOT AT ALL
3. WORRYING TOO MUCH ABOUT DIFFERENT THINGS: NOT AT ALL
7. FEELING AFRAID AS IF SOMETHING AWFUL MIGHT HAPPEN: NOT AT ALL
2. NOT BEING ABLE TO STOP OR CONTROL WORRYING: NOT AT ALL
GAD7 TOTAL SCORE: 0
IF YOU CHECKED OFF ANY PROBLEMS ON THIS QUESTIONNAIRE, HOW DIFFICULT HAVE THESE PROBLEMS MADE IT FOR YOU TO DO YOUR WORK, TAKE CARE OF THINGS AT HOME, OR GET ALONG WITH OTHER PEOPLE: NOT DIFFICULT AT ALL
5. BEING SO RESTLESS THAT IT IS HARD TO SIT STILL: NOT AT ALL
4. TROUBLE RELAXING: NOT AT ALL
6. BECOMING EASILY ANNOYED OR IRRITABLE: NOT AT ALL

## 2025-02-05 ENCOUNTER — HOSPITAL ENCOUNTER (OUTPATIENT)
Facility: HOSPITAL | Age: 65
Setting detail: SPECIMEN
Discharge: HOME | End: 2025-02-08

## 2025-02-07 ENCOUNTER — TELEPHONE (OUTPATIENT)
Facility: CLINIC | Age: 65
End: 2025-02-07

## 2025-02-07 NOTE — TELEPHONE ENCOUNTER
Please call.  Previous order had .  Reordered.  He can now have them done at any time.  Ideally he should have them done fasting at first in the morning.

## 2025-02-07 NOTE — TELEPHONE ENCOUNTER
Pt inquiring about new lab orders  Pt requesting a call back if he needs labs  Informed pt did not see any current labs I the system

## 2025-02-12 DIAGNOSIS — M54.2 CERVICALGIA: ICD-10-CM

## 2025-02-13 RX ORDER — IBUPROFEN 800 MG/1
TABLET, FILM COATED ORAL
Qty: 90 TABLET | Refills: 5 | Status: SHIPPED | OUTPATIENT
Start: 2025-02-13

## 2025-02-14 ENCOUNTER — HOSPITAL ENCOUNTER (OUTPATIENT)
Facility: HOSPITAL | Age: 65
Discharge: HOME | End: 2025-02-17
Payer: COMMERCIAL

## 2025-02-14 DIAGNOSIS — E11.21 TYPE 2 DIABETES MELLITUS WITH DIABETIC NEPHROPATHY, WITHOUT LONG-TERM CURRENT USE OF INSULIN (HCC): ICD-10-CM

## 2025-02-14 DIAGNOSIS — D64.9 NORMOCYTIC ANEMIA: ICD-10-CM

## 2025-02-14 DIAGNOSIS — I10 ESSENTIAL (PRIMARY) HYPERTENSION: ICD-10-CM

## 2025-02-14 DIAGNOSIS — E78.00 PURE HYPERCHOLESTEROLEMIA: ICD-10-CM

## 2025-02-14 LAB
ALBUMIN SERPL-MCNC: 3.9 G/DL (ref 3.4–5)
ALBUMIN/GLOB SERPL: 1 (ref 0.8–1.7)
ALP SERPL-CCNC: 126 U/L (ref 45–117)
ALT SERPL-CCNC: 34 U/L (ref 16–61)
ANION GAP SERPL CALC-SCNC: 8 MMOL/L (ref 3–18)
AST SERPL-CCNC: 33 U/L (ref 10–38)
BASOPHILS # BLD: 0.06 K/UL (ref 0–0.1)
BASOPHILS NFR BLD: 1 % (ref 0–2)
BILIRUB SERPL-MCNC: 0.7 MG/DL (ref 0.2–1)
BUN SERPL-MCNC: 13 MG/DL (ref 7–18)
BUN/CREAT SERPL: 14 (ref 12–20)
CALCIUM SERPL-MCNC: 9.5 MG/DL (ref 8.5–10.1)
CHLORIDE SERPL-SCNC: 104 MMOL/L (ref 100–111)
CHOLEST SERPL-MCNC: 197 MG/DL
CO2 SERPL-SCNC: 25 MMOL/L (ref 21–32)
CREAT SERPL-MCNC: 0.91 MG/DL (ref 0.6–1.3)
CREAT UR-MCNC: 38 MG/DL (ref 30–125)
DIFFERENTIAL METHOD BLD: ABNORMAL
EOSINOPHIL # BLD: 0.17 K/UL (ref 0–0.4)
EOSINOPHIL NFR BLD: 2.7 % (ref 0–5)
ERYTHROCYTE [DISTWIDTH] IN BLOOD BY AUTOMATED COUNT: 16 % (ref 11.6–14.5)
EST. AVERAGE GLUCOSE BLD GHB EST-MCNC: 266 MG/DL
FERRITIN SERPL-MCNC: 155 NG/ML (ref 8–388)
FOLATE SERPL-MCNC: >20 NG/ML (ref 3.1–17.5)
GLOBULIN SER CALC-MCNC: 3.9 G/DL (ref 2–4)
GLUCOSE SERPL-MCNC: 204 MG/DL (ref 74–99)
HBA1C MFR BLD: 10.9 % (ref 4.2–5.6)
HCT VFR BLD AUTO: 42.8 % (ref 36–48)
HDLC SERPL-MCNC: 81 MG/DL (ref 40–60)
HDLC SERPL: 2.4 (ref 0–5)
HGB BLD-MCNC: 14.2 G/DL (ref 13–16)
IMM GRANULOCYTES # BLD AUTO: 0.04 K/UL (ref 0–0.04)
IMM GRANULOCYTES NFR BLD AUTO: 0.6 % (ref 0–0.5)
IRON SATN MFR SERPL: 21 % (ref 20–50)
IRON SERPL-MCNC: 76 UG/DL (ref 50–175)
LDLC SERPL CALC-MCNC: 65.4 MG/DL (ref 0–100)
LIPID PANEL: ABNORMAL
LYMPHOCYTES # BLD: 2.38 K/UL (ref 0.9–3.6)
LYMPHOCYTES NFR BLD: 38.1 % (ref 21–52)
MCH RBC QN AUTO: 29.5 PG (ref 24–34)
MCHC RBC AUTO-ENTMCNC: 33.2 G/DL (ref 31–37)
MCV RBC AUTO: 89 FL (ref 78–100)
MICROALBUMIN UR-MCNC: 6.31 MG/DL (ref 0–3)
MICROALBUMIN/CREAT UR-RTO: 166 MG/G (ref 0–30)
MONOCYTES # BLD: 0.54 K/UL (ref 0.05–1.2)
MONOCYTES NFR BLD: 8.7 % (ref 3–10)
NEUTS SEG # BLD: 3.05 K/UL (ref 1.8–8)
NEUTS SEG NFR BLD: 48.9 % (ref 40–73)
NRBC # BLD: 0 K/UL (ref 0–0.01)
NRBC BLD-RTO: 0 PER 100 WBC
PLATELET # BLD AUTO: 199 K/UL (ref 135–420)
PMV BLD AUTO: 12.1 FL (ref 9.2–11.8)
POTASSIUM SERPL-SCNC: 4.2 MMOL/L (ref 3.5–5.5)
PROT SERPL-MCNC: 7.8 G/DL (ref 6.4–8.2)
RBC # BLD AUTO: 4.81 M/UL (ref 4.35–5.65)
RETICS/RBC NFR AUTO: 1.9 % (ref 0.5–2.5)
SODIUM SERPL-SCNC: 137 MMOL/L (ref 136–145)
TIBC SERPL-MCNC: 367 UG/DL (ref 250–450)
TRIGL SERPL-MCNC: 253 MG/DL
VIT B12 SERPL-MCNC: 444 PG/ML (ref 211–911)
VLDLC SERPL CALC-MCNC: 50.6 MG/DL
WBC # BLD AUTO: 6.2 K/UL (ref 4.6–13.2)

## 2025-02-14 PROCEDURE — 85045 AUTOMATED RETICULOCYTE COUNT: CPT

## 2025-02-14 PROCEDURE — 36415 COLL VENOUS BLD VENIPUNCTURE: CPT

## 2025-02-14 PROCEDURE — 82607 VITAMIN B-12: CPT

## 2025-02-14 PROCEDURE — 80053 COMPREHEN METABOLIC PANEL: CPT

## 2025-02-14 PROCEDURE — 82043 UR ALBUMIN QUANTITATIVE: CPT

## 2025-02-14 PROCEDURE — 83036 HEMOGLOBIN GLYCOSYLATED A1C: CPT

## 2025-02-14 PROCEDURE — 84466 ASSAY OF TRANSFERRIN: CPT

## 2025-02-14 PROCEDURE — 82728 ASSAY OF FERRITIN: CPT

## 2025-02-14 PROCEDURE — 83540 ASSAY OF IRON: CPT

## 2025-02-14 PROCEDURE — 82570 ASSAY OF URINE CREATININE: CPT

## 2025-02-14 PROCEDURE — 82746 ASSAY OF FOLIC ACID SERUM: CPT

## 2025-02-14 PROCEDURE — 85025 COMPLETE CBC W/AUTO DIFF WBC: CPT

## 2025-02-14 PROCEDURE — 80061 LIPID PANEL: CPT

## 2025-02-15 LAB — TRANSFERRIN SERPL-MCNC: 289 MG/DL (ref 177–329)

## 2025-02-20 DIAGNOSIS — R80.9 MICROALBUMINURIA: ICD-10-CM

## 2025-02-20 DIAGNOSIS — E78.00 PURE HYPERCHOLESTEROLEMIA: ICD-10-CM

## 2025-02-20 DIAGNOSIS — I10 ESSENTIAL (PRIMARY) HYPERTENSION: Primary | ICD-10-CM

## 2025-02-20 DIAGNOSIS — E11.21 TYPE 2 DIABETES MELLITUS WITH DIABETIC NEPHROPATHY, WITHOUT LONG-TERM CURRENT USE OF INSULIN (HCC): ICD-10-CM

## 2025-02-20 RX ORDER — ATORVASTATIN CALCIUM 20 MG/1
20 TABLET, FILM COATED ORAL DAILY
Qty: 90 TABLET | Refills: 3 | Status: SHIPPED | OUTPATIENT
Start: 2025-02-20

## 2025-02-20 RX ORDER — VALSARTAN 160 MG/1
80 TABLET ORAL DAILY
Qty: 90 TABLET | Refills: 3 | Status: SHIPPED | OUTPATIENT
Start: 2025-02-20

## 2025-02-20 RX ORDER — METFORMIN HYDROCHLORIDE 500 MG/1
500 TABLET, EXTENDED RELEASE ORAL 2 TIMES DAILY
Qty: 180 TABLET | Refills: 3 | Status: SHIPPED | OUTPATIENT
Start: 2025-02-20

## 2025-02-20 NOTE — RESULT ENCOUNTER NOTE
Please call.  No longer anemic.  Anemia panel also looks good.  The cholesterol is 197 which is good his triglycerides are still elevated at 253.  This is a component of the fats.  Will increase the Lipitor to 20 mg daily and recheck before next visit  Hemoglobin A1c is now 10.9.  Previously was 7.9.  Our target is less than 7.  Will increase metformin to twice a day.  Continue Jardiance at 25 mg daily and recheck before next visit.  Ongoing protein spillage in the urine.  Plan to increase the valsartan to 160 mg daily and recheck before next visit

## 2025-05-14 ENCOUNTER — TRANSCRIBE ORDERS (OUTPATIENT)
Facility: HOSPITAL | Age: 65
End: 2025-05-14

## 2025-05-14 DIAGNOSIS — C18.1 APPENDIX CARCINOMA (HCC): Primary | ICD-10-CM

## 2025-05-18 NOTE — ASSESSMENT & PLAN NOTE
Status pos laser surgery right eye for elevated pressure  Chronic problem.  Worsening control.  Will update labs and adjust treatment as indicated.  Lab Results   Component Value Date    LABA1C 10.9 (H) 02/14/2025    LABA1C 7.9 (H) 04/30/2024    LABA1C 7.9 (H) 04/18/2024     Lab Results   Component Value Date    CREATININE 0.91 02/14/2025       Orders:    empagliflozin (JARDIANCE) 25 MG tablet; Take 1 tablet by mouth daily    [facility] DIABETES FOOT EXAM

## 2025-05-18 NOTE — ASSESSMENT & PLAN NOTE
Chronic problem.  Anemia previously resolved.  Will update labs.  Lab Results   Component Value Date    IRON 76 02/14/2025    TIBC 367 02/14/2025    FERRITIN 155 02/14/2025     Lab Results   Component Value Date    WBC 6.2 02/14/2025    HGB 14.2 02/14/2025    HCT 42.8 02/14/2025    MCV 89.0 02/14/2025     02/14/2025     No results found for: \"RETICCTPCT\"  Lab Results   Component Value Date    LLPNQIRR09 444 02/14/2025     Lab Results   Component Value Date    FOLATE >20.0 (H) 02/14/2025     No components found for: \"TRANSFERIN\"

## 2025-05-18 NOTE — PATIENT INSTRUCTIONS
be tempted to eat it. Cook dishes that have a lot of veggies in them, such as stir-fries and soups.  Foods high in fiber may reduce your cholesterol and provide important vitamins and minerals. High-fiber foods include whole-grain cereals and breads, oatmeal, beans, brown rice, citrus fruits, and apples.  Buy whole-grain breads and cereals instead of white bread and pastries.  Limit saturated fat  Read food labels and try to avoid saturated fat and trans fat. They increase your risk of heart disease.  Use olive or canola oil when you cook. Try cholesterol-lowering spreads, such as Benecol or Take Control.  Bake, broil, grill, or steam foods instead of frying them.  Limit the amount of high-fat meats you eat, including hot dogs and sausages. Cut out all visible fat when you prepare meat.  Eat fish, skinless poultry, and soy products such as tofu instead of high-fat meats. Soybeans may be especially good for your heart. Eat at least two servings of fish a week. Certain fish, such as salmon, contain omega-3 fatty acids, which may help reduce your risk of heart attack.  Choose low-fat or fat-free milk and dairy products.  Get exercise, limit alcohol, and quit smoking  Get more exercise. Work with your doctor to set up an exercise program. Even if you can do only a small amount, exercise will help you get stronger, have more energy, and manage your weight and your stress. Walking is an easy way to get exercise. Gradually increase the amount you walk every day. Aim for at least 30 minutes on most days of the week. You also may want to swim, bike, or do other activities.  Limit alcohol to no more than 2 drinks a day for men and 1 drink a day for women.  Do not smoke. If you need help quitting, talk to your doctor about stop-smoking programs and medicines. These can increase your chances of quitting for good.  When should you call for help?  Call 911 anytime you think you may need emergency care. For example, call if:  You

## 2025-05-18 NOTE — ASSESSMENT & PLAN NOTE
Chronic problem.  Taking medication regularly.  Had been out of Jardiance which could contribute to BP control as well as diabetes.  Add HCTZ 12.5 mg daily  BP Readings from Last 3 Encounters:   06/09/25 (!) 150/66   02/04/25 (!) 143/70   01/17/25 (!) 158/80       Orders:    hydroCHLOROthiazide 12.5 MG capsule; Take 1 capsule by mouth every morning

## 2025-05-18 NOTE — ASSESSMENT & PLAN NOTE
Chronic problem.  Previous elevated triglycerides only.  Will update labs.  If elevation continues would increase the Lipitor to 40 mg daily.  Lab Results   Component Value Date    CHOL 197 02/14/2025    TRIG 253 (H) 02/14/2025    HDL 81 (H) 02/14/2025    LDL 65.4 02/14/2025    VLDL 50.6 02/14/2025    CHOLHDLRATIO 2.4 02/14/2025

## 2025-05-18 NOTE — ASSESSMENT & PLAN NOTE
We will follow this along with the specialist that the patient sees for this problem.  No change in medication or treatment on our part at this time.

## 2025-05-18 NOTE — ASSESSMENT & PLAN NOTE
We went over diet and exercise.  Download my fitness pal.  Walk 150 minutes a week,  Wt Readings from Last 3 Encounters:   06/09/25 112 kg (247 lb)   02/04/25 111.1 kg (245 lb)   01/17/25 108.8 kg (239 lb 12.8 oz)

## 2025-06-04 ENCOUNTER — COMMUNITY OUTREACH (OUTPATIENT)
Facility: CLINIC | Age: 65
End: 2025-06-04

## 2025-06-09 ENCOUNTER — HOSPITAL ENCOUNTER (OUTPATIENT)
Facility: HOSPITAL | Age: 65
Setting detail: SPECIMEN
Discharge: HOME | End: 2025-06-12
Payer: COMMERCIAL

## 2025-06-09 ENCOUNTER — OFFICE VISIT (OUTPATIENT)
Facility: CLINIC | Age: 65
End: 2025-06-09
Payer: COMMERCIAL

## 2025-06-09 VITALS
BODY MASS INDEX: 45.45 KG/M2 | DIASTOLIC BLOOD PRESSURE: 72 MMHG | HEIGHT: 62 IN | HEART RATE: 64 BPM | RESPIRATION RATE: 16 BRPM | WEIGHT: 247 LBS | TEMPERATURE: 97.1 F | SYSTOLIC BLOOD PRESSURE: 140 MMHG | OXYGEN SATURATION: 95 %

## 2025-06-09 DIAGNOSIS — D64.9 NORMOCYTIC ANEMIA: ICD-10-CM

## 2025-06-09 DIAGNOSIS — E78.00 PURE HYPERCHOLESTEROLEMIA: ICD-10-CM

## 2025-06-09 DIAGNOSIS — E11.21 TYPE 2 DIABETES MELLITUS WITH DIABETIC NEPHROPATHY, WITHOUT LONG-TERM CURRENT USE OF INSULIN (HCC): ICD-10-CM

## 2025-06-09 DIAGNOSIS — I10 ESSENTIAL (PRIMARY) HYPERTENSION: Primary | ICD-10-CM

## 2025-06-09 DIAGNOSIS — R07.9 CHEST PAIN, UNSPECIFIED TYPE: ICD-10-CM

## 2025-06-09 DIAGNOSIS — I10 ESSENTIAL (PRIMARY) HYPERTENSION: ICD-10-CM

## 2025-06-09 DIAGNOSIS — M54.2 NECK PAIN: ICD-10-CM

## 2025-06-09 DIAGNOSIS — C18.1 CANCER OF APPENDIX (HCC): ICD-10-CM

## 2025-06-09 DIAGNOSIS — E66.01 MORBID (SEVERE) OBESITY DUE TO EXCESS CALORIES (HCC): ICD-10-CM

## 2025-06-09 LAB
ALBUMIN SERPL-MCNC: 3.8 G/DL (ref 3.4–5)
ALBUMIN/GLOB SERPL: 1.2 (ref 0.8–1.7)
ALP SERPL-CCNC: 102 U/L (ref 45–117)
ALT SERPL-CCNC: 22 U/L (ref 10–50)
ANION GAP SERPL CALC-SCNC: 12 MMOL/L (ref 3–18)
AST SERPL-CCNC: 28 U/L (ref 10–38)
BASOPHILS # BLD: 0.03 K/UL (ref 0–0.1)
BASOPHILS NFR BLD: 0.5 % (ref 0–2)
BILIRUB SERPL-MCNC: 1.1 MG/DL (ref 0.2–1)
BUN SERPL-MCNC: 11 MG/DL (ref 6–23)
BUN/CREAT SERPL: 13 (ref 12–20)
CALCIUM SERPL-MCNC: 9.7 MG/DL (ref 8.5–10.1)
CHLORIDE SERPL-SCNC: 101 MMOL/L (ref 98–107)
CHOLEST SERPL-MCNC: 182 MG/DL
CO2 SERPL-SCNC: 27 MMOL/L (ref 21–32)
CREAT SERPL-MCNC: 0.91 MG/DL (ref 0.6–1.3)
CREAT UR-MCNC: 80.6 MG/DL (ref 30–125)
DIFFERENTIAL METHOD BLD: ABNORMAL
EOSINOPHIL # BLD: 0.17 K/UL (ref 0–0.4)
EOSINOPHIL NFR BLD: 2.6 % (ref 0–5)
ERYTHROCYTE [DISTWIDTH] IN BLOOD BY AUTOMATED COUNT: 15.2 % (ref 11.6–14.5)
EST. AVERAGE GLUCOSE BLD GHB EST-MCNC: 233 MG/DL
GLOBULIN SER CALC-MCNC: 3.1 G/DL (ref 2–4)
GLUCOSE SERPL-MCNC: 250 MG/DL (ref 74–108)
HBA1C MFR BLD: 9.7 % (ref 4.2–5.6)
HCT VFR BLD AUTO: 37 % (ref 36–48)
HDLC SERPL-MCNC: 50 MG/DL (ref 40–60)
HDLC SERPL: 3.7 (ref 0–5)
HGB BLD-MCNC: 12.4 G/DL (ref 13–16)
IMM GRANULOCYTES # BLD AUTO: 0.01 K/UL (ref 0–0.04)
IMM GRANULOCYTES NFR BLD AUTO: 0.2 % (ref 0–0.5)
LDLC SERPL CALC-MCNC: 89 MG/DL (ref 0–100)
LYMPHOCYTES # BLD: 2.48 K/UL (ref 0.9–3.6)
LYMPHOCYTES NFR BLD: 37.5 % (ref 21–52)
MCH RBC QN AUTO: 29.1 PG (ref 24–34)
MCHC RBC AUTO-ENTMCNC: 33.5 G/DL (ref 31–37)
MCV RBC AUTO: 86.9 FL (ref 78–100)
MICROALBUMIN UR-MCNC: 12.2 MG/DL (ref 0–3)
MICROALBUMIN/CREAT UR-RTO: 151 MG/G (ref 0–30)
MONOCYTES # BLD: 0.38 K/UL (ref 0.05–1.2)
MONOCYTES NFR BLD: 5.7 % (ref 3–10)
NEUTS SEG # BLD: 3.54 K/UL (ref 1.8–8)
NEUTS SEG NFR BLD: 53.5 % (ref 40–73)
NRBC # BLD: 0 K/UL (ref 0–0.01)
NRBC BLD-RTO: 0 PER 100 WBC
PLATELET # BLD AUTO: 172 K/UL (ref 135–420)
PMV BLD AUTO: 11.8 FL (ref 9.2–11.8)
POTASSIUM SERPL-SCNC: 4.4 MMOL/L (ref 3.5–5.5)
PROT SERPL-MCNC: 6.9 G/DL (ref 6.4–8.2)
RBC # BLD AUTO: 4.26 M/UL (ref 4.35–5.65)
SODIUM SERPL-SCNC: 139 MMOL/L (ref 136–145)
TRIGL SERPL-MCNC: 216 MG/DL (ref 0–150)
VLDLC SERPL CALC-MCNC: 43 MG/DL
WBC # BLD AUTO: 6.6 K/UL (ref 4.6–13.2)

## 2025-06-09 PROCEDURE — 3078F DIAST BP <80 MM HG: CPT | Performed by: EMERGENCY MEDICINE

## 2025-06-09 PROCEDURE — 82570 ASSAY OF URINE CREATININE: CPT

## 2025-06-09 PROCEDURE — 82043 UR ALBUMIN QUANTITATIVE: CPT

## 2025-06-09 PROCEDURE — 80061 LIPID PANEL: CPT

## 2025-06-09 PROCEDURE — 99214 OFFICE O/P EST MOD 30 MIN: CPT | Performed by: EMERGENCY MEDICINE

## 2025-06-09 PROCEDURE — 3046F HEMOGLOBIN A1C LEVEL >9.0%: CPT | Performed by: EMERGENCY MEDICINE

## 2025-06-09 PROCEDURE — 83036 HEMOGLOBIN GLYCOSYLATED A1C: CPT

## 2025-06-09 PROCEDURE — 80053 COMPREHEN METABOLIC PANEL: CPT

## 2025-06-09 PROCEDURE — 3077F SYST BP >= 140 MM HG: CPT | Performed by: EMERGENCY MEDICINE

## 2025-06-09 PROCEDURE — 85025 COMPLETE CBC W/AUTO DIFF WBC: CPT

## 2025-06-09 PROCEDURE — 36415 COLL VENOUS BLD VENIPUNCTURE: CPT

## 2025-06-09 RX ORDER — HYDROCHLOROTHIAZIDE 12.5 MG/1
12.5 CAPSULE ORAL EVERY MORNING
Qty: 90 CAPSULE | Refills: 1 | Status: SHIPPED | OUTPATIENT
Start: 2025-06-09

## 2025-06-09 SDOH — ECONOMIC STABILITY: FOOD INSECURITY: WITHIN THE PAST 12 MONTHS, YOU WORRIED THAT YOUR FOOD WOULD RUN OUT BEFORE YOU GOT MONEY TO BUY MORE.: NEVER TRUE

## 2025-06-09 SDOH — ECONOMIC STABILITY: FOOD INSECURITY: WITHIN THE PAST 12 MONTHS, THE FOOD YOU BOUGHT JUST DIDN'T LAST AND YOU DIDN'T HAVE MONEY TO GET MORE.: NEVER TRUE

## 2025-06-09 ASSESSMENT — PATIENT HEALTH QUESTIONNAIRE - PHQ9
SUM OF ALL RESPONSES TO PHQ QUESTIONS 1-9: 0
SUM OF ALL RESPONSES TO PHQ QUESTIONS 1-9: 0
2. FEELING DOWN, DEPRESSED OR HOPELESS: NOT AT ALL
1. LITTLE INTEREST OR PLEASURE IN DOING THINGS: NOT AT ALL
SUM OF ALL RESPONSES TO PHQ QUESTIONS 1-9: 0
SUM OF ALL RESPONSES TO PHQ QUESTIONS 1-9: 0

## 2025-06-09 NOTE — PROGRESS NOTES
Have you been to the ER, urgent care clinic since your last visit?  Hospitalized since your last visit?   NO    Have you seen or consulted any other health care providers outside our system since your last visit?   NO             
discharged with a diagnosis of elevated LFTs.  Chest pain  No further chest pain pressure palpitations.  Referred to cardiology on last visit..  Onset 2 weeks prior , worse at night.Eats at 20:00. Goes to bed at 23:00  Feels discomfort worse when lying down..Feels like pressure.  Associated dyspnea and nausea  Not brought on by exertion as far as he can tell.  History of a heart murmur but no other cardiovascular history    Right elbow swelling  .  Onset 5 days prior.  At the olecranon process.  No injury. Positive history of gout.   Neck pain     Follow-up onset May 2024  Awakens him from sleep.  It is located in the right lateral neck region.  It is aggravated by rotating the head to the right.  Associated pain radiating to the right arm.Nothing taken for the illness.  No history of injury.  Hypertension    The patient has no headaches, visual changes, chest pain or pressure,dyspnea, orthopnea, or PND. No changes in sensation, speech or strength. There is no problem with the medication.  BP Readings from Last 3 Encounters:   02/04/25 (!) 143/70   01/17/25 (!) 158/80   10/12/24 (!) 173/76     No results found for: \"KLEBER\", \"CREAPOC\"  03/29/19 03/29/2019  4:11 PM, 03/29/2019 12:00 AM (Final)    Narrative  This is a summary report. The complete report is available in the patient's medical record. If you cannot access the medical record, please contact the sending organization for a detailed fax or copy.    · Estimated left ventricular ejection fraction is 56 - 60%. Visually measured ejection fraction. Left ventricular mild concentric hypertrophy. No regional wall motion abnormality noted.    Signed by: Hans Hyatt MD on 3/29/2019  4:11 PM, Signed by: Unknown Provider Result on 3/29/2019 12:00 AM    No results found for this or any previous visit (from the past 4464 hours).     No results found for this or any previous visit.   CT Results (most recent):  @BSHSILASTIMGCAT(MPC2145:1)@  CT BIOPSY ABDOMEN

## 2025-06-11 ENCOUNTER — HOSPITAL ENCOUNTER (OUTPATIENT)
Facility: HOSPITAL | Age: 65
Discharge: HOME | End: 2025-06-14
Attending: INTERNAL MEDICINE
Payer: COMMERCIAL

## 2025-06-11 DIAGNOSIS — C18.1 APPENDIX CARCINOMA (HCC): ICD-10-CM

## 2025-06-11 PROCEDURE — 74177 CT ABD & PELVIS W/CONTRAST: CPT

## 2025-06-11 PROCEDURE — 6360000004 HC RX CONTRAST MEDICATION: Performed by: INTERNAL MEDICINE

## 2025-06-11 RX ORDER — DIATRIZOATE MEGLUMINE AND DIATRIZOATE SODIUM 660; 100 MG/ML; MG/ML
30 SOLUTION ORAL; RECTAL
Status: DISCONTINUED | OUTPATIENT
Start: 2025-06-11 | End: 2025-06-15 | Stop reason: HOSPADM

## 2025-06-11 RX ORDER — IOPAMIDOL 612 MG/ML
100 INJECTION, SOLUTION INTRAVASCULAR
Status: COMPLETED | OUTPATIENT
Start: 2025-06-11 | End: 2025-06-11

## 2025-06-11 RX ADMIN — DIATRIZOATE MEGLUMINE AND DIATRIZOATE SODIUM 30 ML: 660; 100 LIQUID ORAL; RECTAL at 13:35

## 2025-06-11 RX ADMIN — IOPAMIDOL 100 ML: 612 INJECTION, SOLUTION INTRAVENOUS at 13:35

## 2025-06-14 ENCOUNTER — RESULTS FOLLOW-UP (OUTPATIENT)
Facility: CLINIC | Age: 65
End: 2025-06-14

## 2025-06-14 DIAGNOSIS — E78.00 PURE HYPERCHOLESTEROLEMIA: ICD-10-CM

## 2025-06-14 DIAGNOSIS — D64.9 ANEMIA, UNSPECIFIED TYPE: ICD-10-CM

## 2025-06-14 DIAGNOSIS — R80.9 MICROALBUMINURIA: ICD-10-CM

## 2025-06-14 DIAGNOSIS — E11.9 TYPE 2 DIABETES MELLITUS WITHOUT COMPLICATION, WITHOUT LONG-TERM CURRENT USE OF INSULIN (HCC): Primary | ICD-10-CM

## 2025-06-14 RX ORDER — ATORVASTATIN CALCIUM 40 MG/1
40 TABLET, FILM COATED ORAL DAILY
Qty: 90 TABLET | Refills: 3 | Status: SHIPPED | OUTPATIENT
Start: 2025-06-14

## 2025-06-14 RX ORDER — VALSARTAN 160 MG/1
160 TABLET ORAL DAILY
Qty: 90 TABLET | Refills: 3 | Status: SHIPPED | OUTPATIENT
Start: 2025-06-14

## 2025-06-22 ENCOUNTER — APPOINTMENT (OUTPATIENT)
Facility: HOSPITAL | Age: 65
End: 2025-06-22
Payer: COMMERCIAL

## 2025-06-22 ENCOUNTER — HOSPITAL ENCOUNTER (EMERGENCY)
Facility: HOSPITAL | Age: 65
Discharge: HOME | End: 2025-06-22
Attending: STUDENT IN AN ORGANIZED HEALTH CARE EDUCATION/TRAINING PROGRAM
Payer: COMMERCIAL

## 2025-06-22 VITALS
RESPIRATION RATE: 16 BRPM | WEIGHT: 240 LBS | SYSTOLIC BLOOD PRESSURE: 126 MMHG | OXYGEN SATURATION: 98 % | HEIGHT: 62 IN | DIASTOLIC BLOOD PRESSURE: 86 MMHG | BODY MASS INDEX: 44.16 KG/M2 | TEMPERATURE: 98.8 F | HEART RATE: 72 BPM

## 2025-06-22 DIAGNOSIS — R10.13 ABDOMINAL PAIN, EPIGASTRIC: Primary | ICD-10-CM

## 2025-06-22 LAB
ALBUMIN SERPL-MCNC: 3.6 G/DL (ref 3.4–5)
ALBUMIN/GLOB SERPL: 1 (ref 0.8–1.7)
ALP SERPL-CCNC: 107 U/L (ref 45–117)
ALT SERPL-CCNC: 25 U/L (ref 10–50)
ANION GAP SERPL CALC-SCNC: 11 MMOL/L (ref 3–18)
APPEARANCE UR: CLEAR
AST SERPL-CCNC: 31 U/L (ref 10–38)
BASOPHILS # BLD: 0.04 K/UL (ref 0–0.1)
BASOPHILS NFR BLD: 0.6 % (ref 0–2)
BILIRUB SERPL-MCNC: 0.7 MG/DL (ref 0.2–1)
BILIRUB UR QL: NEGATIVE
BUN SERPL-MCNC: 9 MG/DL (ref 6–23)
BUN/CREAT SERPL: 14 (ref 12–20)
CALCIUM SERPL-MCNC: 9.6 MG/DL (ref 8.5–10.1)
CHLORIDE SERPL-SCNC: 104 MMOL/L (ref 98–107)
CO2 SERPL-SCNC: 23 MMOL/L (ref 21–32)
COLOR UR: YELLOW
CREAT SERPL-MCNC: 0.62 MG/DL (ref 0.6–1.3)
DIFFERENTIAL METHOD BLD: ABNORMAL
EOSINOPHIL # BLD: 0.17 K/UL (ref 0–0.4)
EOSINOPHIL NFR BLD: 2.7 % (ref 0–5)
ERYTHROCYTE [DISTWIDTH] IN BLOOD BY AUTOMATED COUNT: 14.3 % (ref 11.6–14.5)
GLOBULIN SER CALC-MCNC: 3.6 G/DL (ref 2–4)
GLUCOSE SERPL-MCNC: 299 MG/DL (ref 74–108)
GLUCOSE UR STRIP.AUTO-MCNC: >1000 MG/DL
HCT VFR BLD AUTO: 33.5 % (ref 36–48)
HGB BLD-MCNC: 11.6 G/DL (ref 13–16)
HGB UR QL STRIP: NEGATIVE
IMM GRANULOCYTES # BLD AUTO: 0.02 K/UL (ref 0–0.04)
IMM GRANULOCYTES NFR BLD AUTO: 0.3 % (ref 0–0.5)
KETONES UR QL STRIP.AUTO: NEGATIVE MG/DL
LEUKOCYTE ESTERASE UR QL STRIP.AUTO: NEGATIVE
LIPASE SERPL-CCNC: 38 U/L (ref 13–75)
LYMPHOCYTES # BLD: 2.66 K/UL (ref 0.9–3.6)
LYMPHOCYTES NFR BLD: 41.8 % (ref 21–52)
MCH RBC QN AUTO: 29.3 PG (ref 24–34)
MCHC RBC AUTO-ENTMCNC: 34.6 G/DL (ref 31–37)
MCV RBC AUTO: 84.6 FL (ref 78–100)
MONOCYTES # BLD: 0.36 K/UL (ref 0.05–1.2)
MONOCYTES NFR BLD: 5.7 % (ref 3–10)
NEUTS SEG # BLD: 3.11 K/UL (ref 1.8–8)
NEUTS SEG NFR BLD: 48.9 % (ref 40–73)
NITRITE UR QL STRIP.AUTO: NEGATIVE
NRBC # BLD: 0 K/UL (ref 0–0.01)
NRBC BLD-RTO: 0 PER 100 WBC
PH UR STRIP: 5.5 (ref 5–8)
PLATELET # BLD AUTO: 164 K/UL (ref 135–420)
PMV BLD AUTO: 11.5 FL (ref 9.2–11.8)
POTASSIUM SERPL-SCNC: 3.7 MMOL/L (ref 3.5–5.5)
PROT SERPL-MCNC: 7.2 G/DL (ref 6.4–8.2)
PROT UR STRIP-MCNC: NEGATIVE MG/DL
RBC # BLD AUTO: 3.96 M/UL (ref 4.35–5.65)
SODIUM SERPL-SCNC: 138 MMOL/L (ref 136–145)
SP GR UR REFRACTOMETRY: 1.02 (ref 1–1.03)
TROPONIN T SERPL HS-MCNC: 11.8 NG/L (ref 0–22)
UROBILINOGEN UR QL STRIP.AUTO: 0.2 EU/DL (ref 0.2–1)
WBC # BLD AUTO: 6.4 K/UL (ref 4.6–13.2)

## 2025-06-22 PROCEDURE — 71046 X-RAY EXAM CHEST 2 VIEWS: CPT

## 2025-06-22 PROCEDURE — 93005 ELECTROCARDIOGRAM TRACING: CPT | Performed by: STUDENT IN AN ORGANIZED HEALTH CARE EDUCATION/TRAINING PROGRAM

## 2025-06-22 PROCEDURE — 84484 ASSAY OF TROPONIN QUANT: CPT

## 2025-06-22 PROCEDURE — 83690 ASSAY OF LIPASE: CPT

## 2025-06-22 PROCEDURE — 6360000004 HC RX CONTRAST MEDICATION: Performed by: STUDENT IN AN ORGANIZED HEALTH CARE EDUCATION/TRAINING PROGRAM

## 2025-06-22 PROCEDURE — 81003 URINALYSIS AUTO W/O SCOPE: CPT

## 2025-06-22 PROCEDURE — 6370000000 HC RX 637 (ALT 250 FOR IP): Performed by: STUDENT IN AN ORGANIZED HEALTH CARE EDUCATION/TRAINING PROGRAM

## 2025-06-22 PROCEDURE — 6360000002 HC RX W HCPCS: Performed by: STUDENT IN AN ORGANIZED HEALTH CARE EDUCATION/TRAINING PROGRAM

## 2025-06-22 PROCEDURE — 74177 CT ABD & PELVIS W/CONTRAST: CPT

## 2025-06-22 PROCEDURE — 80053 COMPREHEN METABOLIC PANEL: CPT

## 2025-06-22 PROCEDURE — 96375 TX/PRO/DX INJ NEW DRUG ADDON: CPT

## 2025-06-22 PROCEDURE — 99285 EMERGENCY DEPT VISIT HI MDM: CPT

## 2025-06-22 PROCEDURE — 96374 THER/PROPH/DIAG INJ IV PUSH: CPT

## 2025-06-22 PROCEDURE — 85025 COMPLETE CBC W/AUTO DIFF WBC: CPT

## 2025-06-22 RX ORDER — FAMOTIDINE 20 MG/1
20 TABLET, FILM COATED ORAL
Status: COMPLETED | OUTPATIENT
Start: 2025-06-22 | End: 2025-06-22

## 2025-06-22 RX ORDER — SUCRALFATE 1 G/1
1 TABLET ORAL
Status: COMPLETED | OUTPATIENT
Start: 2025-06-22 | End: 2025-06-22

## 2025-06-22 RX ORDER — MORPHINE SULFATE 15 MG/1
15 TABLET ORAL EVERY 4 HOURS PRN
Qty: 12 TABLET | Refills: 0 | Status: SHIPPED | OUTPATIENT
Start: 2025-06-22 | End: 2025-06-25

## 2025-06-22 RX ORDER — ACETAMINOPHEN 500 MG
500 TABLET ORAL EVERY 6 HOURS PRN
Qty: 25 TABLET | Refills: 0 | Status: SHIPPED | OUTPATIENT
Start: 2025-06-22

## 2025-06-22 RX ORDER — ONDANSETRON 2 MG/ML
4 INJECTION INTRAMUSCULAR; INTRAVENOUS ONCE
Status: COMPLETED | OUTPATIENT
Start: 2025-06-22 | End: 2025-06-22

## 2025-06-22 RX ORDER — ACETAMINOPHEN 500 MG
1000 TABLET ORAL
Status: COMPLETED | OUTPATIENT
Start: 2025-06-22 | End: 2025-06-22

## 2025-06-22 RX ORDER — IOPAMIDOL 612 MG/ML
100 INJECTION, SOLUTION INTRAVASCULAR
Status: COMPLETED | OUTPATIENT
Start: 2025-06-22 | End: 2025-06-22

## 2025-06-22 RX ORDER — MORPHINE SULFATE 4 MG/ML
4 INJECTION, SOLUTION INTRAMUSCULAR; INTRAVENOUS
Refills: 0 | Status: COMPLETED | OUTPATIENT
Start: 2025-06-22 | End: 2025-06-22

## 2025-06-22 RX ADMIN — ACETAMINOPHEN 1000 MG: 500 TABLET ORAL at 16:45

## 2025-06-22 RX ADMIN — IOPAMIDOL 100 ML: 612 INJECTION, SOLUTION INTRAVENOUS at 15:16

## 2025-06-22 RX ADMIN — FAMOTIDINE 20 MG: 20 TABLET, FILM COATED ORAL at 16:45

## 2025-06-22 RX ADMIN — MORPHINE SULFATE 4 MG: 4 INJECTION, SOLUTION INTRAMUSCULAR; INTRAVENOUS at 17:51

## 2025-06-22 RX ADMIN — SUCRALFATE 1 G: 1 TABLET ORAL at 16:45

## 2025-06-22 RX ADMIN — ONDANSETRON 4 MG: 2 INJECTION, SOLUTION INTRAMUSCULAR; INTRAVENOUS at 17:51

## 2025-06-22 ASSESSMENT — PAIN - FUNCTIONAL ASSESSMENT: PAIN_FUNCTIONAL_ASSESSMENT: 0-10

## 2025-06-22 ASSESSMENT — PAIN DESCRIPTION - LOCATION
LOCATION: ABDOMEN
LOCATION: ABDOMEN

## 2025-06-22 ASSESSMENT — PAIN DESCRIPTION - DESCRIPTORS: DESCRIPTORS: CRAMPING;DISCOMFORT;SHARP

## 2025-06-22 ASSESSMENT — PAIN SCALES - GENERAL
PAINLEVEL_OUTOF10: 9
PAINLEVEL_OUTOF10: 7

## 2025-06-22 NOTE — Clinical Note
CT of the Abdomen and Pelvis With Contrast    CLINICAL HISTORY: Appendiceal cancer with abdominal pain for 1 month..    TECHNIQUE: After administration of oral and nonionic intravenous contrast, 5 mm MDCT was obtained of the abdomen and pelvis. Sagittal and coronal reformations then created with the original data set. All CT scans at this facility are performed using dose optimization techniques as appropriate to a performed exam, to include automated exposure control, adjustment of the mA and/or kV according to patient's size (including appropriate matching for site specific examinations), or use of iterative reconstruction technique. i    COMPARISON: 1/17/2025 and 6/11/2025.    FINDINGS:     CT Abdomen and pelvis:    Lung bases: Clear. No effusions.     Heart and pericardium: Normal.     Liver: Liver has a nodular contour. There is scarring towards the dome with hypointense bands and volume loss.    Gallbladder/biliary: Cholecystectomy. No duct dilatation.    Spleen: Normal density and size. There is splenules anteriorly as before in the left upper quadrant.    Pancreas: Normal enhancement. No duct dilatation.     Adrenals: Normal.     Kidneys:  Normally enhancing. No hydronephrosis or nephrolithiasis. There are small cortical cyst measuring less than a centimeter. No specific follow-up is indicated.    Retroperitoneum: Great vessels unremarkable.     Lymph nodes:     Low-attenuation lymph nodes are present at staci hepatis measuring up to 26 mm, stable since June. There is a portacaval lymph node measuring 20 mm, stable since June. There is dominant mass probable necrotic lymph node measuring 3.7 x 4.6 cm anterior to the second part duodenum, previously 3.5 x 4.5 cm and in January 2.0 cm (2:86).    Bowel:    Stomach unremarkable. Duodenum otherwise unremarkable. Small bowel loops are nondilated. There are implants in the mesentery for instance:  - 11 mm right mid abdomen (100), previously 8 mm  - 23 mm right

## 2025-06-22 NOTE — ED TRIAGE NOTES
Patient has stage 3 appendicle cancer abdominal pain x1 month. Last CT scan last week. Immunotherapy tx. Oncologist is with [facility]. Excruciating pain with no relief from medication at home is what prompted today's visit. Patient's spouse is bedside. Last BM around 0100.

## 2025-06-22 NOTE — DISCHARGE INSTRUCTIONS
Return to the emergency department if you have any worsening abdominal pain, intractable nausea or vomiting, inability to eat or drink, urinary complaints, fevers or chills greater than 24 hours, confusion/feeling ill or any new concerns/questions.

## 2025-06-22 NOTE — ED PROVIDER NOTES
SC injection  Commonly known as: TRULICITY  Inject 0.5 mLs into the skin once a week     empagliflozin 25 MG tablet  Commonly known as: Jardiance  Take 1 tablet by mouth daily     hydroCHLOROthiazide 12.5 MG capsule  Take 1 capsule by mouth every morning     ibuprofen 800 MG tablet  Commonly known as: ADVIL;MOTRIN  TAKE 1 TABLET BY MOUTH EVERY 8 HOURS AS NEEDED FOR PAIN     metFORMIN 500 MG extended release tablet  Commonly known as: GLUCOPHAGE-XR  Take 1 tablet by mouth 2 times daily     ophthalmic solution Rhopressa 0.02 % Soln  Generic drug: Netarsudil Dimesylate     prochlorperazine 10 MG tablet  Commonly known as: COMPAZINE     valsartan 160 MG tablet  Commonly known as: DIOVAN  Take 1 tablet by mouth daily              Disposition: Discharge home    Patient condition at time of disposition: Stable    DISCHARGE NOTE:   Pt has been reexamined. Patient has no new complaints, changes, or physical findings.  Care plan outlined and precautions discussed.  Results were reviewed with the patient. All medications were reviewed with the patient. All of pt's questions and concerns were addressed.  Alarm symptoms and return precautions associated with chief complaint and evaluation were reviewed with the patient in detail.  The patient demonstrated adequate understanding.  Patient was instructed to follow up with PCP, as well as given strict return precautions to the ED upon further deterioration. Patient is ready for discharge home.          Dragon Disclaimer     Please note that this dictation was completed with [employer], the computer voice recognition software.  Quite often unanticipated grammatical, syntax, homophones, and other interpretive errors are inadvertently transcribed by the computer software.  Please disregard these errors.  Please excuse any errors that have escaped final proofreading.      Alberto Funes,   EM, PGY-2

## 2025-06-23 LAB
EKG ATRIAL RATE: 63 BPM
EKG DIAGNOSIS: NORMAL
EKG P AXIS: 55 DEGREES
EKG P-R INTERVAL: 174 MS
EKG Q-T INTERVAL: 404 MS
EKG QRS DURATION: 98 MS
EKG QTC CALCULATION (BAZETT): 413 MS
EKG R AXIS: 56 DEGREES
EKG T AXIS: 61 DEGREES
EKG VENTRICULAR RATE: 63 BPM

## 2025-06-23 PROCEDURE — 93010 ELECTROCARDIOGRAM REPORT: CPT | Performed by: INTERNAL MEDICINE

## 2025-06-24 DIAGNOSIS — E78.00 PURE HYPERCHOLESTEROLEMIA: ICD-10-CM

## 2025-06-24 DIAGNOSIS — R06.2 WHEEZING: ICD-10-CM

## 2025-06-25 RX ORDER — ATORVASTATIN CALCIUM 10 MG/1
10 TABLET, FILM COATED ORAL DAILY
Qty: 30 TABLET | Refills: 3 | OUTPATIENT
Start: 2025-06-25

## 2025-06-25 RX ORDER — ALBUTEROL SULFATE 90 UG/1
2 INHALANT RESPIRATORY (INHALATION) 4 TIMES DAILY PRN
Qty: 18 EACH | Refills: 3 | Status: SHIPPED | OUTPATIENT
Start: 2025-06-25

## 2025-08-12 ENCOUNTER — OFFICE VISIT (OUTPATIENT)
Facility: CLINIC | Age: 65
End: 2025-08-12
Payer: COMMERCIAL

## 2025-08-12 VITALS
OXYGEN SATURATION: 97 % | HEIGHT: 62 IN | SYSTOLIC BLOOD PRESSURE: 131 MMHG | RESPIRATION RATE: 18 BRPM | TEMPERATURE: 97.5 F | WEIGHT: 243 LBS | DIASTOLIC BLOOD PRESSURE: 65 MMHG | BODY MASS INDEX: 44.72 KG/M2 | HEART RATE: 56 BPM

## 2025-08-12 DIAGNOSIS — I10 ESSENTIAL (PRIMARY) HYPERTENSION: Primary | ICD-10-CM

## 2025-08-12 DIAGNOSIS — E11.9 TYPE 2 DIABETES MELLITUS WITHOUT COMPLICATION, WITHOUT LONG-TERM CURRENT USE OF INSULIN (HCC): ICD-10-CM

## 2025-08-12 DIAGNOSIS — E66.01 MORBID (SEVERE) OBESITY DUE TO EXCESS CALORIES (HCC): ICD-10-CM

## 2025-08-12 DIAGNOSIS — C18.1 CANCER OF APPENDIX (HCC): ICD-10-CM

## 2025-08-12 DIAGNOSIS — D64.9 NORMOCYTIC ANEMIA: ICD-10-CM

## 2025-08-12 DIAGNOSIS — R06.2 WHEEZING: ICD-10-CM

## 2025-08-12 DIAGNOSIS — R80.9 MICROALBUMINURIA: ICD-10-CM

## 2025-08-12 DIAGNOSIS — E78.00 PURE HYPERCHOLESTEROLEMIA: ICD-10-CM

## 2025-08-12 PROCEDURE — 3046F HEMOGLOBIN A1C LEVEL >9.0%: CPT | Performed by: EMERGENCY MEDICINE

## 2025-08-12 PROCEDURE — 3078F DIAST BP <80 MM HG: CPT | Performed by: EMERGENCY MEDICINE

## 2025-08-12 PROCEDURE — 3075F SYST BP GE 130 - 139MM HG: CPT | Performed by: EMERGENCY MEDICINE

## 2025-08-12 PROCEDURE — 99214 OFFICE O/P EST MOD 30 MIN: CPT | Performed by: EMERGENCY MEDICINE

## 2025-08-12 SDOH — ECONOMIC STABILITY: FOOD INSECURITY: WITHIN THE PAST 12 MONTHS, YOU WORRIED THAT YOUR FOOD WOULD RUN OUT BEFORE YOU GOT MONEY TO BUY MORE.: NEVER TRUE

## 2025-08-12 SDOH — ECONOMIC STABILITY: FOOD INSECURITY: WITHIN THE PAST 12 MONTHS, THE FOOD YOU BOUGHT JUST DIDN'T LAST AND YOU DIDN'T HAVE MONEY TO GET MORE.: NEVER TRUE

## 2025-08-12 ASSESSMENT — PATIENT HEALTH QUESTIONNAIRE - PHQ9
SUM OF ALL RESPONSES TO PHQ QUESTIONS 1-9: 0
2. FEELING DOWN, DEPRESSED OR HOPELESS: NOT AT ALL
SUM OF ALL RESPONSES TO PHQ QUESTIONS 1-9: 0
1. LITTLE INTEREST OR PLEASURE IN DOING THINGS: NOT AT ALL
SUM OF ALL RESPONSES TO PHQ QUESTIONS 1-9: 0
SUM OF ALL RESPONSES TO PHQ QUESTIONS 1-9: 0

## 2025-08-14 ENCOUNTER — TELEPHONE (OUTPATIENT)
Facility: CLINIC | Age: 65
End: 2025-08-14

## 2025-08-21 ASSESSMENT — ENCOUNTER SYMPTOMS
TROUBLE SWALLOWING: 0
ABDOMINAL DISTENTION: 0
COLOR CHANGE: 0
COUGH: 0
SHORTNESS OF BREATH: 0
ABDOMINAL PAIN: 0
PHOTOPHOBIA: 0

## 2025-08-25 ENCOUNTER — OFFICE VISIT (OUTPATIENT)
Facility: CLINIC | Age: 65
End: 2025-08-25
Payer: COMMERCIAL

## 2025-08-25 VITALS
HEIGHT: 62 IN | DIASTOLIC BLOOD PRESSURE: 60 MMHG | SYSTOLIC BLOOD PRESSURE: 128 MMHG | OXYGEN SATURATION: 97 % | WEIGHT: 242 LBS | TEMPERATURE: 98.2 F | RESPIRATION RATE: 18 BRPM | BODY MASS INDEX: 44.53 KG/M2 | HEART RATE: 64 BPM

## 2025-08-25 DIAGNOSIS — I10 ESSENTIAL (PRIMARY) HYPERTENSION: ICD-10-CM

## 2025-08-25 DIAGNOSIS — Z01.818 ENCOUNTER FOR PREOPERATIVE ASSESSMENT: Primary | ICD-10-CM

## 2025-08-25 DIAGNOSIS — E11.9 TYPE 2 DIABETES MELLITUS WITHOUT COMPLICATION, WITHOUT LONG-TERM CURRENT USE OF INSULIN (HCC): ICD-10-CM

## 2025-08-25 PROCEDURE — 3046F HEMOGLOBIN A1C LEVEL >9.0%: CPT | Performed by: EMERGENCY MEDICINE

## 2025-08-25 PROCEDURE — 3074F SYST BP LT 130 MM HG: CPT | Performed by: EMERGENCY MEDICINE

## 2025-08-25 PROCEDURE — 3078F DIAST BP <80 MM HG: CPT | Performed by: EMERGENCY MEDICINE

## 2025-08-25 PROCEDURE — 99213 OFFICE O/P EST LOW 20 MIN: CPT | Performed by: EMERGENCY MEDICINE

## 2025-08-25 SDOH — ECONOMIC STABILITY: FOOD INSECURITY: WITHIN THE PAST 12 MONTHS, THE FOOD YOU BOUGHT JUST DIDN'T LAST AND YOU DIDN'T HAVE MONEY TO GET MORE.: NEVER TRUE

## 2025-08-25 SDOH — ECONOMIC STABILITY: FOOD INSECURITY: WITHIN THE PAST 12 MONTHS, YOU WORRIED THAT YOUR FOOD WOULD RUN OUT BEFORE YOU GOT MONEY TO BUY MORE.: NEVER TRUE

## 2025-08-25 ASSESSMENT — PATIENT HEALTH QUESTIONNAIRE - PHQ9
SUM OF ALL RESPONSES TO PHQ QUESTIONS 1-9: 0
2. FEELING DOWN, DEPRESSED OR HOPELESS: NOT AT ALL
SUM OF ALL RESPONSES TO PHQ QUESTIONS 1-9: 0
SUM OF ALL RESPONSES TO PHQ QUESTIONS 1-9: 0
1. LITTLE INTEREST OR PLEASURE IN DOING THINGS: NOT AT ALL
SUM OF ALL RESPONSES TO PHQ QUESTIONS 1-9: 0

## 2025-09-02 ENCOUNTER — TELEPHONE (OUTPATIENT)
Facility: CLINIC | Age: 65
End: 2025-09-02

## (undated) PROCEDURE — 0DTF4ZZ RESECTION OF RIGHT LARGE INTESTINE, PERCUTANEOUS ENDOSCOPIC APPROACH: ICD-10-PCS

## (undated) PROCEDURE — 8E0W4CZ ROBOTIC ASSISTED PROCEDURE OF TRUNK REGION, PERCUTANEOUS ENDOSCOPIC APPROACH: ICD-10-PCS

## (undated) DEVICE — APPLICATOR MEDICATED 26 CC SOLUTION HI LT ORNG CHLORAPREP

## (undated) DEVICE — STERILE POLYISOPRENE POWDER-FREE SURGICAL GLOVES: Brand: PROTEXIS

## (undated) DEVICE — KIT OR TURNOVER

## (undated) DEVICE — GOWN PLASTIC FILM THMBHKS UNIV BLUE: Brand: CARDINAL HEALTH

## (undated) DEVICE — SYRINGE MED 10ML LUERLOCK TIP W/O SFTY DISP

## (undated) DEVICE — ELECTRODE PT RET AD L9FT HI MOIST COND ADH HYDRGEL CORDED

## (undated) DEVICE — CATHETER SUCT TR FL TIP 14FR W/ O CTRL

## (undated) DEVICE — SUTURE PDS II SZ 1 L36IN ABSRB VLT CTXB L48MM 1/2 CIR BLNT ZB371

## (undated) DEVICE — TROCAR: Brand: KII® SLEEVE

## (undated) DEVICE — TISSUE RETRIEVAL SYSTEM: Brand: INZII RETRIEVAL SYSTEM

## (undated) DEVICE — SOLUTION ANTIFOG VIS SYS CLEARIFY LAPSCP

## (undated) DEVICE — BLADELESS OBTURATOR: Brand: WECK VISTA

## (undated) DEVICE — ARM DRAPE

## (undated) DEVICE — COLUMN DRAPE

## (undated) DEVICE — ELECTRO LUBE IS A SINGLE PATIENT USE DEVICE THAT IS INTENDED TO BE USED ON ELECTROSURGICAL ELECTRODES TO REDUCE STICKING.: Brand: KEY SURGICAL ELECTRO LUBE

## (undated) DEVICE — TRAY,URINE METER,100% SILICONE,16FR10ML: Brand: MEDLINE

## (undated) DEVICE — APPLIER CLP M/L SHFT DIA5MM 15 LIG LIGAMAX 5

## (undated) DEVICE — TROCAR: Brand: KII FIOS FIRST ENTRY

## (undated) DEVICE — FLUFF AND POLYMER UNDERPAD,EXTRA HEAVY: Brand: WINGS

## (undated) DEVICE — LINER SUCT CANSTR 3000CC PLAS SFT PRE ASSEMB W/OUT TBNG W/

## (undated) DEVICE — SYRINGE 20ML LL S/C 50

## (undated) DEVICE — 3M™ STERI-DRAPE™ INSTRUMENT POUCH 1018L: Brand: STERI-DRAPE™

## (undated) DEVICE — SEAL

## (undated) DEVICE — DRAPE TOWEL: Brand: CONVERTORS

## (undated) DEVICE — PACK PROCEDURE SURG MAJ W/ BASIN LF

## (undated) DEVICE — LIGHT HANDLE COVER ,2 PER PACK: Brand: DEROYAL

## (undated) DEVICE — SEALER LAP L37CM MARYLAND JAW OPN NANO COAT MULTIFUNCTIONAL

## (undated) DEVICE — SUTURE MONOCRYL SZ 4-0 L27IN ABSRB UD L24MM PS-1 3/8 CIR PRIM Y935H

## (undated) DEVICE — STAPLER 60: Brand: SUREFORM

## (undated) DEVICE — Device

## (undated) DEVICE — FORCEPS REPROC LAP BABCOCK 5MM

## (undated) DEVICE — TROCAR: Brand: KII® OPTICAL ACCESS SYSTEM

## (undated) DEVICE — SUTURE V-LOC 90 SZ 3-0 L6IN ABSRB VLT V-20 L26MM 1/2 CIR VLOCM0604

## (undated) DEVICE — NEEDLE 25GA X 1.5 IN ECLIPSE

## (undated) DEVICE — YANKAUER,SMOOTH HANDLE,HIGH CAPACITY: Brand: MEDLINE INDUSTRIES, INC.

## (undated) DEVICE — CATHETER THOR 36FR DIA10.7MM POLYVI CHL TRCR TIP STR SFT

## (undated) DEVICE — 2, DISPOSABLE SUCTION/IRRIGATOR WITH DISPOSABLE TIP: Brand: STRYKEFLOW

## (undated) DEVICE — INSUFFLATION NEEDLE TO ESTABLISH PNEUMOPERITONEUM.: Brand: INSUFFLATION NEEDLE

## (undated) DEVICE — Z DISCONTINUED USE 2220241 SUTURE SZ 0 27IN 5/8 CIR UR-6  TAPER PT VIOLET ABSRB VICRYL J603H

## (undated) DEVICE — SOLUTION IRRIG 1000ML 0.9% SOD CHL USP POUR PLAS BTL

## (undated) DEVICE — SUTURE VICRYL SZ 3-0 L27IN ABSRB VLT L26MM SH 1/2 CIR J316H

## (undated) DEVICE — CANNULA ORIG TL CLR W FOAM CUSHIONS AND 14FT SUPL TB 3 CHN

## (undated) DEVICE — TRAP SPEC POLYP REM STRNR CLN DSGN MAGNIFYING WIND DISP

## (undated) DEVICE — LAPAROSCOPIC TROCAR SLEEVE/SINGLE USE: Brand: KII® OPTICAL ACCESS SYSTEM

## (undated) DEVICE — LAPAROSCOPIC ACCESS SYSTEM: Brand: ALEXIS LAPAROSCOPIC SYSTEM

## (undated) DEVICE — INTENDED FOR TISSUE SEPARATION, AND OTHER PROCEDURES THAT REQUIRE A SHARP SURGICAL BLADE TO PUNCTURE OR CUT.: Brand: BARD-PARKER SAFETY BLADES SIZE 11, STERILE

## (undated) DEVICE — STAPLER 60 RELOAD BLUE: Brand: SUREFORM

## (undated) DEVICE — REDUCER: Brand: ENDOWRIST

## (undated) DEVICE — DRESSING,GAUZE,XEROFORM,CURAD,1"X8",ST: Brand: CURAD

## (undated) DEVICE — ENDOSCOPY PUMP TUBING/ CAP SET: Brand: ERBE

## (undated) DEVICE — GAUZE,SPONGE,4"X4",16PLY,STRL,LF,10/TRAY: Brand: MEDLINE

## (undated) DEVICE — MEDI-VAC NON-CONDUCTIVE SUCTION TUBING: Brand: CARDINAL HEALTH

## (undated) DEVICE — SYRINGE MED 25GA 3ML L5/8IN SUBQ PLAS W/ DETACH NDL SFTY

## (undated) DEVICE — SKIN PREP TRAY 4 COMPARTM TRAY: Brand: MEDLINE INDUSTRIES, INC.

## (undated) DEVICE — SUTURE VICRYL SZ 0 L27IN ABSRB UD L36MM CT-1 1/2 CIR J260H

## (undated) DEVICE — FORCEPS LAP DIA5MM BCOCK FEN TIP ROT NONARTICULATING LOK

## (undated) DEVICE — GARMENT,MEDLINE,DVT,INT,CALF,MED, GEN2: Brand: MEDLINE

## (undated) DEVICE — SINGLE-USE POLYPECTOMY SNARE: Brand: CAPTIFLEX

## (undated) DEVICE — KIT,ANTI FOG,W/SPONGE & FLUID,SOFT PACK: Brand: MEDLINE

## (undated) DEVICE — SUTURE VICRYL + SZ 0 L27IN ABSRB VLT L26MM UR-6 5/8 CIR VCP603H

## (undated) DEVICE — SUTURE NONABSORBABLE MONOFILAMENT 3-0 PS-1 18 IN BLK ETHILON 1663H

## (undated) DEVICE — SOLUTION IRRIG 1000ML H2O STRL BLT

## (undated) DEVICE — SUTURE ABSORBABLE BRAIDED 0 OS8 18 IN COAT UD VICRYL J756T

## (undated) DEVICE — TAPE,CLOTH/SILK,CURAD,3"X10YD,LF,40/CS: Brand: CURAD

## (undated) DEVICE — SYRINGE MED 50ML LUERSLIP TIP

## (undated) DEVICE — CANNULA NSL AD TBNG L14FT STD PVC O2 CRV CONN NONFLARED NSL